# Patient Record
Sex: FEMALE | Race: WHITE | NOT HISPANIC OR LATINO | Employment: PART TIME | URBAN - METROPOLITAN AREA
[De-identification: names, ages, dates, MRNs, and addresses within clinical notes are randomized per-mention and may not be internally consistent; named-entity substitution may affect disease eponyms.]

---

## 2017-01-05 ENCOUNTER — TELEPHONE (OUTPATIENT)
Dept: INTERNAL MEDICINE | Facility: CLINIC | Age: 37
End: 2017-01-05

## 2017-01-05 PROBLEM — F19.10 SUBSTANCE ABUSE: Status: ACTIVE | Noted: 2017-01-05

## 2017-01-05 NOTE — TELEPHONE ENCOUNTER
----- Message from TORSTEN Lambert sent at 1/5/2017 11:55 AM EST -----  Please note history of substance abuse in chart. No narcotics.  ----- Message -----     From: Ayala Cruz     Sent: 1/5/2017  11:50 AM       To: TORSTEN Lambert        Added to keyur

## 2017-01-13 RX ORDER — BUPROPION HYDROCHLORIDE 300 MG/1
TABLET ORAL
Qty: 90 TABLET | Refills: 1 | Status: SHIPPED | OUTPATIENT
Start: 2017-01-13 | End: 2017-02-22 | Stop reason: SINTOL

## 2017-02-22 ENCOUNTER — OFFICE VISIT (OUTPATIENT)
Dept: RETAIL CLINIC | Facility: CLINIC | Age: 37
End: 2017-02-22

## 2017-02-22 VITALS
SYSTOLIC BLOOD PRESSURE: 136 MMHG | TEMPERATURE: 99.8 F | RESPIRATION RATE: 20 BRPM | OXYGEN SATURATION: 98 % | DIASTOLIC BLOOD PRESSURE: 88 MMHG | HEART RATE: 121 BPM

## 2017-02-22 DIAGNOSIS — Z76.0 REPEAT PRESCRIPTION ISSUE: ICD-10-CM

## 2017-02-22 DIAGNOSIS — H60.501 ACUTE OTITIS EXTERNA OF RIGHT EAR, UNSPECIFIED TYPE: Primary | ICD-10-CM

## 2017-02-22 PROCEDURE — 99213 OFFICE O/P EST LOW 20 MIN: CPT | Performed by: NURSE PRACTITIONER

## 2017-02-22 RX ORDER — CIPROFLOXACIN HYDROCHLORIDE 3.5 MG/ML
SOLUTION/ DROPS TOPICAL
Qty: 2.5 ML | Refills: 0 | Status: SHIPPED | OUTPATIENT
Start: 2017-02-22 | End: 2017-10-02

## 2017-02-22 RX ORDER — OMEPRAZOLE 20 MG/1
20 CAPSULE, DELAYED RELEASE ORAL 2 TIMES DAILY
Qty: 60 CAPSULE | Refills: 0 | Status: SHIPPED | OUTPATIENT
Start: 2017-02-22 | End: 2017-03-24

## 2017-02-22 NOTE — PATIENT INSTRUCTIONS
"  Otitis Externa  Otitis externa is a bacterial or fungal infection of the outer ear canal. This is the area from the eardrum to the outside of the ear. Otitis externa is sometimes called \"swimmer's ear.\"  CAUSES   Possible causes of infection include:  · Swimming in dirty water.  · Moisture remaining in the ear after swimming or bathing.  · Mild injury (trauma) to the ear.  · Objects stuck in the ear (foreign body).  · Cuts or scrapes (abrasions) on the outside of the ear.  SIGNS AND SYMPTOMS   The first symptom of infection is often itching in the ear canal. Later signs and symptoms may include swelling and redness of the ear canal, ear pain, and yellowish-white fluid (pus) coming from the ear. The ear pain may be worse when pulling on the earlobe.  DIAGNOSIS   Your health care provider will perform a physical exam. A sample of fluid may be taken from the ear and examined for bacteria or fungi.  TREATMENT   Antibiotic ear drops are often given for 10 to 14 days. Treatment may also include pain medicine or corticosteroids to reduce itching and swelling.  HOME CARE INSTRUCTIONS   · Apply antibiotic ear drops to the ear canal as prescribed by your health care provider.  · Take medicines only as directed by your health care provider.  · If you have diabetes, follow any additional treatment instructions from your health care provider.  · Keep all follow-up visits as directed by your health care provider.  PREVENTION   · Keep your ear dry. Use the corner of a towel to absorb water out of the ear canal after swimming or bathing.  · Avoid scratching or putting objects inside your ear. This can damage the ear canal or remove the protective wax that lines the canal. This makes it easier for bacteria and fungi to grow.  · Avoid swimming in lakes, polluted water, or poorly chlorinated pools.  · You may use ear drops made of rubbing alcohol and vinegar after swimming. Combine equal parts of white vinegar and alcohol in a " bottle. Put 3 or 4 drops into each ear after swimming.  SEEK MEDICAL CARE IF:   · You have a fever.  · Your ear is still red, swollen, painful, or draining pus after 3 days.  · Your redness, swelling, or pain gets worse.  · You have a severe headache.  · You have redness, swelling, pain, or tenderness in the area behind your ear.  MAKE SURE YOU:   · Understand these instructions.  · Will watch your condition.  · Will get help right away if you are not doing well or get worse.     This information is not intended to replace advice given to you by your health care provider. Make sure you discuss any questions you have with your health care provider.     Document Released: 12/18/2006 Document Revised: 01/08/2016 Document Reviewed: 09/26/2016  [employer] Interactive Patient Education ©2016 [employer]

## 2017-02-22 NOTE — PROGRESS NOTES
Subjective   Aide Henry is a 36 y.o. female.     HPI Comments: Is also requesting refill on prilosec. Adequate symptom improvement while using but has recently ran out.     Earache    There is pain in both ears. This is a new problem. Episode onset: x3 days. The problem occurs constantly. The problem has been unchanged. There has been no fever. Associated symptoms include coughing (baseline) and ear discharge (yellow, thick). Pertinent negatives include no abdominal pain, diarrhea, headaches, hearing loss, neck pain, rash, rhinorrhea, sore throat or vomiting. She has tried nothing for the symptoms. Her past medical history is significant for a chronic ear infection and a tympanostomy tube. There is no history of hearing loss.       The following portions of the patient's history were reviewed and updated as appropriate: allergies, current medications, past family history, past medical history, past social history, past surgical history and problem list.    Review of Systems   Constitutional: Negative for appetite change, chills, diaphoresis, fatigue and fever.   HENT: Positive for ear discharge (yellow, thick) and ear pain. Negative for congestion, dental problem, facial swelling, hearing loss, mouth sores, nosebleeds, postnasal drip, rhinorrhea, sinus pressure, sneezing, sore throat, tinnitus, trouble swallowing and voice change.    Eyes: Negative for pain, discharge, redness and itching.   Respiratory: Positive for cough (baseline). Negative for chest tightness, shortness of breath, wheezing and stridor.    Cardiovascular: Negative for chest pain and palpitations.   Gastrointestinal: Negative for abdominal pain, constipation, diarrhea, nausea and vomiting.   Genitourinary: Negative for decreased urine volume.   Musculoskeletal: Negative for myalgias, neck pain and neck stiffness.   Skin: Negative for rash.   Neurological: Negative for dizziness, syncope, weakness and headaches.       Objective   Physical Exam    Constitutional: She is oriented to person, place, and time. She appears well-developed and well-nourished. She is cooperative.  Non-toxic appearance. She does not appear ill. No distress.   HENT:   Right Ear: Hearing and external ear normal. There is tenderness. No drainage. No mastoid tenderness. Tympanic membrane is scarred. Tympanic membrane is not perforated, not erythematous, not retracted and not bulging.   Left Ear: Hearing and external ear normal. No drainage. No mastoid tenderness. Tympanic membrane is scarred. Tympanic membrane is not perforated, not erythematous, not retracted and not bulging.   Nose: Nose normal. Right sinus exhibits no maxillary sinus tenderness and no frontal sinus tenderness. Left sinus exhibits no maxillary sinus tenderness and no frontal sinus tenderness.   Mouth/Throat: Uvula is midline, oropharynx is clear and moist and mucous membranes are normal. Tonsils are 0 on the right. Tonsils are 0 on the left.   Tympanostomy tube present in left and right TMs, right canal moderately erythematous   Eyes: Conjunctivae and lids are normal.   Cardiovascular: Normal rate, regular rhythm, S1 normal and S2 normal.    Pulmonary/Chest: Effort normal and breath sounds normal.   Abdominal: Soft. Normal appearance and bowel sounds are normal. There is no tenderness.   Lymphadenopathy:     She has no cervical adenopathy.   Neurological: She is alert and oriented to person, place, and time.   Skin: Skin is warm and dry. She is not diaphoretic. No pallor.   Vitals reviewed.      Assessment/Plan   Aide was seen today for earache.    Diagnoses and all orders for this visit:    Acute otitis externa of right ear, unspecified type  -     ciprofloxacin (CILOXAN) 0.3 % ophthalmic solution; 1 drop in right EAR twice a day x 10 days    Repeat prescription issue  -     omeprazole (PRILOSEC) 20 MG capsule; Take 1 capsule by mouth 2 (Two) Times a Day for 30 days.          -     Follow up with ENT for persistent  symptoms        -     Follow up with urgent treatment for worsening symptoms

## 2017-05-10 ENCOUNTER — OFFICE VISIT (OUTPATIENT)
Dept: INTERNAL MEDICINE | Facility: CLINIC | Age: 37
End: 2017-05-10

## 2017-05-10 VITALS
DIASTOLIC BLOOD PRESSURE: 86 MMHG | WEIGHT: 142 LBS | HEART RATE: 101 BPM | SYSTOLIC BLOOD PRESSURE: 134 MMHG | HEIGHT: 64 IN | OXYGEN SATURATION: 98 % | BODY MASS INDEX: 24.24 KG/M2

## 2017-05-10 DIAGNOSIS — B96.89 BV (BACTERIAL VAGINOSIS): ICD-10-CM

## 2017-05-10 DIAGNOSIS — F19.20 DRUG ADDICTION (HCC): Primary | ICD-10-CM

## 2017-05-10 DIAGNOSIS — F10.10 ALCOHOL ABUSE: ICD-10-CM

## 2017-05-10 DIAGNOSIS — N76.0 BV (BACTERIAL VAGINOSIS): ICD-10-CM

## 2017-05-10 PROCEDURE — 99213 OFFICE O/P EST LOW 20 MIN: CPT | Performed by: NURSE PRACTITIONER

## 2017-05-10 RX ORDER — METRONIDAZOLE 500 MG/1
500 TABLET ORAL 3 TIMES DAILY
Qty: 21 TABLET | Refills: 0 | Status: SHIPPED | OUTPATIENT
Start: 2017-05-10 | End: 2017-10-02

## 2017-05-12 LAB
ALBUMIN SERPL-MCNC: 4.2 G/DL (ref 3.5–5.2)
ALBUMIN/GLOB SERPL: 1.4 G/DL
ALP SERPL-CCNC: 71 U/L (ref 40–129)
ALT SERPL-CCNC: 80 U/L (ref 5–33)
AST SERPL-CCNC: 137 U/L (ref 5–32)
BASOPHILS # BLD AUTO: 0.03 10*3/MM3 (ref 0–0.2)
BASOPHILS NFR BLD AUTO: 0.6 % (ref 0–2)
BILIRUB SERPL-MCNC: 0.2 MG/DL (ref 0.2–1.2)
BUN SERPL-MCNC: 8 MG/DL (ref 6–20)
BUN/CREAT SERPL: 12.1 (ref 7–25)
CALCIUM SERPL-MCNC: 8.9 MG/DL (ref 8.6–10.5)
CHLORIDE SERPL-SCNC: 100 MMOL/L (ref 98–107)
CO2 SERPL-SCNC: 25 MMOL/L (ref 22–29)
CREAT SERPL-MCNC: 0.66 MG/DL (ref 0.57–1)
EOSINOPHIL # BLD AUTO: 0.13 10*3/MM3 (ref 0.1–0.3)
EOSINOPHIL NFR BLD AUTO: 2.6 % (ref 0–4)
ERYTHROCYTE [DISTWIDTH] IN BLOOD BY AUTOMATED COUNT: 12.8 % (ref 11.5–14.5)
ETHANOL BLD GC-MCNC: 0.17 %
GLOBULIN SER CALC-MCNC: 3.1 GM/DL
GLUCOSE SERPL-MCNC: 90 MG/DL (ref 65–99)
HAV IGM SERPL QL IA: NEGATIVE
HBV CORE IGM SERPL QL IA: NEGATIVE
HBV SURFACE AG SERPL QL IA: NEGATIVE
HCT VFR BLD AUTO: 42.7 % (ref 37–47)
HCV AB S/CO SERPL IA: <0.1 S/CO RATIO (ref 0–0.9)
HGB BLD-MCNC: 14.3 G/DL (ref 12–16)
HIV 1+2 AB+HIV1 P24 AG SERPL QL IA: NON REACTIVE
IMM GRANULOCYTES # BLD: 0.04 10*3/MM3 (ref 0–0.03)
IMM GRANULOCYTES NFR BLD: 0.8 % (ref 0–0.5)
LYMPHOCYTES # BLD AUTO: 1.57 10*3/MM3 (ref 0.6–4.8)
LYMPHOCYTES NFR BLD AUTO: 31.7 % (ref 20–45)
MCH RBC QN AUTO: 31.8 PG (ref 27–31)
MCHC RBC AUTO-ENTMCNC: 33.5 G/DL (ref 31–37)
MCV RBC AUTO: 95.1 FL (ref 81–99)
MONOCYTES # BLD AUTO: 0.43 10*3/MM3 (ref 0–1)
MONOCYTES NFR BLD AUTO: 8.7 % (ref 3–8)
NEUTROPHILS # BLD AUTO: 2.76 10*3/MM3 (ref 1.5–8.3)
NEUTROPHILS NFR BLD AUTO: 55.6 % (ref 45–70)
NRBC BLD AUTO-RTO: 0 /100 WBC (ref 0–0)
PLATELET # BLD AUTO: 323 10*3/MM3 (ref 140–500)
POTASSIUM SERPL-SCNC: 4.1 MMOL/L (ref 3.5–5.2)
PROT SERPL-MCNC: 7.3 G/DL (ref 6–8.5)
RBC # BLD AUTO: 4.49 10*6/MM3 (ref 4.2–5.4)
SODIUM SERPL-SCNC: 141 MMOL/L (ref 136–145)
WBC # BLD AUTO: 4.96 10*3/MM3 (ref 4.8–10.8)

## 2017-05-15 ENCOUNTER — TELEPHONE (OUTPATIENT)
Dept: INTERNAL MEDICINE | Facility: CLINIC | Age: 37
End: 2017-05-15

## 2017-05-16 LAB
AMPHETAMINES UR QL SCN: NEGATIVE NG/ML
BARBITURATES UR QL SCN: NEGATIVE NG/ML
BENZODIAZ UR QL SCN: NEGATIVE NG/ML
BZE UR QL: POSITIVE
CANNABINOIDS UR QL CFM: POSITIVE
CREAT UR-MCNC: 52 MG/DL (ref 20–300)
FENTANYL+NORFENTANYL UR QL SCN: NEGATIVE PG/ML
Lab: ABNORMAL
MEPERIDINE UR QL: NEGATIVE NG/ML
METHADONE UR QL SCN: NEGATIVE NG/ML
OPIATES UR QL SCN: NEGATIVE NG/ML
OXYCODONE+OXYMORPHONE UR QL SCN: NEGATIVE NG/ML
PCP UR QL: NEGATIVE NG/ML
PH UR: 6.9 [PH] (ref 4.5–8.9)
PROPOXYPH UR QL SCN: NEGATIVE NG/ML
SP GR UR: 1.01
TRAMADOL UR QL SCN: NEGATIVE NG/ML

## 2017-09-13 ENCOUNTER — HOSPITAL ENCOUNTER (EMERGENCY)
Facility: HOSPITAL | Age: 37
Discharge: HOME | End: 2017-09-13
Attending: EMERGENCY MEDICINE | Admitting: EMERGENCY MEDICINE

## 2017-09-13 VITALS
DIASTOLIC BLOOD PRESSURE: 84 MMHG | RESPIRATION RATE: 14 BRPM | HEIGHT: 64 IN | WEIGHT: 158 LBS | OXYGEN SATURATION: 99 % | SYSTOLIC BLOOD PRESSURE: 124 MMHG | HEART RATE: 85 BPM | BODY MASS INDEX: 26.98 KG/M2 | TEMPERATURE: 98.5 F

## 2017-09-13 DIAGNOSIS — L02.31 LEFT BUTTOCK ABSCESS: Primary | ICD-10-CM

## 2017-09-13 PROCEDURE — 99283 EMERGENCY DEPT VISIT LOW MDM: CPT

## 2017-09-13 PROCEDURE — 10060 I&D ABSCESS SIMPLE/SINGLE: CPT | Performed by: EMERGENCY MEDICINE

## 2017-09-13 RX ORDER — CEPHALEXIN 500 MG/1
500 CAPSULE ORAL 4 TIMES DAILY
Qty: 28 CAPSULE | Refills: 0 | Status: SHIPPED | OUTPATIENT
Start: 2017-09-13 | End: 2017-09-20

## 2017-09-13 RX ORDER — SULFAMETHOXAZOLE AND TRIMETHOPRIM 800; 160 MG/1; MG/1
1 TABLET ORAL ONCE
Status: COMPLETED | OUTPATIENT
Start: 2017-09-13 | End: 2017-09-13

## 2017-09-13 RX ORDER — TRAMADOL HYDROCHLORIDE 50 MG/1
50 TABLET ORAL EVERY 8 HOURS PRN
Qty: 10 TABLET | Refills: 0 | Status: SHIPPED | OUTPATIENT
Start: 2017-09-13 | End: 2017-09-16

## 2017-09-13 RX ORDER — SULFAMETHOXAZOLE AND TRIMETHOPRIM 800; 160 MG/1; MG/1
1 TABLET ORAL 2 TIMES DAILY
Qty: 14 TABLET | Refills: 0 | Status: SHIPPED | OUTPATIENT
Start: 2017-09-13 | End: 2017-09-20

## 2017-09-13 RX ORDER — CEPHALEXIN 500 MG/1
500 CAPSULE ORAL ONCE
Status: COMPLETED | OUTPATIENT
Start: 2017-09-13 | End: 2017-09-13

## 2017-09-13 RX ORDER — HYDROCODONE BITARTRATE AND ACETAMINOPHEN 5; 325 MG/1; MG/1
1 TABLET ORAL ONCE
Status: COMPLETED | OUTPATIENT
Start: 2017-09-13 | End: 2017-09-13

## 2017-09-13 RX ADMIN — SULFAMETHOXAZOLE AND TRIMETHOPRIM 160 MG: 800; 160 TABLET ORAL at 11:09

## 2017-09-13 RX ADMIN — HYDROCODONE BITARTRATE AND ACETAMINOPHEN 1 TABLET: 5; 325 TABLET ORAL at 11:09

## 2017-09-13 RX ADMIN — CEPHALEXIN 500 MG: 500 CAPSULE ORAL at 11:09

## 2017-09-13 NOTE — ED PROVIDER NOTES
Subjective   History of Present Illness  History of Present Illness    Chief complaint: Abscess    Location: Left buttocks    Quality/Severity:  Moderate to severe pain    Timing/Duration: Worsening over the past one week    Modifying Factors: Worse with sitting or lying on the buttocks.    Narrative: This patient presents for evaluation of worsening pain to the left buttocks from an abscess.  She has been in nursing home for the past couple of months.  She says that she was diagnosed with the abscess about 1 week ago and they started her on Bactrim therapy which she has been taking for about 5 or 6 days.  She says that in spite of taking the Bactrim medication she continues having a lot of pain and swelling and redness in the buttocks and she is worried that the abscess is growing.  She denies any fevers.  She denies any drainage.  She denies any other areas of swelling or redness or concern.    Associated Symptoms: As above    Review of Systems   Constitutional: Negative for activity change and fever.   HENT: Negative.    Respiratory: Negative for shortness of breath.    Cardiovascular: Negative for chest pain.   Gastrointestinal: Negative for abdominal pain.   Musculoskeletal: Positive for back pain and myalgias.   Skin: Positive for rash. Negative for color change.   Neurological: Negative for syncope and headaches.   All other systems reviewed and are negative.      Past Medical History:   Diagnosis Date   • Anxiety    • Bacterial vaginosis    • Fibromyalgia    • Lateral epicondylitis 2013   • Lupus    • Sjogren's syndrome    • Urinary tract infection        Allergies   Allergen Reactions   • Fluconazole        Past Surgical History:   Procedure Laterality Date   • ADENOIDECTOMY     •  SECTION     • MOUTH SURGERY     • TONSILLECTOMY     • TUBAL ABDOMINAL LIGATION     • TYMPANOSTOMY TUBE PLACEMENT     • WISDOM TOOTH EXTRACTION Bilateral        Family History   Problem Relation Age of Onset   • Cancer Father  "   • Heart disease Paternal Grandmother        Social History     Social History   • Marital status:      Spouse name: N/A   • Number of children: N/A   • Years of education: N/A     Social History Main Topics   • Smoking status: Current Every Day Smoker     Packs/day: 0.50     Years: 20.00     Types: Cigarettes   • Smokeless tobacco: Never Used   • Alcohol use No   • Drug use: No      Comment: \"recovering addict\"   • Sexual activity: Yes     Other Topics Concern   • None     Social History Narrative   • None     ED Triage Vitals   Temp Heart Rate Resp BP SpO2   09/13/17 1025 09/13/17 1025 09/13/17 1025 09/13/17 1025 09/13/17 1025   98.5 °F (36.9 °C) 105 14 125/85 100 %      Temp src Heart Rate Source Patient Position BP Location FiO2 (%)   09/13/17 1025 09/13/17 1025 09/13/17 1025 09/13/17 1025 --   Oral Monitor Sitting Right arm            Objective   Physical Exam   Constitutional: She is oriented to person, place, and time. She appears well-developed and well-nourished.   HENT:   Head: Normocephalic and atraumatic.   Eyes: EOM are normal. Pupils are equal, round, and reactive to light. Right eye exhibits no discharge. Left eye exhibits no discharge.   Neck: Normal range of motion. Neck supple.   Cardiovascular: Normal rate and intact distal pulses.    Pulmonary/Chest: Effort normal. No respiratory distress.   Musculoskeletal: Normal range of motion. She exhibits tenderness. She exhibits no edema or deformity.   Neurological: She is alert and oriented to person, place, and time.   Skin: Skin is warm and dry. No rash noted. There is erythema. No pallor.   The left medial buttocks shows an area of moderate erythema and induration and some mild central fluctuance.  This abscess tissue is approximately 6 cm in diameter and is very tender to palpation.  There is no open lesion or drainage.   Psychiatric: She has a normal mood and affect. Her behavior is normal. Judgment and thought content normal.   Nursing " "note and vitals reviewed.      Incision & Drainage  Date/Time: 9/13/2017 12:15 PM  Performed by: TAVO ABREU  Authorized by: TAVO ABREU     Consent:     Consent obtained:  Verbal    Consent given by:  Patient    Risks discussed:  Bleeding, incomplete drainage, infection, pain and damage to other organs    Alternatives discussed:  No treatment and delayed treatment  Location:     Type:  Abscess    Location:  Lower extremity    Lower extremity location:  L buttock  Pre-procedure details:     Skin preparation:  Betadine  Anesthesia (see MAR for exact dosages):     Anesthesia method:  Local infiltration    Local anesthetic:  Lidocaine 2% WITH epi  Procedure details:     Complexity:  Simple    Needle aspiration: no      Incision types:  Single straight    Incision depth:  Subcutaneous    Scalpel blade:  11    Wound management:  Probed and deloculated    Drainage:  Purulent    Drainage amount:  Copious    Wound treatment:  Wound left open    Packing materials:  1/4 in iodoform gauze    Amount 1/4\" iodoform:  9\"  Post-procedure details:     Patient tolerance of procedure:  Tolerated well, no immediate complications             ED Course  ED Course   Comment By Time   Patient presented with a moderate sized abscess on the left buttocks.  It required incision and drainage which I performed at the bedside.  A moderate amount of purulent debris was evacuated and I packed the wound with iodoform gauze.  We gave her a dose of Keflex and Bactrim here.  I'll send her home with a continued prescription for both medications for one week.  Advised to remove the gauze tomorrow evening.  Gave her the basic abscess wound care instructions and also the usual \"return to ER\" instructions for any further concerns. Tavo Abreu MD 09/13 1710                  MDM  Number of Diagnoses or Management Options  Left buttock abscess:   Risk of Complications, Morbidity, and/or Mortality  Presenting problems: moderate  Diagnostic " procedures: moderate  Management options: moderate        Final diagnoses:   Left buttock abscess            Adalid Abreu MD  09/13/17 4580

## 2017-09-13 NOTE — DISCHARGE INSTRUCTIONS
Take both antibiotics as directed until they're completed.  Remove the gauze packing on Thursday evening as we discussed.  Please return to the emergency room for any worsening pain, redness, swelling, fevers or any other concerns.

## 2017-09-19 ENCOUNTER — OFFICE VISIT (OUTPATIENT)
Dept: INTERNAL MEDICINE | Facility: CLINIC | Age: 37
End: 2017-09-19

## 2017-09-19 VITALS
WEIGHT: 160 LBS | HEART RATE: 111 BPM | OXYGEN SATURATION: 96 % | HEIGHT: 64 IN | BODY MASS INDEX: 27.31 KG/M2 | DIASTOLIC BLOOD PRESSURE: 72 MMHG | SYSTOLIC BLOOD PRESSURE: 118 MMHG

## 2017-09-19 DIAGNOSIS — IMO0001 WOUND ABSCESS, INITIAL ENCOUNTER: Primary | ICD-10-CM

## 2017-09-19 DIAGNOSIS — F19.20: ICD-10-CM

## 2017-09-19 PROBLEM — IMO0002 WOUND ABSCESS: Status: ACTIVE | Noted: 2017-09-19

## 2017-09-19 PROCEDURE — 99213 OFFICE O/P EST LOW 20 MIN: CPT | Performed by: NURSE PRACTITIONER

## 2017-09-25 ENCOUNTER — TELEPHONE (OUTPATIENT)
Dept: INTERNAL MEDICINE | Facility: CLINIC | Age: 37
End: 2017-09-25

## 2017-09-25 LAB
BACTERIA SPEC AEROBE CULT: NORMAL
BACTERIA SPEC ANAEROBE CULT: NORMAL
BACTERIA SPEC CULT: NORMAL
BACTERIA SPEC CULT: NORMAL

## 2017-09-25 NOTE — TELEPHONE ENCOUNTER
Patient advised, abscess is still there. Ok with surgery consult.    ----- Message from TORSTEN Lambert sent at 9/25/2017  9:17 AM EDT -----  Culture was negative. If abscess still present will need surgery consult.  ----- Message -----     From: Troy Moreno Results In     Sent: 9/25/2017   8:08 AM       To: TORSTEN Lambert

## 2017-09-26 DIAGNOSIS — L02.91 ABSCESS: Primary | ICD-10-CM

## 2017-10-02 ENCOUNTER — OFFICE VISIT (OUTPATIENT)
Dept: SURGERY | Facility: CLINIC | Age: 37
End: 2017-10-02

## 2017-10-02 VITALS
SYSTOLIC BLOOD PRESSURE: 118 MMHG | DIASTOLIC BLOOD PRESSURE: 70 MMHG | HEIGHT: 64 IN | BODY MASS INDEX: 27.31 KG/M2 | WEIGHT: 160 LBS

## 2017-10-02 DIAGNOSIS — IMO0001 WOUND ABSCESS, SEQUELA: Primary | ICD-10-CM

## 2017-10-02 PROCEDURE — 99201 PR OFFICE OUTPATIENT NEW 10 MINUTES: CPT | Performed by: SURGERY

## 2017-10-02 NOTE — PROGRESS NOTES
PATIENT INFORMATION  Aide Henry  ABSCESS ON BUTTOCKS, PT HAS BEEN ON FLAGYL AND IT DID HELP THE AREA, PT STATES AREA HAS DRAINED AND IS NOW HEALED BUT AREA IS STILL HARD     - 1980    CHIEF COMPLAINT  Chief Complaint   Patient presents with   • Abscess       HISTORY OF PRESENT ILLNESS  HPI she complains of an abscess on her left buttock.  She says it initially drained and then she went to the emergency room in mid September and this was I&D.  She was placed on Bactrim and then subsequently placed on a second course of Bactrim by her primary care doctor.  Her cultures were no growth.  She denies any drainage from the area now in the area has sealed.  She denies any fevers or chills.  She denies a prior cyst or mass at this site.  She's not had a problem here prior.  She complains of some thickening at the site.        REVIEW OF SYSTEMS  Review of Systems   Constitutional: Negative.    HENT: Negative.    Eyes: Negative.    Respiratory: Negative.    Cardiovascular: Negative.    Gastrointestinal: Negative.    Endocrine: Negative.    Genitourinary: Negative.    Musculoskeletal: Negative.    Skin: Negative.    Allergic/Immunologic: Negative.    Neurological: Negative.    Hematological: Negative.    Psychiatric/Behavioral: Negative.          ACTIVE PROBLEMS  Patient Active Problem List    Diagnosis   • Wound abscess [T81.4XXA]   • Drug addiction syndrome [F19.20]   • Drug addiction [F19.20]   • Alcohol abuse [F10.10]   • Substance abuse [F19.10]   • Bilateral carpal tunnel syndrome [G56.03]   • Eczema [L30.9]   • Shoulder pain, left [M25.512]   • Anxiety [F41.9]   • Arthralgia of multiple joints [M25.50]   • Chronic back pain [M54.9, G89.29]   • Bacterial vaginosis [N76.0, B96.89]   • Chronic constipation [K59.09]   • Disorder of connective tissue [M35.9]   • Foreign body of cornea [T15.00XA]   • Mixed anxiety depressive disorder [F41.8]   • Fever [R50.9]   • Fibromyalgia [M79.7]   • Gastroesophageal reflux  "disease without esophagitis [K21.9]   • Irritable bowel syndrome [K58.9]   • Muscle pain [M79.1]   • Palpitations [R00.2]   • Seasonal allergic rhinitis [J30.2]   • Sjogren's syndrome [M35.00]   • 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           PAST MEDICAL HISTORY  Past Medical History:   Diagnosis Date   • Anxiety    • Bacterial vaginosis    • Fibromyalgia    • Lateral epicondylitis 2013   • Lupus    • Sjogren's syndrome    • Urinary tract infection          SURGICAL HISTORY  Past Surgical History:   Procedure Laterality Date   • ADENOIDECTOMY     •  SECTION     • MOUTH SURGERY     • TONSILLECTOMY     • TUBAL ABDOMINAL LIGATION     • TYMPANOSTOMY TUBE PLACEMENT     • WISDOM TOOTH EXTRACTION Bilateral          FAMILY HISTORY  Family History   Problem Relation Age of Onset   • Cancer Father    • Heart disease Paternal Grandmother          SOCIAL HISTORY  Social History     Occupational History   • Not on file.     Social History Main Topics   • Smoking status: Current Every Day Smoker     Packs/day: 0.50     Years: 20.00     Types: Cigarettes   • Smokeless tobacco: Never Used   • Alcohol use Yes      Comment: Last drink    • Drug use: Yes     Special: Cocaine      Comment: \"recovering addict\"   • Sexual activity: Yes         CURRENT MEDICATIONS  No current outpatient prescriptions on file.    ALLERGIES  Fluconazole    VITALS  Vitals:    10/02/17 1415   BP: 118/70   Weight: 160 lb (72.6 kg)   Height: 64\" (162.6 cm)       LAST RESULTS   Office Visit on 2017   Component Date Value Ref Range Status   • Aer Anaer Result 1 2017 Final report   Final   • Result 1 2017 Comment   Final    No aerobic or anaerobic growth in 72 hours.   • Culture 2017 Final report   Final   • Result 1 2017 Comment   Final    No growth in 36 - 48 hours.     No " results found.    PHYSICAL EXAM  Physical Exam  alert white female in no active distress.  There is a 1 mm scab over an I&D site.  There is no evidence of fluctuance induration cellulitis or recurrent abscess.  She does have a moderate amount of thickening in the soft tissue and this is consistent with a normal healing process.  Her cultures were no growth and I reviewed her primary care and her ER records.    ASSESSMENT  Healing abscess      PLAN  I do not feel that any additional medication or intervention needs to be undertaken.  The induration and thickening in the soft tissue we'll continue to resolve.  I will see her back when necessary.

## 2017-10-30 ENCOUNTER — OFFICE VISIT (OUTPATIENT)
Dept: INTERNAL MEDICINE | Facility: CLINIC | Age: 37
End: 2017-10-30

## 2017-10-30 VITALS
RESPIRATION RATE: 16 BRPM | BODY MASS INDEX: 27.66 KG/M2 | WEIGHT: 162 LBS | HEIGHT: 64 IN | OXYGEN SATURATION: 98 % | DIASTOLIC BLOOD PRESSURE: 80 MMHG | SYSTOLIC BLOOD PRESSURE: 120 MMHG | HEART RATE: 96 BPM

## 2017-10-30 DIAGNOSIS — K21.9 GASTROESOPHAGEAL REFLUX DISEASE WITHOUT ESOPHAGITIS: ICD-10-CM

## 2017-10-30 DIAGNOSIS — M35.01 SJOGREN'S SYNDROME WITH KERATOCONJUNCTIVITIS SICCA (HCC): ICD-10-CM

## 2017-10-30 DIAGNOSIS — N76.1 SUBACUTE VAGINITIS: Primary | ICD-10-CM

## 2017-10-30 DIAGNOSIS — Z96.22 HISTORY OF PLACEMENT OF EAR TUBES: ICD-10-CM

## 2017-10-30 LAB
BILIRUB BLD-MCNC: NEGATIVE MG/DL
CLARITY, POC: CLEAR
COLOR UR: YELLOW
GLUCOSE UR STRIP-MCNC: NEGATIVE MG/DL
KETONES UR QL: NEGATIVE
LEUKOCYTE EST, POC: ABNORMAL
NITRITE UR-MCNC: NEGATIVE MG/ML
PH UR: 8.5 [PH] (ref 5–8)
PROT UR STRIP-MCNC: NEGATIVE MG/DL
RBC # UR STRIP: NEGATIVE /UL
SP GR UR: 1.02 (ref 1–1.03)
UROBILINOGEN UR QL: NORMAL

## 2017-10-30 PROCEDURE — 99214 OFFICE O/P EST MOD 30 MIN: CPT | Performed by: NURSE PRACTITIONER

## 2017-10-30 PROCEDURE — 81003 URINALYSIS AUTO W/O SCOPE: CPT | Performed by: NURSE PRACTITIONER

## 2017-10-30 RX ORDER — OMEPRAZOLE 20 MG/1
20 CAPSULE, DELAYED RELEASE ORAL DAILY
Qty: 30 CAPSULE | Refills: 2 | Status: SHIPPED | OUTPATIENT
Start: 2017-10-30 | End: 2018-04-28 | Stop reason: SDUPTHER

## 2017-10-30 RX ORDER — AZITHROMYCIN 500 MG/1
1000 TABLET, FILM COATED ORAL ONCE
Qty: 2 TABLET | Refills: 0 | Status: SHIPPED | OUTPATIENT
Start: 2017-10-30 | End: 2017-10-30

## 2017-10-30 RX ORDER — CEFTRIAXONE SODIUM 250 MG/1
250 INJECTION, POWDER, FOR SOLUTION INTRAMUSCULAR; INTRAVENOUS ONCE
Status: COMPLETED | OUTPATIENT
Start: 2017-10-30 | End: 2017-12-12

## 2017-10-30 NOTE — PROGRESS NOTES
Chief Complaint   Patient presents with   • Vaginal Itching       Subjective     Aide Henry is a 37 y.o. female being seen for a follow up appointment today regarding vaginal itching that started 2 weeks ago.  She took OTC monistat without much help.  Yellow vaginal discharge and burning pain with urination. Denies abd pain. No new sexual partners or known exposures. Last pap was with Dr. Pillai about 1 year ago.    She has a history of ear tubes in place, and will need an ENT referral due to passport.    She also has Sjogrens syndrome, which causes owen dry eye. Last eye eval was > 1 year ago.       History of Present Illness     Allergies   Allergen Reactions   • Fluconazole        No current outpatient prescriptions on file.    The following portions of the patient's history were reviewed and updated as appropriate: allergies, current medications, past family history, past medical history, past social history, past surgical history and problem list.    Review of Systems   HENT: Negative for ear pain, nosebleeds, postnasal drip, sinus pressure, sneezing and sore throat.    Eyes: Positive for pain, discharge, redness and itching.   Respiratory: Negative.    Genitourinary: Positive for dysuria and vaginal discharge. Negative for decreased urine volume, frequency, menstrual problem, pelvic pain, urgency and vaginal pain.   Musculoskeletal: Negative.    Hematological: Negative.    Psychiatric/Behavioral: Negative.        Assessment     Physical Exam   Constitutional: She is oriented to person, place, and time. She appears well-developed and well-nourished.   HENT:   Head: Normocephalic.   Right Ear: External ear normal. A foreign body (Green TM tube in place) is present.   Left Ear: External ear normal. A foreign body (Green TM tube in place) is present.   Nose: Nose normal.   Mouth/Throat: Oropharynx is clear and moist. No oropharyngeal exudate.   Eyes: Right conjunctiva is injected. Right conjunctiva has no  hemorrhage. Left conjunctiva is not injected. Left conjunctiva has no hemorrhage.   Neck: Neck supple. No thyromegaly present.   Cardiovascular: Normal rate, regular rhythm and normal heart sounds.    No murmur heard.  Pulmonary/Chest: Effort normal and breath sounds normal. No respiratory distress. She has no wheezes.   Genitourinary: There is no rash, lesion or injury on the right labia. There is no rash, lesion or injury on the left labia. Cervix exhibits motion tenderness and discharge. Right adnexum displays no tenderness and no fullness. Left adnexum displays no mass and no fullness. There is tenderness in the vagina. No bleeding in the vagina. No foreign body in the vagina. Vaginal discharge (yellow purulent) found.   Musculoskeletal: She exhibits no edema.   Neurological: She is alert and oriented to person, place, and time.   Psychiatric: She has a normal mood and affect. Her behavior is normal.   Vitals reviewed.      Aston Noble was seen today for vaginal itching.    Diagnoses and all orders for this visit:    Subacute vaginitis  -     POC Urinalysis Dipstick, Automated  -     Urine Culture - Urine, Urine, Clean Catch  -     azithromycin (ZITHROMAX) 500 MG tablet; Take 2 tablets by mouth 1 (One) Time for 1 dose.    Sjogren's syndrome with keratoconjunctivitis sicca  -     Ambulatory Referral to Ophthalmology    Gastroesophageal reflux disease without esophagitis  -     omeprazole (PRILOSEC) 20 MG capsule; Take 1 capsule by mouth Daily.    History of placement of ear tubes  -     Ambulatory Referral to ENT (Otolaryngology)      Set up fasting labs with STD screen and a physical in 3 months.

## 2017-11-01 LAB
BACTERIA UR CULT: ABNORMAL
BACTERIA UR CULT: ABNORMAL

## 2017-11-02 LAB
A VAGINAE DNA VAG QL NAA+PROBE: ABNORMAL SCORE
BVAB2 DNA VAG QL NAA+PROBE: ABNORMAL SCORE
C ALBICANS DNA VAG QL NAA+PROBE: NEGATIVE
C GLABRATA DNA VAG QL NAA+PROBE: NEGATIVE
C TRACH RRNA CVX QL NAA+PROBE: NEGATIVE
C TRACH RRNA SPEC QL NAA+PROBE: NEGATIVE
CONV .: ABNORMAL
CYTOLOGIST CVX/VAG CYTO: ABNORMAL
CYTOLOGY CVX/VAG DOC THIN PREP: ABNORMAL
DX ICD CODE: ABNORMAL
DX ICD CODE: ABNORMAL
HIV 1 & 2 AB SER-IMP: ABNORMAL
MEGA1 DNA VAG QL NAA+PROBE: ABNORMAL SCORE
N GONORRHOEA RRNA CVX QL NAA+PROBE: NEGATIVE
N GONORRHOEA RRNA SPEC QL NAA+PROBE: NEGATIVE
OTHER STN SPEC: ABNORMAL
PATH REPORT.FINAL DX SPEC: ABNORMAL
STAT OF ADQ CVX/VAG CYTO-IMP: ABNORMAL
T VAGINALIS RRNA SPEC QL NAA+PROBE: POSITIVE
T VAGINALIS RRNA SPEC QL NAA+PROBE: POSITIVE

## 2017-11-03 ENCOUNTER — TELEPHONE (OUTPATIENT)
Dept: INTERNAL MEDICINE | Facility: CLINIC | Age: 37
End: 2017-11-03

## 2017-11-03 NOTE — TELEPHONE ENCOUNTER
Attempted to call patient but no answer, her voicemail is not set up.     ----- Message from TORSTEN Lambert sent at 11/2/2017  8:11 AM EDT -----  Positive trich as suspected. She has already been treated. Sexual partners need treatment as well.   ----- Message -----     From: Garland Carrillo MA     Sent: 10/30/2017  10:53 AM       To: TORSTEN Lambert

## 2017-11-29 ENCOUNTER — TELEPHONE (OUTPATIENT)
Dept: INTERNAL MEDICINE | Facility: CLINIC | Age: 37
End: 2017-11-29

## 2017-11-29 NOTE — TELEPHONE ENCOUNTER
Advised patient as per below.  Patient declined appt this week due to menses.  Offered appt next week, but she states she will call back.    ----- Message from TORSTEN Lambert sent at 11/29/2017  3:05 PM EST -----  Regarding: FW: REFILL MED FOR STD  Contact: [phone]  Not sure what STD she has currently. Will need a vaginal swab and STD panel for high risk.  ----- Message -----     From: Lulú Mata MA     Sent: 11/29/2017   2:29 PM       To: TORSTEN Lambert  Subject: FW: REFILL MED FOR STD                               ----- Message -----     From: Lori Ellis     Sent: 11/29/2017   1:56 PM       To: [facility]  Subject: REFILL MED FOR STD                               MARLENY CHOPRA    Patient is requesting a refill of the medicine that you gave her for her STD. She said she is having the same problem again.    kriss richard    Thanks!

## 2017-12-12 ENCOUNTER — OFFICE VISIT (OUTPATIENT)
Dept: INTERNAL MEDICINE | Facility: CLINIC | Age: 37
End: 2017-12-12

## 2017-12-12 VITALS
DIASTOLIC BLOOD PRESSURE: 80 MMHG | TEMPERATURE: 98.4 F | HEART RATE: 136 BPM | BODY MASS INDEX: 27.31 KG/M2 | HEIGHT: 64 IN | WEIGHT: 160 LBS | RESPIRATION RATE: 16 BRPM | OXYGEN SATURATION: 98 % | SYSTOLIC BLOOD PRESSURE: 120 MMHG

## 2017-12-12 DIAGNOSIS — A08.4 VIRAL GASTROENTERITIS: ICD-10-CM

## 2017-12-12 DIAGNOSIS — A59.01 TRICHOMONAL VAGINITIS: Primary | ICD-10-CM

## 2017-12-12 PROBLEM — N76.1 SUBACUTE VAGINITIS: Status: RESOLVED | Noted: 2017-10-30 | Resolved: 2017-12-12

## 2017-12-12 PROBLEM — IMO0002 WOUND ABSCESS: Status: RESOLVED | Noted: 2017-09-19 | Resolved: 2017-12-12

## 2017-12-12 PROBLEM — K52.9 GASTROENTERITIS: Status: ACTIVE | Noted: 2017-12-12

## 2017-12-12 PROCEDURE — 96372 THER/PROPH/DIAG INJ SC/IM: CPT | Performed by: NURSE PRACTITIONER

## 2017-12-12 PROCEDURE — 99213 OFFICE O/P EST LOW 20 MIN: CPT | Performed by: NURSE PRACTITIONER

## 2017-12-12 RX ORDER — ONDANSETRON 4 MG/1
4 TABLET, FILM COATED ORAL EVERY 8 HOURS PRN
Qty: 10 TABLET | Refills: 0 | Status: SHIPPED | OUTPATIENT
Start: 2017-12-12 | End: 2018-01-15

## 2017-12-12 RX ORDER — AZITHROMYCIN 500 MG/1
1000 TABLET, FILM COATED ORAL ONCE
Qty: 2 TABLET | Refills: 0 | Status: SHIPPED | OUTPATIENT
Start: 2017-12-12 | End: 2017-12-12

## 2017-12-12 RX ORDER — CEFTRIAXONE SODIUM 250 MG/1
250 INJECTION, POWDER, FOR SOLUTION INTRAMUSCULAR; INTRAVENOUS ONCE
Qty: 250 MG | Refills: 0 | Status: SHIPPED | OUTPATIENT
Start: 2017-12-12 | End: 2017-12-12

## 2017-12-12 RX ADMIN — CEFTRIAXONE SODIUM 250 MG: 250 INJECTION, POWDER, FOR SOLUTION INTRAMUSCULAR; INTRAVENOUS at 17:31

## 2017-12-12 NOTE — PROGRESS NOTES
Chief Complaint   Patient presents with   • Diarrhea   • Abdominal Pain   • Vaginitis       Subjective     Aide Henry is a 37 y.o. female being seen for a follow up appointment today regarding vaginitis. She was treated for trich in December. She believes she was re-infected when her  did not get treated for STD. Symptoms of yellow vaginal Discharge, itching started again 1 month ago.     She is having abd pain and nausea as well from viral gastroenteritis. .      History of Present Illness     Allergies   Allergen Reactions   • Fluconazole          Current Outpatient Prescriptions:   •  omeprazole (PRILOSEC) 20 MG capsule, Take 1 capsule by mouth Daily., Disp: 30 capsule, Rfl: 2    Current Facility-Administered Medications:   •  cefTRIAXone (ROCEPHIN) injection 250 mg, 250 mg, Intramuscular, Once, TORSTEN Lambert    The following portions of the patient's history were reviewed and updated as appropriate: allergies, current medications, past family history, past medical history, past social history, past surgical history and problem list.    Review of Systems   Constitutional: Positive for chills.   HENT: Negative.    Eyes: Negative.    Respiratory: Negative.    Cardiovascular: Negative.    Gastrointestinal: Positive for abdominal pain, diarrhea and nausea.   Endocrine: Negative.    Genitourinary: Positive for pelvic pain and vaginal discharge. Negative for menstrual problem.   Musculoskeletal: Negative.    Skin: Negative.    Allergic/Immunologic: Negative.    Neurological: Negative.    Hematological: Negative.    Psychiatric/Behavioral: Negative.        Assessment     Physical Exam   Constitutional: She appears well-developed and well-nourished.   Cardiovascular: Normal rate, regular rhythm and normal heart sounds.    No murmur heard.  Pulmonary/Chest: Effort normal and breath sounds normal. No respiratory distress. She has no wheezes.   Abdominal: There is tenderness (suprapubic).   Psychiatric: She  has a normal mood and affect. Her behavior is normal.   Vitals reviewed.      Aston Noble was seen today for diarrhea, abdominal pain and vaginitis.    Diagnoses and all orders for this visit:    Trichomonal vaginitis  -     azithromycin (ZITHROMAX) 500 MG tablet; Take 2 tablets by mouth 1 (One) Time for 1 dose.       Diagnosis Plan   1. Trichomonal vaginitis  azithromycin (ZITHROMAX) 500 MG tablet   2. Viral gastroenteritis  ondansetron (ZOFRAN) 4 MG tablet     Rocephin 250mg IM today    She refused PAP and HIV testing today due to illness. She will schedule Pap and get  treated at [facility].

## 2017-12-12 NOTE — PATIENT INSTRUCTIONS
Trichomoniasis  Trichomoniasis is an infection caused by an organism called Trichomonas. The infection can affect both women and men. In women, the outer female genitalia and the vagina are affected. In men, the penis is mainly affected, but the prostate and other reproductive organs can also be involved. Trichomoniasis is a sexually transmitted infection (STI) and is most often passed to another person through sexual contact.   RISK FACTORS  · Having unprotected sexual intercourse.  · Having sexual intercourse with an infected partner.  SIGNS AND SYMPTOMS   Symptoms of trichomoniasis in women include:  · Abnormal gray-green frothy vaginal discharge.  · Itching and irritation of the vagina.  · Itching and irritation of the area outside the vagina.  Symptoms of trichomoniasis in men include:   · Penile discharge with or without pain.  · Pain during urination. This results from inflammation of the urethra.  DIAGNOSIS   Trichomoniasis may be found during a Pap test or physical exam. Your health care provider may use one of the following methods to help diagnose this infection:  · Testing the pH of the vagina with a test tape.  · Using a vaginal swab test that checks for the Trichomonas organism. A test is available that provides results within a few minutes.  · Examining a urine sample.  · Testing vaginal secretions.  Your health care provider may test you for other STIs, including HIV.  TREATMENT   · You may be given medicine to fight the infection. Women should inform their health care provider if they could be or are pregnant. Some medicines used to treat the infection should not be taken during pregnancy.  · Your health care provider may recommend over-the-counter medicines or creams to decrease itching or irritation.  · Your sexual partner will need to be treated if infected.  · Your health care provider may test you for infection again 3 months after treatment.  HOME CARE INSTRUCTIONS   · Take medicines only as  directed by your health care provider.  · Take over-the-counter medicine for itching or irritation as directed by your health care provider.  · Do not have sexual intercourse while you have the infection.  · Women should not douche or wear tampons while they have the infection.  · Discuss your infection with your partner. Your partner may have gotten the infection from you, or you may have gotten it from your partner.  · Have your sex partner get examined and treated if necessary.  · Practice safe, informed, and protected sex.  · See your health care provider for other STI testing.  SEEK MEDICAL CARE IF:   · You still have symptoms after you finish your medicine.  · You develop abdominal pain.  · You have pain when you urinate.  · You have bleeding after sexual intercourse.  · You develop a rash.  · Your medicine makes you sick or makes you throw up (vomit).  MAKE SURE YOU:  · Understand these instructions.  · Will watch your condition.  · Will get help right away if you are not doing well or get worse.     This information is not intended to replace advice given to you by your health care provider. Make sure you discuss any questions you have with your health care provider.     Document Released: 06/13/2002 Document Revised: 01/08/2016 Document Reviewed: 09/29/2014  [employer] Interactive Patient Education ©2017 [employer]

## 2018-01-15 ENCOUNTER — OFFICE VISIT (OUTPATIENT)
Dept: OBSTETRICS AND GYNECOLOGY | Facility: CLINIC | Age: 38
End: 2018-01-15

## 2018-01-15 VITALS
WEIGHT: 165 LBS | BODY MASS INDEX: 28.17 KG/M2 | SYSTOLIC BLOOD PRESSURE: 118 MMHG | DIASTOLIC BLOOD PRESSURE: 78 MMHG | HEIGHT: 64 IN

## 2018-01-15 DIAGNOSIS — N89.8 VAGINAL DISCHARGE: Primary | ICD-10-CM

## 2018-01-15 DIAGNOSIS — Z11.3 SCREENING FOR STD (SEXUALLY TRANSMITTED DISEASE): ICD-10-CM

## 2018-01-15 DIAGNOSIS — A59.01 TRICHOMONAL VAGINITIS: ICD-10-CM

## 2018-01-15 LAB
BILIRUB BLD-MCNC: NEGATIVE MG/DL
CLARITY, POC: CLEAR
COLOR UR: YELLOW
GLUCOSE UR STRIP-MCNC: NEGATIVE MG/DL
KETONES UR QL: NEGATIVE
LEUKOCYTE EST, POC: ABNORMAL
NITRITE UR-MCNC: NEGATIVE MG/ML
PH UR: 6 [PH] (ref 5–8)
PROT UR STRIP-MCNC: NEGATIVE MG/DL
RBC # UR STRIP: NEGATIVE /UL
SP GR UR: 1.02 (ref 1–1.03)
UROBILINOGEN UR QL: NORMAL

## 2018-01-15 PROCEDURE — 99213 OFFICE O/P EST LOW 20 MIN: CPT | Performed by: OBSTETRICS & GYNECOLOGY

## 2018-01-15 RX ORDER — PREDNISOLONE ACETATE 10 MG/ML
SUSPENSION/ DROPS OPHTHALMIC
Refills: 6 | COMMUNITY
Start: 2017-12-11 | End: 2020-02-14 | Stop reason: ALTCHOICE

## 2018-01-15 NOTE — PROGRESS NOTES
"      Aide Henry is a 37 y.o. patient who presents for follow up of   Chief Complaint   Patient presents with   • Vaginal Discharge       36 yo est pt here for vaginal discharge. She had an affair and contracted trich. She was treated but then had sex with her  who was untreated.She now has discharge again. She declined full STD panel blood work.       The following portions of the patient's history were reviewed and updated as appropriate: allergies, current medications and problem list.    Review of Systems   Constitutional: Negative for chills and fever.   Genitourinary: Positive for vaginal discharge. Negative for dyspareunia, menstrual problem, pelvic pain, urgency and vaginal pain.   All other systems reviewed and are negative.      /78  Ht 162.6 cm (64\")  Wt 74.8 kg (165 lb)  LMP 01/03/2018  BMI 28.32 kg/m2    Physical Exam   Constitutional: She is oriented to person, place, and time. She appears well-developed and well-nourished.   HENT:   Head: Normocephalic and atraumatic.   Abdominal: Soft. Bowel sounds are normal. She exhibits no distension and no mass. There is no tenderness. There is no rebound and no guarding. No hernia.   Genitourinary: Uterus normal. Pelvic exam was performed with patient supine. There is no rash, tenderness, lesion or injury on the right labia. There is no rash, tenderness, lesion or injury on the left labia. Cervix exhibits no motion tenderness, no discharge and no friability. Right adnexum displays no mass, no tenderness and no fullness. Left adnexum displays no mass, no tenderness and no fullness. No erythema, tenderness or bleeding in the vagina. No foreign body in the vagina. No signs of injury around the vagina. Vaginal discharge found.   Neurological: She is alert and oriented to person, place, and time.   Skin: Skin is warm and dry.   Psychiatric: She has a normal mood and affect. Her behavior is normal. Judgment and thought content normal.   Nursing " note and vitals reviewed.    A/P :  1. Vaginal discharge- check NuSwab L/BV/M- will treat according to results. Enc condoms or abstinence until JERMAINE.  2. R[facility]- UTD pap 10/2017.     Assessment/Plan   Aide was seen today for vaginal discharge.    Diagnoses and all orders for this visit:    Vaginal discharge  -     POC Urinalysis Dipstick                   No Follow-up on file.      Snehal Carlson MD    1/15/2018  12:04 PM

## 2018-01-18 LAB
A VAGINAE DNA VAG QL NAA+PROBE: ABNORMAL SCORE
BVAB2 DNA VAG QL NAA+PROBE: ABNORMAL SCORE
C ALBICANS DNA VAG QL NAA+PROBE: NEGATIVE
C GLABRATA DNA VAG QL NAA+PROBE: NEGATIVE
C TRACH RRNA SPEC QL NAA+PROBE: NEGATIVE
CONV COMMENT: ABNORMAL
M GENITALIUM DNA SPEC QL NAA+PROBE: NEGATIVE
M HOMINIS DNA SPEC QL NAA+PROBE: POSITIVE
MEGA1 DNA VAG QL NAA+PROBE: ABNORMAL SCORE
N GONORRHOEA RRNA SPEC QL NAA+PROBE: NEGATIVE
T VAGINALIS RRNA SPEC QL NAA+PROBE: POSITIVE
UREAPLASMA DNA SPEC QL NAA+PROBE: POSITIVE

## 2018-01-18 RX ORDER — METRONIDAZOLE 500 MG/1
500 TABLET ORAL 2 TIMES DAILY
Qty: 14 TABLET | Refills: 1 | Status: SHIPPED | OUTPATIENT
Start: 2018-01-18 | End: 2018-01-25

## 2018-01-18 RX ORDER — LEVOFLOXACIN 500 MG/1
500 TABLET, FILM COATED ORAL DAILY
Qty: 7 TABLET | Refills: 0 | Status: SHIPPED | OUTPATIENT
Start: 2018-01-18 | End: 2018-01-25

## 2018-04-25 ENCOUNTER — OFFICE VISIT (OUTPATIENT)
Dept: INTERNAL MEDICINE | Facility: CLINIC | Age: 38
End: 2018-04-25

## 2018-04-25 VITALS
SYSTOLIC BLOOD PRESSURE: 138 MMHG | RESPIRATION RATE: 16 BRPM | HEIGHT: 64 IN | WEIGHT: 168 LBS | BODY MASS INDEX: 28.68 KG/M2 | TEMPERATURE: 98 F | DIASTOLIC BLOOD PRESSURE: 82 MMHG | HEART RATE: 92 BPM | OXYGEN SATURATION: 98 %

## 2018-04-25 DIAGNOSIS — F41.8 DEPRESSION WITH ANXIETY: ICD-10-CM

## 2018-04-25 DIAGNOSIS — Z96.22 HISTORY OF PLACEMENT OF EAR TUBES: ICD-10-CM

## 2018-04-25 DIAGNOSIS — F19.20: ICD-10-CM

## 2018-04-25 DIAGNOSIS — F10.21 ALCOHOL DEPENDENCE IN REMISSION (HCC): Primary | ICD-10-CM

## 2018-04-25 DIAGNOSIS — M35.01 SJOGREN'S SYNDROME WITH KERATOCONJUNCTIVITIS SICCA (HCC): ICD-10-CM

## 2018-04-25 LAB
ALBUMIN SERPL-MCNC: 4.5 G/DL (ref 3.5–5.2)
ALBUMIN/GLOB SERPL: 1.6 G/DL
ALP SERPL-CCNC: 58 U/L (ref 40–129)
ALT SERPL-CCNC: 30 U/L (ref 5–33)
AST SERPL-CCNC: 32 U/L (ref 5–32)
BASOPHILS # BLD AUTO: 0.02 10*3/MM3 (ref 0–0.2)
BASOPHILS NFR BLD AUTO: 0.6 % (ref 0–2)
BILIRUB SERPL-MCNC: 0.2 MG/DL (ref 0.2–1.2)
BUN SERPL-MCNC: 8 MG/DL (ref 6–20)
BUN/CREAT SERPL: 11.4 (ref 7–25)
CALCIUM SERPL-MCNC: 9.3 MG/DL (ref 8.6–10.5)
CHLORIDE SERPL-SCNC: 101 MMOL/L (ref 98–107)
CHOLEST SERPL-MCNC: 153 MG/DL (ref 0–200)
CHOLEST/HDLC SERPL: 2.73 {RATIO}
CO2 SERPL-SCNC: 28.6 MMOL/L (ref 22–29)
CREAT SERPL-MCNC: 0.7 MG/DL (ref 0.57–1)
EOSINOPHIL # BLD AUTO: 0.06 10*3/MM3 (ref 0.1–0.3)
EOSINOPHIL NFR BLD AUTO: 1.7 % (ref 0–4)
ERYTHROCYTE [DISTWIDTH] IN BLOOD BY AUTOMATED COUNT: 12.6 % (ref 11.5–14.5)
GFR SERPLBLD CREATININE-BSD FMLA CKD-EPI: 115 ML/MIN/1.73
GFR SERPLBLD CREATININE-BSD FMLA CKD-EPI: 95 ML/MIN/1.73
GLOBULIN SER CALC-MCNC: 2.9 GM/DL
GLUCOSE SERPL-MCNC: 92 MG/DL (ref 65–99)
HCT VFR BLD AUTO: 42.6 % (ref 37–47)
HDLC SERPL-MCNC: 56 MG/DL (ref 40–60)
HGB BLD-MCNC: 14.2 G/DL (ref 12–16)
IMM GRANULOCYTES # BLD: 0.05 10*3/MM3 (ref 0–0.03)
IMM GRANULOCYTES NFR BLD: 1.4 % (ref 0–0.5)
LDLC SERPL CALC-MCNC: 81 MG/DL (ref 0–100)
LYMPHOCYTES # BLD AUTO: 0.71 10*3/MM3 (ref 0.6–4.8)
LYMPHOCYTES NFR BLD AUTO: 19.8 % (ref 20–45)
MCH RBC QN AUTO: 32.3 PG (ref 27–31)
MCHC RBC AUTO-ENTMCNC: 33.3 G/DL (ref 31–37)
MCV RBC AUTO: 97 FL (ref 81–99)
MONOCYTES # BLD AUTO: 0.38 10*3/MM3 (ref 0–1)
MONOCYTES NFR BLD AUTO: 10.6 % (ref 3–8)
NEUTROPHILS # BLD AUTO: 2.36 10*3/MM3 (ref 1.5–8.3)
NEUTROPHILS NFR BLD AUTO: 65.9 % (ref 45–70)
NRBC BLD AUTO-RTO: 0 /100 WBC (ref 0–0)
PLATELET # BLD AUTO: 236 10*3/MM3 (ref 140–500)
POTASSIUM SERPL-SCNC: 4.4 MMOL/L (ref 3.5–5.2)
PROT SERPL-MCNC: 7.4 G/DL (ref 6–8.5)
RBC # BLD AUTO: 4.39 10*6/MM3 (ref 4.2–5.4)
SODIUM SERPL-SCNC: 139 MMOL/L (ref 136–145)
T4 SERPL-MCNC: 6.17 MCG/DL (ref 4.5–11.7)
TRIGL SERPL-MCNC: 82 MG/DL (ref 0–150)
TSH SERPL DL<=0.005 MIU/L-ACNC: 3.2 MIU/ML (ref 0.27–4.2)
VLDLC SERPL CALC-MCNC: 16.4 MG/DL (ref 7–27)
WBC # BLD AUTO: 3.58 10*3/MM3 (ref 4.8–10.8)

## 2018-04-25 PROCEDURE — 99213 OFFICE O/P EST LOW 20 MIN: CPT | Performed by: NURSE PRACTITIONER

## 2018-04-25 RX ORDER — ESCITALOPRAM OXALATE 10 MG/1
10 TABLET ORAL DAILY
Qty: 30 TABLET | Refills: 1 | Status: SHIPPED | OUTPATIENT
Start: 2018-04-25 | End: 2018-05-25 | Stop reason: SDUPTHER

## 2018-04-25 NOTE — PROGRESS NOTES
"Chief Complaint   Patient presents with   • Follow-up   • Hemorrhoids       Subjective     Aide Henry is a 37 y.o. female being seen for a follow up appointment today regarding \"some issues\". She has history of cocaine use and marijuana use (positive drug screen 5-). [facility] but reports that she used suppositories OTC and is doing well.    She is currently in AA meetings for drug addiciton and alcohol use. She report that her last drink was 2 months ago. She is very anxious. She is not having panic attacks. She is not working right now. She is getting evaluated at [facility] next week to start rehab IOP.     She is also requesting referral to ENT and rheumatology due to ear tubes and Sjogrens  syndrome history. She denies trouble with hearing, ear pain. She is complaining visual disturbance related to uveitis from Sjogrens and is followed by opth.       History of Present Illness     Allergies   Allergen Reactions   • Fluconazole          Current Outpatient Prescriptions:   •  omeprazole (PRILOSEC) 20 MG capsule, Take 1 capsule by mouth Daily., Disp: 30 capsule, Rfl: 2  •  prednisoLONE acetate (PRED FORTE) 1 % ophthalmic suspension, INSTILL 1 DROP INTO OD Q 2 H, Disp: , Rfl: 6    The following portions of the patient's history were reviewed and updated as appropriate: allergies, current medications, past family history, past medical history, past social history, past surgical history and problem list.    Review of Systems   Constitutional: Positive for fatigue.   HENT: Positive for congestion.    Eyes: Positive for visual disturbance.   Respiratory: Negative.  Negative for shortness of breath and stridor.    Cardiovascular: Negative for chest pain, palpitations and leg swelling.   Gastrointestinal: Positive for diarrhea and nausea. Negative for anal bleeding.   Endocrine: Negative.    Genitourinary: Negative.    Musculoskeletal: Positive for " arthralgias.   Skin: Negative.    Allergic/Immunologic: Positive for environmental allergies.   Neurological: Negative.    Hematological: Negative.    Psychiatric/Behavioral: Positive for agitation and sleep disturbance. Negative for suicidal ideas. The patient is nervous/anxious.        Assessment     Physical Exam   Constitutional: She is oriented to person, place, and time. She appears well-developed and well-nourished.   HENT:   Head: Normocephalic.   Right Ear: A foreign body (green TM tube) is present. Tympanic membrane is scarred.   Left Ear: A foreign body (green TM tube) is present. Tympanic membrane is scarred.   Cardiovascular: Normal rate, regular rhythm and normal heart sounds.    No murmur heard.  Pulmonary/Chest: Effort normal and breath sounds normal. No respiratory distress. She has no wheezes.   Abdominal: Soft. Bowel sounds are normal. She exhibits no distension. There is no tenderness.   Musculoskeletal: She exhibits no edema.   Neurological: She is alert and oriented to person, place, and time.   Skin: Skin is warm and dry.   Psychiatric: Her mood appears anxious. Her speech is rapid and/or pressured. She is agitated. Thought content is paranoid. She expresses no suicidal ideation.   Vitals reviewed.      Plan     Her fasting labs were drawn today.    Aide was seen today for follow-up and hemorrhoids.    Diagnoses and all orders for this visit:    Alcohol dependence in remission    Depression with anxiety  -     escitalopram (LEXAPRO) 10 MG tablet; Take 1 tablet by mouth Daily.  -     Comprehensive metabolic panel  -     Conv Lipid Panel w/ Chol/HDL Ratio  -     CBC & Differential  -     T4 & TSH ([facility])    Drug addiction syndrome    History of placement of ear tubes  -     Ambulatory Referral to ENT (Otolaryngology)    Sjogren's syndrome with keratoconjunctivitis sicca  -     Ambulatory Referral to Rheumatology       She is getting Evaluated at [facility] next week for IOP.     I have  placed referral for chronic issues os Sjogrens and TM tubes.     She will need to catch up on routine healthcare. Follow up in 4 weeks.

## 2018-04-26 ENCOUNTER — TELEPHONE (OUTPATIENT)
Dept: OBSTETRICS AND GYNECOLOGY | Facility: CLINIC | Age: 38
End: 2018-04-26

## 2018-04-26 RX ORDER — METRONIDAZOLE 500 MG/1
500 TABLET ORAL 2 TIMES DAILY
Qty: 14 TABLET | Refills: 1 | Status: SHIPPED | OUTPATIENT
Start: 2018-04-26 | End: 2018-05-03

## 2018-04-26 NOTE — TELEPHONE ENCOUNTER
Patient states she and her partner have trich again she would like refills on their meds if possible

## 2018-04-27 LAB
HAV IGM SERPL QL IA: NEGATIVE
HBV CORE IGM SERPL QL IA: NEGATIVE
HBV SURFACE AG SERPL QL IA: NEGATIVE
HCV AB S/CO SERPL IA: <0.1 S/CO RATIO (ref 0–0.9)
Lab: NORMAL
RPR SER-TITR: NON REACTIVE {TITER}
SPECIMEN STATUS: NORMAL
WRITTEN AUTHORIZATION: NORMAL

## 2018-04-28 DIAGNOSIS — K21.9 GASTROESOPHAGEAL REFLUX DISEASE WITHOUT ESOPHAGITIS: ICD-10-CM

## 2018-04-30 RX ORDER — OMEPRAZOLE 20 MG/1
20 CAPSULE, DELAYED RELEASE ORAL DAILY
Qty: 90 CAPSULE | Refills: 1 | Status: SHIPPED | OUTPATIENT
Start: 2018-04-30 | End: 2019-11-07 | Stop reason: SDUPTHER

## 2018-05-15 ENCOUNTER — OFFICE VISIT (OUTPATIENT)
Dept: RETAIL CLINIC | Facility: CLINIC | Age: 38
End: 2018-05-15

## 2018-05-15 VITALS
DIASTOLIC BLOOD PRESSURE: 90 MMHG | HEART RATE: 81 BPM | OXYGEN SATURATION: 99 % | SYSTOLIC BLOOD PRESSURE: 134 MMHG | TEMPERATURE: 98.3 F

## 2018-05-15 DIAGNOSIS — N39.0 URINARY TRACT INFECTION WITH HEMATURIA, SITE UNSPECIFIED: ICD-10-CM

## 2018-05-15 DIAGNOSIS — R30.0 DYSURIA: Primary | ICD-10-CM

## 2018-05-15 DIAGNOSIS — R31.9 URINARY TRACT INFECTION WITH HEMATURIA, SITE UNSPECIFIED: ICD-10-CM

## 2018-05-15 LAB
BILIRUB BLD-MCNC: NEGATIVE MG/DL
CLARITY, POC: ABNORMAL
COLOR UR: ABNORMAL
GLUCOSE UR STRIP-MCNC: NEGATIVE MG/DL
KETONES UR QL: NEGATIVE
LEUKOCYTE EST, POC: ABNORMAL
NITRITE UR-MCNC: POSITIVE MG/ML
PH UR: 6.5 [PH] (ref 5–8)
PROT UR STRIP-MCNC: ABNORMAL MG/DL
RBC # UR STRIP: ABNORMAL /UL
SP GR UR: 1.02 (ref 1–1.03)
UROBILINOGEN UR QL: NORMAL

## 2018-05-15 PROCEDURE — 81003 URINALYSIS AUTO W/O SCOPE: CPT | Performed by: NURSE PRACTITIONER

## 2018-05-15 PROCEDURE — 99213 OFFICE O/P EST LOW 20 MIN: CPT | Performed by: NURSE PRACTITIONER

## 2018-05-15 RX ORDER — PHENAZOPYRIDINE HYDROCHLORIDE 100 MG/1
100 TABLET, FILM COATED ORAL 3 TIMES DAILY PRN
Qty: 6 TABLET | Refills: 0 | Status: SHIPPED | OUTPATIENT
Start: 2018-05-15 | End: 2018-05-17

## 2018-05-15 RX ORDER — SULFAMETHOXAZOLE AND TRIMETHOPRIM 800; 160 MG/1; MG/1
1 TABLET ORAL 2 TIMES DAILY
Qty: 20 TABLET | Refills: 0 | Status: SHIPPED | OUTPATIENT
Start: 2018-05-15 | End: 2018-05-25

## 2018-05-15 NOTE — PROGRESS NOTES
Subjective     Aide Henry is a 37 y.o.. female.     Urinary Tract Infection    This is a new problem. Episode onset: 2 days ago. The problem has been unchanged. There has been no fever. Associated symptoms include frequency, hematuria (on menses at this time) and urgency. Pertinent negatives include no flank pain, nausea or vomiting. She has tried nothing for the symptoms. The treatment provided no relief. Her past medical history is significant for recurrent UTIs. There is no history of kidney stones.       The following portions of the patient's history were reviewed and updated as appropriate: allergies, current medications, past family history, past medical history, past social history, past surgical history and problem list.    Review of Systems   Constitutional: Negative for fever.   Gastrointestinal: Negative for nausea and vomiting.   Genitourinary: Positive for dysuria, frequency, hematuria (on menses at this time) and urgency. Negative for flank pain.       Objective     Vitals:    05/15/18 0857   BP: 134/90   Pulse: 81   Temp: 98.3 °F (36.8 °C)   TempSrc: Oral   SpO2: 99%       Physical Exam   Constitutional: She is oriented to person, place, and time. She appears well-developed and well-nourished.   HENT:   Head: Normocephalic and atraumatic.   Eyes: Pupils are equal, round, and reactive to light.   Cardiovascular: Normal rate and regular rhythm.    Pulmonary/Chest: Effort normal and breath sounds normal.   Musculoskeletal: Normal range of motion.   Neurological: She is alert and oriented to person, place, and time.       Lab Results (last 24 hours)     Procedure Component Value Units Date/Time    POCT urinalysis dipstick, automated [[id number]]  (Abnormal) Collected:  05/15/18 0917    Specimen:  Urine Updated:  05/15/18 0919     Color Victorina     Clarity, UA Cloudy (A)     Glucose, UA Negative mg/dL      Bilirubin Negative     Ketones, UA Negative     Specific Gravity  1.025     Blood, UA Large (A)      pH, Urine 6.5     Protein,  mg/dL (A) mg/dL      Urobilinogen, UA Normal     Leukocytes Large (3+) (A)     Nitrite, UA Positive (A)          Assessment/Plan   Aide was seen today for urinary tract infection.    Diagnoses and all orders for this visit:    Dysuria  -     POCT urinalysis dipstick, automated    Urinary tract infection with hematuria, site unspecified  -     Urine Culture - Urine, Urine, Clean Catch  -     sulfamethoxazole-trimethoprim (BACTRIM DS) 800-160 MG per tablet; Take 1 tablet by mouth 2 (Two) Times a Day for 10 days.  -     phenazopyridine (PYRIDIUM) 100 MG tablet; Take 1 tablet by mouth 3 (Three) Times a Day As Needed for bladder spasms for up to 2 days.        Patient Instructions   Urinary Tract Infection, Adult  A urinary tract infection (UTI) is an infection of any part of the urinary tract, which includes the kidneys, ureters, bladder, and urethra. These organs make, store, and get rid of urine in the body. UTI can be a bladder infection (cystitis) or kidney infection (pyelonephritis).  What are the causes?  This infection may be caused by fungi, viruses, or bacteria. Bacteria are the most common cause of UTIs. This condition can also be caused by repeated incomplete emptying of the bladder during urination.  What increases the risk?  This condition is more likely to develop if:  · You ignore your need to urinate or hold urine for long periods of time.  · You do not empty your bladder completely during urination.  · You wipe back to front after urinating or having a bowel movement, if you are female.  · You are uncircumcised, if you are male.  · You are constipated.  · You have a urinary catheter that stays in place (indwelling).  · You have a weak defense (immune) system.  · You have a medical condition that affects your bowels, kidneys, or bladder.  · You have diabetes.  · You take antibiotic medicines frequently or for long periods of time, and the antibiotics no longer work  well against certain types of infections (antibiotic resistance).  · You take medicines that irritate your urinary tract.  · You are exposed to chemicals that irritate your urinary tract.  · You are female.  What are the signs or symptoms?  Symptoms of this condition include:  · Fever.  · Frequent urination or passing small amounts of urine frequently.  · Needing to urinate urgently.  · Pain or burning with urination.  · Urine that smells bad or unusual.  · Cloudy urine.  · Pain in the lower abdomen or back.  · Trouble urinating.  · Blood in the urine.  · Vomiting or being less hungry than normal.  · Diarrhea or abdominal pain.  · Vaginal discharge, if you are female.  How is this diagnosed?  This condition is diagnosed with a medical history and physical exam. You will also need to provide a urine sample to test your urine. Other tests may be done, including:  · Blood tests.  · Sexually transmitted disease (STD) testing.  If you have had more than one UTI, a cystoscopy or imaging studies may be done to determine the cause of the infections.  How is this treated?  Treatment for this condition often includes a combination of two or more of the following:  · Antibiotic medicine.  · Other medicines to treat less common causes of UTI.  · Over-the-counter medicines to treat pain.  · Drinking enough water to stay hydrated.  Follow these instructions at home:  · Take over-the-counter and prescription medicines only as told by your health care provider.  · If you were prescribed an antibiotic, take it as told by your health care provider. Do not stop taking the antibiotic even if you start to feel better.  · Avoid alcohol, caffeine, tea, and carbonated beverages. They can irritate your bladder.  · Drink enough fluid to keep your urine clear or pale yellow.  · Keep all follow-up visits as told by your health care provider. This is important.  · Make sure to:  ¨ Empty your bladder often and completely. Do not hold urine for  long periods of time.  ¨ Empty your bladder before and after sex.  ¨ Wipe from front to back after a bowel movement if you are female. Use each tissue one time when you wipe.  Contact a health care provider if:  · You have back pain.  · You have a fever.  · You feel nauseous or vomit.  · Your symptoms do not get better after 3 days.  · Your symptoms go away and then return.  Get help right away if:  · You have severe back pain or lower abdominal pain.  · You are vomiting and cannot keep down any medicines or water.  This information is not intended to replace advice given to you by your health care provider. Make sure you discuss any questions you have with your health care provider.  Document Released: 09/27/2006 Document Revised: 05/31/2017 Document Reviewed: 11/07/2016  [employer] Interactive Patient Education © 2017 [employer]        Return if symptoms worsen or fail to improve with urgent care/ER.

## 2018-05-18 LAB
BACTERIA UR CULT: ABNORMAL
BACTERIA UR CULT: ABNORMAL
OTHER ANTIBIOTIC SUSC ISLT: ABNORMAL

## 2018-05-20 ENCOUNTER — TELEPHONE (OUTPATIENT)
Dept: RETAIL CLINIC | Facility: CLINIC | Age: 38
End: 2018-05-20

## 2018-05-20 NOTE — TELEPHONE ENCOUNTER
Attempted to call pt but pt's mailbox not set up to take voicemails. Pt's urine culture came back positive for e-coli and susceptibility results show bactrim covering infection. Pt was started on bactrim on day of visit.     Will attempt to call at later date to provide culture results.

## 2018-05-24 ENCOUNTER — TELEPHONE (OUTPATIENT)
Dept: RETAIL CLINIC | Facility: CLINIC | Age: 38
End: 2018-05-24

## 2018-05-24 NOTE — TELEPHONE ENCOUNTER
Spoke to Ms. Henry today. Pt was notified that we tried to reach her a few days ago regarding her abnormal lab results. Pt reports the she is still taking her antibiotic and  has a couple of days left. Pt says it still burns when she pees. Ms. Henry reports that she has an appt scheduled w/ her PCP for something else,  but plans on addressing her urinary symptoms with them as well.   Pt acknowledged her abnormal urine culture results for e- coli and had no further comments or concerns.

## 2018-05-25 ENCOUNTER — OFFICE VISIT (OUTPATIENT)
Dept: INTERNAL MEDICINE | Facility: CLINIC | Age: 38
End: 2018-05-25

## 2018-05-25 VITALS
HEIGHT: 64 IN | RESPIRATION RATE: 16 BRPM | OXYGEN SATURATION: 99 % | SYSTOLIC BLOOD PRESSURE: 120 MMHG | HEART RATE: 93 BPM | WEIGHT: 165 LBS | DIASTOLIC BLOOD PRESSURE: 82 MMHG | BODY MASS INDEX: 28.17 KG/M2 | TEMPERATURE: 98.5 F

## 2018-05-25 DIAGNOSIS — F41.8 DEPRESSION WITH ANXIETY: ICD-10-CM

## 2018-05-25 DIAGNOSIS — F19.20: Primary | ICD-10-CM

## 2018-05-25 DIAGNOSIS — N30.00 ACUTE CYSTITIS WITHOUT HEMATURIA: ICD-10-CM

## 2018-05-25 DIAGNOSIS — M25.512 CHRONIC LEFT SHOULDER PAIN: ICD-10-CM

## 2018-05-25 DIAGNOSIS — G89.29 CHRONIC LEFT SHOULDER PAIN: ICD-10-CM

## 2018-05-25 LAB
BILIRUB BLD-MCNC: NEGATIVE MG/DL
CLARITY, POC: CLEAR
COLOR UR: YELLOW
GLUCOSE UR STRIP-MCNC: NEGATIVE MG/DL
KETONES UR QL: NEGATIVE
LEUKOCYTE EST, POC: ABNORMAL
NITRITE UR-MCNC: NEGATIVE MG/ML
PH UR: 5.5 [PH] (ref 5–8)
PROT UR STRIP-MCNC: NEGATIVE MG/DL
RBC # UR STRIP: NEGATIVE /UL
SP GR UR: 1.02 (ref 1–1.03)
UROBILINOGEN UR QL: NORMAL

## 2018-05-25 PROCEDURE — 99214 OFFICE O/P EST MOD 30 MIN: CPT | Performed by: NURSE PRACTITIONER

## 2018-05-25 RX ORDER — ESCITALOPRAM OXALATE 10 MG/1
10 TABLET ORAL DAILY
Qty: 90 TABLET | Refills: 3 | Status: SHIPPED | OUTPATIENT
Start: 2018-05-25 | End: 2018-09-04 | Stop reason: SDUPTHER

## 2018-05-25 NOTE — PROGRESS NOTES
Chief Complaint   Patient presents with   • Follow-up   • Anxiety   • Urinary Tract Infection       Subjective     Aide Henry is a 37 y.o. female being seen for a follow up appointment today regarding Depression with anxiety and history of drug addiction . She was also seen an Urgent Care for a UTI and is currently on Bactrim therapy. She will need a full STI panel with GYN, and she will call to get appointment today.     She has been on Lexapro 10 mg 1 month ago. It blunted her emotions at first. She has been in IOP at [facility] for drug and alcohol addiciton, last drink April 2018. She is getting a sponsor for the holiday weekend. She denies panic attacks, insomnia.     She is complaining of left shoulder pain for several years. Pain has increased over the past 2 weeks. It is worse with driving.No neck pain or numbness in arm. She has history of connective tissue disease, awaiting Rheumatology eval.        History of Present Illness     Allergies   Allergen Reactions   • Fluconazole          Current Outpatient Prescriptions:   •  escitalopram (LEXAPRO) 10 MG tablet, Take 1 tablet by mouth Daily., Disp: 30 tablet, Rfl: 1  •  omeprazole (priLOSEC) 20 MG capsule, TAKE 1 CAPSULE BY MOUTH DAILY, Disp: 90 capsule, Rfl: 1  •  prednisoLONE acetate (PRED FORTE) 1 % ophthalmic suspension, INSTILL 1 DROP INTO OD Q 2 H, Disp: , Rfl: 6  •  sulfamethoxazole-trimethoprim (BACTRIM DS) 800-160 MG per tablet, Take 1 tablet by mouth 2 (Two) Times a Day for 10 days., Disp: 20 tablet, Rfl: 0    The following portions of the patient's history were reviewed and updated as appropriate: allergies, current medications, past family history, past medical history, past social history, past surgical history and problem list.    Review of Systems   Constitutional: Negative.    HENT: Negative.    Eyes: Negative.    Respiratory: Negative.  Negative for apnea and chest tightness.    Cardiovascular: Negative.  Negative for chest pain,  palpitations and leg swelling.   Gastrointestinal: Negative.  Negative for abdominal distention and abdominal pain.   Endocrine: Negative.    Genitourinary: Positive for dysuria.   Musculoskeletal: Positive for arthralgias. Negative for joint swelling, myalgias, neck pain and neck stiffness.   Skin: Negative.    Allergic/Immunologic: Negative.    Neurological: Negative.    Hematological: Negative.    Psychiatric/Behavioral: Negative for agitation, behavioral problems, confusion, sleep disturbance and suicidal ideas. The patient is not nervous/anxious.        Assessment     Physical Exam   Constitutional: She is oriented to person, place, and time. She appears well-developed and well-nourished.   HENT:   Head: Normocephalic.   Right Ear: External ear normal.   Left Ear: External ear normal.   Nose: Nose normal.   Mouth/Throat: Oropharynx is clear and moist.   Neck: Neck supple.   Cardiovascular: Normal rate, regular rhythm and normal heart sounds.    No murmur heard.  Pulmonary/Chest: Effort normal and breath sounds normal. No respiratory distress. She has no wheezes.   Musculoskeletal:        Left shoulder: She exhibits tenderness (anterior shoulder). She exhibits normal range of motion and no pain.   Neurological: She is alert and oriented to person, place, and time.   Psychiatric: She has a normal mood and affect. Her behavior is normal.   Vitals reviewed.      Aston Noble was seen today for follow-up, anxiety and urinary tract infection.    Diagnoses and all orders for this visit:    Drug addiction syndrome    Depression with anxiety  -     escitalopram (LEXAPRO) 10 MG tablet; Take 1 tablet by mouth Daily.    Acute cystitis without hematuria    Chronic left shoulder pain  -     XR Shoulder 2+ View Left; Future      [facility]    2. Urine clear today, will follow up with GYN for possible STI. Complete Bactrim DS.     3. XR of shoulder, lesly lcall with  results.    Follow up in 6 months.

## 2018-06-02 ENCOUNTER — HOSPITAL ENCOUNTER (OUTPATIENT)
Dept: GENERAL RADIOLOGY | Facility: HOSPITAL | Age: 38
Discharge: HOME | End: 2018-06-02
Admitting: NURSE PRACTITIONER

## 2018-06-02 DIAGNOSIS — M25.512 CHRONIC LEFT SHOULDER PAIN: ICD-10-CM

## 2018-06-02 DIAGNOSIS — G89.29 CHRONIC LEFT SHOULDER PAIN: ICD-10-CM

## 2018-06-02 PROCEDURE — 73030 X-RAY EXAM OF SHOULDER: CPT

## 2018-06-05 ENCOUNTER — LAB (OUTPATIENT)
Dept: OBSTETRICS AND GYNECOLOGY | Facility: CLINIC | Age: 38
End: 2018-06-05

## 2018-06-05 DIAGNOSIS — Z11.3 SCREEN FOR STD (SEXUALLY TRANSMITTED DISEASE): Primary | ICD-10-CM

## 2018-06-06 LAB
HBV SURFACE AG SERPL QL IA: NEGATIVE
HCV AB S/CO SERPL IA: <0.1 S/CO RATIO (ref 0–0.9)
HIV 1+2 AB+HIV1 P24 AG SERPL QL IA: NON REACTIVE
HSV1 IGG SER IA-ACNC: 8.84 INDEX (ref 0–0.9)
HSV2 IGG SER IA-ACNC: >23.6 INDEX (ref 0–0.9)
RPR SER QL: REACTIVE
RPR SER-TITR: ABNORMAL {TITER}
T PALLIDUM AB SER QL IA: NEGATIVE

## 2018-06-07 LAB
C TRACH RRNA SPEC QL NAA+PROBE: NEGATIVE
N GONORRHOEA RRNA SPEC QL NAA+PROBE: NEGATIVE
T VAGINALIS RRNA SPEC QL NAA+PROBE: NEGATIVE

## 2018-06-08 NOTE — PROGRESS NOTES
PIP- JERMAINE for trich is normal. Her STD panel shows previous exposure to the cold sore as well as the genital herpes virus. Her screening test for syphilis was positive but the final test was normal. She make an appt if she has any additional questions

## 2018-06-11 DIAGNOSIS — M25.512 LEFT SHOULDER PAIN, UNSPECIFIED CHRONICITY: Primary | ICD-10-CM

## 2018-06-20 DIAGNOSIS — M35.01 SJOGREN'S SYNDROME WITH KERATOCONJUNCTIVITIS SICCA (HCC): Primary | ICD-10-CM

## 2018-06-29 ENCOUNTER — OFFICE VISIT (OUTPATIENT)
Dept: ORTHOPEDIC SURGERY | Facility: CLINIC | Age: 38
End: 2018-06-29

## 2018-06-29 VITALS
BODY MASS INDEX: 27.31 KG/M2 | HEIGHT: 64 IN | SYSTOLIC BLOOD PRESSURE: 117 MMHG | DIASTOLIC BLOOD PRESSURE: 69 MMHG | WEIGHT: 160 LBS | HEART RATE: 92 BPM

## 2018-06-29 DIAGNOSIS — M35.00 SJOGREN'S SYNDROME, WITH UNSPECIFIED ORGAN INVOLVEMENT (HCC): ICD-10-CM

## 2018-06-29 DIAGNOSIS — M67.912 TENDINOPATHY OF LEFT ROTATOR CUFF: Primary | ICD-10-CM

## 2018-06-29 PROCEDURE — 99203 OFFICE O/P NEW LOW 30 MIN: CPT | Performed by: NURSE PRACTITIONER

## 2018-06-29 RX ORDER — NAPROXEN 250 MG/1
250 TABLET ORAL 2 TIMES DAILY PRN
COMMUNITY
End: 2019-03-28

## 2018-07-27 ENCOUNTER — HOSPITAL ENCOUNTER (OUTPATIENT)
Dept: MRI IMAGING | Facility: HOSPITAL | Age: 38
Discharge: HOME | End: 2018-07-27
Admitting: NURSE PRACTITIONER

## 2018-07-27 ENCOUNTER — OFFICE VISIT (OUTPATIENT)
Dept: OBSTETRICS AND GYNECOLOGY | Facility: CLINIC | Age: 38
End: 2018-07-27

## 2018-07-27 VITALS
WEIGHT: 167 LBS | HEIGHT: 64 IN | SYSTOLIC BLOOD PRESSURE: 130 MMHG | DIASTOLIC BLOOD PRESSURE: 90 MMHG | BODY MASS INDEX: 28.51 KG/M2

## 2018-07-27 DIAGNOSIS — N76.0 ACUTE VAGINITIS: Primary | ICD-10-CM

## 2018-07-27 DIAGNOSIS — R63.8 INCREASED BMI: ICD-10-CM

## 2018-07-27 DIAGNOSIS — M67.912 TENDINOPATHY OF LEFT ROTATOR CUFF: ICD-10-CM

## 2018-07-27 DIAGNOSIS — F17.200 SMOKER: ICD-10-CM

## 2018-07-27 DIAGNOSIS — R03.0 ELEVATED BP WITHOUT DIAGNOSIS OF HYPERTENSION: ICD-10-CM

## 2018-07-27 PROCEDURE — 99213 OFFICE O/P EST LOW 20 MIN: CPT | Performed by: NURSE PRACTITIONER

## 2018-07-27 PROCEDURE — 99406 BEHAV CHNG SMOKING 3-10 MIN: CPT | Performed by: NURSE PRACTITIONER

## 2018-07-27 PROCEDURE — 73221 MRI JOINT UPR EXTREM W/O DYE: CPT

## 2018-07-27 RX ORDER — METRONIDAZOLE 500 MG/1
500 TABLET ORAL 2 TIMES DAILY
Qty: 14 TABLET | Refills: 0 | Status: SHIPPED | OUTPATIENT
Start: 2018-07-27 | End: 2018-08-03

## 2018-07-27 NOTE — PROGRESS NOTES
Subjective     Chief Complaint   Patient presents with   • Vaginitis       Aide Henry is a 38 y.o. No obstetric history on file. whose LMP is Patient's last menstrual period was 07/13/2018 (exact date).. She presents with complaints of vaginal discharge and itching. Her symptoms started approx 4 days ago. She is on OTC monistat currently. She is sexually active. She has had more than one partner over the course of the year but only one partner most recently. She has been treated for Trich in the past. She is working on drug rehab and is clean currently.      HPI    Vaginal Discharge   The patient's primary symptoms include genital itching and vaginal discharge. The patient's pertinent negatives include no genital lesions, genital odor, genital rash, missed menses, pelvic pain or vaginal bleeding. This is a new problem. The current episode started in the past 7 days. The problem occurs intermittently. The problem has been waxing and waning. The patient is experiencing no pain. She is not pregnant. Pertinent negatives include no discolored urine, dysuria, fever, nausea, urgency or vomiting. The vaginal discharge was yellow, watery and thick. There has been no bleeding. She has not been passing clots. She has not been passing tissue. Nothing aggravates the symptoms. She has tried antifungals for the symptoms. The treatment provided mild relief. She is sexually active. No, her partner does not have an STD. She uses nothing for contraception. Her menstrual history has been regular.       The following portions of the patient's history were reviewed and updated as appropriate:vital signs, allergies, current medications, past medical history, past social history, past surgical history and problem list      Review of Systems     Review of Systems   Constitutional: Negative.  Negative for fever.   Respiratory: Negative.    Cardiovascular: Negative.    Gastrointestinal: Negative.  Negative for nausea and vomiting.  "  Endocrine: Negative.    Genitourinary: Positive for vaginal discharge. Negative for dysuria, missed menses, pelvic pain and urgency.   Musculoskeletal: Negative.    Skin: Negative.    Neurological: Negative.    Psychiatric/Behavioral: Negative.        Objective      /90   Ht 162.6 cm (64\")   Wt 75.8 kg (167 lb)   LMP 07/13/2018 (Exact Date)   Breastfeeding? No   BMI 28.67 kg/m²     Physical Exam    Physical Exam   Constitutional: She is oriented to person, place, and time. She appears well-developed and well-nourished.   Pulmonary/Chest: Effort normal. Right breast exhibits no inverted nipple, no mass, no nipple discharge, no skin change and no tenderness. Left breast exhibits no inverted nipple, no mass, no nipple discharge, no skin change and no tenderness.   Abdominal: Soft. Bowel sounds are normal. She exhibits no distension. There is no tenderness.   Genitourinary: Rectum normal. Pelvic exam was performed with patient supine. There is no rash, tenderness, lesion or injury on the right labia. There is no rash, tenderness, lesion or injury on the left labia. Uterus is not deviated, not enlarged, not fixed and not tender. Cervix exhibits no motion tenderness, no discharge and no friability. Right adnexum displays no mass, no tenderness and no fullness. Left adnexum displays no mass, no tenderness and no fullness. There is erythema and tenderness in the vagina. No bleeding in the vagina. No foreign body in the vagina. No signs of injury around the vagina. Vaginal discharge found.   Musculoskeletal: Normal range of motion.   Neurological: She is alert and oriented to person, place, and time.   Skin: Skin is warm and dry.   Psychiatric: She has a normal mood and affect. Her behavior is normal.   Vitals reviewed.      Lab Review   Labs: No data reviewed     Imaging   No data reviewed    Assessment  There are no diagnoses linked to this encounter.    Additional Assessment:   1) Vaginitis   2) Smoker   3) " Increased BMI   4) elevated BP    Plan     1. Vaginitis- Treat presumptively for Trich. ERX Flagyl. Check NuSwab. Disc with patient that swab maybe inconclusive d/t Monistat use.     2. Scheduled for: STD Labs - Gonorrhea, Chlamydia, Trichomonas and Nu Swab - myco, bv, yeast, leuk , Ultrasound of the -  N/A , Mammography - N/A , Bone Density Test - N/A , Additional Labs - N/A   3. Elevated BP- Pt aware. Plans to follow up with PCP.     4. Pap:  10/17    5. Contraception: None.     6. STD:  Enc condom use.     7.   Smoking status: Smoker. Is smoking 4 cigs/day down from 1 ppd.  I advised Aide of the risks of continuing to use tobacco, and I provided her with tobacco cessation educational materials in the After Visit Summary.  During this visit, I spent 3-5 minutes counseling the patient regarding tobacco cessation. She is trying to quit but denies medication, she reports she is weaning.     8.   Patient's Body mass index is 28.67 kg/m². BMI is above normal parameters. Recommendations include: educational material.    9.  Annual Exam scheduled for: 10/18      TORSTEN Ho  7/27/2018  1:09 PM

## 2018-07-31 ENCOUNTER — TELEPHONE (OUTPATIENT)
Dept: ORTHOPEDIC SURGERY | Facility: CLINIC | Age: 38
End: 2018-07-31

## 2018-07-31 NOTE — TELEPHONE ENCOUNTER
Called to discuss results with patient:    MRI LEFT SHOULDER WITHOUT CONTRAST     HISTORY:  Left shoulder pain for several years, worse over the last 3 months with decreased range of motion, history of lupus and Sjogren syndrome.       COMPARISON:  Left shoulder films 06/02/2018.     FINDINGS:  Multiplanar, multiecho imaging from the left shoulder utilizing a high field magnet dedicated protocol.  Hypertrophic change noted along the undersurface of the acromion with a thickened coracoacromial ligament contributing to narrowing of the subacromial outlet and may contribute to the patient's cuff pathology.  Marrow signal within the proximal humerus and glenoid is normal.  Glenohumeral joint fluid within normal limits.  Small amount of subacromial/subdeltoid bursal inflammation.       Moderate insertional and preinsertional supraspinatus tendinopathy with a small full-thickness tear insertional supraspinatus tendon measuring about 4 mm medial to lateral x about 7 mm AP dimension.  No retraction.  Mild infraspinatus tendinopathy.  Teres minor and subscapularis tendons appear intact.  No muscle atrophy or edema.     There is some periarticular inflammation about the AC joint compatible with active inflammatory component. There is edema along the muscle belly of the infraspinatus, could represent a component of muscle strain or the sequela of underlying tendinopathy.     Superior labrum, biceps anchor, and long tendon of the biceps appears intact.  Anterior and posterior labrum unremarkable.  Deltoid and extra-articular soft tissues appear normal.  Mild AC joint arthropathy with inferiorly directed capsular hypertrophy contributing to some mass effect on the rotator cuff.     IMPRESSION:  1.  A 4 x 7 mm full thickness tear central insertion of the supraspinatus tendon without retraction.  This is superimposed on moderate diffuse tendinopathy.  2.  Mild infraspinatus tendinopathy.   3.  Subacromial outlet narrowing due to a  combination of ligamentous hypertrophy and hypertrophic change undersurface of the acromion.  AC joint arthropathy also contributes to some narrowing.      Plan:  1. Patient wishes to discuss surgery. Will have her follow-up with Dr. Rivers. Patient verbalized understanding of all information and agrees with plan of care. Denies all other concerns present at this time.

## 2018-08-02 LAB
A VAGINAE DNA VAG QL NAA+PROBE: ABNORMAL SCORE
BVAB2 DNA VAG QL NAA+PROBE: ABNORMAL SCORE
C ALBICANS DNA VAG QL NAA+PROBE: NEGATIVE
C GLABRATA DNA VAG QL NAA+PROBE: NEGATIVE
C TRACH RRNA SPEC QL NAA+PROBE: NEGATIVE
LABORATORY COMMENT REPORT: ABNORMAL
M GENITALIUM DNA SPEC QL NAA+PROBE: NEGATIVE
M HOMINIS DNA SPEC QL NAA+PROBE: POSITIVE
MEGA1 DNA VAG QL NAA+PROBE: ABNORMAL SCORE
N GONORRHOEA RRNA SPEC QL NAA+PROBE: NEGATIVE
T VAGINALIS RRNA SPEC QL NAA+PROBE: POSITIVE
UREAPLASMA DNA SPEC QL NAA+PROBE: POSITIVE

## 2018-08-05 RX ORDER — DOXYCYCLINE HYCLATE 100 MG
100 TABLET ORAL 2 TIMES DAILY
Qty: 14 TABLET | Refills: 0 | Status: SHIPPED | OUTPATIENT
Start: 2018-08-05 | End: 2018-08-12

## 2018-08-06 ENCOUNTER — TELEPHONE (OUTPATIENT)
Dept: OBSTETRICS AND GYNECOLOGY | Facility: CLINIC | Age: 38
End: 2018-08-06

## 2018-08-08 ENCOUNTER — TELEPHONE (OUTPATIENT)
Dept: OBSTETRICS AND GYNECOLOGY | Facility: CLINIC | Age: 38
End: 2018-08-08

## 2018-08-08 NOTE — TELEPHONE ENCOUNTER
Pt states you told her you would treat her . I told her we were no longer doing this and she rudely said then why would she tell me that.

## 2018-08-08 NOTE — TELEPHONE ENCOUNTER
I don't recall telling this patient I would treat her . I probably told her that her partner needs to be treated.

## 2018-08-14 ENCOUNTER — OFFICE VISIT (OUTPATIENT)
Dept: ORTHOPEDIC SURGERY | Facility: CLINIC | Age: 38
End: 2018-08-14

## 2018-08-14 VITALS
HEIGHT: 64 IN | SYSTOLIC BLOOD PRESSURE: 119 MMHG | BODY MASS INDEX: 29.02 KG/M2 | WEIGHT: 170 LBS | DIASTOLIC BLOOD PRESSURE: 82 MMHG | HEART RATE: 108 BPM

## 2018-08-14 DIAGNOSIS — M75.122 COMPLETE TEAR OF LEFT ROTATOR CUFF: Primary | ICD-10-CM

## 2018-08-14 DIAGNOSIS — M75.22 BICEPS TENDINITIS OF LEFT UPPER EXTREMITY: ICD-10-CM

## 2018-08-14 PROCEDURE — 99204 OFFICE O/P NEW MOD 45 MIN: CPT | Performed by: ORTHOPAEDIC SURGERY

## 2018-08-14 RX ORDER — MELOXICAM 7.5 MG/1
15 TABLET ORAL ONCE
Status: CANCELLED | OUTPATIENT
Start: 2018-08-14 | End: 2018-08-14

## 2018-08-14 RX ORDER — PREGABALIN 75 MG/1
75 CAPSULE ORAL ONCE
Status: CANCELLED | OUTPATIENT
Start: 2018-08-14 | End: 2018-08-14

## 2018-08-14 RX ORDER — ACETAMINOPHEN 325 MG/1
1000 TABLET ORAL ONCE
Status: CANCELLED | OUTPATIENT
Start: 2018-08-14 | End: 2018-08-14

## 2018-08-14 RX ORDER — OXYCODONE HCL 10 MG/1
10 TABLET, FILM COATED, EXTENDED RELEASE ORAL ONCE
Status: CANCELLED | OUTPATIENT
Start: 2018-08-14 | End: 2018-08-14

## 2018-08-14 NOTE — PROGRESS NOTES
"Subjective:     Patient ID: Aide Henry is a 38 y.o. female.    Chief Complaint:  Left shoulder pain  History of Present Illness  Aide Henry presents to clinic today for evaluation of left shoulder pain, initially seen by Sirisha Rose, nurse practitioner, notes that pain has been present for approximately 5 years per her report to me today.  Denies any injury prior to the onset of her pain.  Localizes pain to the anterolateral aspect the left shoulder, rates now as a 6-8 out of 10 aching in nature, sharp pain noted with overhead lifting and try to move her arm overhead, she has noticed increasing weakness over the last 3-4 months.  Denies any history of instability.  Denies associated numbness or tingling left upper extremity.  Denies any significant radiation of her pain.  She has failed other treatment including anti-inflammatory medications, home exercise program, and activity modification.     Social History     Occupational History   • Not on file.     Social History Main Topics   • Smoking status: Current Every Day Smoker     Packs/day: 0.25     Years: 20.00     Types: Cigarettes   • Smokeless tobacco: Never Used      Comment: Declines medication today. Not interested in patches. Has tried gum previously. Desires to continue weaning.    • Alcohol use Yes      Comment: Alcoholic, Last drink 2018   • Drug use: Yes     Types: Cocaine      Comment: \"recovering addict\", last reports use 2017   • Sexual activity: Yes     Partners: Male     Birth control/ protection: Coitus interruptus      Past Medical History:   Diagnosis Date   • Anxiety    • Bacterial vaginosis    • Fibromyalgia    • Fracture, finger    • Lateral epicondylitis 2013   • Lupus    • Sjogren's syndrome (CMS/HCC)    • Urinary tract infection      Past Surgical History:   Procedure Laterality Date   • ADENOIDECTOMY     •  SECTION     • MOUTH SURGERY     • TONSILLECTOMY     • TUBAL ABDOMINAL LIGATION     • TYMPANOSTOMY TUBE " "PLACEMENT     • WISDOM TOOTH EXTRACTION Bilateral        Family History   Problem Relation Age of Onset   • Cancer Father    • Heart disease Paternal Grandmother    • Diabetes Maternal Aunt    • Diabetes Maternal Grandmother          Review of Systems   Constitutional: Negative for chills, diaphoresis, fever and unexpected weight change.   HENT: Negative for hearing loss, nosebleeds, sore throat and tinnitus.    Eyes: Negative for pain and visual disturbance.   Respiratory: Negative for cough, shortness of breath and wheezing.    Cardiovascular: Negative for chest pain and palpitations.   Gastrointestinal: Negative for abdominal pain, diarrhea, nausea and vomiting.   Endocrine: Negative for cold intolerance, heat intolerance and polydipsia.   Genitourinary: Negative for difficulty urinating, dysuria and hematuria.   Musculoskeletal: Positive for myalgias. Negative for arthralgias and joint swelling.   Skin: Negative for rash and wound.   Allergic/Immunologic: Negative for environmental allergies.   Neurological: Negative for dizziness, syncope and numbness.   Hematological: Does not bruise/bleed easily.   Psychiatric/Behavioral: Negative for dysphoric mood and sleep disturbance. The patient is not nervous/anxious.            Objective:  Vitals:    08/14/18 1311   BP: 119/82   BP Location: Right arm   Pulse: 108   Weight: 77.1 kg (170 lb)   Height: 162.6 cm (64\")     1    08/14/18  1311   Weight: 77.1 kg (170 lb)     Body mass index is 29.18 kg/m².  Physical Exam    Vital signs reviewed.   General: No acute distress.  Eyes: conjunctiva clear; pupils equally round and reactive  ENT: external ears and nose atraumatic; oropharynx clear  CV: no peripheral edema  Resp: normal respiratory effort  Skin: no rashes or wounds; normal turgor  Psych: mood and affect appropriate; recent and remote memory intact       Ortho Exam    Left shoulder-active forward flexion 160° compared to 180 on the right, 4 minus out of 5 strength, " active external rotation 45° with 4 out of 5 strength, active internal rotation T12, 4+ out of 5 strength on belly press test with negative bear hug sign.  Positive Yergason and speed's, positive empty can test, negative drop arm test, negative external rotation lag sign.  Minimal tenderness over acromioclavicular joint with a negative crossarm test.  Positive Mason and Neer's.  No midline or paraspinal cervical spine tenderness, negative Spurling exam.  Brisk cap refill all digits, 2+ radial pulse left wrist.  Positive sensation light touch all distibution left hand symmetric to the right.  Imaging:  Review of outside x-rays left shoulder as well as radiology report indicates no evidence of fracture, dislocation, or subluxation.    Review of outside MRI left shoulder as well as radiology report indicates small region of full-thickness rotator cuff tear primarily bursal sided involving the supraspinatus with minimal evidence of retraction, moderate biceps intra-articular tendinopathy on my review.  Assessment:       1. Complete tear of left rotator cuff    2. Biceps tendinitis of left upper extremity          Plan:          Discussed treatment options at length with patient at today's visit.  Reviewed treatment options at length, given prolonged nature of pain, full-thickness nature of the rotator cuff tear, failure to treatment, patient wished to proceed with surgery at this point in time.  Recommended that she stay off her methotrexate as she has been off of the first year as this will limits wound healing and rotator cuff healing potential with surgical intervention.    Plan will be for left shoulder arthroscopy, rotator cuff debridement versus repair, possible biceps tenodesis, subacromial decompression, and all associated procedures.  I reviewed risks benefits and alternatives the procedure with risks including not limited to neurovascular damage, bleeding, infection, chronic pain, re-tear rotator cuff,  failure of healing rotator cuff, loss of motion, weakness, stiffness, instability, biceps sag, DVT, pulmonary embolus, death, stroke, complex regional pain syndrome, myocardial infarction, need for additional procedures. She understood all these had all questions answered.  Patient verbally consented to proceed with surgery.  No guarantees were given regarding results of surgery.  We will have patient medically optimized by primary care physician and proceed with surgery at next available date.    Denies any significant cardiac history, denies history of DVT or pulmonary embolus.  She does have lupus so we will place on full dose aspirin postoperatively for DVT prophylaxis.    Aide Henry was in agreement with plan and had all questions answered.     Orders:  Orders Placed This Encounter   Procedures   • Basic metabolic panel   • Protime-INR   • APTT   • Follow anesthesia standing orders.   • Provide instructions to patient regarding NPO status   • Clorhexidine skin prep   • CBC and Differential       Medications:  No orders of the defined types were placed in this encounter.      Followup:  No Follow-up on file.    Aide was seen today for pain.    Diagnoses and all orders for this visit:    Complete tear of left rotator cuff  -     Case Request; Standing  -     oxyCODONE (oxyCONTIN) 12 hr tablet 10 mg; Take 1 tablet by mouth 1 (One) Time.  -     CBC and Differential; Future  -     Basic metabolic panel; Future  -     Protime-INR; Future  -     APTT; Future  -     ceFAZolin (ANCEF) 2 g in sodium chloride 0.9 % 100 mL IVPB; Infuse 2 g into a venous catheter 1 (One) Time.  -     acetaminophen (TYLENOL) tablet 975 mg; Take 3 tablets by mouth 1 (One) Time.  -     meloxicam (MOBIC) tablet 15 mg; Take 2 tablets by mouth 1 (One) Time.  -     pregabalin (LYRICA) capsule 75 mg; Take 1 capsule by mouth 1 (One) Time.  -     Case Request    Biceps tendinitis of left upper extremity  -     Case Request; Standing  -      oxyCODONE (oxyCONTIN) 12 hr tablet 10 mg; Take 1 tablet by mouth 1 (One) Time.  -     CBC and Differential; Future  -     Basic metabolic panel; Future  -     Protime-INR; Future  -     APTT; Future  -     ceFAZolin (ANCEF) 2 g in sodium chloride 0.9 % 100 mL IVPB; Infuse 2 g into a venous catheter 1 (One) Time.  -     acetaminophen (TYLENOL) tablet 975 mg; Take 3 tablets by mouth 1 (One) Time.  -     meloxicam (MOBIC) tablet 15 mg; Take 2 tablets by mouth 1 (One) Time.  -     pregabalin (LYRICA) capsule 75 mg; Take 1 capsule by mouth 1 (One) Time.  -     Case Request    Other orders  -     Follow anesthesia standing orders.  -     Provide instructions to patient regarding NPO status  -     Clorhexidine skin prep  -     Follow Anesthesia Guidelines / Standing Orders; Standing  -     Verify NPO Status; Standing  -     Clip operative site; Standing  -     Obtain informed consent (if not collected inpatient or PAT); Standing          Dictated utilizing Dragon dictation

## 2018-08-27 ENCOUNTER — PREP FOR SURGERY (OUTPATIENT)
Dept: OTHER | Facility: HOSPITAL | Age: 38
End: 2018-08-27

## 2018-08-29 ENCOUNTER — APPOINTMENT (OUTPATIENT)
Dept: PREADMISSION TESTING | Facility: HOSPITAL | Age: 38
End: 2018-08-29

## 2018-08-29 VITALS
RESPIRATION RATE: 16 BRPM | DIASTOLIC BLOOD PRESSURE: 82 MMHG | SYSTOLIC BLOOD PRESSURE: 119 MMHG | BODY MASS INDEX: 29.43 KG/M2 | OXYGEN SATURATION: 99 % | HEIGHT: 64 IN | WEIGHT: 172.4 LBS | HEART RATE: 80 BPM

## 2018-08-29 DIAGNOSIS — M75.122 COMPLETE TEAR OF LEFT ROTATOR CUFF: ICD-10-CM

## 2018-08-29 DIAGNOSIS — M75.22 BICEPS TENDINITIS OF LEFT UPPER EXTREMITY: ICD-10-CM

## 2018-08-29 LAB
ANION GAP SERPL CALCULATED.3IONS-SCNC: 14.4 MMOL/L
APTT PPP: 27.9 SECONDS (ref 24.3–38.1)
BASOPHILS # BLD AUTO: 0.03 10*3/MM3 (ref 0–0.2)
BASOPHILS NFR BLD AUTO: 0.9 % (ref 0–2)
BUN BLD-MCNC: 10 MG/DL (ref 6–20)
BUN/CREAT SERPL: 13.9 (ref 7–25)
CALCIUM SPEC-SCNC: 8.7 MG/DL (ref 8.6–10.5)
CHLORIDE SERPL-SCNC: 103 MMOL/L (ref 98–107)
CO2 SERPL-SCNC: 20.6 MMOL/L (ref 22–29)
CREAT BLD-MCNC: 0.72 MG/DL (ref 0.57–1)
DEPRECATED RDW RBC AUTO: 42.5 FL (ref 37–54)
EOSINOPHIL # BLD AUTO: 0.08 10*3/MM3 (ref 0.1–0.3)
EOSINOPHIL NFR BLD AUTO: 2.4 % (ref 0–4)
ERYTHROCYTE [DISTWIDTH] IN BLOOD BY AUTOMATED COUNT: 12.1 % (ref 11.5–14.5)
GFR SERPL CREATININE-BSD FRML MDRD: 92 ML/MIN/1.73
GLUCOSE BLD-MCNC: 114 MG/DL (ref 65–99)
HCT VFR BLD AUTO: 39.9 % (ref 37–47)
HGB BLD-MCNC: 13.2 G/DL (ref 12–16)
IMM GRANULOCYTES # BLD: 0.04 10*3/MM3 (ref 0–0.03)
IMM GRANULOCYTES NFR BLD: 1.2 % (ref 0–0.5)
INR PPP: 0.94 (ref 0.9–1.1)
LYMPHOCYTES # BLD AUTO: 0.79 10*3/MM3 (ref 0.6–4.8)
LYMPHOCYTES NFR BLD AUTO: 23.9 % (ref 20–45)
MCH RBC QN AUTO: 31.7 PG (ref 27–31)
MCHC RBC AUTO-ENTMCNC: 33.1 G/DL (ref 31–37)
MCV RBC AUTO: 95.7 FL (ref 81–99)
MONOCYTES # BLD AUTO: 0.36 10*3/MM3 (ref 0–1)
MONOCYTES NFR BLD AUTO: 10.9 % (ref 3–8)
NEUTROPHILS # BLD AUTO: 2 10*3/MM3 (ref 1.5–8.3)
NEUTROPHILS NFR BLD AUTO: 60.7 % (ref 45–70)
NRBC BLD MANUAL-RTO: 0 /100 WBC (ref 0–0)
PLATELET # BLD AUTO: 235 10*3/MM3 (ref 140–500)
PMV BLD AUTO: 9.7 FL (ref 7.4–10.4)
POTASSIUM BLD-SCNC: 4 MMOL/L (ref 3.5–5.2)
PROTHROMBIN TIME: 12.6 SECONDS (ref 12.1–15)
RBC # BLD AUTO: 4.17 10*6/MM3 (ref 4.2–5.4)
SODIUM BLD-SCNC: 138 MMOL/L (ref 136–145)
WBC NRBC COR # BLD: 3.3 10*3/MM3 (ref 4.8–10.8)

## 2018-08-29 PROCEDURE — 85025 COMPLETE CBC W/AUTO DIFF WBC: CPT | Performed by: ORTHOPAEDIC SURGERY

## 2018-08-29 PROCEDURE — 36415 COLL VENOUS BLD VENIPUNCTURE: CPT

## 2018-08-29 PROCEDURE — 80048 BASIC METABOLIC PNL TOTAL CA: CPT | Performed by: ORTHOPAEDIC SURGERY

## 2018-08-29 PROCEDURE — 85610 PROTHROMBIN TIME: CPT | Performed by: ORTHOPAEDIC SURGERY

## 2018-08-29 PROCEDURE — 85730 THROMBOPLASTIN TIME PARTIAL: CPT | Performed by: ORTHOPAEDIC SURGERY

## 2018-09-04 ENCOUNTER — OFFICE VISIT (OUTPATIENT)
Dept: INTERNAL MEDICINE | Facility: CLINIC | Age: 38
End: 2018-09-04

## 2018-09-04 VITALS
HEIGHT: 64 IN | BODY MASS INDEX: 29.37 KG/M2 | OXYGEN SATURATION: 99 % | SYSTOLIC BLOOD PRESSURE: 114 MMHG | TEMPERATURE: 98.3 F | WEIGHT: 172 LBS | DIASTOLIC BLOOD PRESSURE: 76 MMHG | HEART RATE: 77 BPM | RESPIRATION RATE: 16 BRPM

## 2018-09-04 DIAGNOSIS — Z01.818 PRE-OPERATIVE CLEARANCE: Primary | ICD-10-CM

## 2018-09-04 DIAGNOSIS — F10.21 ALCOHOL DEPENDENCE IN REMISSION (HCC): ICD-10-CM

## 2018-09-04 DIAGNOSIS — M75.122 COMPLETE TEAR OF LEFT ROTATOR CUFF: ICD-10-CM

## 2018-09-04 DIAGNOSIS — F41.8 DEPRESSION WITH ANXIETY: ICD-10-CM

## 2018-09-04 PROBLEM — A59.01 TRICHOMONAL VAGINITIS: Status: RESOLVED | Noted: 2017-12-12 | Resolved: 2018-09-04

## 2018-09-04 PROBLEM — F10.10 ALCOHOL ABUSE: Status: RESOLVED | Noted: 2017-05-10 | Resolved: 2018-09-04

## 2018-09-04 PROCEDURE — 93000 ELECTROCARDIOGRAM COMPLETE: CPT | Performed by: NURSE PRACTITIONER

## 2018-09-04 PROCEDURE — 99214 OFFICE O/P EST MOD 30 MIN: CPT | Performed by: NURSE PRACTITIONER

## 2018-09-04 RX ORDER — ESCITALOPRAM OXALATE 20 MG/1
20 TABLET ORAL DAILY
Qty: 90 TABLET | Refills: 3 | Status: SHIPPED | OUTPATIENT
Start: 2018-09-04 | End: 2019-03-28

## 2018-09-04 NOTE — PROGRESS NOTES
Procedure     ECG 12 Lead  Date/Time: 9/4/2018 10:33 AM  Performed by: MARLENY MAYS  Authorized by: MARLENY MAYS   Comparison: compared with previous ECG from 6/11/2015  Similar to previous ECG  Comparison to previous ECG: Compared to Holter lead III, no 12 lead available  Rhythm: sinus rhythm  Ectopy comments: none  Rate: normal  BPM: 75  Conduction: conduction normal  Conduction comments: VT 0.12 QRS 0.08  ST Segments: ST segments normal  T Waves: T waves normal  QRS axis: normal  Other: no other findings  Clinical impression: normal ECG

## 2018-09-04 NOTE — PROGRESS NOTES
Subjective      CC: Pre-Op consult for Left Shoulder scope      History of Present Illness     Aide Henry is a 38 y.o. female here today for a pre-operative consult for a left shoulder arthroscopy which is planned for 9/18/18.   She is being seen by Dr. Rivers and was found to have a complete tear of her left rotator cuff and bicep tendinitis of her left upper extremity. MRI left shoulder completed 7-.     Patient has a history of SICCA Syndrome and and is being followed by Dr. Bar at [facility].      Requesting increase in her Lexapro to help with increased anxiety symptoms she has been experiencing since quitting smoking and alcohol.    Past medical history includes depression with anxiety, fibromyaglia, and alcohol abuse currently in remission.  She also has a history of past opiod abuse.  She reports that she has a plan for managing her pain medication after surgery so she does not relapse.    Social history: She is a former smoker (7-2018) with 5 pack year history. She has not smoked since July.  She is in recovery from alcohol abuse and drug abuse and states she has not had any alcohol for at least 3 months.  Aide states she is currently without a car so she has not been able to get to meetings but has found new support from her [employer] family.      She is currently not taking any blood thinners or fish oil. She has been off of the previously prescribed Methotrexate for over 1 1/2 years and will not resume until cleared by Dr. Rivers after surgery recovery.      Current Outpatient Prescriptions on File Prior to Visit   Medication Sig Dispense Refill   • escitalopram (LEXAPRO) 10 MG tablet Take 1 tablet by mouth Daily. (Patient taking differently: Take 10 mg by mouth Every Evening.) 90 tablet 3   • omeprazole (priLOSEC) 20 MG capsule TAKE 1 CAPSULE BY MOUTH DAILY (Patient taking differently: Take 20 mg by mouth Every Night.) 90 capsule 1   • prednisoLONE acetate (PRED FORTE) 1 % ophthalmic  suspension INSTILL 1 DROP INTO OD Q 2 H  6   • naproxen (NAPROSYN) 250 MG tablet Take 250 mg by mouth 2 (Two) Times a Day As Needed.       No current facility-administered medications on file prior to visit.      The following portions of the patient's history were reviewed and updated as appropriate: allergies, current medications, past family history, past medical history, past social history, past surgical history and problem list.    Review of Systems   Constitutional: Negative for activity change, appetite change and fatigue.   HENT: Positive for congestion. Negative for rhinorrhea and sore throat.    Eyes: Positive for visual disturbance (due to SICCA/eye dryness). Negative for discharge and redness.   Respiratory: Negative for chest tightness, shortness of breath and wheezing.    Cardiovascular: Negative for leg swelling.   Gastrointestinal: Positive for abdominal distention. Negative for abdominal pain.   Endocrine: Negative for polyuria.   Genitourinary: Negative for dysuria.   Musculoskeletal: Positive for arthralgias. Negative for joint swelling.   Skin: Positive for dry skin (patches of itchy, dry skin). Negative for color change.   Allergic/Immunologic: Positive for environmental allergies.   Neurological: Negative for dizziness.   Hematological: Negative for adenopathy.   Psychiatric/Behavioral: Positive for stress. The patient is nervous/anxious.        Objective   Physical Exam   Constitutional: She is oriented to person, place, and time. Vital signs are normal. She appears well-developed and well-nourished.  Non-toxic appearance. She does not have a sickly appearance. She does not appear ill.   HENT:   Head: Normocephalic.   Right Ear: External ear normal.   Left Ear: External ear normal.   Nose: Nose normal. No rhinorrhea or congestion.   Mouth/Throat: Uvula is midline and mucous membranes are normal. Posterior oropharyngeal erythema (mild. patient denies symptoms and attributes to drainage from  allergies) present.   Tubes present in both ears.   Eyes: Pupils are equal, round, and reactive to light. Conjunctivae and lids are normal. Right eye exhibits no discharge. Left eye exhibits no discharge. Right conjunctiva is not injected. Left conjunctiva is not injected.   Neck: Normal range of motion. No JVD present.   Cardiovascular: Normal rate, regular rhythm, normal heart sounds and intact distal pulses.    No murmur heard.  Pulmonary/Chest: Effort normal and breath sounds normal. No respiratory distress. She has no wheezes.   Abdominal: Soft. Bowel sounds are normal. She exhibits distension. There is no tenderness. There is no guarding.   Musculoskeletal: Normal range of motion. She exhibits no edema.   Neurological: She is alert and oriented to person, place, and time.   Skin: Skin is warm and dry. She is not diaphoretic.   Psychiatric: She has a normal mood and affect. Her behavior is normal.   Nursing note and vitals reviewed.        Assessment/Plan      EKG in office today.  Normal Sinus Rhythm.    CBC and PTT from 8/29/19 reviewed and were found to be within normal limits.       Diagnosis Plan   1. Pre-operative clearance  ECG 12 Lead   2. Complete tear of left rotator cuff     3. Alcohol dependence in remission (CMS/HCC)     4. Depression with anxiety  escitalopram (LEXAPRO) 20 MG tablet       Discussed clearance for surgery today with need to hold her methotrexate and any blood thinners until advised by surgery team.  Low risk for surgery, cleared for left shoulder arthroscopy.     Discussed increase in Lexapro to 20 mg today to help with increased anxiety symptoms.    Follow up in clinic if symptoms not improved in a few weeks after medication increase.      Documentation, physical assessment, diagnosis, and medical plan reviewed and approved by SHAYLEE Lambert.

## 2018-09-07 ENCOUNTER — ANESTHESIA EVENT (OUTPATIENT)
Dept: PERIOP | Facility: HOSPITAL | Age: 38
End: 2018-09-07

## 2018-09-10 ENCOUNTER — HOSPITAL ENCOUNTER (OUTPATIENT)
Facility: HOSPITAL | Age: 38
Setting detail: HOSPITAL OUTPATIENT SURGERY
Discharge: HOME | End: 2018-09-10
Attending: ORTHOPAEDIC SURGERY | Admitting: NURSE ANESTHETIST, CERTIFIED REGISTERED

## 2018-09-10 ENCOUNTER — ANESTHESIA (OUTPATIENT)
Dept: PERIOP | Facility: HOSPITAL | Age: 38
End: 2018-09-10

## 2018-09-10 VITALS
SYSTOLIC BLOOD PRESSURE: 117 MMHG | BODY MASS INDEX: 30.45 KG/M2 | WEIGHT: 177.4 LBS | HEART RATE: 108 BPM | RESPIRATION RATE: 16 BRPM | TEMPERATURE: 97.8 F | DIASTOLIC BLOOD PRESSURE: 77 MMHG | OXYGEN SATURATION: 94 %

## 2018-09-10 DIAGNOSIS — M75.122 COMPLETE TEAR OF LEFT ROTATOR CUFF: ICD-10-CM

## 2018-09-10 DIAGNOSIS — M75.22 BICEPS TENDINITIS OF LEFT UPPER EXTREMITY: ICD-10-CM

## 2018-09-10 LAB — HCG SERPL QL: NEGATIVE

## 2018-09-10 PROCEDURE — 25010000002 MIDAZOLAM PER 1 MG: Performed by: NURSE ANESTHETIST, CERTIFIED REGISTERED

## 2018-09-10 PROCEDURE — C1713 ANCHOR/SCREW BN/BN,TIS/BN: HCPCS | Performed by: ORTHOPAEDIC SURGERY

## 2018-09-10 PROCEDURE — 29827 SHO ARTHRS SRG RT8TR CUF RPR: CPT | Performed by: ORTHOPAEDIC SURGERY

## 2018-09-10 PROCEDURE — 25010000003 CEFAZOLIN PER 500 MG: Performed by: ORTHOPAEDIC SURGERY

## 2018-09-10 PROCEDURE — 23430 REPAIR BICEPS TENDON: CPT | Performed by: ORTHOPAEDIC SURGERY

## 2018-09-10 PROCEDURE — 84703 CHORIONIC GONADOTROPIN ASSAY: CPT | Performed by: ORTHOPAEDIC SURGERY

## 2018-09-10 PROCEDURE — 25010000002 KETOROLAC TROMETHAMINE PER 15 MG: Performed by: NURSE ANESTHETIST, CERTIFIED REGISTERED

## 2018-09-10 PROCEDURE — 25010000002 PROPOFOL 10 MG/ML EMULSION: Performed by: NURSE ANESTHETIST, CERTIFIED REGISTERED

## 2018-09-10 PROCEDURE — 25010000002 SUCCINYLCHOLINE PER 20 MG: Performed by: NURSE ANESTHETIST, CERTIFIED REGISTERED

## 2018-09-10 PROCEDURE — 25010000002 DEXAMETHASONE PER 1 MG: Performed by: NURSE ANESTHETIST, CERTIFIED REGISTERED

## 2018-09-10 PROCEDURE — 29823 SHO ARTHRS SRG XTNSV DBRDMT: CPT | Performed by: ORTHOPAEDIC SURGERY

## 2018-09-10 PROCEDURE — 25010000002 ROPIVACAINE PER 1 MG: Performed by: NURSE ANESTHETIST, CERTIFIED REGISTERED

## 2018-09-10 PROCEDURE — 25010000002 ONDANSETRON PER 1 MG: Performed by: NURSE ANESTHETIST, CERTIFIED REGISTERED

## 2018-09-10 PROCEDURE — 25010000002 EPINEPHRINE PER 0.1 MG: Performed by: ORTHOPAEDIC SURGERY

## 2018-09-10 DEVICE — 2.8MM Q-FIX ALL SUTURE ANCHOR
Type: IMPLANTABLE DEVICE | Site: SHOULDER | Status: FUNCTIONAL
Brand: Q-FIX

## 2018-09-10 DEVICE — HEALICOIL PK 4.5 MM SUTURE ANCHOR                                    WITH TWO ULTRABRAID NO.2 SUTURES                                    BLUE, BLUE-COBRAID STERILE
Type: IMPLANTABLE DEVICE | Site: SHOULDER | Status: FUNCTIONAL
Brand: HEALICOIL

## 2018-09-10 RX ORDER — KETAMINE HYDROCHLORIDE 100 MG/ML
INJECTION INTRAMUSCULAR; INTRAVENOUS AS NEEDED
Status: DISCONTINUED | OUTPATIENT
Start: 2018-09-10 | End: 2018-09-10 | Stop reason: SURG

## 2018-09-10 RX ORDER — SODIUM CHLORIDE, SODIUM LACTATE, POTASSIUM CHLORIDE, CALCIUM CHLORIDE 600; 310; 30; 20 MG/100ML; MG/100ML; MG/100ML; MG/100ML
9 INJECTION, SOLUTION INTRAVENOUS CONTINUOUS
Status: DISCONTINUED | OUTPATIENT
Start: 2018-09-10 | End: 2018-09-10 | Stop reason: HOSPADM

## 2018-09-10 RX ORDER — OXYCODONE HCL 10 MG/1
10 TABLET, FILM COATED, EXTENDED RELEASE ORAL ONCE
Status: COMPLETED | OUTPATIENT
Start: 2018-09-10 | End: 2018-09-10

## 2018-09-10 RX ORDER — DOCUSATE SODIUM 100 MG/1
100 CAPSULE, LIQUID FILLED ORAL 2 TIMES DAILY PRN
Qty: 30 CAPSULE | Refills: 0 | Status: SHIPPED | OUTPATIENT
Start: 2018-09-10 | End: 2018-10-02 | Stop reason: SDUPTHER

## 2018-09-10 RX ORDER — DEXAMETHASONE SODIUM PHOSPHATE 4 MG/ML
8 INJECTION, SOLUTION INTRA-ARTICULAR; INTRALESIONAL; INTRAMUSCULAR; INTRAVENOUS; SOFT TISSUE ONCE AS NEEDED
Status: COMPLETED | OUTPATIENT
Start: 2018-09-10 | End: 2018-09-10

## 2018-09-10 RX ORDER — MIDAZOLAM HYDROCHLORIDE 1 MG/ML
1 INJECTION INTRAMUSCULAR; INTRAVENOUS
Status: DISCONTINUED | OUTPATIENT
Start: 2018-09-10 | End: 2018-09-10 | Stop reason: HOSPADM

## 2018-09-10 RX ORDER — KETOROLAC TROMETHAMINE 30 MG/ML
INJECTION, SOLUTION INTRAMUSCULAR; INTRAVENOUS AS NEEDED
Status: DISCONTINUED | OUTPATIENT
Start: 2018-09-10 | End: 2018-09-10 | Stop reason: SURG

## 2018-09-10 RX ORDER — MIDAZOLAM HYDROCHLORIDE 1 MG/ML
2 INJECTION INTRAMUSCULAR; INTRAVENOUS
Status: DISCONTINUED | OUTPATIENT
Start: 2018-09-10 | End: 2018-09-10 | Stop reason: HOSPADM

## 2018-09-10 RX ORDER — OXYCODONE HYDROCHLORIDE AND ACETAMINOPHEN 5; 325 MG/1; MG/1
1 TABLET ORAL ONCE AS NEEDED
Status: DISCONTINUED | OUTPATIENT
Start: 2018-09-10 | End: 2018-09-10 | Stop reason: HOSPADM

## 2018-09-10 RX ORDER — LIDOCAINE HYDROCHLORIDE AND EPINEPHRINE 10; 10 MG/ML; UG/ML
INJECTION, SOLUTION INFILTRATION; PERINEURAL AS NEEDED
Status: DISCONTINUED | OUTPATIENT
Start: 2018-09-10 | End: 2018-09-10 | Stop reason: HOSPADM

## 2018-09-10 RX ORDER — GLYCOPYRROLATE 0.2 MG/ML
0.2 INJECTION INTRAMUSCULAR; INTRAVENOUS
Status: DISCONTINUED | OUTPATIENT
Start: 2018-09-10 | End: 2018-09-10 | Stop reason: HOSPADM

## 2018-09-10 RX ORDER — SODIUM CHLORIDE, SODIUM LACTATE, POTASSIUM CHLORIDE, CALCIUM CHLORIDE 600; 310; 30; 20 MG/100ML; MG/100ML; MG/100ML; MG/100ML
100 INJECTION, SOLUTION INTRAVENOUS CONTINUOUS
Status: DISCONTINUED | OUTPATIENT
Start: 2018-09-10 | End: 2018-09-10 | Stop reason: HOSPADM

## 2018-09-10 RX ORDER — ONDANSETRON 4 MG/1
4 TABLET, FILM COATED ORAL EVERY 8 HOURS PRN
Qty: 30 TABLET | Refills: 0 | Status: SHIPPED | OUTPATIENT
Start: 2018-09-10 | End: 2018-09-18

## 2018-09-10 RX ORDER — ROCURONIUM BROMIDE 10 MG/ML
INJECTION, SOLUTION INTRAVENOUS AS NEEDED
Status: DISCONTINUED | OUTPATIENT
Start: 2018-09-10 | End: 2018-09-10 | Stop reason: SURG

## 2018-09-10 RX ORDER — PREGABALIN 75 MG/1
75 CAPSULE ORAL ONCE
Status: COMPLETED | OUTPATIENT
Start: 2018-09-10 | End: 2018-09-10

## 2018-09-10 RX ORDER — HYDROMORPHONE HYDROCHLORIDE 1 MG/ML
0.5 INJECTION, SOLUTION INTRAMUSCULAR; INTRAVENOUS; SUBCUTANEOUS
Status: DISCONTINUED | OUTPATIENT
Start: 2018-09-10 | End: 2018-09-10 | Stop reason: HOSPADM

## 2018-09-10 RX ORDER — ROPIVACAINE HYDROCHLORIDE 5 MG/ML
INJECTION, SOLUTION EPIDURAL; INFILTRATION; PERINEURAL AS NEEDED
Status: DISCONTINUED | OUTPATIENT
Start: 2018-09-10 | End: 2018-09-10 | Stop reason: SURG

## 2018-09-10 RX ORDER — SODIUM CHLORIDE 9 MG/ML
40 INJECTION, SOLUTION INTRAVENOUS AS NEEDED
Status: DISCONTINUED | OUTPATIENT
Start: 2018-09-10 | End: 2018-09-10 | Stop reason: HOSPADM

## 2018-09-10 RX ORDER — FAMOTIDINE 20 MG/1
20 TABLET, FILM COATED ORAL EVERY 12 HOURS SCHEDULED
Status: DISCONTINUED | OUTPATIENT
Start: 2018-09-10 | End: 2018-09-10 | Stop reason: HOSPADM

## 2018-09-10 RX ORDER — SODIUM CHLORIDE 0.9 % (FLUSH) 0.9 %
1-10 SYRINGE (ML) INJECTION AS NEEDED
Status: DISCONTINUED | OUTPATIENT
Start: 2018-09-10 | End: 2018-09-10 | Stop reason: HOSPADM

## 2018-09-10 RX ORDER — ONDANSETRON 2 MG/ML
4 INJECTION INTRAMUSCULAR; INTRAVENOUS ONCE AS NEEDED
Status: DISCONTINUED | OUTPATIENT
Start: 2018-09-10 | End: 2018-09-10 | Stop reason: HOSPADM

## 2018-09-10 RX ORDER — EPHEDRINE SULFATE 50 MG/ML
INJECTION, SOLUTION INTRAVENOUS AS NEEDED
Status: DISCONTINUED | OUTPATIENT
Start: 2018-09-10 | End: 2018-09-10 | Stop reason: SURG

## 2018-09-10 RX ORDER — SUCCINYLCHOLINE CHLORIDE 20 MG/ML
INJECTION INTRAMUSCULAR; INTRAVENOUS AS NEEDED
Status: DISCONTINUED | OUTPATIENT
Start: 2018-09-10 | End: 2018-09-10 | Stop reason: SURG

## 2018-09-10 RX ORDER — LIDOCAINE HYDROCHLORIDE 10 MG/ML
0.5 INJECTION, SOLUTION EPIDURAL; INFILTRATION; INTRACAUDAL; PERINEURAL ONCE AS NEEDED
Status: COMPLETED | OUTPATIENT
Start: 2018-09-10 | End: 2018-09-10

## 2018-09-10 RX ORDER — MEPERIDINE HYDROCHLORIDE 25 MG/ML
12.5 INJECTION INTRAMUSCULAR; INTRAVENOUS; SUBCUTANEOUS
Status: DISCONTINUED | OUTPATIENT
Start: 2018-09-10 | End: 2018-09-10 | Stop reason: HOSPADM

## 2018-09-10 RX ORDER — MELOXICAM 7.5 MG/1
15 TABLET ORAL ONCE
Status: COMPLETED | OUTPATIENT
Start: 2018-09-10 | End: 2018-09-10

## 2018-09-10 RX ORDER — OXYCODONE HYDROCHLORIDE AND ACETAMINOPHEN 5; 325 MG/1; MG/1
1 TABLET ORAL EVERY 4 HOURS PRN
Qty: 42 TABLET | Refills: 0 | Status: SHIPPED | OUTPATIENT
Start: 2018-09-10 | End: 2018-09-18 | Stop reason: SDUPTHER

## 2018-09-10 RX ORDER — PROPOFOL 10 MG/ML
VIAL (ML) INTRAVENOUS AS NEEDED
Status: DISCONTINUED | OUTPATIENT
Start: 2018-09-10 | End: 2018-09-10 | Stop reason: SURG

## 2018-09-10 RX ORDER — ONDANSETRON 2 MG/ML
4 INJECTION INTRAMUSCULAR; INTRAVENOUS ONCE AS NEEDED
Status: COMPLETED | OUTPATIENT
Start: 2018-09-10 | End: 2018-09-10

## 2018-09-10 RX ORDER — ACETAMINOPHEN 500 MG
1000 TABLET ORAL ONCE
Status: COMPLETED | OUTPATIENT
Start: 2018-09-10 | End: 2018-09-10

## 2018-09-10 RX ADMIN — ROPIVACAINE HYDROCHLORIDE 25 ML: 5 INJECTION, SOLUTION EPIDURAL; INFILTRATION; PERINEURAL at 08:21

## 2018-09-10 RX ADMIN — PREGABALIN 75 MG: 75 CAPSULE ORAL at 07:15

## 2018-09-10 RX ADMIN — EPHEDRINE SULFATE 5 MG: 50 INJECTION, SOLUTION INTRAVENOUS at 09:53

## 2018-09-10 RX ADMIN — SUCCINYLCHOLINE CHLORIDE 120 MG: 20 INJECTION, SOLUTION INTRAMUSCULAR; INTRAVENOUS at 08:42

## 2018-09-10 RX ADMIN — OXYCODONE HYDROCHLORIDE 10 MG: 10 TABLET, FILM COATED, EXTENDED RELEASE ORAL at 07:15

## 2018-09-10 RX ADMIN — KETAMINE HYDROCHLORIDE 80 MG: 100 INJECTION INTRAMUSCULAR; INTRAVENOUS at 08:56

## 2018-09-10 RX ADMIN — CEFAZOLIN SODIUM 2 G: 2 SOLUTION INTRAVENOUS at 08:35

## 2018-09-10 RX ADMIN — SODIUM CHLORIDE, POTASSIUM CHLORIDE, SODIUM LACTATE AND CALCIUM CHLORIDE 9 ML/HR: 600; 310; 30; 20 INJECTION, SOLUTION INTRAVENOUS at 07:20

## 2018-09-10 RX ADMIN — PROPOFOL 200 MG: 10 INJECTION, EMULSION INTRAVENOUS at 08:39

## 2018-09-10 RX ADMIN — EPHEDRINE SULFATE 5 MG: 50 INJECTION, SOLUTION INTRAVENOUS at 09:27

## 2018-09-10 RX ADMIN — MIDAZOLAM HYDROCHLORIDE 1 MG: 1 INJECTION, SOLUTION INTRAMUSCULAR; INTRAVENOUS at 08:14

## 2018-09-10 RX ADMIN — MIDAZOLAM HYDROCHLORIDE: 1 INJECTION, SOLUTION INTRAMUSCULAR; INTRAVENOUS at 08:20

## 2018-09-10 RX ADMIN — EPHEDRINE SULFATE 5 MG: 50 INJECTION, SOLUTION INTRAVENOUS at 09:03

## 2018-09-10 RX ADMIN — ONDANSETRON 4 MG: 2 INJECTION INTRAMUSCULAR; INTRAVENOUS at 08:12

## 2018-09-10 RX ADMIN — GLYCOPYRROLATE 0.2 MG: 0.2 INJECTION INTRAMUSCULAR; INTRAVENOUS at 08:12

## 2018-09-10 RX ADMIN — SODIUM CHLORIDE, POTASSIUM CHLORIDE, SODIUM LACTATE AND CALCIUM CHLORIDE: 600; 310; 30; 20 INJECTION, SOLUTION INTRAVENOUS at 10:26

## 2018-09-10 RX ADMIN — MELOXICAM 15 MG: 7.5 TABLET ORAL at 07:15

## 2018-09-10 RX ADMIN — DEXAMETHASONE SODIUM PHOSPHATE 8 MG: 4 INJECTION, SOLUTION INTRAMUSCULAR; INTRAVENOUS at 08:12

## 2018-09-10 RX ADMIN — FAMOTIDINE 20 MG: 10 INJECTION, SOLUTION INTRAVENOUS at 08:12

## 2018-09-10 RX ADMIN — ACETAMINOPHEN 1000 MG: 500 TABLET, FILM COATED ORAL at 07:15

## 2018-09-10 RX ADMIN — ROCURONIUM BROMIDE 5 MG: 10 INJECTION INTRAVENOUS at 08:38

## 2018-09-10 RX ADMIN — LIDOCAINE HYDROCHLORIDE 0.1 ML: 10 INJECTION, SOLUTION EPIDURAL; INFILTRATION; INTRACAUDAL; PERINEURAL at 07:20

## 2018-09-10 RX ADMIN — KETOROLAC TROMETHAMINE 30 MG: 30 INJECTION INTRAMUSCULAR; INTRAVENOUS at 10:22

## 2018-09-10 NOTE — ANESTHESIA POSTPROCEDURE EVALUATION
Patient: Aide Henry    Procedure Summary     Date:  09/10/18 Room / Location:   [facility] /  [facility]    Anesthesia Start:  0834 Anesthesia Stop:  1041    Procedure:  SHOULDER ARTHROSCOPY, rotator cuff repair; extensive debridement, open biceps tenodesis (Left Shoulder) Diagnosis:       Complete tear of left rotator cuff      Biceps tendinitis of left upper extremity      (Complete tear of left rotator cuff [M75.122])      (Biceps tendinitis of left upper extremity [M75.22])    Surgeon:  Joo Rivers MD Provider:  Douglas Doe CRNA    Anesthesia Type:  general, regional ASA Status:  2          Anesthesia Type: general, regional  Last vitals  BP   122/89 (09/10/18 1107)   Temp   97.8 °F (36.6 °C) (09/10/18 1109)   Pulse   120 (09/10/18 1107)   Resp   12 (09/10/18 1038)     SpO2   94 % (09/10/18 1107)     Post Anesthesia Care and Evaluation    Patient location during evaluation: bedside  Patient participation: complete - patient participated  Level of consciousness: awake  Pain score: 1  Pain management: adequate  Airway patency: patent  Anesthetic complications: No anesthetic complications  PONV Status: none  Cardiovascular status: acceptable  Respiratory status: acceptable  Hydration status: acceptable

## 2018-09-10 NOTE — ANESTHESIA PROCEDURE NOTES
Airway  Date/Time: 9/10/2018 8:41 AM  Airway not difficult    General Information and Staff    Patient location during procedure: OR    Indications and Patient Condition  Indications for airway management: airway protection    Preoxygenated: yes  MILS maintained throughout  Mask difficulty assessment: 1 - vent by mask    Final Airway Details  Final airway type: endotracheal airway      Successful airway: ETT  Cuffed: yes   Successful intubation technique: direct laryngoscopy  Facilitating devices/methods: intubating stylet  Endotracheal tube insertion site: oral  Blade: Garcia  Blade size: 3  ETT size: 7.5 mm  Cormack-Lehane Classification: grade I - full view of glottis  Placement verified by: chest auscultation   Measured from: lips  ETT to lips (cm): 21  Number of attempts at approach: 1

## 2018-09-10 NOTE — ANESTHESIA PROCEDURE NOTES
Peripheral Block    Start time: 9/10/2018 8:17 AM  Stop time: 9/10/2018 8:23 AM  Reason for block: post-op pain management  Preanesthetic Checklist  Completed: patient identified, site marked, surgical consent, pre-op evaluation, timeout performed, IV checked, risks and benefits discussed and monitors and equipment checked  Prep:  Pt Position: sitting  Sterile barriers:cap, gloves, mask and sterile barriers  Prep: ChloraPrep  Patient monitoring: blood pressure monitoring, continuous pulse oximetry and EKG  Procedure  Sedation:yes  Performed under: PNB  Guidance:ultrasound guided and nerve stimulator  Images:still images obtained    Laterality:left  Block Type:interscalene  Injection Technique:single-shot  Needle Type:echogenic and short-bevel  Needle Gauge:22 G    Medications  Local Injected:ropivacaine 0.5% Local Amount Injected:25mL

## 2018-09-10 NOTE — ANESTHESIA PREPROCEDURE EVALUATION
Anesthesia Evaluation     Patient summary reviewed and Nursing notes reviewed   no history of anesthetic complications:  NPO Solid Status: > 8 hours  NPO Liquid Status: > 8 hours           Airway   Mallampati: II  TM distance: >3 FB  No difficulty expected  Dental - normal exam     Pulmonary - normal exam    breath sounds clear to auscultation  (+) a smoker (quit 1 month ago, 20 pk yr hx) Former,   Cardiovascular - normal exam  Exercise tolerance: good (4-7 METS)    ECG reviewed  Rhythm: regular  Rate: normal        Neuro/Psych  (+) psychiatric history Anxiety and Depression,     (-) numbness  GI/Hepatic/Renal/Endo    (+) obesity,  GERD well controlled,      Musculoskeletal     (+) back pain, myalgias,       ROS comment: lupus  Abdominal  - normal exam   Substance History   (+) alcohol use, drug use      Comment: Abstained x 2 yrs   OB/GYN          Other   (+) arthritis                     Anesthesia Plan    ASA 2     general and regional   (Isb)  intravenous induction   Anesthetic plan, all risks, benefits, and alternatives have been provided, discussed and informed consent has been obtained with: patient.  Use of blood products discussed with patient  Consented to blood products.

## 2018-09-13 ENCOUNTER — TELEPHONE (OUTPATIENT)
Dept: ORTHOPEDIC SURGERY | Facility: CLINIC | Age: 38
End: 2018-09-13

## 2018-09-13 NOTE — TELEPHONE ENCOUNTER
Needs to be supplementing with ibuprofen, aleve, or advil. If these are not working I can send in a prescription NSAID.     Pain should improve over next few days, if needs refill of pain pills call back

## 2018-09-13 NOTE — TELEPHONE ENCOUNTER
Pt states she only 25 pills for pain left and she doesn't see you until Tuesday and she is in a lot of pain.  Wants to know if you have any suggestions for her or can give her an extra days worth of pain pills.

## 2018-09-18 ENCOUNTER — OFFICE VISIT (OUTPATIENT)
Dept: ORTHOPEDIC SURGERY | Facility: CLINIC | Age: 38
End: 2018-09-18

## 2018-09-18 DIAGNOSIS — M75.122 COMPLETE TEAR OF LEFT ROTATOR CUFF: Primary | ICD-10-CM

## 2018-09-18 DIAGNOSIS — M75.22 BICEPS TENDINITIS OF LEFT UPPER EXTREMITY: ICD-10-CM

## 2018-09-18 DIAGNOSIS — Z98.890 STATUS POST ARTHROSCOPY OF SHOULDER: ICD-10-CM

## 2018-09-18 PROCEDURE — 99024 POSTOP FOLLOW-UP VISIT: CPT | Performed by: NURSE PRACTITIONER

## 2018-09-18 RX ORDER — OXYCODONE HYDROCHLORIDE AND ACETAMINOPHEN 5; 325 MG/1; MG/1
1 TABLET ORAL EVERY 4 HOURS PRN
Qty: 42 TABLET | Refills: 0 | Status: SHIPPED | OUTPATIENT
Start: 2018-09-18 | End: 2018-09-24 | Stop reason: SDUPTHER

## 2018-09-19 NOTE — PROGRESS NOTES
CC: follow-up status post right shoulder arthroscopy with extensive debridement, including debridement of rotator cuff tear, labrum, and subacromial bursitis, open biceps tenodesis, arthroscopic rotator cuff repair, DOS 09/10/2018    Interval History: Patient returns to clinic stating pain is doing fairly well, has been using sling as instructed, denies any numbness or tingling over right arm. No fevers, chills, or sweats, and no drainage from incisions noted.    Exam:   Right shoulder- incisions clean, dry, sutures in place   Positive sensation all distributions right hand and proximal lateral aspect arm, positive deltoid firing   Cap refill < 3 seconds, radial pulse 2+   Positive deltoid firing   Flex/extend fingers/thumb/wrist with 4+/5 strength, positive thumbs up, okay sign, cross finger adduction and abduction against resistance     Impression: s/p right shoulder  arthroscopy with extensive debridement, including debridement of rotator cuff tear, labrum, and subacromial bursitis, open biceps tenodesis, arthroscopic rotator cuff repair     Plan:  1. D/c sutures today and replace with steri-strips- may shower, no submerging wounds x 4 weeks  2. F/u in 3 wks with Dr Rivers  3. We will keep patient in sling for 3 weeks total, start physical therapy at week 2 was standard rotator cuff protocol, no resisted elbow flexion ×8 weeks status post biceps tenodesis.

## 2018-09-24 ENCOUNTER — HOSPITAL ENCOUNTER (OUTPATIENT)
Dept: PHYSICAL THERAPY | Facility: HOSPITAL | Age: 38
Setting detail: THERAPIES SERIES
Discharge: HOME | End: 2018-09-24

## 2018-09-24 DIAGNOSIS — Z98.890 STATUS POST ARTHROSCOPY OF SHOULDER: Primary | ICD-10-CM

## 2018-09-24 PROCEDURE — 97161 PT EVAL LOW COMPLEX 20 MIN: CPT

## 2018-09-24 RX ORDER — OXYCODONE HYDROCHLORIDE AND ACETAMINOPHEN 5; 325 MG/1; MG/1
1 TABLET ORAL EVERY 4 HOURS PRN
Qty: 42 TABLET | Refills: 0 | Status: SHIPPED | OUTPATIENT
Start: 2018-09-24 | End: 2018-10-02 | Stop reason: SDUPTHER

## 2018-09-24 NOTE — THERAPY EVALUATION
Outpatient Physical Therapy Ortho Initial Evaluation  TENISHA Wing     Patient Name: Aide Henry  : 1980  MRN: [id number]  Today's Date: 2018      Visit Date: 2018    Patient Active Problem List   Diagnosis   • Arthralgia of multiple joints   • Chronic back pain   • Chronic constipation   • Disorder of connective tissue (CMS/HCC)   • Depression with anxiety   • Fibromyalgia   • Gastroesophageal reflux disease without esophagitis   • Irritable bowel syndrome   • Muscle pain   • Palpitations   • Seasonal allergic rhinitis   • Sjogren's syndrome (CMS/HCC)   • Sympathetic uveitis   • Tenderness of temporomandibular joint   • Bilateral carpal tunnel syndrome   • Eczema   • Shoulder pain, left   • Substance abuse   • Lateral epicondylitis   • Drug addiction syndrome (CMS/HCC)   • Gastroenteritis   • Alcohol dependence in remission (CMS/HCC)   • History of placement of ear tubes   • Tendinopathy of left rotator cuff   • Biceps tendinitis of left upper extremity   • Complete tear of left rotator cuff   • Pre-operative clearance   • Status post arthroscopy of shoulder        Past Medical History:   Diagnosis Date   • Anxiety    • Bacterial vaginosis    • Fibromyalgia    • Fracture, finger    • GERD (gastroesophageal reflux disease)    • Lateral epicondylitis 2013    RHUEMATOLOGIST   • Lupus    • MRSA (methicillin resistant staph aureus) culture positive  ESTIMATE    GROIN AREA    • Sjogren's syndrome (CMS/HCC)    • Urinary tract infection         Past Surgical History:   Procedure Laterality Date   • ADENOIDECTOMY     •  SECTION     • COLONOSCOPY     • MOUTH SURGERY     • RHINOPLASTY     • SHOULDER ARTHROSCOPY Left 9/10/2018    Procedure: SHOULDER ARTHROSCOPY, rotator cuff repair; extensive debridement, open biceps tenodesis;  Surgeon: Joo Rivers MD;  Location: [facility];  Service: Orthopedics   • TONSILLECTOMY     • TUBAL ABDOMINAL LIGATION     • TYMPANOSTOMY TUBE  PLACEMENT     • WISDOM TOOTH EXTRACTION Bilateral        Visit Dx:     ICD-10-CM ICD-9-CM   1. Status post arthroscopy of shoulder Z98.890 V45.89             Patient History     Row Name 09/24/18 0900             History    Chief Complaint Difficulty with daily activities;Joint stiffness;Joint swelling;Muscle tenderness;Muscle weakness;Pain;Tightness  -AS      Type of Pain Shoulder pain   Left  -AS      Date Current Problem(s) Began 09/10/18  -AS      Brief Description of Current Complaint Patient is s/p left rotator cuff repair with biceps tenodesis performed 9-10-18. She is wearing abduction pillow sling at time of initial evaluation. Patient states she has been compliant with her post-op precautions and limitations. She denies numbness or tingling into LUE.  -AS      Patient/Caregiver Goals Relieve pain;Return to prior level of function;Improve mobility;Improve strength  -AS      Patient's Rating of General Health Good  -AS      Hand Dominance right-handed  -AS      Occupation/sports/leisure activities Homemaker  -AS      How has patient tried to help current problem? rest, ice, sling  -AS      Surgery/Hospitalization 9-10-18  -AS         Pain     Pain Location Shoulder  -AS      Pain at Present 2  -AS      Pain at Best 1  -AS      Pain at Worst 7  -AS      Pain Frequency Constant/continuous  -AS      Pain Description Aching  -AS      What Performance Factors Make the Current Problem(s) WORSE? movement  -AS      What Performance Factors Make the Current Problem(s) BETTER? rest  -AS         Daily Activities    Primary Language English  -AS      Are you able to read Yes  -AS      Are you able to write Yes  -AS      How does patient learn best? Listening;Reading  -AS      Teaching needs identified Home Exercise Program;Management of Condition  -AS      Patient is concerned about/has problems with Difficulty with self care (i.e. bathing, dressing, toileting:;Bed Mobility;Performing home management (household chores,  shopping, care of dependents);Performing job responsibilities/community activities (work, school,;Performing sports, recreation, and play activities;Reaching over head;Repetitive movements of the hand, arm, shoulder  -AS      Does patient have problems with the following? Anxiety  -AS      Barriers to learning None  -AS      Pt Participated in POC and Goals Yes  -AS         Safety    Are you being hurt, hit, or frightened by anyone at home or in your life? No  -AS      Are you being neglected by a caregiver No  -AS        User Key  (r) = Recorded By, (t) = Taken By, (c) = Cosigned By    Initials Name Provider Type    AS Mo Alford, PT Physical Therapist                PT Ortho     Row Name 09/24/18 0900       Precautions and Contraindications    Precautions/Limitations shoulder precautions   see post-op protocol  -AS       Posture/Observations    Posture- WNL Posture is WNL  -AS    Observations Incision healing;Edema  -AS       Shoulder Girdle Palpation    Supraspinatus Insertion Left:;Tender  -AS    Long Head of Biceps Left:;Tender  -AS    Greater Tubercule Left:;Tender  -AS    Pect Minor Left:;Bilateral:  -AS       Left Upper Ext    Lt Shoulder Abduction PROM 105  -AS    Lt Shoulder Flexion PROM 110  -AS    Lt Shoulder External Rotation PROM 23  -AS    Lt Shoulder Internal Rotation PROM 56  -AS       MMT Left Upper Ext    Lt Shoulder Flexion MMT, Gross Movement --   Not performed at IE due to recent surgery  -AS    Lt Shoulder ABduction MMT, Gross Movement --   Not performed at IE due to recent surgery  -AS    Lt Shoulder Internal Rotation MMT, Gross Movement --   Not performed at IE due to recent surgery  -AS    Lt Shoulder External Rotation MMT, Gross Movement --   Not performed at IE due to recent surgery  -AS       Sensation    Sensation WNL? WNL  -AS    Light Touch No apparent deficits  -AS      User Key  (r) = Recorded By, (t) = Taken By, (c) = Cosigned By    Initials Name Provider Type    AS  Mo Alford, PT Physical Therapist                      Therapy Education  Given: HEP, Symptoms/condition management, Pain management  Program: New  How Provided: Verbal, Demonstration, Written  Provided to: Patient  Level of Understanding: Teach back education performed, Verbalized, Demonstrated           PT OP Goals     Row Name 09/24/18 0900          PT Short Term Goals    STG Date to Achieve 10/22/18  -AS     STG 1 Pt to report c/o resting pain decreased to <2/10, with PROM <4/10  -AS     STG 2 Pt to demonstrate HEP for self-stretching and scapula stabilization strengthening I. 3/3 trials  -AS     STG 3 Pt to demonstrate °, PER at 20°abd 45-60°, PER at 90° abd 45°  -AS     STG 4 Pt to demonstrate use of UE for light waist level activities as comfortable, with 0 increased c/o pain  -AS        Long Term Goals    LTG Date to Achieve 11/19/18  -AS     LTG 1 Patient to demonstrate compliance with her HEP for ROM and strengthening.  -AS     LTG 2 Patient to demonstrate AROM flexion to 160, ABD to 160, ER to 75, and IR to 60.  -AS     LTG 3 Patient to report left shoulder pain on VAS of 0-1/10 with AROM and light ADL activities.  -AS     LTG 4 Patient to demonstrate improved left shoulder strength to 4/5 in all planes of motion.  -AS     LTG 5 Patient to report improved function and decreased pain on Quick DASH by >20-25 points.  -AS        Time Calculation    PT Goal Re-Cert Due Date 10/22/18  -AS       User Key  (r) = Recorded By, (t) = Taken By, (c) = Cosigned By    Initials Name Provider Type    AS Mo Alford, PT Physical Therapist                PT Assessment/Plan     Row Name 09/24/18 0900          PT Assessment    Functional Limitations Limitation in home management;Limitations in community activities;Performance in leisure activities;Performance in self-care ADL;Performance in sport activities;Performance in work activities  -AS     Impairments Joint mobility;Muscle  strength;Pain;Range of motion  -AS     Assessment Comments Patient presents to outpatient PT s/p left RCR and biceps tenodesis performed on 9-10-18. Patient is wearing abduction pillow sling. She has limited left shoulder ROM, limited left shoulder strength, and increased left shoulder pain. Patient has limited function at this time due to the above.  -AS     Please refer to paper survey for additional self-reported information Yes  -AS     Rehab Potential Good  -AS     Patient/caregiver participated in establishment of treatment plan and goals Yes  -AS     Patient would benefit from skilled therapy intervention Yes  -AS        PT Plan    PT Frequency 2x/week  -AS     Predicted Duration of Therapy Intervention (Therapy Eval) 8-10 weeks  -AS     Planned CPT's? PT RE-EVAL: 50844;PT MANUAL THERAPY EA 15 MIN: 74298;PT THER PROC EA 15 MIN: 74563;PT THER ACT EA 15 MIN: 11948;PT NEUROMUSC RE-EDUCATION EA 15 MIN: 50590;PT ELECTRICAL STIM UNATTEND: ;PT ULTRASOUND EA 15 MIN: 18558;PT HOT/COLD PACK WC NONMCARE: 88522  -AS       User Key  (r) = Recorded By, (t) = Taken By, (c) = Cosigned By    Initials Name Provider Type    AS Mo Alford, PT Physical Therapist                Modalities     Row Name 09/24/18 0900             Moist Heat    MH Applied Yes  -AS      Location Left Shoulder  -AS      Rx Minutes 10 mins  -AS      MH Prior to Rx Yes  -AS         Ice    Ice Applied Yes  -AS      Location Left Shoulder  -AS      Rx Minutes 10 mins  -AS      Ice S/P Rx Yes  -AS        User Key  (r) = Recorded By, (t) = Taken By, (c) = Cosigned By    Initials Name Provider Type    AS Mo Alford, PT Physical Therapist              Exercises     Row Name 09/24/18 0900             Subjective Pain    Able to rate subjective pain? yes  -AS      Pre-Treatment Pain Level 2  -AS      Post-Treatment Pain Level 2  -AS        User Key  (r) = Recorded By, (t) = Taken By, (c) = Cosigned By    Initials Name Provider Type    AS  Mo Alford, PT Physical Therapist           Manual Rx (last 36 hours)      Manual Treatments     Row Name 09/24/18 0900             Manual Rx 1    Manual Rx 1 Location Left Shoulder  -AS      Manual Rx 1 Type PROM - Flex, ABD, IR, ER  -AS      Manual Rx 1 Grade I  -AS      Manual Rx 1 Duration 15 min  -AS        User Key  (r) = Recorded By, (t) = Taken By, (c) = Cosigned By    Initials Name Provider Type    AS Mo Alford, PT Physical Therapist                      Outcome Measure Options: Quick DASH  Quick DASH  Open a tight or new jar.: Unable  Do heavy household chores (e.g., wash walls, wash floors): Unable  Carry a shopping bag or briefcase: Unable  Wash your back: Unable  Use a knife to cut food: Unable  Recreational activities in which you take some force or impact through your arm, should or hand (e.g. golf, hammering, tennis, etc.): Unable  During the past week, to what extent has your arm, shoulder, or hand problem interfered with your normal social activites with family, friends, neighbors or groups?: Extremely  During the past week, were you limited in your work or other regular daily activities as a result of your arm, shoulder or hand problem?: Unable  Arm, Shoulder, or hand pain: Extreme  Tingling (pins and needles) in your arm, shoulder, or hand: None  During the past week, how much difficulty have you had sleeping because of the pain in your arm, shoulder or hand?: So much Difficulty that I can't sleep  Number of Questions Answered: 11  Quick DASH Score: 90.91  Work Module (Optional)  Using your usual technique for your work?: Unable  Doing your usual work because of arm, shoulder or hand pain?: Unable  Doing your work as well as you would like?: Unable  Spending your usual amount of time doing your work?: Unable  Work Module Score: 100         Time Calculation:     Therapy Suggested Charges     Code   Minutes Charges    None             Start Time: 0906  Stop Time: 0955  Time  Calculation (min): 49 min     Therapy Charges for Today     Code Description Service Date Service Provider Modifiers Qty    [id number] HC PT EVAL LOW COMPLEXITY 3 9/24/2018 Mo Alford, PT GP 1          PT G-Codes  Outcome Measure Options: Quick DASH  Quick DASH Score: 90.91         Mo Alford, PT  9/24/2018

## 2018-09-28 ENCOUNTER — HOSPITAL ENCOUNTER (OUTPATIENT)
Dept: PHYSICAL THERAPY | Facility: HOSPITAL | Age: 38
Setting detail: THERAPIES SERIES
Discharge: HOME | End: 2018-09-28

## 2018-09-28 DIAGNOSIS — Z98.890 STATUS POST ARTHROSCOPY OF SHOULDER: Primary | ICD-10-CM

## 2018-09-28 PROCEDURE — 97140 MANUAL THERAPY 1/> REGIONS: CPT

## 2018-09-28 NOTE — THERAPY TREATMENT NOTE
Outpatient Physical Therapy Ortho Treatment Note  TENISHA Wing     Patient Name: Aide Henry  : 1980  MRN: [id number]  Today's Date: 2018      Visit Date: 2018    Visit Dx:    ICD-10-CM ICD-9-CM   1. Status post arthroscopy of shoulder Z98.890 V45.89       Patient Active Problem List   Diagnosis   • Arthralgia of multiple joints   • Chronic back pain   • Chronic constipation   • Disorder of connective tissue (CMS/HCC)   • Depression with anxiety   • Fibromyalgia   • Gastroesophageal reflux disease without esophagitis   • Irritable bowel syndrome   • Muscle pain   • Palpitations   • Seasonal allergic rhinitis   • Sjogren's syndrome (CMS/HCC)   • Sympathetic uveitis   • Tenderness of temporomandibular joint   • Bilateral carpal tunnel syndrome   • Eczema   • Shoulder pain, left   • Substance abuse   • Lateral epicondylitis   • Drug addiction syndrome (CMS/HCC)   • Gastroenteritis   • Alcohol dependence in remission (CMS/HCC)   • History of placement of ear tubes   • Tendinopathy of left rotator cuff   • Biceps tendinitis of left upper extremity   • Complete tear of left rotator cuff   • Pre-operative clearance   • Status post arthroscopy of shoulder        Past Medical History:   Diagnosis Date   • Anxiety    • Bacterial vaginosis    • Fibromyalgia    • Fracture, finger    • GERD (gastroesophageal reflux disease)    • Lateral epicondylitis 2013    RHUEMATOLOGIST   • Lupus    • MRSA (methicillin resistant staph aureus) culture positive 2011 ESTIMATE    GROIN AREA    • Sjogren's syndrome (CMS/HCC)    • Urinary tract infection         Past Surgical History:   Procedure Laterality Date   • ADENOIDECTOMY     •  SECTION     • COLONOSCOPY     • MOUTH SURGERY     • RHINOPLASTY     • SHOULDER ARTHROSCOPY Left 9/10/2018    Procedure: SHOULDER ARTHROSCOPY, rotator cuff repair; extensive debridement, open biceps tenodesis;  Surgeon: Joo Rivers MD;  Location: [facility];  Service:  Orthopedics   • TONSILLECTOMY     • TUBAL ABDOMINAL LIGATION     • TYMPANOSTOMY TUBE PLACEMENT     • WISDOM TOOTH EXTRACTION Bilateral                              PT Assessment/Plan     Row Name 09/28/18 0800          PT Assessment    Assessment Comments Patient's left shoulder PROM improved today with manual therapy techniques and stretching.  -AS        PT Plan    PT Plan Comments Continue with current treatment plan.  -AS       User Key  (r) = Recorded By, (t) = Taken By, (c) = Cosigned By    Initials Name Provider Type    AS Mo Alford, PT Physical Therapist                Modalities     Row Name 09/28/18 0800             Moist Heat    MH Applied Yes  -AS      Location Left Shoulder  -AS      Rx Minutes 10 mins  -AS      MH Prior to Rx Yes  -AS         Ice    Ice Applied Yes  -AS      Location Left Shoulder  -AS      Rx Minutes 10 mins  -AS      Ice S/P Rx Yes  -AS        User Key  (r) = Recorded By, (t) = Taken By, (c) = Cosigned By    Initials Name Provider Type    AS Mo Alford, PT Physical Therapist                Exercises     Row Name 09/28/18 0800             Subjective Comments    Subjective Comments Patient states that her shoulder has been throbbing a little more than usual but went away. She states she was sore a little after her initial treatment session.  -AS        User Key  (r) = Recorded By, (t) = Taken By, (c) = Cosigned By    Initials Name Provider Type    AS Mo Alford, PT Physical Therapist                        Manual Rx (last 36 hours)      Manual Treatments     Row Name 09/28/18 0800             Manual Rx 1    Manual Rx 1 Location Left Shoulder  -AS      Manual Rx 1 Type PROM - Flex, ABD, IR, ER  -AS      Manual Rx 1 Grade I  -AS      Manual Rx 1 Duration 15 min  -AS        User Key  (r) = Recorded By, (t) = Taken By, (c) = Cosigned By    Initials Name Provider Type    AS Mo Alford, PT Physical Therapist                             Time  Calculation:   Start Time: 0802  Stop Time: 0837  Time Calculation (min): 35 min  Therapy Suggested Charges     Code   Minutes Charges    None           Therapy Charges for Today     Code Description Service Date Service Provider Modifiers Qty    16020854145  PT MANUAL THERAPY EA 15 MIN 9/28/2018 Mo Alford, PT GP 1                    Mo Alford, PT  9/28/2018

## 2018-10-01 ENCOUNTER — TELEPHONE (OUTPATIENT)
Dept: ORTHOPEDIC SURGERY | Facility: CLINIC | Age: 38
End: 2018-10-01

## 2018-10-02 ENCOUNTER — APPOINTMENT (OUTPATIENT)
Dept: PHYSICAL THERAPY | Facility: HOSPITAL | Age: 38
End: 2018-10-02

## 2018-10-02 RX ORDER — OXYCODONE HYDROCHLORIDE AND ACETAMINOPHEN 5; 325 MG/1; MG/1
1 TABLET ORAL EVERY 4 HOURS PRN
Qty: 42 TABLET | Refills: 0 | Status: SHIPPED | OUTPATIENT
Start: 2018-10-02 | End: 2018-10-08 | Stop reason: SDUPTHER

## 2018-10-02 RX ORDER — DOCUSATE SODIUM 100 MG/1
CAPSULE, LIQUID FILLED ORAL
Qty: 30 CAPSULE | Refills: 0 | Status: SHIPPED | OUTPATIENT
Start: 2018-10-02 | End: 2019-03-28

## 2018-10-05 ENCOUNTER — HOSPITAL ENCOUNTER (OUTPATIENT)
Dept: PHYSICAL THERAPY | Facility: HOSPITAL | Age: 38
Setting detail: THERAPIES SERIES
Discharge: HOME | End: 2018-10-05

## 2018-10-05 DIAGNOSIS — Z98.890 STATUS POST ARTHROSCOPY OF SHOULDER: Primary | ICD-10-CM

## 2018-10-05 PROCEDURE — 97140 MANUAL THERAPY 1/> REGIONS: CPT

## 2018-10-05 NOTE — THERAPY TREATMENT NOTE
Outpatient Physical Therapy Ortho Treatment Note  TENISHA Wing     Patient Name: Aide Henry  : 1980  MRN: [id number]  Today's Date: 10/5/2018      Visit Date: 10/05/2018    Visit Dx:    ICD-10-CM ICD-9-CM   1. Status post arthroscopy of shoulder Z98.890 V45.89       Patient Active Problem List   Diagnosis   • Arthralgia of multiple joints   • Chronic back pain   • Chronic constipation   • Disorder of connective tissue (CMS/[facility])   • Depression with anxiety   • Fibromyalgia   • Gastroesophageal reflux disease without esophagitis   • Irritable bowel syndrome   • Muscle pain   • Palpitations   • Seasonal allergic rhinitis   • Sjogren's syndrome (CMS/HCC)   • Sympathetic uveitis   • Tenderness of temporomandibular joint   • Bilateral carpal tunnel syndrome   • Eczema   • Shoulder pain, left   • Substance abuse (CMS/HCC)   • Lateral epicondylitis   • Drug addiction syndrome (CMS/HCC)   • Gastroenteritis   • Alcohol dependence in remission (CMS/[facility])   • History of placement of ear tubes   • Tendinopathy of left rotator cuff   • Biceps tendinitis of left upper extremity   • Complete tear of left rotator cuff   • Pre-operative clearance   • Status post arthroscopy of shoulder        Past Medical History:   Diagnosis Date   • Anxiety    • Bacterial vaginosis    • Fibromyalgia    • Fracture, finger    • GERD (gastroesophageal reflux disease)    • Lateral epicondylitis 2013    RHUEMATOLOGIST   • Lupus    • MRSA (methicillin resistant staph aureus) culture positive 2011 ESTIMATE    GROIN AREA    • Sjogren's syndrome (CMS/HCC)    • Urinary tract infection         Past Surgical History:   Procedure Laterality Date   • ADENOIDECTOMY     •  SECTION     • COLONOSCOPY     • MOUTH SURGERY     • RHINOPLASTY     • SHOULDER ARTHROSCOPY Left 9/10/2018    Procedure: SHOULDER ARTHROSCOPY, rotator cuff repair; extensive debridement, open biceps tenodesis;  Surgeon: Joo Rivers MD;  Location: [facility] OR;   Service: Orthopedics   • TONSILLECTOMY     • TUBAL ABDOMINAL LIGATION     • TYMPANOSTOMY TUBE PLACEMENT     • WISDOM TOOTH EXTRACTION Bilateral                              PT Assessment/Plan     Row Name 10/05/18 0800          PT Assessment    Assessment Comments Patient continues to have improved tolerance with PROM at this time.  -AS        PT Plan    PT Plan Comments Continue with current treatment plan.  -AS       User Key  (r) = Recorded By, (t) = Taken By, (c) = Cosigned By    Initials Name Provider Type    AS Mo Alford, PT Physical Therapist                Modalities     Row Name 10/05/18 0800             Moist Heat    MH Applied Yes  -AS      Location Left Shoulder  -AS      Rx Minutes 10 mins  -AS      MH Prior to Rx Yes  -AS         Ice    Ice Applied Yes  -AS      Location Left Shoulder  -AS      Rx Minutes 10 mins  -AS      Ice S/P Rx Yes  -AS        User Key  (r) = Recorded By, (t) = Taken By, (c) = Cosigned By    Initials Name Provider Type    AS Mo Alford, PT Physical Therapist                Exercises     Row Name 10/05/18 0800             Subjective Comments    Subjective Comments Patient reports that her left shoulder continues to be painful but is improving slowly. She states she returns to her MD next week.  -AS        User Key  (r) = Recorded By, (t) = Taken By, (c) = Cosigned By    Initials Name Provider Type    AS Mo Alford, PT Physical Therapist                        Manual Rx (last 36 hours)      Manual Treatments     Row Name 10/05/18 0800             Manual Rx 1    Manual Rx 1 Location Left Shoulder  -AS      Manual Rx 1 Type PROM - Flex, ABD, IR, ER  -AS      Manual Rx 1 Duration 15 min  -AS        User Key  (r) = Recorded By, (t) = Taken By, (c) = Cosigned By    Initials Name Provider Type    AS Mo Alford, PT Physical Therapist                             Time Calculation:   Start Time: 0757  Stop Time: 0835  Time Calculation (min): 38  min  Therapy Suggested Charges     Code   Minutes Charges    None           Therapy Charges for Today     Code Description Service Date Service Provider Modifiers Qty    24930235207 HC PT MANUAL THERAPY EA 15 MIN 10/5/2018 Mo Alford, PT GP 1                    Mo Alford, PT  10/5/2018

## 2018-10-08 RX ORDER — OXYCODONE HYDROCHLORIDE AND ACETAMINOPHEN 5; 325 MG/1; MG/1
1 TABLET ORAL EVERY 4 HOURS PRN
Qty: 42 TABLET | Refills: 0 | Status: SHIPPED | OUTPATIENT
Start: 2018-10-08 | End: 2018-10-17 | Stop reason: SDUPTHER

## 2018-10-09 ENCOUNTER — HOSPITAL ENCOUNTER (OUTPATIENT)
Dept: PHYSICAL THERAPY | Facility: HOSPITAL | Age: 38
Setting detail: THERAPIES SERIES
Discharge: HOME | End: 2018-10-09

## 2018-10-09 ENCOUNTER — OFFICE VISIT (OUTPATIENT)
Dept: ORTHOPEDIC SURGERY | Facility: CLINIC | Age: 38
End: 2018-10-09

## 2018-10-09 VITALS — HEIGHT: 64 IN | BODY MASS INDEX: 29.02 KG/M2 | WEIGHT: 170 LBS

## 2018-10-09 DIAGNOSIS — Z98.890 STATUS POST ARTHROSCOPY OF SHOULDER: Primary | ICD-10-CM

## 2018-10-09 PROBLEM — M75.22 BICEPS TENDINITIS OF LEFT UPPER EXTREMITY: Status: RESOLVED | Noted: 2018-08-14 | Resolved: 2018-10-09

## 2018-10-09 PROBLEM — M75.122 COMPLETE TEAR OF LEFT ROTATOR CUFF: Status: RESOLVED | Noted: 2018-08-14 | Resolved: 2018-10-09

## 2018-10-09 PROBLEM — M67.912 TENDINOPATHY OF LEFT ROTATOR CUFF: Status: RESOLVED | Noted: 2018-06-29 | Resolved: 2018-10-09

## 2018-10-09 PROCEDURE — 97140 MANUAL THERAPY 1/> REGIONS: CPT

## 2018-10-09 PROCEDURE — 99024 POSTOP FOLLOW-UP VISIT: CPT | Performed by: ORTHOPAEDIC SURGERY

## 2018-10-09 NOTE — PROGRESS NOTES
CC: F/u s/p left shoulder rotator cuff repair and biceps tenodesis  DOS 9/10/2018    Interval History: Patient returns to clinic stating pain is doing fairly well, has been using sling as instructed, denies any numbness or tingling over left arm. No fevers, chills, or sweats, and no drainage from incisions noted. She has started into physical therapy.    Exam:   Left shoulder- incisions healing well   Tolerates FF- 120,   ER- 40   Positive sensation all distributions left hand and proximal lateral aspect arm, positive deltoid firing   Cap refill < 3 seconds, radial pulse 2+   Positive deltoid firing   Flex/extend fingers/thumb/wrist with 4+/5 strength, positive thumbs up, okay  sign, cross finger adduction and abduction against resistance     Impression: s/p left shoulder rotator cuff repair and biceps tenodesis     Plan:  1. Continue PT for work on ROM and progressing into strengthening per protocol  2. Discontinue sling  3. Instructed on passive ROM exercises to be done multiple times daily at home  4. F/u in 4 weeks to evaluate motion and progress with PT  5. All questions answered      No orders of the defined types were placed in this encounter.      No orders of the defined types were placed in this encounter.

## 2018-10-09 NOTE — THERAPY TREATMENT NOTE
Outpatient Physical Therapy Ortho Treatment Note  TENISHA Wing     Patient Name: Aide Henry  : 1980  MRN: [id number]  Today's Date: 10/9/2018      Visit Date: 10/09/2018    Visit Dx:    ICD-10-CM ICD-9-CM   1. Status post arthroscopy of shoulder Z98.890 V45.89       Patient Active Problem List   Diagnosis   • Arthralgia of multiple joints   • Chronic back pain   • Chronic constipation   • Disorder of connective tissue (CMS/[facility])   • Depression with anxiety   • Fibromyalgia   • Gastroesophageal reflux disease without esophagitis   • Irritable bowel syndrome   • Muscle pain   • Palpitations   • Seasonal allergic rhinitis   • Sjogren's syndrome (CMS/HCC)   • Sympathetic uveitis   • Tenderness of temporomandibular joint   • Bilateral carpal tunnel syndrome   • Eczema   • Shoulder pain, left   • Substance abuse (CMS/HCC)   • Lateral epicondylitis   • Drug addiction syndrome (CMS/HCC)   • Gastroenteritis   • Alcohol dependence in remission (CMS/[facility])   • History of placement of ear tubes   • Tendinopathy of left rotator cuff   • Biceps tendinitis of left upper extremity   • Complete tear of left rotator cuff   • Pre-operative clearance   • Status post arthroscopy of shoulder        Past Medical History:   Diagnosis Date   • Anxiety    • Bacterial vaginosis    • Fibromyalgia    • Fracture, finger    • GERD (gastroesophageal reflux disease)    • Lateral epicondylitis 2013    RHUEMATOLOGIST   • Lupus    • MRSA (methicillin resistant staph aureus) culture positive 2011 ESTIMATE    GROIN AREA    • Sjogren's syndrome (CMS/HCC)    • Urinary tract infection         Past Surgical History:   Procedure Laterality Date   • ADENOIDECTOMY     •  SECTION     • COLONOSCOPY     • MOUTH SURGERY     • RHINOPLASTY     • SHOULDER ARTHROSCOPY Left 9/10/2018    Procedure: SHOULDER ARTHROSCOPY, rotator cuff repair; extensive debridement, open biceps tenodesis;  Surgeon: Joo Rivers MD;  Location: [facility] OR;   Service: Orthopedics   • TONSILLECTOMY     • TUBAL ABDOMINAL LIGATION     • TYMPANOSTOMY TUBE PLACEMENT     • WISDOM TOOTH EXTRACTION Bilateral              PT Ortho     Row Name 10/09/18 0700       Subjective Comments    Subjective Comments Pt states for 2 nights in a row, she has woke with her arm out of the sling - shoulder has been more sore since  -       Subjective Pain    Able to rate subjective pain? yes  -    Pre-Treatment Pain Level 7  -MH       Left Upper Ext    Lt Shoulder Abduction PROM 149  -MH    Lt Shoulder Flexion PROM 133  -MH    Lt Shoulder External Rotation PROM 66  -MH    Lt Shoulder Internal Rotation PROM 90  -MH      User Key  (r) = Recorded By, (t) = Taken By, (c) = Cosigned By    Initials Name Provider Type     Aung Burger PTA Physical Therapy Assistant                            PT Assessment/Plan     Row Name 10/09/18 0834          PT Assessment    Assessment Comments Pt with complaints of increased soreness today - pt feels due to waking 2 nights in a row with her arm out of her sling; pt showing increased PROM all planes; pain limiting factor with PROM  -        PT Plan    PT Plan Comments Pt to MD following today's treatment - will continue per MD and POC  -       User Key  (r) = Recorded By, (t) = Taken By, (c) = Cosigned By    Initials Name Provider Type     Aung Burger PTA Physical Therapy Assistant                Modalities     Row Name 10/09/18 0700             Moist Heat    Location Left Shoulder - pt seated, pillow for support  -MH      Rx Minutes 10 mins  -      MH Prior to Rx Yes  -         Ice    Location Left Shoulder - pt seated, pillow for support  -MH      Rx Minutes 10 mins  -      Ice S/P Rx Yes  -        User Key  (r) = Recorded By, (t) = Taken By, (c) = Cosigned By    Initials Name Provider Type     Aung Burger PTA Physical Therapy Assistant                Exercises     Row Name 10/09/18 0700             Subjective Comments     Subjective Comments Pt states for 2 nights in a row, she has woke with her arm out of the sling - shoulder has been more sore since  -         Subjective Pain    Able to rate subjective pain? yes  -      Pre-Treatment Pain Level 7  -        User Key  (r) = Recorded By, (t) = Taken By, (c) = Cosigned By    Initials Name Provider Type    Aung Steel PTA Physical Therapy Assistant                        Manual Rx (last 36 hours)      Manual Treatments     Row Name 10/09/18 0700             Manual Rx 1    Manual Rx 1 Location Left Shoulder  -      Manual Rx 1 Type PROM - Flex, ABD, IR, ER  -      Manual Rx 1 Duration 15 min  -        User Key  (r) = Recorded By, (t) = Taken By, (c) = Cosigned By    Initials Name Provider Type    Aung Steel PTA Physical Therapy Assistant              Therapy Education  Given: Symptoms/condition management  Program: Reinforced  How Provided: Verbal  Level of Understanding: Teach back education performed, Verbalized              Time Calculation:   Start Time: 0755  Stop Time: 0844  Time Calculation (min): 49 min  Therapy Suggested Charges     Code   Minutes Charges    None           Therapy Charges for Today     Code Description Service Date Service Provider Modifiers Qty    11226462593 HC PT HOT OR COLD PACK TREAT MCARE 10/9/2018 Aung Burger PTA GP 2    98252689925 HC PT MANUAL THERAPY EA 15 MIN 10/9/2018 Aung Burger PTA GP 1                    Aung Burger PTA  10/9/2018

## 2018-10-11 ENCOUNTER — HOSPITAL ENCOUNTER (OUTPATIENT)
Dept: PHYSICAL THERAPY | Facility: HOSPITAL | Age: 38
Setting detail: THERAPIES SERIES
Discharge: HOME | End: 2018-10-11

## 2018-10-11 PROCEDURE — 97140 MANUAL THERAPY 1/> REGIONS: CPT

## 2018-10-11 NOTE — THERAPY TREATMENT NOTE
Outpatient Physical Therapy Ortho Treatment Note  TENISHA Wing     Patient Name: Aide Henry  : 1980  MRN: [id number]  Today's Date: 10/11/2018      Visit Date: 10/11/2018    Visit Dx:  No diagnosis found.    Patient Active Problem List   Diagnosis   • Arthralgia of multiple joints   • Chronic back pain   • Chronic constipation   • Disorder of connective tissue (CMS/HCC)   • Depression with anxiety   • Fibromyalgia   • Gastroesophageal reflux disease without esophagitis   • Irritable bowel syndrome   • Muscle pain   • Palpitations   • Seasonal allergic rhinitis   • Sjogren's syndrome (CMS/HCC)   • Sympathetic uveitis   • Tenderness of temporomandibular joint   • Bilateral carpal tunnel syndrome   • Eczema   • Shoulder pain, left   • Substance abuse (CMS/HCC)   • Lateral epicondylitis   • Drug addiction syndrome (CMS/HCC)   • Gastroenteritis   • Alcohol dependence in remission (CMS/HCC)   • History of placement of ear tubes   • Pre-operative clearance   • Status post arthroscopy of shoulder        Past Medical History:   Diagnosis Date   • Anxiety    • Bacterial vaginosis    • Fibromyalgia    • Fracture, finger    • GERD (gastroesophageal reflux disease)    • Lateral epicondylitis 2013    RHUEMATOLOGIST   • Lupus    • MRSA (methicillin resistant staph aureus) culture positive  ESTIMATE    GROIN AREA    • Sjogren's syndrome (CMS/HCC)    • Urinary tract infection         Past Surgical History:   Procedure Laterality Date   • ADENOIDECTOMY     •  SECTION     • COLONOSCOPY     • MOUTH SURGERY     • RHINOPLASTY     • SHOULDER ARTHROSCOPY Left 9/10/2018    Procedure: SHOULDER ARTHROSCOPY, rotator cuff repair; extensive debridement, open biceps tenodesis;  Surgeon: Joo Rivers MD;  Location: [facility];  Service: Orthopedics   • TONSILLECTOMY     • TUBAL ABDOMINAL LIGATION     • TYMPANOSTOMY TUBE PLACEMENT     • WISDOM TOOTH EXTRACTION Bilateral              PT Ortho     Row Name  10/11/18 0800       Subjective Comments    Subjective Comments Patient states she has been experiencing increased pain since being out of the sling.   -KM    Row Name 10/09/18 0700       Subjective Comments    Subjective Comments Pt states for 2 nights in a row, she has woke with her arm out of the sling - shoulder has been more sore since  -       Subjective Pain    Able to rate subjective pain? yes  -MH    Pre-Treatment Pain Level 7  -MH       Left Upper Ext    Lt Shoulder Abduction PROM 149  -MH    Lt Shoulder Flexion PROM 133  -MH    Lt Shoulder External Rotation PROM 66  -MH    Lt Shoulder Internal Rotation PROM 90  -MH      User Key  (r) = Recorded By, (t) = Taken By, (c) = Cosigned By    Initials Name Provider Type     Aung Burger PTA Physical Therapy Assistant    Sanna Jama PTA Physical Therapy Assistant                            PT Assessment/Plan     Row Name 10/11/18 0900          PT Assessment    Assessment Comments Pt demonstrates increased PROM throughout treatment session.   -KM        PT Plan    PT Plan Comments Continue per POC  -KM       User Key  (r) = Recorded By, (t) = Taken By, (c) = Cosigned By    Initials Name Provider Type    Sanna Jama PTA Physical Therapy Assistant                Modalities     Row Name 10/11/18 0800             Moist Heat    MH Applied Yes  -KM      Location Left Shoulder - pt seated, pillow for support  -KM      Rx Minutes 10 mins  -KM      MH Prior to Rx Yes  -KM         Ice    Ice Applied Yes  -KM      Location Left Shoulder - pt seated, pillow for support  -KM      Rx Minutes 10 mins  -KM      Ice S/P Rx Yes  -KM        User Key  (r) = Recorded By, (t) = Taken By, (c) = Cosigned By    Initials Name Provider Type    Sanna Jama PTA Physical Therapy Assistant                Exercises     Row Name 10/11/18 0800             Subjective Comments    Subjective Comments Patient states she has been experiencing increased pain since being out of  the sling.   -KM        User Key  (r) = Recorded By, (t) = Taken By, (c) = Cosigned By    Initials Name Provider Type    Sanna Jama PTA Physical Therapy Assistant                        Manual Rx (last 36 hours)      Manual Treatments     Row Name 10/11/18 0800             Manual Rx 1    Manual Rx 1 Location Left Shoulder  -KM      Manual Rx 1 Type PROM - Flex, ABD, IR, ER  -KM      Manual Rx 1 Duration 15 min  -KM        User Key  (r) = Recorded By, (t) = Taken By, (c) = Cosigned By    Initials Name Provider Type    Sanna Jama PTA Physical Therapy Assistant                             Time Calculation:   Start Time: 0800  Stop Time: 0850  Time Calculation (min): 50 min  Therapy Suggested Charges     Code   Minutes Charges    None           Therapy Charges for Today     Code Description Service Date Service Provider Modifiers Qty    34062759621 HC PT HOT OR COLD PACK TREAT [facility] 10/11/2018 Sanna Chaney PTA GP 1    58987115370 HC PT MANUAL THERAPY EA 15 MIN 10/11/2018 Sanna Chaney PTA GP 1                    Sanna Chaney PTA  10/11/2018

## 2018-10-17 ENCOUNTER — APPOINTMENT (OUTPATIENT)
Dept: PHYSICAL THERAPY | Facility: HOSPITAL | Age: 38
End: 2018-10-17

## 2018-10-17 RX ORDER — OXYCODONE HYDROCHLORIDE AND ACETAMINOPHEN 5; 325 MG/1; MG/1
1 TABLET ORAL EVERY 4 HOURS PRN
Qty: 42 TABLET | Refills: 0 | Status: SHIPPED | OUTPATIENT
Start: 2018-10-17 | End: 2018-10-23 | Stop reason: SDUPTHER

## 2018-10-19 ENCOUNTER — HOSPITAL ENCOUNTER (OUTPATIENT)
Dept: PHYSICAL THERAPY | Facility: HOSPITAL | Age: 38
Setting detail: THERAPIES SERIES
Discharge: HOME | End: 2018-10-19

## 2018-10-19 DIAGNOSIS — Z98.890 STATUS POST ARTHROSCOPY OF SHOULDER: Primary | ICD-10-CM

## 2018-10-19 PROCEDURE — 97140 MANUAL THERAPY 1/> REGIONS: CPT

## 2018-10-19 NOTE — THERAPY TREATMENT NOTE
Outpatient Physical Therapy Ortho Treatment Note   Cecilia Jarquin     Patient Name: Aide Henry  : 1980  MRN: [id number]  Today's Date: 10/19/2018      Visit Date: 10/19/2018    Visit Dx:    ICD-10-CM ICD-9-CM   1. Status post arthroscopy of shoulder Z98.890 V45.89       Patient Active Problem List   Diagnosis   • Arthralgia of multiple joints   • Chronic back pain   • Chronic constipation   • Disorder of connective tissue (CMS/HCC)   • Depression with anxiety   • Fibromyalgia   • Gastroesophageal reflux disease without esophagitis   • Irritable bowel syndrome   • Muscle pain   • Palpitations   • Seasonal allergic rhinitis   • Sjogren's syndrome (CMS/HCC)   • Sympathetic uveitis   • Tenderness of temporomandibular joint   • Bilateral carpal tunnel syndrome   • Eczema   • Shoulder pain, left   • Substance abuse (CMS/HCC)   • Lateral epicondylitis   • Drug addiction syndrome (CMS/HCC)   • Gastroenteritis   • Alcohol dependence in remission (CMS/HCC)   • History of placement of ear tubes   • Pre-operative clearance   • Status post arthroscopy of shoulder        Past Medical History:   Diagnosis Date   • Anxiety    • Bacterial vaginosis    • Fibromyalgia    • Fracture, finger    • GERD (gastroesophageal reflux disease)    • Lateral epicondylitis 2013    RHUEMATOLOGIST   • Lupus    • MRSA (methicillin resistant staph aureus) culture positive 2011 ESTIMATE    GROIN AREA    • Sjogren's syndrome (CMS/HCC)    • Urinary tract infection         Past Surgical History:   Procedure Laterality Date   • ADENOIDECTOMY     •  SECTION     • COLONOSCOPY     • MOUTH SURGERY     • RHINOPLASTY     • SHOULDER ARTHROSCOPY Left 9/10/2018    Procedure: SHOULDER ARTHROSCOPY, rotator cuff repair; extensive debridement, open biceps tenodesis;  Surgeon: Joo Rivers MD;  Location: [facility];  Service: Orthopedics   • TONSILLECTOMY     • TUBAL ABDOMINAL LIGATION     • TYMPANOSTOMY TUBE PLACEMENT     • WISDOM TOOTH  EXTRACTION Bilateral                              PT Assessment/Plan     Row Name 10/19/18 0800          PT Assessment    Assessment Comments Patient's Left shoulder PROM continues to improve in all planes of motion. Plan to progress patient per post op protocol.  -AS        PT Plan    PT Plan Comments Continue per POC  -AS       User Key  (r) = Recorded By, (t) = Taken By, (c) = Cosigned By    Initials Name Provider Type    AS Mo Alford, PT Physical Therapist                Modalities     Row Name 10/19/18 0800             Moist Heat    MH Applied Yes  -AS      Location Left Shoulder - pt seated, pillow for support  -AS      Rx Minutes 10 mins  -AS      MH Prior to Rx Yes  -AS         Ice    Ice Applied Yes  -AS      Location Left Shoulder - pt seated, pillow for support  -AS      Rx Minutes 10 mins  -AS      Ice S/P Rx Yes  -AS        User Key  (r) = Recorded By, (t) = Taken By, (c) = Cosigned By    Initials Name Provider Type    AS Mo Alford, PT Physical Therapist                Exercises     Row Name 10/19/18 0800             Subjective Comments    Subjective Comments Patient states that her shoulder continues to be painful but is improving. She states that her motion is also improving at this time.  -AS        User Key  (r) = Recorded By, (t) = Taken By, (c) = Cosigned By    Initials Name Provider Type    AS Mo Alford, PT Physical Therapist                        Manual Rx (last 36 hours)      Manual Treatments     Row Name 10/19/18 0800             Manual Rx 1    Manual Rx 1 Location Left Shoulder  -AS      Manual Rx 1 Type PROM - Flex, ABD, IR, ER  -AS      Manual Rx 1 Duration 15 min  -AS        User Key  (r) = Recorded By, (t) = Taken By, (c) = Cosigned By    Initials Name Provider Type    AS Mo Alford, PT Physical Therapist                             Time Calculation:   Start Time: 0751  Stop Time: 0826  Time Calculation (min): 35 min  Therapy Suggested  Charges     Code   Minutes Charges    None           Therapy Charges for Today     Code Description Service Date Service Provider Modifiers Qty    46347471174 HC PT MANUAL THERAPY EA 15 MIN 10/19/2018 Mo Alford, PT GP 1                    Mo Alford, PT  10/19/2018

## 2018-10-22 ENCOUNTER — APPOINTMENT (OUTPATIENT)
Dept: PHYSICAL THERAPY | Facility: HOSPITAL | Age: 38
End: 2018-10-22

## 2018-10-23 RX ORDER — OXYCODONE HYDROCHLORIDE AND ACETAMINOPHEN 5; 325 MG/1; MG/1
1 TABLET ORAL EVERY 4 HOURS PRN
Qty: 42 TABLET | Refills: 0 | Status: SHIPPED | OUTPATIENT
Start: 2018-10-23 | End: 2018-10-29 | Stop reason: SDUPTHER

## 2018-10-24 ENCOUNTER — HOSPITAL ENCOUNTER (OUTPATIENT)
Dept: PHYSICAL THERAPY | Facility: HOSPITAL | Age: 38
Setting detail: THERAPIES SERIES
Discharge: HOME | End: 2018-10-24

## 2018-10-24 DIAGNOSIS — Z98.890 STATUS POST ARTHROSCOPY OF SHOULDER: Primary | ICD-10-CM

## 2018-10-24 PROCEDURE — 97110 THERAPEUTIC EXERCISES: CPT

## 2018-10-24 PROCEDURE — 97140 MANUAL THERAPY 1/> REGIONS: CPT

## 2018-10-24 NOTE — THERAPY TREATMENT NOTE
Outpatient Physical Therapy Ortho Treatment Note   Cecilia Jarquin     Patient Name: Aide Henry  : 1980  MRN: [id number]  Today's Date: 10/24/2018      Visit Date: 10/24/2018    Visit Dx:    ICD-10-CM ICD-9-CM   1. Status post arthroscopy of shoulder Z98.890 V45.89       Patient Active Problem List   Diagnosis   • Arthralgia of multiple joints   • Chronic back pain   • Chronic constipation   • Disorder of connective tissue (CMS/HCC)   • Depression with anxiety   • Fibromyalgia   • Gastroesophageal reflux disease without esophagitis   • Irritable bowel syndrome   • Muscle pain   • Palpitations   • Seasonal allergic rhinitis   • Sjogren's syndrome (CMS/HCC)   • Sympathetic uveitis   • Tenderness of temporomandibular joint   • Bilateral carpal tunnel syndrome   • Eczema   • Shoulder pain, left   • Substance abuse (CMS/HCC)   • Lateral epicondylitis   • Drug addiction syndrome (CMS/HCC)   • Gastroenteritis   • Alcohol dependence in remission (CMS/HCC)   • History of placement of ear tubes   • Pre-operative clearance   • Status post arthroscopy of shoulder        Past Medical History:   Diagnosis Date   • Anxiety    • Bacterial vaginosis    • Fibromyalgia    • Fracture, finger    • GERD (gastroesophageal reflux disease)    • Lateral epicondylitis 2013    RHUEMATOLOGIST   • Lupus    • MRSA (methicillin resistant staph aureus) culture positive 2011 ESTIMATE    GROIN AREA    • Sjogren's syndrome (CMS/HCC)    • Urinary tract infection         Past Surgical History:   Procedure Laterality Date   • ADENOIDECTOMY     •  SECTION     • COLONOSCOPY     • MOUTH SURGERY     • RHINOPLASTY     • SHOULDER ARTHROSCOPY Left 9/10/2018    Procedure: SHOULDER ARTHROSCOPY, rotator cuff repair; extensive debridement, open biceps tenodesis;  Surgeon: Joo Rivers MD;  Location: [facility];  Service: Orthopedics   • TONSILLECTOMY     • TUBAL ABDOMINAL LIGATION     • TYMPANOSTOMY TUBE PLACEMENT     • WISDOM TOOTH  EXTRACTION Bilateral                              PT Assessment/Plan     Row Name 10/24/18 0800          PT Assessment    Assessment Comments Initiated AAROM today with 3-way wand exercises as well a pulleys in flexion and abduction. Patient tolerated treatment today with minimal complaints of left shoulder discomfort. Progressed patient with a HEP today as well.  -AS        PT Plan    PT Plan Comments Continue with current treatment plan.  -AS       User Key  (r) = Recorded By, (t) = Taken By, (c) = Cosigned By    Initials Name Provider Type    AS Mo Alford, PT Physical Therapist                Modalities     Row Name 10/24/18 0800             Moist Heat    MH Applied Yes  -AS      Location Left Shoulder - pt seated, pillow for support  -AS      Rx Minutes 10 mins  -AS      MH Prior to Rx Yes  -AS         Ice    Ice Applied Yes  -AS      Location Left Shoulder - pt seated, pillow for support  -AS      Rx Minutes 10 mins  -AS      Ice S/P Rx Yes  -AS        User Key  (r) = Recorded By, (t) = Taken By, (c) = Cosigned By    Initials Name Provider Type    AS Mo Alford, PT Physical Therapist                Exercises     Row Name 10/24/18 0800             Subjective Comments    Subjective Comments Patient states that her shoulder has been hurting her a little more the past few days. She states she has aching in her left shoulder when just sitting around, pain is less when she is up moving around.  -AS         Exercise 1    Exercise Name 1 3-Way Cane  -AS      Reps 1 20 each  -AS         Exercise 2    Exercise Name 2 Pulleys - Flex and ABD  -AS      Time 2 3 min each  -AS        User Key  (r) = Recorded By, (t) = Taken By, (c) = Cosigned By    Initials Name Provider Type    AS Mo Alford, PT Physical Therapist                        Manual Rx (last 36 hours)      Manual Treatments     Row Name 10/24/18 0800             Manual Rx 1    Manual Rx 1 Location Left Shoulder  -AS      Manual Rx  1 Type PROM - Flex, ABD, IR, ER  -AS      Manual Rx 1 Duration 15 min  -AS        User Key  (r) = Recorded By, (t) = Taken By, (c) = Cosigned By    Initials Name Provider Type    AS Mo Alford, PT Physical Therapist                             Time Calculation:   Start Time: 0805  Stop Time: 0858  Time Calculation (min): 53 min  Therapy Suggested Charges     Code   Minutes Charges    None           Therapy Charges for Today     Code Description Service Date Service Provider Modifiers Qty    14191070273  PT THER PROC EA 15 MIN 10/24/2018 Mo Alford, PT GP 1    [id number]  PT MANUAL THERAPY EA 15 MIN 10/24/2018 Mo Alford, PT GP 1                    Mo Alford, PT  10/24/2018

## 2018-10-26 ENCOUNTER — HOSPITAL ENCOUNTER (OUTPATIENT)
Dept: PHYSICAL THERAPY | Facility: HOSPITAL | Age: 38
Setting detail: THERAPIES SERIES
Discharge: HOME | End: 2018-10-26

## 2018-10-26 DIAGNOSIS — Z98.890 STATUS POST ARTHROSCOPY OF SHOULDER: Primary | ICD-10-CM

## 2018-10-26 PROCEDURE — 97110 THERAPEUTIC EXERCISES: CPT

## 2018-10-26 PROCEDURE — 97140 MANUAL THERAPY 1/> REGIONS: CPT

## 2018-10-26 NOTE — THERAPY RE-EVALUATION
Outpatient Physical Therapy Ortho Re-Evaluation  TENISHA Wing     Patient Name: Aide Henry  : 1980  MRN: [id number]  Today's Date: 10/26/2018      Visit Date: 10/26/2018    Patient Active Problem List   Diagnosis   • Arthralgia of multiple joints   • Chronic back pain   • Chronic constipation   • Disorder of connective tissue (CMS/HCC)   • Depression with anxiety   • Fibromyalgia   • Gastroesophageal reflux disease without esophagitis   • Irritable bowel syndrome   • Muscle pain   • Palpitations   • Seasonal allergic rhinitis   • Sjogren's syndrome (CMS/HCC)   • Sympathetic uveitis   • Tenderness of temporomandibular joint   • Bilateral carpal tunnel syndrome   • Eczema   • Shoulder pain, left   • Substance abuse (CMS/HCC)   • Lateral epicondylitis   • Drug addiction syndrome (CMS/HCC)   • Gastroenteritis   • Alcohol dependence in remission (CMS/HCC)   • History of placement of ear tubes   • Pre-operative clearance   • Status post arthroscopy of shoulder        Past Medical History:   Diagnosis Date   • Anxiety    • Bacterial vaginosis    • Fibromyalgia    • Fracture, finger    • GERD (gastroesophageal reflux disease)    • Lateral epicondylitis 2013    RHUEMATOLOGIST   • Lupus    • MRSA (methicillin resistant staph aureus) culture positive 2011 ESTIMATE    GROIN AREA    • Sjogren's syndrome (CMS/HCC)    • Urinary tract infection         Past Surgical History:   Procedure Laterality Date   • ADENOIDECTOMY     •  SECTION     • COLONOSCOPY     • MOUTH SURGERY     • RHINOPLASTY     • SHOULDER ARTHROSCOPY Left 9/10/2018    Procedure: SHOULDER ARTHROSCOPY, rotator cuff repair; extensive debridement, open biceps tenodesis;  Surgeon: Joo Rivers MD;  Location: [facility];  Service: Orthopedics   • TONSILLECTOMY     • TUBAL ABDOMINAL LIGATION     • TYMPANOSTOMY TUBE PLACEMENT     • WISDOM TOOTH EXTRACTION Bilateral        Visit Dx:     ICD-10-CM ICD-9-CM   1. Status post arthroscopy of  shoulder Z98.890 V45.89                 PT Ortho     Row Name 10/26/18 0700       Subjective Comments    Subjective Comments Patient states that her shoulder is feeling about the same this morning.  -AS       Left Upper Ext    Lt Shoulder Abduction AROM 94  -AS    Lt Shoulder Flexion AROM 145  -AS    Lt Shoulder External Rotation AROM 70  -AS    Lt Shoulder Internal Rotation AROM 79  -AS       MMT Left Upper Ext    Lt Shoulder Flexion MMT, Gross Movement (3+/5) fair plus  -AS    Lt Shoulder ABduction MMT, Gross Movement (3+/5) fair plus  -AS    Lt Shoulder Internal Rotation MMT, Gross Movement (3+/5) fair plus  -AS    Lt Shoulder External Rotation MMT, Gross Movement (3+/5) fair plus  -AS      User Key  (r) = Recorded By, (t) = Taken By, (c) = Cosigned By    Initials Name Provider Type    AS Mo Alford, PT Physical Therapist                                  PT OP Goals     Row Name 10/26/18 0700          PT Short Term Goals    STG Date to Achieve 11/16/18  -AS     STG 1 Pt to report c/o resting pain decreased to <2/10, with PROM <4/10  -AS     STG 1 Progress Ongoing;Progressing  -AS     STG 2 Pt to demonstrate HEP for self-stretching and scapula stabilization strengthening I. 3/3 trials  -AS     STG 2 Progress Met  -AS     STG 3 Pt to demonstrate °, PER at 20°abd 70-80°, PER at 90° abd 65°  -AS     STG 3 Progress Partially Met;Ongoing;Progressing  -AS     STG 4 Pt to demonstrate use of UE for light waist level activities as comfortable, with 0 increased c/o pain  -AS     STG 4 Progress Met  -AS        Long Term Goals    LTG Date to Achieve 12/07/18  -AS     LTG 1 Patient to demonstrate compliance with her HEP for ROM and strengthening.  -AS     LTG 1 Progress Ongoing;Progressing  -AS     LTG 2 Patient to demonstrate AROM flexion to 160, ABD to 160, ER to 75, and IR to 90.  -AS     LTG 2 Progress Ongoing;Progressing  -AS     LTG 3 Patient to report left shoulder pain on VAS of 0-1/10 with AROM and  light ADL activities.  -AS     LTG 3 Progress Ongoing;Progressing  -AS     LTG 4 Patient to demonstrate improved left shoulder strength to 4/5 in all planes of motion.  -AS     LTG 4 Progress Ongoing;Progressing  -AS     LTG 5 Patient to report improved function and decreased pain on Quick DASH by >20-25 points.  -AS     LTG 5 Progress Ongoing;Progressing  -AS        Time Calculation    PT Goal Re-Cert Due Date 11/23/18  -AS       User Key  (r) = Recorded By, (t) = Taken By, (c) = Cosigned By    Initials Name Provider Type    AS Mo Alford, PT Physical Therapist                PT Assessment/Plan     Row Name 10/26/18 0700          PT Assessment    Functional Limitations Limitation in home management;Limitations in community activities;Performance in leisure activities;Performance in self-care ADL;Performance in sport activities;Performance in work activities  -AS     Impairments Muscle strength;Pain;Range of motion  -AS     Assessment Comments Initiated left GH and scapular strengthening today with only minimal complaints of left shoulder discomfort. Patient continues to improve with PT at this time.  -AS     Please refer to paper survey for additional self-reported information Yes  -AS     Rehab Potential Good  -AS     Patient/caregiver participated in establishment of treatment plan and goals Yes  -AS     Patient would benefit from skilled therapy intervention Yes  -AS        PT Plan    PT Frequency 2x/week  -AS     Predicted Duration of Therapy Intervention (Therapy Eval) 6-8 weeks  -AS     Planned CPT's? PT RE-EVAL: 47223;PT THER PROC EA 15 MIN: 43447;PT THER ACT EA 15 MIN: 77686;PT MANUAL THERAPY EA 15 MIN: 56613;PT NEUROMUSC RE-EDUCATION EA 15 MIN: 12964;PT ELECTRICAL STIM UNATTEND: ;PT ULTRASOUND EA 15 MIN: 25083;PT HOT/COLD PACK WC NONMCARE: 57414  -AS     PT Plan Comments Continue with current treatment plan.  -AS       User Key  (r) = Recorded By, (t) = Taken By, (c) = Cosigned By     Initials Name Provider Type    AS Mo Alford, PT Physical Therapist                Modalities     Row Name 10/26/18 0700             Moist Heat    MH Applied Yes  -AS      Location Left Shoulder - pt seated, pillow for support  -AS      Rx Minutes 10 mins  -AS      MH Prior to Rx Yes  -AS         Ice    Ice Applied Yes  -AS      Location Left Shoulder - pt seated, pillow for support  -AS      Rx Minutes 10 mins  -AS      Ice S/P Rx Yes  -AS        User Key  (r) = Recorded By, (t) = Taken By, (c) = Cosigned By    Initials Name Provider Type    AS Mo Alford, PT Physical Therapist              Exercises     Row Name 10/26/18 0700             Subjective Comments    Subjective Comments Patient states that her shoulder is feeling about the same this morning.  -AS         Exercise 1    Exercise Name 1 3-Way Cane  -AS      Reps 1 25 each  -AS         Exercise 2    Exercise Name 2 Pulleys - Flex and ABD  -AS      Time 2 4 min each  -AS         Exercise 3    Exercise Name 3 Serratus Punches  -AS      Reps 3 25  -AS         Exercise 4    Exercise Name 4 Sidelying ER  -AS      Reps 4 25  -AS         Exercise 5    Exercise Name 5 Prone Y & T  -AS         Exercise 6    Exercise Name 6 Empty/Full Can  -AS      Reps 6 25 each  -AS         Exercise 7    Exercise Name 7 Rows  -AS         Exercise 8    Exercise Name 8 Extensions  -AS         Exercise 9    Exercise Name 9 IR  -AS         Exercise 10    Exercise Name 10 ER  -AS        User Key  (r) = Recorded By, (t) = Taken By, (c) = Cosigned By    Initials Name Provider Type    AS Mo Alford, PT Physical Therapist           Manual Rx (last 36 hours)      Manual Treatments     Row Name 10/26/18 0800             Manual Rx 1    Manual Rx 1 Location Left Shoulder  -AS      Manual Rx 1 Type PROM - Flex, ABD, IR, ER  -AS      Manual Rx 1 Duration 15 min  -AS        User Key  (r) = Recorded By, (t) = Taken By, (c) = Cosigned By    Initials Name Provider Type     AS Mo Alford, PT Physical Therapist                                Time Calculation:     Therapy Suggested Charges     Code   Minutes Charges    None             Start Time: 0754  Stop Time: 0901  Time Calculation (min): 67 min     Therapy Charges for Today     Code Description Service Date Service Provider Modifiers Qty    11062270085  PT THER PROC EA 15 MIN 10/26/2018 Mo Alford, PT GP 2    [id number]  PT MANUAL THERAPY EA 15 MIN 10/26/2018 Mo Alford, PT GP 1                    Mo Alford, PT  10/26/2018

## 2018-10-29 ENCOUNTER — HOSPITAL ENCOUNTER (OUTPATIENT)
Dept: PHYSICAL THERAPY | Facility: HOSPITAL | Age: 38
Setting detail: THERAPIES SERIES
Discharge: HOME | End: 2018-10-29

## 2018-10-29 DIAGNOSIS — Z98.890 STATUS POST ARTHROSCOPY OF SHOULDER: Primary | ICD-10-CM

## 2018-10-29 PROCEDURE — 97140 MANUAL THERAPY 1/> REGIONS: CPT | Performed by: PHYSICAL THERAPIST

## 2018-10-29 PROCEDURE — 97110 THERAPEUTIC EXERCISES: CPT | Performed by: PHYSICAL THERAPIST

## 2018-10-29 NOTE — THERAPY TREATMENT NOTE
Outpatient Physical Therapy Ortho Treatment Note   Cecilia Jarquin     Patient Name: Aide Henry  : 1980  MRN: [id number]  Today's Date: 10/29/2018      Visit Date: 10/29/2018    Visit Dx:    ICD-10-CM ICD-9-CM   1. Status post arthroscopy of shoulder Z98.890 V45.89       Patient Active Problem List   Diagnosis   • Arthralgia of multiple joints   • Chronic back pain   • Chronic constipation   • Disorder of connective tissue (CMS/HCC)   • Depression with anxiety   • Fibromyalgia   • Gastroesophageal reflux disease without esophagitis   • Irritable bowel syndrome   • Muscle pain   • Palpitations   • Seasonal allergic rhinitis   • Sjogren's syndrome (CMS/HCC)   • Sympathetic uveitis   • Tenderness of temporomandibular joint   • Bilateral carpal tunnel syndrome   • Eczema   • Shoulder pain, left   • Substance abuse (CMS/HCC)   • Lateral epicondylitis   • Drug addiction syndrome (CMS/HCC)   • Gastroenteritis   • Alcohol dependence in remission (CMS/HCC)   • History of placement of ear tubes   • Pre-operative clearance   • Status post arthroscopy of shoulder        Past Medical History:   Diagnosis Date   • Anxiety    • Bacterial vaginosis    • Fibromyalgia    • Fracture, finger    • GERD (gastroesophageal reflux disease)    • Lateral epicondylitis 2013    RHUEMATOLOGIST   • Lupus    • MRSA (methicillin resistant staph aureus) culture positive 2011 ESTIMATE    GROIN AREA    • Sjogren's syndrome (CMS/HCC)    • Urinary tract infection         Past Surgical History:   Procedure Laterality Date   • ADENOIDECTOMY     •  SECTION     • COLONOSCOPY     • MOUTH SURGERY     • RHINOPLASTY     • SHOULDER ARTHROSCOPY Left 9/10/2018    Procedure: SHOULDER ARTHROSCOPY, rotator cuff repair; extensive debridement, open biceps tenodesis;  Surgeon: Joo Rivers MD;  Location: [facility];  Service: Orthopedics   • TONSILLECTOMY     • TUBAL ABDOMINAL LIGATION     • TYMPANOSTOMY TUBE PLACEMENT     • WISDOM TOOTH  EXTRACTION Bilateral                              PT Assessment/Plan     Row Name 10/29/18 2502          PT Assessment    Assessment Comments Pt tolerated stretches and exercises fair today with complaints of pain wiht all.  -GC        PT Plan    PT Plan Comments Pt will continue her HEP daily.  -GC       User Key  (r) = Recorded By, (t) = Taken By, (c) = Cosigned By    Initials Name Provider Type    GC Ruperto Kimball, PT Physical Therapist                Modalities     Row Name 10/29/18 8949             Moist Heat    MH Applied Yes  -GC      Location Left Shoulder - pt seated, pillow for support  -GC      Rx Minutes 10 mins  -GC      MH Prior to Rx Yes  -GC         Ice    Ice Applied Yes  -GC      Location Left Shoulder - pt seated, pillow for support  -GC      Rx Minutes 10 mins  -GC      Ice S/P Rx Yes  -GC        User Key  (r) = Recorded By, (t) = Taken By, (c) = Cosigned By    Initials Name Provider Type    GC Ruperto Kimball, PT Physical Therapist                Exercises     Row Name 10/29/18 1164             Subjective Comments    Subjective Comments Pt states her shoulder is a little sore and stiff this morning.  -GC         Exercise 1    Exercise Name 1 3-Way Cane  -GC      Reps 1 25 each  -GC         Exercise 2    Exercise Name 2 Pulleys - Flex and ABD  -GC      Time 2 4 min each  -GC         Exercise 3    Exercise Name 3 Serratus Punches  -GC      Reps 3 25  -GC         Exercise 4    Exercise Name 4 Sidelying ER  -GC      Reps 4 25  -GC         Exercise 5    Exercise Name 5 Prone Y & T  -GC         Exercise 6    Exercise Name 6 Empty/Full Can  -GC      Reps 6 25 each  -GC         Exercise 7    Exercise Name 7 Rows  -GC         Exercise 8    Exercise Name 8 Extensions  -GC         Exercise 9    Exercise Name 9 IR  -GC         Exercise 10    Exercise Name 10 ER  -GC        User Key  (r) = Recorded By, (t) = Taken By, (c) = Cosigned By    Initials Name Provider Type    GC Ruperto Kimball, PT Physical  Therapist                        Manual Rx (last 36 hours)      Manual Treatments     Row Name 10/29/18 0755             Manual Rx 1    Manual Rx 1 Location Left Shoulder  -GC      Manual Rx 1 Type PROM - Flex, ABD, IR, ER  -GC      Manual Rx 1 Duration 15 min  -GC        User Key  (r) = Recorded By, (t) = Taken By, (c) = Cosigned By    Initials Name Provider Type    GC Ruperto Kimball, PT Physical Therapist                             Time Calculation:   Start Time: 0755  Stop Time: 0847  Time Calculation (min): 52 min  Therapy Suggested Charges     Code   Minutes Charges    None           Therapy Charges for Today     Code Description Service Date Service Provider Modifiers Qty    [id number]  PT MANUAL THERAPY EA 15 MIN 10/29/2018 Ruperto Kimball, PT GP 1    [id number] HC PT THER PROC EA 15 MIN 10/29/2018 Ruperto Kimball, PT GP 1                    Ruperto Kimball, PT  10/29/2018

## 2018-10-30 RX ORDER — OXYCODONE HYDROCHLORIDE AND ACETAMINOPHEN 5; 325 MG/1; MG/1
1 TABLET ORAL EVERY 4 HOURS PRN
Qty: 42 TABLET | Refills: 0 | Status: SHIPPED | OUTPATIENT
Start: 2018-10-30 | End: 2018-11-06 | Stop reason: SDUPTHER

## 2018-10-31 ENCOUNTER — HOSPITAL ENCOUNTER (OUTPATIENT)
Dept: PHYSICAL THERAPY | Facility: HOSPITAL | Age: 38
Setting detail: THERAPIES SERIES
Discharge: HOME | End: 2018-10-31

## 2018-10-31 DIAGNOSIS — Z98.890 STATUS POST ARTHROSCOPY OF SHOULDER: Primary | ICD-10-CM

## 2018-10-31 PROCEDURE — 97140 MANUAL THERAPY 1/> REGIONS: CPT

## 2018-10-31 PROCEDURE — 97110 THERAPEUTIC EXERCISES: CPT

## 2018-11-01 ENCOUNTER — OFFICE VISIT (OUTPATIENT)
Dept: INTERNAL MEDICINE | Facility: CLINIC | Age: 38
End: 2018-11-01

## 2018-11-01 VITALS
BODY MASS INDEX: 30.22 KG/M2 | RESPIRATION RATE: 16 BRPM | TEMPERATURE: 98.3 F | WEIGHT: 177 LBS | DIASTOLIC BLOOD PRESSURE: 80 MMHG | HEART RATE: 92 BPM | SYSTOLIC BLOOD PRESSURE: 110 MMHG | HEIGHT: 64 IN | OXYGEN SATURATION: 100 %

## 2018-11-01 DIAGNOSIS — M35.00 SJOGREN'S SYNDROME, WITH UNSPECIFIED ORGAN INVOLVEMENT (HCC): Primary | ICD-10-CM

## 2018-11-01 DIAGNOSIS — F19.20: ICD-10-CM

## 2018-11-01 DIAGNOSIS — M79.7 FIBROMYALGIA: ICD-10-CM

## 2018-11-01 PROBLEM — Z01.818 PRE-OPERATIVE CLEARANCE: Status: RESOLVED | Noted: 2018-09-04 | Resolved: 2018-11-01

## 2018-11-01 PROCEDURE — 99213 OFFICE O/P EST LOW 20 MIN: CPT | Performed by: NURSE PRACTITIONER

## 2018-11-01 NOTE — PROGRESS NOTES
"Chief Complaint   Patient presents with   • Follow-up       Subjective     Aide Henry is a 38 y.o. female being seen for a follow up appointment today regarding disability paperwork. She reports that she has filed for disability for Fibromyalgia, Lupus, and Sjögren's. She is followed by Rheumatolgy, and last appointment was 8- for SICC .     She is reporting that daily pain is ranging from 5 of 10. Pain is exacerbated by any movement at all. She states' \" I cannot even chew my food.\"  She aslo reports \"i can't do anything, I can't climb steps.\" She reports that the pain is everything from her \"joints to muscles to nerves.\" She reports that she has spoken with her  and needs her paperwork done today!    She is currently on Percocet for recent arthroscopic shoulder surgery. She has history of opiod abuse.       History of Present Illness     Allergies   Allergen Reactions   • Fluconazole Hives         Current Outpatient Prescriptions:   •   MG capsule, TAKE ONE CAPSULE BY MOUTH TWICE DAILY AS NEEDED FOR CONSTIPATION, Disp: 30 capsule, Rfl: 0  •  escitalopram (LEXAPRO) 20 MG tablet, Take 1 tablet by mouth Daily., Disp: 90 tablet, Rfl: 3  •  naproxen (NAPROSYN) 250 MG tablet, Take 250 mg by mouth 2 (Two) Times a Day As Needed., Disp: , Rfl:   •  omeprazole (priLOSEC) 20 MG capsule, TAKE 1 CAPSULE BY MOUTH DAILY (Patient taking differently: Take 20 mg by mouth Every Night.), Disp: 90 capsule, Rfl: 1  •  oxyCODONE-acetaminophen (PERCOCET) 5-325 MG per tablet, Take 1 tablet by mouth Every 4 (Four) Hours As Needed (Pain)., Disp: 42 tablet, Rfl: 0  •  prednisoLONE acetate (PRED FORTE) 1 % ophthalmic suspension, INSTILL 1 DROP INTO OD Q 2 H, Disp: , Rfl: 6    The following portions of the patient's history were reviewed and updated as appropriate: allergies, current medications, past family history, past medical history, past social history, past surgical history and problem list.    Review of Systems "   Constitutional: Negative.    HENT: Negative.    Eyes: Positive for visual disturbance.   Respiratory: Negative for shortness of breath, wheezing and stridor.    Cardiovascular: Negative for chest pain, palpitations and leg swelling.   Gastrointestinal: Negative.    Endocrine: Negative.    Genitourinary: Negative.    Musculoskeletal: Positive for arthralgias, back pain, myalgias, neck pain and neck stiffness.   Allergic/Immunologic: Positive for environmental allergies.   Neurological: Negative.    Hematological: Negative.    Psychiatric/Behavioral: Positive for agitation. The patient is nervous/anxious.        Assessment     Physical Exam   Constitutional: She appears well-developed and well-nourished.   Neck: Neck supple.   Cardiovascular: Normal rate and regular rhythm.    Musculoskeletal:        Right shoulder: She exhibits tenderness.        Left shoulder: She exhibits tenderness.        Cervical back: She exhibits tenderness.        Lumbar back: She exhibits tenderness.   Neurological: She is alert.   Psychiatric: Her mood appears anxious. Her speech is rapid and/or pressured. She is agitated and aggressive. She expresses impulsivity and inappropriate judgment. She expresses no suicidal plans.   Speech is rapid and pressured, she is speaking in a loud decibel. She appears very anxious. She is inattentive.   Vitals reviewed.      Aston Noble was seen today for follow-up.    Diagnoses and all orders for this visit:    Sjogren's syndrome, with unspecified organ involvement (CMS/HCC)    Fibromyalgia    Drug addiction syndrome (CMS/HCC)    I have asked that she taper off the Percocet due to opiod abuse in the past. She is unable to have any pain assessment complete while taking prescriptions pain medications.    She is aggressive and hostile today in office. I told her that she would have to see a disability approved physician to get an assessment complete. I also sent in out , Chantel Sandra, to  speak with her.     After review of rheumatology note, she does not appear to qualify for disability due to Rheumatoid disease.    She will need to see [facility] to get Addiction therapy help.

## 2018-11-06 ENCOUNTER — OFFICE VISIT (OUTPATIENT)
Dept: ORTHOPEDIC SURGERY | Facility: CLINIC | Age: 38
End: 2018-11-06

## 2018-11-06 ENCOUNTER — HOSPITAL ENCOUNTER (OUTPATIENT)
Dept: PHYSICAL THERAPY | Facility: HOSPITAL | Age: 38
Setting detail: THERAPIES SERIES
Discharge: HOME | End: 2018-11-06

## 2018-11-06 VITALS — BODY MASS INDEX: 30.73 KG/M2 | WEIGHT: 180 LBS | HEIGHT: 64 IN

## 2018-11-06 DIAGNOSIS — Z98.890 STATUS POST ARTHROSCOPY OF SHOULDER: Primary | ICD-10-CM

## 2018-11-06 PROCEDURE — 99024 POSTOP FOLLOW-UP VISIT: CPT | Performed by: ORTHOPAEDIC SURGERY

## 2018-11-06 PROCEDURE — 97140 MANUAL THERAPY 1/> REGIONS: CPT

## 2018-11-06 PROCEDURE — 97110 THERAPEUTIC EXERCISES: CPT

## 2018-11-06 RX ORDER — MELOXICAM 7.5 MG/1
7.5 TABLET ORAL DAILY
Qty: 30 TABLET | Refills: 0 | Status: SHIPPED | OUTPATIENT
Start: 2018-11-06 | End: 2018-12-09 | Stop reason: SDUPTHER

## 2018-11-06 RX ORDER — OXYCODONE HYDROCHLORIDE AND ACETAMINOPHEN 5; 325 MG/1; MG/1
1 TABLET ORAL EVERY 4 HOURS PRN
Qty: 42 TABLET | Refills: 0 | Status: SHIPPED | OUTPATIENT
Start: 2018-11-06 | End: 2019-03-28

## 2018-11-06 NOTE — THERAPY TREATMENT NOTE
Outpatient Physical Therapy Ortho Treatment Note   Cecilia Jarquin     Patient Name: Aide Henry  : 1980  MRN: [id number]  Today's Date: 2018      Visit Date: 2018    Visit Dx:    ICD-10-CM ICD-9-CM   1. Status post arthroscopy of shoulder Z98.890 V45.89       Patient Active Problem List   Diagnosis   • Arthralgia of multiple joints   • Chronic back pain   • Chronic constipation   • Disorder of connective tissue (CMS/HCC)   • Depression with anxiety   • Fibromyalgia   • Gastroesophageal reflux disease without esophagitis   • Irritable bowel syndrome   • Muscle pain   • Palpitations   • Seasonal allergic rhinitis   • Sjogren's syndrome (CMS/HCC)   • Sympathetic uveitis   • Tenderness of temporomandibular joint   • Bilateral carpal tunnel syndrome   • Eczema   • Shoulder pain, left   • Substance abuse (CMS/HCC)   • Lateral epicondylitis   • Drug addiction syndrome (CMS/HCC)   • Gastroenteritis   • Alcohol dependence in remission (CMS/[facility])   • History of placement of ear tubes   • Status post arthroscopy of shoulder        Past Medical History:   Diagnosis Date   • Anxiety    • Bacterial vaginosis    • Fibromyalgia    • Fracture, finger    • GERD (gastroesophageal reflux disease)    • Lateral epicondylitis 2013    RHUEMATOLOGIST   • Lupus    • MRSA (methicillin resistant staph aureus) culture positive 2011 ESTIMATE    GROIN AREA    • Sjogren's syndrome (CMS/HCC)    • Urinary tract infection         Past Surgical History:   Procedure Laterality Date   • ADENOIDECTOMY     •  SECTION     • COLONOSCOPY     • MOUTH SURGERY     • RHINOPLASTY     • SHOULDER ARTHROSCOPY Left 9/10/2018    Procedure: SHOULDER ARTHROSCOPY, rotator cuff repair; extensive debridement, open biceps tenodesis;  Surgeon: Joo Rivers MD;  Location: [facility];  Service: Orthopedics   • TONSILLECTOMY     • TUBAL ABDOMINAL LIGATION     • TYMPANOSTOMY TUBE PLACEMENT     • WISDOM TOOTH EXTRACTION Bilateral          "     PT Ortho     Row Name 11/06/18 0800       Left Upper Ext    Lt Shoulder Abduction AROM 122  -AS    Lt Shoulder Flexion AROM 154  -AS      User Key  (r) = Recorded By, (t) = Taken By, (c) = Cosigned By    Initials Name Provider Type    AS Mo Alford, PT Physical Therapist                            PT Assessment/Plan     Row Name 11/06/18 0800          PT Assessment    Assessment Comments Patient continues to make good progress with her left shoulder ROM and strengthening. She has improved tolerance to exercises today.  -AS        PT Plan    PT Plan Comments Patient to continue her HEP daily.  -AS       User Key  (r) = Recorded By, (t) = Taken By, (c) = Cosigned By    Initials Name Provider Type    AS Mo Alford, PT Physical Therapist                Modalities     Row Name 11/06/18 0800             Moist Heat    MH Applied Yes  -AS      Location Left Shoulder - pt seated, pillow for support  -AS      Rx Minutes 10 mins  -AS      MH Prior to Rx Yes  -AS         Ice    Ice Applied Yes  -AS      Location Left Shoulder - pt seated, pillow for support  -AS      Rx Minutes 10 mins  -AS      Ice S/P Rx Yes  -AS        User Key  (r) = Recorded By, (t) = Taken By, (c) = Cosigned By    Initials Name Provider Type    AS Mo Alford, PT Physical Therapist                Exercises     Row Name 11/06/18 0800             Subjective Comments    Subjective Comments Patient states that her shoulder is \"getting there\" as she states she is starting to feel better.  -AS         Exercise 1    Exercise Name 1 3-Way Cane  -AS      Reps 1 25 each  -AS         Exercise 2    Exercise Name 2 --  -AS      Time 2 --  -AS         Exercise 3    Exercise Name 3 Serratus Punches  -AS      Reps 3 40  -AS         Exercise 4    Exercise Name 4 Sidelying ER  -AS      Reps 4 40  -AS         Exercise 5    Exercise Name 5 Prone Y & T  -AS      Cueing 5 Verbal;Tactile  -AS      Reps 5 25 each  -AS         Exercise 6    " Exercise Name 6 Empty/Full Can  -AS      Reps 6 30 each  -AS         Exercise 7    Exercise Name 7 Rows  -AS      Reps 7 25  -AS      Additional Comments Red  -AS         Exercise 8    Exercise Name 8 Extensions  -AS      Reps 8 25  -AS      Additional Comments Red  -AS         Exercise 9    Exercise Name 9 IR  -AS      Cueing 9 Verbal;Tactile;Demo  -AS      Reps 9 25  -AS      Additional Comments Red  -AS         Exercise 10    Exercise Name 10 ER  -AS      Cueing 10 Verbal;Tactile;Demo  -AS      Reps 10 25  -AS      Additional Comments Red  -AS        User Key  (r) = Recorded By, (t) = Taken By, (c) = Cosigned By    Initials Name Provider Type    AS Mo Alford, PT Physical Therapist                        Manual Rx (last 36 hours)      Manual Treatments     Row Name 11/06/18 0800             Manual Rx 1    Manual Rx 1 Location Left Shoulder  -AS      Manual Rx 1 Type PROM - Flex, ABD, IR, ER  -AS      Manual Rx 1 Duration 15 min  -AS        User Key  (r) = Recorded By, (t) = Taken By, (c) = Cosigned By    Initials Name Provider Type    AS Mo Alford, PT Physical Therapist                             Time Calculation:   Start Time: 0802  Stop Time: 0849  Time Calculation (min): 47 min  Therapy Suggested Charges     Code   Minutes Charges    None           Therapy Charges for Today     Code Description Service Date Service Provider Modifiers Qty    [id number]  PT THER PROC EA 15 MIN 11/6/2018 Mo Alford, PT GP 1    [id number]  PT MANUAL THERAPY EA 15 MIN 11/6/2018 Mo Alford, PT GP 1                    Mo Alford, PT  11/6/2018

## 2018-11-06 NOTE — PROGRESS NOTES
CC: F/u s/p left shoulder rotator cuff repair and biceps tenodesis  DOS 9/10/2018    Interval History: Patient returns to clinic stating pain is doing fairly well but still notes some aching pain, has been working with PT, denies any numbness or tingling over left arm. No fevers, chills, or sweats, and no drainage from incisions noted.    Exam:   Left shoulder- incisions healed   Active , 4/5 strength   Passive    Active Er  50, 4/5 strength   Passive ER 55   Positive sensation all distributions left hand and proximal lateral aspect arm, positive deltoid firing   Cap refill < 3 seconds, radial pulse 2+   Positive deltoid firing   Flex/extend fingers/thumb/wrist with 4+/5 strength, positive thumbs up, okay  sign, cross finger adduction and abduction against resistance     Impression: s/p left shoulder rotator cuff repair and biceps tenodesis     Plan:  1. Continue PT for work on ROM and progressing into strengthening per protocol  2. Continue home exercises  3. F/u in 6 weeks to evaluate motion and progress with PT  4. All questions answered  5. Refill on Percocet today, no additional refills to be given    New Medications Ordered This Visit   Medications   • oxyCODONE-acetaminophen (PERCOCET) 5-325 MG per tablet     Sig: Take 1 tablet by mouth Every 4 (Four) Hours As Needed (Pain).     Dispense:  42 tablet     Refill:  0   • meloxicam (MOBIC) 7.5 MG tablet     Sig: Take 1 tablet by mouth Daily.     Dispense:  30 tablet     Refill:  0       No orders of the defined types were placed in this encounter.

## 2018-11-20 ENCOUNTER — HOSPITAL ENCOUNTER (OUTPATIENT)
Dept: PHYSICAL THERAPY | Facility: HOSPITAL | Age: 38
Setting detail: THERAPIES SERIES
Discharge: HOME | End: 2018-11-20

## 2018-11-20 DIAGNOSIS — Z98.890 STATUS POST ARTHROSCOPY OF SHOULDER: Primary | ICD-10-CM

## 2018-11-20 PROCEDURE — 97140 MANUAL THERAPY 1/> REGIONS: CPT

## 2018-11-20 PROCEDURE — 97110 THERAPEUTIC EXERCISES: CPT

## 2018-11-20 NOTE — THERAPY TREATMENT NOTE
Outpatient Physical Therapy Ortho Treatment Note  TENISHA Wing     Patient Name: Aide Henry  : 1980  MRN: [id number]  Today's Date: 2018      Visit Date: 2018    Visit Dx:    ICD-10-CM ICD-9-CM   1. Status post arthroscopy of shoulder Z98.890 V45.89       Patient Active Problem List   Diagnosis   • Arthralgia of multiple joints   • Chronic back pain   • Chronic constipation   • Disorder of connective tissue (CMS/HCC)   • Depression with anxiety   • Fibromyalgia   • Gastroesophageal reflux disease without esophagitis   • Irritable bowel syndrome   • Muscle pain   • Palpitations   • Seasonal allergic rhinitis   • Sjogren's syndrome (CMS/HCC)   • Sympathetic uveitis   • Tenderness of temporomandibular joint   • Bilateral carpal tunnel syndrome   • Eczema   • Shoulder pain, left   • Substance abuse (CMS/HCC)   • Lateral epicondylitis   • Drug addiction syndrome (CMS/HCC)   • Gastroenteritis   • Alcohol dependence in remission (CMS/HCC)   • History of placement of ear tubes   • Status post arthroscopy of shoulder        Past Medical History:   Diagnosis Date   • Anxiety    • Bacterial vaginosis    • Fibromyalgia    • Fracture, finger    • GERD (gastroesophageal reflux disease)    • Lateral epicondylitis 2013    RHUEMATOLOGIST   • Lupus    • MRSA (methicillin resistant staph aureus) culture positive 2011 ESTIMATE    GROIN AREA    • Sjogren's syndrome (CMS/HCC)    • Urinary tract infection         Past Surgical History:   Procedure Laterality Date   • ADENOIDECTOMY     •  SECTION     • COLONOSCOPY     • MOUTH SURGERY     • RHINOPLASTY     • TONSILLECTOMY     • TUBAL ABDOMINAL LIGATION     • TYMPANOSTOMY TUBE PLACEMENT     • WISDOM TOOTH EXTRACTION Bilateral                        PT Assessment/Plan     Row Name 18 0800          PT Assessment    Assessment Comments  Patient's left shoulder ROM continues to improve as well as her strength. Patient has good tolerance with her  exercises.  -AS        PT Plan    PT Plan Comments  Continue with current treatment plan.  -AS       User Key  (r) = Recorded By, (t) = Taken By, (c) = Cosigned By    Initials Name Provider Type    AS Mo Alford, PT Physical Therapist          Modalities     Row Name 11/20/18 0700             Moist Heat    MH Applied  Yes  -AS      Location  Left Shoulder - pt seated, pillow for support  -AS      Rx Minutes  10 mins  -AS      MH Prior to Rx  Yes  -AS         Ice    Ice Applied  Yes  -AS      Location  Left Shoulder - pt seated, pillow for support  -AS      Rx Minutes  10 mins  -AS      Ice S/P Rx  Yes  -AS        User Key  (r) = Recorded By, (t) = Taken By, (c) = Cosigned By    Initials Name Provider Type    AS Mo Alford, PT Physical Therapist          Exercises     Row Name 11/20/18 0800             Subjective Comments    Subjective Comments  Patient states her shoulder still hurts but her ROM and function have improved.  -AS         Exercise 1    Exercise Name 1  3-Way Cane  -AS      Reps 1  25 each  -AS         Exercise 3    Exercise Name 3  Serratus Punches  -AS      Reps 3  25  -AS      Additional Comments  1#  -AS         Exercise 4    Exercise Name 4  Sidelying ER  -AS      Reps 4  25  -AS      Additional Comments  1#  -AS         Exercise 5    Exercise Name 5  Prone Y & T  -AS      Cueing 5  Verbal;Tactile  -AS      Reps 5  25 each  -AS         Exercise 6    Exercise Name 6  Empty/Full Can  -AS      Reps 6  25 each  -AS         Exercise 7    Exercise Name 7  Rows  -AS      Reps 7  30  -AS      Additional Comments  Red  -AS         Exercise 8    Exercise Name 8  Extensions  -AS      Reps 8  30  -AS      Additional Comments  Red  -AS         Exercise 9    Exercise Name 9  IR  -AS      Cueing 9  Verbal;Tactile;Demo  -AS      Reps 9  30  -AS      Additional Comments  Red  -AS         Exercise 10    Exercise Name 10  ER  -AS      Cueing 10  Verbal;Tactile;Demo  -AS      Reps 10  30  -AS       Additional Comments  Red  -AS        User Key  (r) = Recorded By, (t) = Taken By, (c) = Cosigned By    Initials Name Provider Type    AS Mo Alford, PT Physical Therapist                        Manual Rx (last 36 hours)      Manual Treatments     Row Name 11/20/18 0900             Manual Rx 1    Manual Rx 1 Location  Left Shoulder  -AS      Manual Rx 1 Type  PROM - Flex, ABD, IR, ER  -AS      Manual Rx 1 Duration  15 min  -AS        User Key  (r) = Recorded By, (t) = Taken By, (c) = Cosigned By    Initials Name Provider Type    AS Mo Alford, PT Physical Therapist                             Time Calculation:   Start Time: 0756  Stop Time: 0902  Time Calculation (min): 66 min  Therapy Suggested Charges     Code   Minutes Charges    None           Therapy Charges for Today     Code Description Service Date Service Provider Modifiers Qty    [id number]  PT THER PROC EA 15 MIN 11/20/2018 Mo Alford, PT GP 1    [id number]  PT MANUAL THERAPY EA 15 MIN 11/20/2018 Mo Alford, PT GP 1                    Mo Alford, PT  11/20/2018

## 2018-11-29 ENCOUNTER — HOSPITAL ENCOUNTER (OUTPATIENT)
Dept: PHYSICAL THERAPY | Facility: HOSPITAL | Age: 38
Setting detail: THERAPIES SERIES
Discharge: HOME | End: 2018-11-29

## 2018-11-29 DIAGNOSIS — Z98.890 STATUS POST ARTHROSCOPY OF SHOULDER: Primary | ICD-10-CM

## 2018-11-29 PROCEDURE — 97110 THERAPEUTIC EXERCISES: CPT

## 2018-11-29 NOTE — THERAPY EVALUATION
Outpatient Physical Therapy Ortho Initial Evaluation  TENISHA Rowlett     Patient Name: Aide Henry  : 1980  MRN: [id number]  Today's Date: 2018      Visit Date: 2018    Patient Active Problem List   Diagnosis   • Arthralgia of multiple joints   • Chronic back pain   • Chronic constipation   • Disorder of connective tissue (CMS/HCC)   • Depression with anxiety   • Fibromyalgia   • Gastroesophageal reflux disease without esophagitis   • Irritable bowel syndrome   • Muscle pain   • Palpitations   • Seasonal allergic rhinitis   • Sjogren's syndrome (CMS/HCC)   • Sympathetic uveitis   • Tenderness of temporomandibular joint   • Bilateral carpal tunnel syndrome   • Eczema   • Shoulder pain, left   • Substance abuse (CMS/HCC)   • Lateral epicondylitis   • Drug addiction syndrome (CMS/HCC)   • Gastroenteritis   • Alcohol dependence in remission (CMS/HCC)   • History of placement of ear tubes   • Status post arthroscopy of shoulder        Past Medical History:   Diagnosis Date   • Anxiety    • Bacterial vaginosis    • Fibromyalgia    • Fracture, finger    • GERD (gastroesophageal reflux disease)    • Lateral epicondylitis 2013    RHUEMATOLOGIST   • Lupus    • MRSA (methicillin resistant staph aureus) culture positive 2011 ESTIMATE    GROIN AREA    • Sjogren's syndrome (CMS/HCC)    • Urinary tract infection         Past Surgical History:   Procedure Laterality Date   • ADENOIDECTOMY     •  SECTION     • COLONOSCOPY     • MOUTH SURGERY     • RHINOPLASTY     • TONSILLECTOMY     • TUBAL ABDOMINAL LIGATION     • TYMPANOSTOMY TUBE PLACEMENT     • WISDOM TOOTH EXTRACTION Bilateral        Visit Dx:     ICD-10-CM ICD-9-CM   1. Status post arthroscopy of shoulder Z98.890 V45.89           PT Ortho     Row Name 18 0800       Left Upper Ext    Lt Shoulder Abduction AROM  163  -AS    Lt Shoulder Flexion AROM  160  -AS       MMT Left Upper Ext    Lt Shoulder Flexion MMT, Gross Movement  (4/5)  good  -AS    Lt Shoulder ABduction MMT, Gross Movement  (4/5) good  -AS    Lt Shoulder Internal Rotation MMT, Gross Movement  (4/5) good  -AS    Lt Shoulder External Rotation MMT, Gross Movement  (4-/5) good minus  -AS      User Key  (r) = Recorded By, (t) = Taken By, (c) = Cosigned By    Initials Name Provider Type    AS Mo Alford, PT Physical Therapist                            PT OP Goals     Row Name 11/29/18 0900          PT Short Term Goals    STG Date to Achieve  12/20/18  -AS     STG 1  Pt to report c/o resting pain decreased to <2/10, with PROM <4/10  -AS     STG 1 Progress  Ongoing;Progressing  -AS     STG 2  Pt to demonstrate HEP for self-stretching and scapula stabilization strengthening I. 3/3 trials  -AS     STG 2 Progress  Met  -AS     STG 3  Pt to demonstrate °, PER at 20°abd 70-80°, PER at 90° abd 65°  -AS     STG 3 Progress  Met  -AS     STG 4  Pt to demonstrate use of UE for light waist level activities as comfortable, with 0 increased c/o pain  -AS     STG 4 Progress  Met  -AS        Long Term Goals    LTG Date to Achieve  01/10/19  -AS     LTG 1  Patient to demonstrate compliance with her HEP for ROM and strengthening.  -AS     LTG 1 Progress  Met  -AS     LTG 2  Patient to demonstrate AROM flexion to 170, ABD to 170, ER to 75, and IR to 90.  -AS     LTG 2 Progress  New  -AS     LTG 3  Patient to report left shoulder pain on VAS of 0-1/10 with AROM and light ADL activities.  -AS     LTG 3 Progress  Ongoing;Progressing  -AS     LTG 4  Patient to demonstrate improved left shoulder strength to 4/5 in all planes of motion.  -AS     LTG 4 Progress  Partially Met;Ongoing;Progressing  -AS     LTG 5  Patient to report improved function and decreased pain on Quick DASH by >20-25 points.  -AS     LTG 5 Progress  Ongoing;Progressing  -AS        Time Calculation    PT Goal Re-Cert Due Date  12/27/18  -AS       User Key  (r) = Recorded By, (t) = Taken By, (c) = Cosigned By    Initials  Name Provider Type    AS Mo Alford, PT Physical Therapist          PT Assessment/Plan     Row Name 11/29/18 0800          PT Assessment    Functional Limitations  Performance in sport activities;Performance in work activities;Limitations in community activities;Limitation in home management  -AS     Impairments  Muscle strength;Pain;Range of motion  -AS     Assessment Comments  Patient continues to demonstrate improved left shoulder ROM, strength, and function. However, she continues to report pain and states she has been diagnosed with fibromyalgia and she feels this contributes to her shoulder pain.  -AS     Please refer to paper survey for additional self-reported information  Yes  -AS     Rehab Potential  Good  -AS     Patient/caregiver participated in establishment of treatment plan and goals  Yes  -AS     Patient would benefit from skilled therapy intervention  Yes  -AS        PT Plan    PT Frequency  2x/week  -AS     Predicted Duration of Therapy Intervention (Therapy Eval)  4-6 weeks  -AS     Planned CPT's?  PT THER ACT EA 15 MIN: 42997;PT RE-EVAL: 27842;PT THER PROC EA 15 MIN: 44254;PT MANUAL THERAPY EA 15 MIN: 92760;PT NEUROMUSC RE-EDUCATION EA 15 MIN: 10485;PT ELECTRICAL STIM UNATTEND: ;PT ULTRASOUND EA 15 MIN: 95268;PT HOT/COLD PACK WC NONMCARE: 04646  -AS     PT Plan Comments  Continue with current treatment plan.  -AS       User Key  (r) = Recorded By, (t) = Taken By, (c) = Cosigned By    Initials Name Provider Type    AS Mo Alford, PT Physical Therapist          Modalities     Row Name 11/29/18 0800             Moist Heat    MH Applied  Yes  -AS      Location  Left Shoulder - pt seated, pillow for support  -AS      Rx Minutes  10 mins  -AS      MH Prior to Rx  Yes  -AS         Ice    Ice Applied  Yes  -AS      Location  Left Shoulder - pt seated, pillow for support  -AS      Rx Minutes  10 mins  -AS      Ice S/P Rx  Yes  -AS        User Key  (r) = Recorded By, (t) = Taken  By, (c) = Cosigned By    Initials Name Provider Type    AS Mo Alford, PT Physical Therapist        Exercises     Row Name 11/29/18 0800             Subjective Comments    Subjective Comments  Patient states that she continues to have pain but her ROM and function continue to improve.  -AS         Exercise 1    Exercise Name 1  3-Way Cane  -AS      Reps 1  25 each  -AS         Exercise 3    Exercise Name 3  Serratus Punches  -AS      Reps 3  25  -AS      Additional Comments  2#  -AS         Exercise 4    Exercise Name 4  Sidelying ER  -AS      Reps 4  25  -AS      Additional Comments  2#  -AS         Exercise 5    Exercise Name 5  Prone Y & T  -AS      Cueing 5  Verbal;Tactile  -AS      Reps 5  25 each  -AS         Exercise 6    Exercise Name 6  Empty/Full Can  -AS      Reps 6  25 each  -AS      Additional Comments  1#  -AS         Exercise 7    Exercise Name 7  Rows  -AS      Reps 7  25  -AS      Additional Comments  Green  -AS         Exercise 8    Exercise Name 8  Extensions  -AS      Reps 8  25  -AS      Additional Comments  Green  -AS         Exercise 9    Exercise Name 9  IR  -AS      Cueing 9  Verbal;Tactile;Demo  -AS      Reps 9  25  -AS      Additional Comments  Green  -AS         Exercise 10    Exercise Name 10  ER  -AS      Cueing 10  Verbal;Tactile;Demo  -AS      Reps 10  25  -AS      Additional Comments  Green  -AS        User Key  (r) = Recorded By, (t) = Taken By, (c) = Cosigned By    Initials Name Provider Type    AS Mo Alford, PT Physical Therapist           Manual Rx (last 36 hours)      Manual Treatments     Row Name 11/29/18 0800             Manual Rx 1    Manual Rx 1 Location  Left Shoulder  -AS      Manual Rx 1 Type  PROM - Flex, ABD, IR, ER  -AS      Manual Rx 1 Duration  15 min  -AS        User Key  (r) = Recorded By, (t) = Taken By, (c) = Cosigned By    Initials Name Provider Type    AS oM Alford, PT Physical Therapist                                Time  Calculation:     Therapy Suggested Charges     Code   Minutes Charges    None             Start Time: 0800  Stop Time: 0906  Time Calculation (min): 66 min     Therapy Charges for Today     Code Description Service Date Service Provider Modifiers Qty    [id number]  PT THER PROC EA 15 MIN 11/29/2018 Mo Alford, PT GP 2                    Mo Alford, PT  11/29/2018

## 2018-12-10 RX ORDER — MELOXICAM 7.5 MG/1
7.5 TABLET ORAL DAILY
Qty: 30 TABLET | Refills: 0 | Status: SHIPPED | OUTPATIENT
Start: 2018-12-10 | End: 2019-03-28

## 2018-12-13 ENCOUNTER — HOSPITAL ENCOUNTER (OUTPATIENT)
Dept: PHYSICAL THERAPY | Facility: HOSPITAL | Age: 38
Setting detail: THERAPIES SERIES
Discharge: HOME | End: 2018-12-13

## 2018-12-13 DIAGNOSIS — Z98.890 STATUS POST ARTHROSCOPY OF SHOULDER: Primary | ICD-10-CM

## 2018-12-13 PROCEDURE — 97140 MANUAL THERAPY 1/> REGIONS: CPT

## 2018-12-13 PROCEDURE — 97110 THERAPEUTIC EXERCISES: CPT

## 2018-12-13 NOTE — THERAPY TREATMENT NOTE
Outpatient Physical Therapy Ortho Treatment Note  TENISHA Wing     Patient Name: Aide Henry  : 1980  MRN: [id number]  Today's Date: 2018      Visit Date: 2018    Visit Dx:    ICD-10-CM ICD-9-CM   1. Status post arthroscopy of shoulder Z98.890 V45.89       Patient Active Problem List   Diagnosis   • Arthralgia of multiple joints   • Chronic back pain   • Chronic constipation   • Disorder of connective tissue (CMS/HCC)   • Depression with anxiety   • Fibromyalgia   • Gastroesophageal reflux disease without esophagitis   • Irritable bowel syndrome   • Muscle pain   • Palpitations   • Seasonal allergic rhinitis   • Sjogren's syndrome (CMS/HCC)   • Sympathetic uveitis   • Tenderness of temporomandibular joint   • Bilateral carpal tunnel syndrome   • Eczema   • Shoulder pain, left   • Substance abuse (CMS/HCC)   • Lateral epicondylitis   • Drug addiction syndrome (CMS/HCC)   • Gastroenteritis   • Alcohol dependence in remission (CMS/HCC)   • History of placement of ear tubes   • Status post arthroscopy of shoulder        Past Medical History:   Diagnosis Date   • Anxiety    • Bacterial vaginosis    • Fibromyalgia    • Fracture, finger    • GERD (gastroesophageal reflux disease)    • Lateral epicondylitis 2013    RHUEMATOLOGIST   • Lupus    • MRSA (methicillin resistant staph aureus) culture positive 2011 ESTIMATE    GROIN AREA    • Sjogren's syndrome (CMS/HCC)    • Urinary tract infection         Past Surgical History:   Procedure Laterality Date   • ADENOIDECTOMY     •  SECTION     • COLONOSCOPY     • MOUTH SURGERY     • RHINOPLASTY     • TONSILLECTOMY     • TUBAL ABDOMINAL LIGATION     • TYMPANOSTOMY TUBE PLACEMENT     • WISDOM TOOTH EXTRACTION Bilateral                        PT Assessment/Plan     Row Name 18 0700          PT Assessment    Assessment Comments  Pain continues to be her most limiting factor at this time.  -AS        PT Plan    PT Plan Comments  Continue  with current treatment plan.  -AS       User Key  (r) = Recorded By, (t) = Taken By, (c) = Cosigned By    Initials Name Provider Type    AS Mo Alford, PT Physical Therapist          Modalities     Row Name 12/13/18 0700             Moist Heat    MH Applied  Yes  -AS      Location  Left Shoulder - pt seated, pillow for support  -AS      Rx Minutes  10 mins  -AS      MH Prior to Rx  Yes  -AS         Ice    Ice Applied  Yes  -AS      Location  Left Shoulder - pt seated, pillow for support  -AS      Rx Minutes  10 mins  -AS      Ice S/P Rx  Yes  -AS        User Key  (r) = Recorded By, (t) = Taken By, (c) = Cosigned By    Initials Name Provider Type    AS Mo Alford, PT Physical Therapist          Exercises     Row Name 12/13/18 0700             Subjective Comments    Subjective Comments  Patient reports that her shoulder is doing well this morning.  -AS         Exercise 1    Exercise Name 1  3-Way Cane  -AS      Reps 1  25 each  -AS         Exercise 3    Exercise Name 3  Serratus Punches  -AS      Reps 3  30  -AS      Additional Comments  2#  -AS         Exercise 4    Exercise Name 4  Sidelying ER  -AS      Reps 4  30  -AS      Additional Comments  2#  -AS         Exercise 5    Exercise Name 5  Prone Y & T  -AS      Cueing 5  Verbal;Tactile  -AS      Reps 5  25 each  -AS      Additional Comments  1#  -AS         Exercise 6    Exercise Name 6  Empty/Full Can  -AS      Reps 6  25 each  -AS      Additional Comments  1#  -AS         Exercise 7    Exercise Name 7  Rows  -AS      Reps 7  30  -AS      Additional Comments  Green  -AS         Exercise 8    Exercise Name 8  Extensions  -AS      Reps 8  30  -AS      Additional Comments  Green  -AS         Exercise 9    Exercise Name 9  IR  -AS      Cueing 9  Verbal;Tactile;Demo  -AS      Reps 9  30  -AS      Additional Comments  Green  -AS         Exercise 10    Exercise Name 10  ER  -AS      Cueing 10  Verbal;Tactile;Demo  -AS      Reps 10  30  -AS       Additional Comments  Green  -AS        User Key  (r) = Recorded By, (t) = Taken By, (c) = Cosigned By    Initials Name Provider Type    AS Mo Alford, PT Physical Therapist                        Manual Rx (last 36 hours)      Manual Treatments     Row Name 12/13/18 0700             Manual Rx 1    Manual Rx 1 Location  Left Shoulder  -AS      Manual Rx 1 Type  PROM - Flex, ABD, IR, ER  -AS      Manual Rx 1 Duration  15 min  -AS        User Key  (r) = Recorded By, (t) = Taken By, (c) = Cosigned By    Initials Name Provider Type    AS Mo Alford, PT Physical Therapist                             Time Calculation:   Start Time: 0728  Stop Time: 0830  Time Calculation (min): 62 min  Therapy Suggested Charges     Code   Minutes Charges    None           Therapy Charges for Today     Code Description Service Date Service Provider Modifiers Qty    [id number]  PT THER PROC EA 15 MIN 12/13/2018 Mo Alford, PT GP 1    [id number]  PT MANUAL THERAPY EA 15 MIN 12/13/2018 Mo Alford, PT GP 1                    Mo Alford, PT  12/13/2018

## 2018-12-18 ENCOUNTER — OFFICE VISIT (OUTPATIENT)
Dept: ORTHOPEDIC SURGERY | Facility: CLINIC | Age: 38
End: 2018-12-18

## 2018-12-18 ENCOUNTER — HOSPITAL ENCOUNTER (OUTPATIENT)
Dept: PHYSICAL THERAPY | Facility: HOSPITAL | Age: 38
Setting detail: THERAPIES SERIES
Discharge: HOME | End: 2018-12-18

## 2018-12-18 VITALS — WEIGHT: 180 LBS | BODY MASS INDEX: 30.73 KG/M2 | HEIGHT: 64 IN

## 2018-12-18 DIAGNOSIS — R52 PAIN: ICD-10-CM

## 2018-12-18 DIAGNOSIS — Z98.890 STATUS POST ARTHROSCOPY OF SHOULDER: Primary | ICD-10-CM

## 2018-12-18 DIAGNOSIS — M75.42 SUBACROMIAL IMPINGEMENT OF LEFT SHOULDER: ICD-10-CM

## 2018-12-18 PROCEDURE — 20610 DRAIN/INJ JOINT/BURSA W/O US: CPT | Performed by: ORTHOPAEDIC SURGERY

## 2018-12-18 PROCEDURE — 97140 MANUAL THERAPY 1/> REGIONS: CPT

## 2018-12-18 PROCEDURE — 99213 OFFICE O/P EST LOW 20 MIN: CPT | Performed by: ORTHOPAEDIC SURGERY

## 2018-12-18 PROCEDURE — 97110 THERAPEUTIC EXERCISES: CPT

## 2018-12-18 RX ORDER — LIDOCAINE HYDROCHLORIDE 10 MG/ML
4 INJECTION, SOLUTION EPIDURAL; INFILTRATION; INTRACAUDAL; PERINEURAL
Status: COMPLETED | OUTPATIENT
Start: 2018-12-18 | End: 2018-12-18

## 2018-12-18 RX ORDER — TRIAMCINOLONE ACETONIDE 40 MG/ML
80 INJECTION, SUSPENSION INTRA-ARTICULAR; INTRAMUSCULAR
Status: COMPLETED | OUTPATIENT
Start: 2018-12-18 | End: 2018-12-18

## 2018-12-18 RX ADMIN — TRIAMCINOLONE ACETONIDE 80 MG: 40 INJECTION, SUSPENSION INTRA-ARTICULAR; INTRAMUSCULAR at 09:09

## 2018-12-18 RX ADMIN — LIDOCAINE HYDROCHLORIDE 4 ML: 10 INJECTION, SOLUTION EPIDURAL; INFILTRATION; INTRACAUDAL; PERINEURAL at 09:09

## 2018-12-18 NOTE — PROGRESS NOTES
"Subjective:     Patient ID: Aide Henry is a 38 y.o. female.    Chief Complaint:  Follow-up status post left shoulder rotator cuff repair, biceps tenodesis, 9/10/2018  History of Present Illness  Aide Henry presents to clinic today for evaluation of left shoulder, states she is making good progress with therapy but has noted some persistent residual pain over the lateral aspect of left shoulder particularly with overhead and crossarm activities, rates as a 4-6 out of 10 in aching in nature with occasional sharp pain noted, she has noted continued improvement in regards to motion and strength.  Denies associated numbness or tingling, denies radiation of pain.     Social History     Occupational History   • Not on file   Tobacco Use   • Smoking status: Former Smoker     Packs/day: 0.25     Years: 20.00     Pack years: 5.00     Types: Cigarettes     Last attempt to quit: 2018     Years since quittin.4   • Smokeless tobacco: Never Used   • Tobacco comment: Declines medication today. Not interested in patches. Has tried gum previously. Desires to continue weaning.    Substance and Sexual Activity   • Alcohol use: Yes     Comment: Alcoholic, Last drink 2018   • Drug use: Yes     Types: Cocaine, Hydrocodone     Comment: \"recovering addict\", last reports use 2017   • Sexual activity: Defer      Past Medical History:   Diagnosis Date   • Anxiety    • Bacterial vaginosis    • Fibromyalgia    • Fracture, finger    • GERD (gastroesophageal reflux disease)    • Lateral epicondylitis 2013    RHUEMATOLOGIST   • Lupus    • MRSA (methicillin resistant staph aureus) culture positive  ESTIMATE    GROIN AREA    • Sjogren's syndrome (CMS/[facility])    • Urinary tract infection      Past Surgical History:   Procedure Laterality Date   • ADENOIDECTOMY     •  SECTION     • COLONOSCOPY     • MOUTH SURGERY     • RHINOPLASTY     • TONSILLECTOMY     • TUBAL ABDOMINAL LIGATION     • TYMPANOSTOMY TUBE " "PLACEMENT     • WISDOM TOOTH EXTRACTION Bilateral        Family History   Problem Relation Age of Onset   • Cancer Father    • Heart disease Paternal Grandmother    • Diabetes Maternal Aunt    • Diabetes Maternal Grandmother          Review of Systems   Constitutional: Negative for chills, diaphoresis, fever and unexpected weight change.   HENT: Negative for hearing loss, nosebleeds, sore throat and tinnitus.    Eyes: Negative for pain and visual disturbance.   Respiratory: Negative for cough, shortness of breath and wheezing.    Cardiovascular: Negative for chest pain and palpitations.   Gastrointestinal: Negative for abdominal pain, diarrhea, nausea and vomiting.   Endocrine: Negative for cold intolerance, heat intolerance and polydipsia.   Genitourinary: Negative for difficulty urinating, dysuria and hematuria.   Musculoskeletal: Positive for arthralgias and myalgias. Negative for joint swelling.   Skin: Negative for rash and wound.   Allergic/Immunologic: Negative for environmental allergies.   Neurological: Negative for dizziness, syncope and numbness.   Hematological: Does not bruise/bleed easily.   Psychiatric/Behavioral: Negative for dysphoric mood and sleep disturbance. The patient is not nervous/anxious.            Objective:  Vitals:    12/18/18 0849   Weight: 81.6 kg (180 lb)   Height: 162.6 cm (64\")         12/18/18  0849   Weight: 81.6 kg (180 lb)     Body mass index is 30.9 kg/m².  General: No acute distress.  Resp: normal respiratory effort  Skin: no rashes or wounds; normal turgor  Psych: mood and affect appropriate; recent and remote memory intact          Ortho Exam     Left shoulder-active forward flexion 170°, external rotation 55°, external rotation L1, 4 out of 5 strength on resisted forward flexion, 4+ out of 5 strength on resisted external rotation and belly press test.  Positive Mason and Neer's.  Maximal tenderness over the lateral subacromial space.  Incisions well-healed.  Positive " sensation light touch all distibution's left hand symmetric to the right.  Imaging:    Assessment:        1. Status post arthroscopy of shoulder    2. Subacromial impingement of left shoulder    3. Pain           Plan:Large Joint Arthrocentesis: L subacromial bursa  Date/Time: 12/18/2018 9:09 AM  Consent given by: patient  Site marked: site marked  Timeout: Immediately prior to procedure a time out was called to verify the correct patient, procedure, equipment, support staff and site/side marked as required   Supporting Documentation  Indications: pain   Procedure Details  Location: shoulder - L subacromial bursa  Preparation: Patient was prepped and draped in the usual sterile fashion  Needle size: 22 G  Approach: lateral  Medications administered: 4 mL lidocaine PF 1% 1 %; 80 mg triamcinolone acetonide 40 MG/ML  Patient tolerance: patient tolerated the procedure well with no immediate complications                  Discussed treatment options at length with patient at today's visit.  Given persistent pain the subacromial space was limiting her further progress we discussed options and patient wished to proceed with subacromial injection today.  Follow-up in 3 months with x-rays left shoulder.  Continue work with physical therapy on strengthening program.  Continue home exercises.    Aide Henry was in agreement with plan and had all questions answered.     Orders:  Orders Placed This Encounter   Procedures   • Large Joint Arthrocentesis: R subacromial bursa       Medications:  No orders of the defined types were placed in this encounter.      Followup:  Return in about 3 months (around 3/18/2019).    Aide was seen today for follow-up.    Diagnoses and all orders for this visit:    Status post arthroscopy of shoulder    Subacromial impingement of left shoulder    Pain  -     Large Joint Arthrocentesis: R subacromial bursa          Dictated utilizing Dragon dictation

## 2018-12-18 NOTE — THERAPY TREATMENT NOTE
Outpatient Physical Therapy Ortho Treatment Note  TENISHA Wing     Patient Name: Aide Henry  : 1980  MRN: [id number]  Today's Date: 2018      Visit Date: 2018    Visit Dx:    ICD-10-CM ICD-9-CM   1. Status post arthroscopy of shoulder Z98.890 V45.89       Patient Active Problem List   Diagnosis   • Arthralgia of multiple joints   • Chronic back pain   • Chronic constipation   • Disorder of connective tissue (CMS/HCC)   • Depression with anxiety   • Fibromyalgia   • Gastroesophageal reflux disease without esophagitis   • Irritable bowel syndrome   • Muscle pain   • Palpitations   • Seasonal allergic rhinitis   • Sjogren's syndrome (CMS/HCC)   • Sympathetic uveitis   • Tenderness of temporomandibular joint   • Bilateral carpal tunnel syndrome   • Eczema   • Shoulder pain, left   • Substance abuse (CMS/HCC)   • Lateral epicondylitis   • Drug addiction syndrome (CMS/HCC)   • Gastroenteritis   • Alcohol dependence in remission (CMS/[facility])   • History of placement of ear tubes   • Status post arthroscopy of shoulder   • Subacromial impingement of left shoulder        Past Medical History:   Diagnosis Date   • Anxiety    • Bacterial vaginosis    • Fibromyalgia    • Fracture, finger    • GERD (gastroesophageal reflux disease)    • Lateral epicondylitis 2013    RHUEMATOLOGIST   • Lupus    • MRSA (methicillin resistant staph aureus) culture positive  ESTIMATE    GROIN AREA    • Sjogren's syndrome (CMS/HCC)    • Urinary tract infection         Past Surgical History:   Procedure Laterality Date   • ADENOIDECTOMY     •  SECTION     • COLONOSCOPY     • MOUTH SURGERY     • RHINOPLASTY     • TONSILLECTOMY     • TUBAL ABDOMINAL LIGATION     • TYMPANOSTOMY TUBE PLACEMENT     • WISDOM TOOTH EXTRACTION Bilateral        PT Ortho     Row Name 18 0700       Left Upper Ext    Lt Shoulder Abduction AROM  170  -AS    Lt Shoulder Flexion AROM  160  -AS      User Key  (r) = Recorded By, (t) =  Taken By, (c) = Cosigned By    Initials Name Provider Type    AS Mo Alford, PT Physical Therapist                      PT Assessment/Plan     Row Name 12/18/18 0700          PT Assessment    Assessment Comments  Patient's left shoulder ROM continues to improve as well as her strength. Patient has good tolerance with her exercises.  -AS        PT Plan    PT Plan Comments  Continue with current treatment plan.  -AS       User Key  (r) = Recorded By, (t) = Taken By, (c) = Cosigned By    Initials Name Provider Type    AS Mo Alford, PT Physical Therapist          Modalities     Row Name 12/18/18 0700             Moist Heat    MH Applied  Yes  -AS      Location  Left Shoulder - pt seated, pillow for support  -AS      Rx Minutes  10 mins  -AS      MH Prior to Rx  Yes  -AS         Ice    Ice Applied  Yes  -AS      Location  Left Shoulder - pt seated, pillow for support  -AS      Rx Minutes  10 mins  -AS      Ice S/P Rx  Yes  -AS        User Key  (r) = Recorded By, (t) = Taken By, (c) = Cosigned By    Initials Name Provider Type    AS Mo Alford, PT Physical Therapist          Exercises     Row Name 12/18/18 0700             Subjective Comments    Subjective Comments  Patient states her shoulde ris doing ok today.  -AS         Exercise 1    Exercise Name 1  3-Way Cane  -AS      Reps 1  25 each  -AS         Exercise 2    Exercise Name 2  Pulleys - flex and ABD  -AS      Time 2  4 min each  -AS         Exercise 3    Exercise Name 3  Serratus Punches  -AS      Reps 3  40  -AS      Additional Comments  2#  -AS         Exercise 4    Exercise Name 4  Sidelying ER  -AS      Reps 4  40  -AS      Additional Comments  2#  -AS         Exercise 5    Exercise Name 5  Prone Y & T  -AS      Cueing 5  Verbal;Tactile  -AS      Reps 5  30 each  -AS      Additional Comments  1#  -AS         Exercise 6    Exercise Name 6  Empty/Full Can  -AS      Reps 6  30 each  -AS      Additional Comments  1#  -AS          Exercise 7    Exercise Name 7  Rows  -AS      Reps 7  40  -AS      Additional Comments  Green  -AS         Exercise 8    Exercise Name 8  Extensions  -AS      Reps 8  40  -AS      Additional Comments  Green  -AS         Exercise 9    Exercise Name 9  IR  -AS      Cueing 9  Verbal;Tactile;Demo  -AS      Reps 9  40  -AS      Additional Comments  Green  -AS         Exercise 10    Exercise Name 10  ER  -AS      Cueing 10  Verbal;Tactile;Demo  -AS      Reps 10  40  -AS      Additional Comments  Green  -AS        User Key  (r) = Recorded By, (t) = Taken By, (c) = Cosigned By    Initials Name Provider Type    AS Mo Alford, PT Physical Therapist                        Manual Rx (last 36 hours)      Manual Treatments     Row Name 12/18/18 0900             Manual Rx 1    Manual Rx 1 Location  Left Shoulder  -AS      Manual Rx 1 Type  PROM - Flex, ABD, IR, ER  -AS      Manual Rx 1 Duration  15 min  -AS        User Key  (r) = Recorded By, (t) = Taken By, (c) = Cosigned By    Initials Name Provider Type    AS Mo Alford, PT Physical Therapist                             Time Calculation:   Start Time: 0729  Stop Time: 0840  Time Calculation (min): 71 min  Therapy Suggested Charges     Code   Minutes Charges    None           Therapy Charges for Today     Code Description Service Date Service Provider Modifiers Qty    39631015805 HC PT THER PROC EA 15 MIN 12/18/2018 Mo Alford, PT GP 1    [id number]  PT MANUAL THERAPY EA 15 MIN 12/18/2018 Mo Alford, PT GP 1                    Mo Alford, PT  12/18/2018

## 2018-12-21 ENCOUNTER — HOSPITAL ENCOUNTER (OUTPATIENT)
Dept: PHYSICAL THERAPY | Facility: HOSPITAL | Age: 38
Setting detail: THERAPIES SERIES
Discharge: HOME | End: 2018-12-21

## 2018-12-21 DIAGNOSIS — Z98.890 STATUS POST ARTHROSCOPY OF SHOULDER: Primary | ICD-10-CM

## 2018-12-21 PROCEDURE — 97110 THERAPEUTIC EXERCISES: CPT

## 2018-12-21 PROCEDURE — 97140 MANUAL THERAPY 1/> REGIONS: CPT

## 2018-12-21 NOTE — THERAPY TREATMENT NOTE
Outpatient Physical Therapy Ortho Treatment Note   Cecilia Jarquin     Patient Name: Aide Henry  : 1980  MRN: [id number]  Today's Date: 2018      Visit Date: 2018    Visit Dx:    ICD-10-CM ICD-9-CM   1. Status post arthroscopy of shoulder Z98.890 V45.89       Patient Active Problem List   Diagnosis   • Arthralgia of multiple joints   • Chronic back pain   • Chronic constipation   • Disorder of connective tissue (CMS/HCC)   • Depression with anxiety   • Fibromyalgia   • Gastroesophageal reflux disease without esophagitis   • Irritable bowel syndrome   • Muscle pain   • Palpitations   • Seasonal allergic rhinitis   • Sjogren's syndrome (CMS/HCC)   • Sympathetic uveitis   • Tenderness of temporomandibular joint   • Bilateral carpal tunnel syndrome   • Eczema   • Shoulder pain, left   • Substance abuse (CMS/HCC)   • Lateral epicondylitis   • Drug addiction syndrome (CMS/HCC)   • Gastroenteritis   • Alcohol dependence in remission (CMS/[facility])   • History of placement of ear tubes   • Status post arthroscopy of shoulder   • Subacromial impingement of left shoulder        Past Medical History:   Diagnosis Date   • Anxiety    • Bacterial vaginosis    • Fibromyalgia    • Fracture, finger    • GERD (gastroesophageal reflux disease)    • Lateral epicondylitis 2013    RHUEMATOLOGIST   • Lupus    • MRSA (methicillin resistant staph aureus) culture positive 2011 ESTIMATE    GROIN AREA    • Sjogren's syndrome (CMS/HCC)    • Urinary tract infection         Past Surgical History:   Procedure Laterality Date   • ADENOIDECTOMY     •  SECTION     • COLONOSCOPY     • MOUTH SURGERY     • RHINOPLASTY     • SHOULDER ARTHROSCOPY Left 9/10/2018    Procedure: SHOULDER ARTHROSCOPY, rotator cuff repair; extensive debridement, open biceps tenodesis;  Surgeon: Joo Rivers MD;  Location: [facility];  Service: Orthopedics   • TONSILLECTOMY     • TUBAL ABDOMINAL LIGATION     • TYMPANOSTOMY TUBE PLACEMENT      • WISDOM TOOTH EXTRACTION Bilateral                        PT Assessment/Plan     Row Name 12/21/18 0800          PT Assessment    Assessment Comments  Patient continues to make good improvemenst and progressing well with PT at this time.   -AS        PT Plan    PT Plan Comments  Continue with current treatment plan.  -AS       User Key  (r) = Recorded By, (t) = Taken By, (c) = Cosigned By    Initials Name Provider Type    AS Mo Alford, PT Physical Therapist          Modalities     Row Name 12/21/18 0800             Moist Heat    MH Applied  Yes  -AS      Location  Left Shoulder - pt seated, pillow for support  -AS      Rx Minutes  10 mins  -AS      MH Prior to Rx  Yes  -AS         Ice    Ice Applied  Yes  -AS      Location  Left Shoulder - pt seated, pillow for support  -AS      Rx Minutes  10 mins  -AS      Ice S/P Rx  Yes  -AS        User Key  (r) = Recorded By, (t) = Taken By, (c) = Cosigned By    Initials Name Provider Type    AS Mo Alford, PT Physical Therapist          Exercises     Row Name 12/21/18 0800             Subjective Comments    Subjective Comments  Patient reports she got a shot in her shoulder earlier this week and her shoulder is feeling better and she feels she is getting improvements in her ROM and pain.  -AS         Exercise 1    Exercise Name 1  3-Way Cane  -AS      Reps 1  25 each  -AS         Exercise 2    Exercise Name 2  Pulleys - flex and ABD  -AS      Time 2  3 min each  -AS         Exercise 3    Exercise Name 3  Serratus Punches  -AS      Reps 3  25  -AS      Additional Comments  3#  -AS         Exercise 4    Exercise Name 4  Sidelying ER  -AS      Reps 4  25  -AS      Additional Comments  3#  -AS         Exercise 5    Exercise Name 5  Prone Y & T  -AS      Cueing 5  Verbal;Tactile  -AS      Reps 5  40 each  -AS      Additional Comments  1#  -AS         Exercise 6    Exercise Name 6  Empty/Full Can  -AS      Reps 6  40 each  -AS      Additional Comments  1#   -AS         Exercise 7    Exercise Name 7  Rows  -AS      Reps 7  25  -AS      Additional Comments  Blue  -AS         Exercise 8    Exercise Name 8  Extensions  -AS      Reps 8  25  -AS      Additional Comments  Blue  -AS         Exercise 9    Exercise Name 9  IR  -AS      Cueing 9  Verbal;Tactile;Demo  -AS      Reps 9  25  -AS      Additional Comments  Blue  -AS         Exercise 10    Exercise Name 10  ER  -AS      Cueing 10  Verbal;Tactile;Demo  -AS      Reps 10  25  -AS      Additional Comments  Blue  -AS        User Key  (r) = Recorded By, (t) = Taken By, (c) = Cosigned By    Initials Name Provider Type    AS Mo Alford, PT Physical Therapist                        Manual Rx (last 36 hours)      Manual Treatments     Row Name 12/21/18 0900             Manual Rx 1    Manual Rx 1 Location  Left Shoulder  -AS      Manual Rx 1 Type  PROM - Flex, ABD, IR, ER  -AS      Manual Rx 1 Duration  15 min  -AS        User Key  (r) = Recorded By, (t) = Taken By, (c) = Cosigned By    Initials Name Provider Type    AS Mo Alford, PT Physical Therapist                             Time Calculation:   Start Time: 0834  Stop Time: 0940  Time Calculation (min): 66 min  Therapy Suggested Charges     Code   Minutes Charges    None           Therapy Charges for Today     Code Description Service Date Service Provider Modifiers Qty    68523943572 HC PT MANUAL THERAPY EA 15 MIN 12/21/2018 Mo Alford, PT GP 1    42915882893 HC PT THER PROC EA 15 MIN 12/21/2018 Mo Alford, PT GP 2                    Mo Alford, PT  12/21/2018

## 2019-03-28 ENCOUNTER — OFFICE VISIT (OUTPATIENT)
Dept: ORTHOPEDIC SURGERY | Facility: CLINIC | Age: 39
End: 2019-03-28

## 2019-03-28 DIAGNOSIS — Z98.890 STATUS POST ARTHROSCOPY OF SHOULDER: ICD-10-CM

## 2019-03-28 DIAGNOSIS — M75.22 BICEPS TENDINITIS OF LEFT UPPER EXTREMITY: ICD-10-CM

## 2019-03-28 DIAGNOSIS — R52 PAIN: Primary | ICD-10-CM

## 2019-03-28 DIAGNOSIS — M75.122 COMPLETE TEAR OF LEFT ROTATOR CUFF: ICD-10-CM

## 2019-03-28 PROCEDURE — 99212 OFFICE O/P EST SF 10 MIN: CPT | Performed by: ORTHOPAEDIC SURGERY

## 2019-03-28 PROCEDURE — 73030 X-RAY EXAM OF SHOULDER: CPT | Performed by: ORTHOPAEDIC SURGERY

## 2019-03-28 RX ORDER — CHLORHEXIDINE GLUCONATE 4 G/100ML
SOLUTION TOPICAL
COMMUNITY
Start: 2019-03-21 | End: 2020-02-14 | Stop reason: ALTCHOICE

## 2019-03-28 RX ORDER — AZATHIOPRINE 50 MG/1
100 TABLET ORAL DAILY
COMMUNITY
Start: 2019-03-08 | End: 2022-04-01

## 2019-03-28 RX ORDER — SULFAMETHOXAZOLE AND TRIMETHOPRIM 800; 160 MG/1; MG/1
1 TABLET ORAL
COMMUNITY
Start: 2019-03-08 | End: 2019-03-31

## 2019-03-28 RX ORDER — PILOCARPINE HYDROCHLORIDE 5 MG/1
5 TABLET, FILM COATED ORAL 3 TIMES DAILY PRN
Refills: 3 | COMMUNITY
Start: 2019-03-08 | End: 2022-04-01

## 2019-03-28 NOTE — PROGRESS NOTES
"Subjective:     Patient ID: Aide Henry is a 38 y.o. female.    Chief Complaint:  Follow-up status post left shoulder rotator cuff repair, biceps tenodesis, 9/10/2018  History of Present Illness  Aide Henry returns to clinic today for evaluation of left shoulder, she is doing very well at this point time, minimal irritation rates as 1-2 out of 10 on resisted external rotation and forward flexion as well as lifting activities.  She admits that she has not been doing her therapy exercises and strengthening program at home.  She denies any numbness or tingling, denies radiation of pain, denies any issues with her incisions.  Improvement with soft tissue massage, rest, anti-inflammatory medications intermittently over-the-counter.     Social History     Occupational History   • Not on file   Tobacco Use   • Smoking status: Former Smoker     Packs/day: 0.25     Years: 20.00     Pack years: 5.00     Types: Cigarettes     Last attempt to quit: 2018     Years since quittin.7   • Smokeless tobacco: Never Used   • Tobacco comment: Declines medication today. Not interested in patches. Has tried gum previously. Desires to continue weaning.    Substance and Sexual Activity   • Alcohol use: Yes     Comment: Alcoholic, Last drink 2018   • Drug use: Yes     Types: Cocaine, Hydrocodone     Comment: \"recovering addict\", last reports use 2017   • Sexual activity: Defer      Past Medical History:   Diagnosis Date   • Anxiety    • Bacterial vaginosis    • Fibromyalgia    • Fracture, finger    • GERD (gastroesophageal reflux disease)    • Lateral epicondylitis 2013    RHUEMATOLOGIST   • Lupus    • MRSA (methicillin resistant staph aureus) culture positive  ESTIMATE    GROIN AREA    • Sjogren's syndrome (CMS/[facility])    • Urinary tract infection      Past Surgical History:   Procedure Laterality Date   • ADENOIDECTOMY     •  SECTION     • COLONOSCOPY     • MOUTH SURGERY     • RHINOPLASTY     • " SHOULDER ARTHROSCOPY Left 9/10/2018    Procedure: SHOULDER ARTHROSCOPY, rotator cuff repair; extensive debridement, open biceps tenodesis;  Surgeon: Joo Rivers MD;  Location: [facility];  Service: Orthopedics   • TONSILLECTOMY     • TUBAL ABDOMINAL LIGATION     • TYMPANOSTOMY TUBE PLACEMENT     • WISDOM TOOTH EXTRACTION Bilateral        Family History   Problem Relation Age of Onset   • Cancer Father    • Heart disease Paternal Grandmother    • Diabetes Maternal Aunt    • Diabetes Maternal Grandmother          Review of Systems   Constitutional: Negative for chills, diaphoresis, fever and unexpected weight change.   HENT: Negative for hearing loss, nosebleeds, sore throat and tinnitus.    Eyes: Negative for pain and visual disturbance.   Respiratory: Negative for cough, shortness of breath and wheezing.    Cardiovascular: Negative for chest pain and palpitations.   Gastrointestinal: Negative for abdominal pain, diarrhea, nausea and vomiting.   Endocrine: Negative for cold intolerance, heat intolerance and polydipsia.   Genitourinary: Negative for difficulty urinating, dysuria and hematuria.   Musculoskeletal: Positive for arthralgias. Negative for joint swelling and myalgias.   Skin: Negative for rash and wound.   Allergic/Immunologic: Negative for environmental allergies.   Neurological: Negative for dizziness, syncope and numbness.   Hematological: Does not bruise/bleed easily.   Psychiatric/Behavioral: Negative for dysphoric mood and sleep disturbance. The patient is not nervous/anxious.    All other systems reviewed and are negative.          Objective:  There were no vitals filed for this visit.  There were no vitals filed for this visit.  There is no height or weight on file to calculate BMI.  General: No acute distress.  Resp: normal respiratory effort  Skin: no rashes or wounds; normal turgor  Psych: mood and affect appropriate; recent and remote memory intact          Ortho Exam     Left  shoulder-active forward flexion 175 degrees, external rotation 60 degrees, internal rotation T8, 4+ out of 5 strength in all planes of motion against resistance, symmetric to the right, minimal irritation over subacromial space with minimally positive empty can test, negative Mason and Neer's, negative external rotation lag sign, negative drop arm test.  Incisions well-healed.  Brisk cap refill all digits, 2+ radial pulse left wrist.  Imaging:  3 view x-rays left shoulder from today's visit, ordered and reviewed by me, indication status post rotator cuff repair, compared to prior preoperative x-rays from office.  Glenohumeral joint well aligned, no evidence of reactive bone formation around rotator cuff insertion site, mild blunting of greater tuberosity from debridement in preparation for rotator cuff repair appreciated.    Assessment:        1. Pain    2. Status post arthroscopy of shoulder    3. Complete tear of left rotator cuff    4. Biceps tendinitis of left upper extremity           Plan:          Discussed treatment options at length with patient at today's visit.  Patient has made great progress at this point time, doing much better following subacromial injection last visit.  X-rays appear to be acceptable at this time.  Continue work with home strengthening program, she has any further issues return to office for follow-up.    Aide Henry was in agreement with plan and had all questions answered.     Orders:  Orders Placed This Encounter   Procedures   • XR Shoulder 2+ View Left       Medications:  No orders of the defined types were placed in this encounter.      Followup:  Return if symptoms worsen or fail to improve.    Aide was seen today for follow-up and pain.    Diagnoses and all orders for this visit:    Pain  -     XR Shoulder 2+ View Left    Status post arthroscopy of shoulder    Complete tear of left rotator cuff    Biceps tendinitis of left upper extremity          Dictated utilizing  Grisel dictation

## 2019-04-22 ENCOUNTER — OFFICE VISIT (OUTPATIENT)
Dept: SURGERY | Facility: CLINIC | Age: 39
End: 2019-04-22

## 2019-04-22 VITALS
BODY MASS INDEX: 31.58 KG/M2 | HEIGHT: 64 IN | WEIGHT: 185 LBS | DIASTOLIC BLOOD PRESSURE: 66 MMHG | SYSTOLIC BLOOD PRESSURE: 122 MMHG | RESPIRATION RATE: 18 BRPM

## 2019-04-22 DIAGNOSIS — M79.89 SOFT TISSUE MASS: Primary | ICD-10-CM

## 2019-04-22 PROCEDURE — 99212 OFFICE O/P EST SF 10 MIN: CPT | Performed by: SURGERY

## 2019-04-22 NOTE — PROGRESS NOTES
PATIENT INFORMATION  Aide Henry       - 1980    CHIEF COMPLAINT  Chief Complaint   Patient presents with   • Cyst     right arm   cyst on right arm x 1 month    HISTORY OF PRESENT ILLNESS  HPI she complains of several month history of a mass on her forearm in the antecubital fossa.  She says it started as a pimple and then she was placed on 2 courses of Bactrim and she says the redness and the size has improved but it has not resolved.  She denies any drainage from the site.        REVIEW OF SYSTEMS  Review of Systems she is immunocompromised  He has a history of MRSA    ACTIVE PROBLEMS  Patient Active Problem List    Diagnosis   • Biceps tendinitis of left upper extremity [M75.22]   • Complete tear of left rotator cuff [M75.122]   • Subacromial impingement of left shoulder [M75.42]   • Status post arthroscopy of shoulder [Z98.890]   • Alcohol dependence in remission (CMS/[facility]) [F10.21]   • History of placement of ear tubes [Z96.22]   • Gastroenteritis [K52.9]   • Drug addiction syndrome (CMS/[facility]) [F19.20]   • Substance abuse (CMS/[facility]) [F19.10]   • Bilateral carpal tunnel syndrome [G56.03]   • Eczema [L30.9]   • Shoulder pain, left [M25.512]   • Arthralgia of multiple joints [M25.50]   • Chronic back pain [M54.9, G89.29]   • Chronic constipation [K59.09]   • Disorder of connective tissue (CMS/[facility]) [M35.9]   • Depression with anxiety [F41.8]   • Fibromyalgia [M79.7]   • Gastroesophageal reflux disease without esophagitis [K21.9]   • Irritable bowel syndrome [K58.9]   • Muscle pain [M79.10]   • Palpitations [R00.2]   • Seasonal allergic rhinitis [J30.2]   • Sjogren's syndrome (CMS/[facility]) [M35.00]   • 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]   • Tenderness of temporomandibular joint [M26.629]   • Lateral epicondylitis [M77.10]         PAST MEDICAL HISTORY  Past Medical History:   Diagnosis Date   • Anxiety    • Bacterial vaginosis    • Fibromyalgia    • Fracture, finger    • GERD (gastroesophageal reflux disease)   "  • Lateral epicondylitis 2013    RHUEMATOLOGIST   • Lupus    • MRSA (methicillin resistant staph aureus) culture positive  ESTIMATE    GROIN AREA    • Sjogren's syndrome (CMS/HCC)    • Urinary tract infection          SURGICAL HISTORY  Past Surgical History:   Procedure Laterality Date   • ADENOIDECTOMY     •  SECTION     • COLONOSCOPY     • MOUTH SURGERY     • RHINOPLASTY     • SHOULDER ARTHROSCOPY Left 9/10/2018    Procedure: SHOULDER ARTHROSCOPY, rotator cuff repair; extensive debridement, open biceps tenodesis;  Surgeon: Joo Rivers MD;  Location: [facility];  Service: Orthopedics   • TONSILLECTOMY     • TUBAL ABDOMINAL LIGATION     • TYMPANOSTOMY TUBE PLACEMENT     • WISDOM TOOTH EXTRACTION Bilateral          FAMILY HISTORY  Family History   Problem Relation Age of Onset   • Cancer Father    • Heart disease Paternal Grandmother    • Diabetes Maternal Aunt    • Diabetes Maternal Grandmother          SOCIAL HISTORY  Social History     Occupational History   • Not on file   Tobacco Use   • Smoking status: Former Smoker     Packs/day: 0.25     Years: 20.00     Pack years: 5.00     Types: Cigarettes     Last attempt to quit: 2018     Years since quittin.8   • Smokeless tobacco: Never Used   • Tobacco comment: Declines medication today. Not interested in patches. Has tried gum previously. Desires to continue weaning.    Substance and Sexual Activity   • Alcohol use: Yes     Comment: Alcoholic, Last drink 2018   • Drug use: Yes     Types: Cocaine, Hydrocodone     Comment: \"recovering addict\", last reports use 2017   • Sexual activity: Defer       Debilities/Disabilities Identified: None    Emotional Behavior: Appropriate    CURRENT MEDICATIONS    Current Outpatient Medications:   •  azaTHIOprine (IMURAN) 50 MG tablet, TK 2 TS PO D, Disp: , Rfl:   •  chlorhexidine (HIBICLENS) 4 % external liquid, by Intratympanic route., Disp: , Rfl:   •  omeprazole (priLOSEC) 20 MG capsule, " "TAKE 1 CAPSULE BY MOUTH DAILY (Patient taking differently: Take 20 mg by mouth Every Night.), Disp: 90 capsule, Rfl: 1  •  pilocarpine (SALAGEN) 5 MG tablet, TK 1 T PO TID, Disp: , Rfl: 3  •  prednisoLONE acetate (PRED FORTE) 1 % ophthalmic suspension, INSTILL 1 DROP INTO OD Q 2 H, Disp: , Rfl: 6    ALLERGIES  Fluconazole    VITALS  Vitals:    04/22/19 1455   BP: 122/66   Resp: 18   Weight: 83.9 kg (185 lb)   Height: 162.6 cm (64\")       LAST RESULTS   Admission on 09/10/2018, Discharged on 09/10/2018   Component Date Value Ref Range Status   • HCG Qualitative 09/10/2018 Negative  Negative Final     Xr Shoulder 2+ View Left    Result Date: 3/28/2019  Impression: Ordering physician's impression is located in the Encounter Note dated 03/28/19.       PHYSICAL EXAM  Physical Exam alert white female in no active distress she has a 1.1 cm hard mass in the subcutaneous tissue with some induration.  I was unable to appreciate any fluctuance at the site.    ASSESSMENT    Soft tissue mass    PLAN  The risk benefits and options were discussed with her in detail.  I would like to get an ultrasound of the site and we will call her with those results and further recommendations after I have had an opportunity to review her films                            "

## 2019-04-26 ENCOUNTER — HOSPITAL ENCOUNTER (OUTPATIENT)
Dept: ULTRASOUND IMAGING | Facility: HOSPITAL | Age: 39
Discharge: HOME | End: 2019-04-26
Admitting: SURGERY

## 2019-04-26 DIAGNOSIS — M79.89 SOFT TISSUE MASS: ICD-10-CM

## 2019-04-26 PROCEDURE — 76999 ECHO EXAMINATION PROCEDURE: CPT

## 2019-04-30 ENCOUNTER — OFFICE VISIT (OUTPATIENT)
Dept: GASTROENTEROLOGY | Facility: CLINIC | Age: 39
End: 2019-04-30

## 2019-04-30 VITALS
SYSTOLIC BLOOD PRESSURE: 124 MMHG | BODY MASS INDEX: 32.3 KG/M2 | WEIGHT: 189.2 LBS | HEIGHT: 64 IN | DIASTOLIC BLOOD PRESSURE: 70 MMHG

## 2019-04-30 DIAGNOSIS — K58.2 IRRITABLE BOWEL SYNDROME WITH BOTH CONSTIPATION AND DIARRHEA: Primary | ICD-10-CM

## 2019-04-30 DIAGNOSIS — K21.9 GASTROESOPHAGEAL REFLUX DISEASE WITHOUT ESOPHAGITIS: ICD-10-CM

## 2019-04-30 PROCEDURE — 99203 OFFICE O/P NEW LOW 30 MIN: CPT | Performed by: INTERNAL MEDICINE

## 2019-04-30 RX ORDER — DULOXETIN HYDROCHLORIDE 20 MG/1
20 CAPSULE, DELAYED RELEASE ORAL DAILY
COMMUNITY
End: 2020-02-14 | Stop reason: ALTCHOICE

## 2019-04-30 NOTE — PROGRESS NOTES
PATIENT INFORMATION  Aide Henry       - 1980    CHIEF COMPLAINT  Chief Complaint   Patient presents with   • Abdominal Pain   • Diarrhea   • Constipation   • Heartburn       HISTORY OF PRESENT ILLNESS  HPI    39 yo with gerd for over 5-6 years with increasing reflux over the past several months. Symptoms worsening in regards to regurgitation. Heartburn is better.  No known aggravating or alleviating factors, worse with bending over. She has been on ppi for 10 years and takes in am with breakfast.  She was an alcoholic and has been sober for 2 years.  She has lupus and Sojorgens and is on imuran.  No dysphagia occasionl odynophagia  bm alternate from diarrhea and constipation occasional blood on tp. She has hemorrhoids since pregnancy.  No egd  cls done 5-7 years ago. No polyps.    REVIEW OF SYSTEMS  Review of Systems   Gastrointestinal: Positive for abdominal pain, anal bleeding, constipation, diarrhea, nausea, rectal pain and vomiting.        Reflux   All other systems reviewed and are negative.        ACTIVE PROBLEMS  Patient Active Problem List    Diagnosis   • Biceps tendinitis of left upper extremity [M75.22]   • Complete tear of left rotator cuff [M75.122]   • Subacromial impingement of left shoulder [M75.42]   • Status post arthroscopy of shoulder [Z98.890]   • Alcohol dependence in remission (CMS/[facility]) [F10.21]   • History of placement of ear tubes [Z96.22]   • Gastroenteritis [K52.9]   • Drug addiction syndrome (CMS/[facility]) [F19.20]   • Substance abuse (CMS/[facility]) [F19.10]   • Bilateral carpal tunnel syndrome [G56.03]   • Eczema [L30.9]   • Shoulder pain, left [M25.512]   • Arthralgia of multiple joints [M25.50]   • Chronic back pain [M54.9, G89.29]   • Chronic constipation [K59.09]   • Disorder of connective tissue (CMS/[facility]) [M35.9]   • Depression with anxiety [F41.8]   • Fibromyalgia [M79.7]   • Gastroesophageal reflux disease without esophagitis [K21.9]   • Irritable bowel syndrome [K58.9]   •  Muscle pain [M79.10]   • Palpitations [R00.2]   • Seasonal allergic rhinitis [J30.2]   • Sjogren's syndrome (CMS/HCC) [M35.00]   • 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]   • Tenderness of temporomandibular joint [M26.629]   • Lateral epicondylitis [M77.10]         PAST MEDICAL HISTORY  Past Medical History:   Diagnosis Date   • Anxiety    • Bacterial vaginosis    • Fibromyalgia    • Fracture, finger    • GERD (gastroesophageal reflux disease)    • Lateral epicondylitis 2013    RHUEMATOLOGIST   • Lupus    • MRSA (methicillin resistant staph aureus) culture positive  ESTIMATE    GROIN AREA    • Sjogren's syndrome (CMS/HCC)    • Urinary tract infection          SURGICAL HISTORY  Past Surgical History:   Procedure Laterality Date   • ADENOIDECTOMY     •  SECTION     • COLONOSCOPY     • MOUTH SURGERY     • RHINOPLASTY     • SHOULDER ARTHROSCOPY Left 9/10/2018    Procedure: SHOULDER ARTHROSCOPY, rotator cuff repair; extensive debridement, open biceps tenodesis;  Surgeon: Joo Rivers MD;  Location: [facility];  Service: Orthopedics   • TONSILLECTOMY     • TUBAL ABDOMINAL LIGATION     • TYMPANOSTOMY TUBE PLACEMENT     • WISDOM TOOTH EXTRACTION Bilateral          FAMILY HISTORY  Family History   Problem Relation Age of Onset   • Cancer Father    • Heart disease Paternal Grandmother    • Diabetes Maternal Aunt    • Diabetes Maternal Grandmother    • Colon cancer Neg Hx    • Colon polyps Neg Hx          SOCIAL HISTORY  Social History     Occupational History   • Not on file   Tobacco Use   • Smoking status: Former Smoker     Packs/day: 0.25     Years: 20.00     Pack years: 5.00     Types: Cigarettes   • Smokeless tobacco: Never Used   • Tobacco comment: Declines medication today. Not interested in patches. Has tried gum previously. Desires to continue weaning.    Substance and Sexual Activity   • Alcohol use: Yes     Comment: Alcoholic, Last drink 2018   • Drug use: Yes     Types: Cocaine, Hydrocodone      "Comment: \"recovering addict\", last reports use June 2017   • Sexual activity: Defer       Debilities/Disabilities Identified: None    Emotional Behavior: Appropriate    CURRENT MEDICATIONS    Current Outpatient Medications:   •  azaTHIOprine (IMURAN) 50 MG tablet, TK 2 TS PO D, Disp: , Rfl:   •  chlorhexidine (HIBICLENS) 4 % external liquid, by Intratympanic route., Disp: , Rfl:   •  DULoxetine (CYMBALTA) 20 MG capsule, Take 20 mg by mouth Daily., Disp: , Rfl:   •  omeprazole (priLOSEC) 20 MG capsule, TAKE 1 CAPSULE BY MOUTH DAILY (Patient taking differently: Take 20 mg by mouth Every Night.), Disp: 90 capsule, Rfl: 1  •  pilocarpine (SALAGEN) 5 MG tablet, TK 1 T PO TID, Disp: , Rfl: 3  •  prednisoLONE acetate (PRED FORTE) 1 % ophthalmic suspension, INSTILL 1 DROP INTO OD Q 2 H, Disp: , Rfl: 6    ALLERGIES  Fluconazole    VITALS  Vitals:    04/30/19 1528   BP: 124/70   Weight: 85.8 kg (189 lb 3.2 oz)   Height: 162.6 cm (64.02\")       LAST RESULTS   Admission on 09/10/2018, Discharged on 09/10/2018   Component Date Value Ref Range Status   • HCG Qualitative 09/10/2018 Negative  Negative Final     Us Soft Tissue    Result Date: 4/26/2019  Narrative: ULTRASOUND SOFT TISSUE, RIGHT ARM, 04/26/2019  HISTORY: 38-year-old female complaining of several month history of a mass on her forearm in the antecubital fossa. ?She says it started as a pimple and then she was placed on 2 courses of Bactrim and she says the redness and the size has improved but it has not resolved. ?She denies any drainage from the site. This is not fluctuant on physical exam.  TECHNIQUE: High-resolution grayscale ultrasound imaging of the medial right arm and elbow at the site of clinical complaint.  FINDINGS: The examination shows a rounded focus of heterogeneously increased echotexture at the skin surface measuring about 1.7 cm in diameter and 0.6 cm in thickness. No fluid component is visible. There is no hyperemia on color flow Doppler imaging. " Malignancy is unlikely.  The findings are nonspecific. Resolving focus of subcutaneous inflammation without residual abscess is likely. Subcutaneous lipoma or similar lesion is not excluded.      Impression: Focal subcutaneous soft tissue swelling in the right arm as described. No abscess or other fluid collection is demonstrated.  This report was finalized on 4/26/2019 3:45 PM by Dr. Siddharth Connors MD.        PHYSICAL EXAM  Physical Exam   Constitutional: She is oriented to person, place, and time. She appears well-developed and well-nourished. No distress.   HENT:   Head: Normocephalic and atraumatic.   Mouth/Throat: Oropharynx is clear and moist.   Eyes: EOM are normal. Pupils are equal, round, and reactive to light.   Neck: Normal range of motion. No tracheal deviation present.   Cardiovascular: Normal rate, regular rhythm, normal heart sounds and intact distal pulses. Exam reveals no gallop and no friction rub.   No murmur heard.  Pulmonary/Chest: Effort normal and breath sounds normal. No stridor. No respiratory distress. She has no wheezes. She has no rales. She exhibits no tenderness.   Abdominal: Soft. Bowel sounds are normal. She exhibits no distension. There is no tenderness. There is no rebound and no guarding.   Musculoskeletal: She exhibits no edema.   Lymphadenopathy:     She has no cervical adenopathy.   Neurological: She is alert and oriented to person, place, and time.   Skin: Skin is warm. She is not diaphoretic.   Psychiatric: She has a normal mood and affect. Her behavior is normal. Judgment and thought content normal.   Nursing note and vitals reviewed.      ASSESSMENT  Diagnoses and all orders for this visit:    Irritable bowel syndrome with both constipation and diarrhea    Gastroesophageal reflux disease without esophagitis  -     Case Request; Standing  -     Case Request    Other orders  -     DULoxetine (CYMBALTA) 20 MG capsule; Take 20 mg by mouth Daily.  -     Follow Anesthesia  Guidelines / Standing Orders; Future  -     Implement Anesthesia orders day of procedure.; Standing  -     Obtain informed consent; Standing          PLAN  No Follow-up on file.        Antireflux measures and dietary modifications reviewed. Low acid diet reviewed. Keep head of bed elevated. Stop eating/drinking at least 3 hours prior to bedtime. Eliminate caffeine and carbonated beverages.  Weight loss encouraged if BMI over 25.      Indications, risks and procedure were discussed with the patient, including but not limited to, bleeding, infection, possibility of perforation and possible polypectomy. All of the patient's questions were answered, and signed informed consent was obtained and placed on the chart.

## 2019-05-08 ENCOUNTER — TRANSCRIBE ORDERS (OUTPATIENT)
Dept: ADMINISTRATIVE | Facility: HOSPITAL | Age: 39
End: 2019-05-08

## 2019-05-08 ENCOUNTER — LAB (OUTPATIENT)
Dept: LAB | Facility: HOSPITAL | Age: 39
End: 2019-05-08

## 2019-05-08 ENCOUNTER — PROCEDURE VISIT (OUTPATIENT)
Dept: SURGERY | Facility: CLINIC | Age: 39
End: 2019-05-08

## 2019-05-08 VITALS
HEIGHT: 64 IN | BODY MASS INDEX: 32.61 KG/M2 | DIASTOLIC BLOOD PRESSURE: 68 MMHG | WEIGHT: 191 LBS | SYSTOLIC BLOOD PRESSURE: 123 MMHG | RESPIRATION RATE: 18 BRPM

## 2019-05-08 DIAGNOSIS — T14.8XXA HEMATOMA: Primary | ICD-10-CM

## 2019-05-08 DIAGNOSIS — Z51.81 ENCOUNTER FOR THERAPEUTIC DRUG MONITORING: Primary | ICD-10-CM

## 2019-05-08 DIAGNOSIS — Z51.81 ENCOUNTER FOR THERAPEUTIC DRUG MONITORING: ICD-10-CM

## 2019-05-08 LAB
ALBUMIN SERPL-MCNC: 4.5 G/DL (ref 3.5–5.2)
ALBUMIN/GLOB SERPL: 1.5 G/DL
ALP SERPL-CCNC: 51 U/L (ref 39–117)
ALT SERPL W P-5'-P-CCNC: 24 U/L (ref 1–33)
ANION GAP SERPL CALCULATED.3IONS-SCNC: 11.8 MMOL/L
AST SERPL-CCNC: 28 U/L (ref 1–32)
BILIRUB SERPL-MCNC: <0.2 MG/DL (ref 0.2–1.2)
BUN BLD-MCNC: 7 MG/DL (ref 6–20)
BUN/CREAT SERPL: 10.4 (ref 7–25)
CALCIUM SPEC-SCNC: 9 MG/DL (ref 8.6–10.5)
CHLORIDE SERPL-SCNC: 102 MMOL/L (ref 98–107)
CO2 SERPL-SCNC: 23.2 MMOL/L (ref 22–29)
CREAT BLD-MCNC: 0.67 MG/DL (ref 0.57–1)
DEPRECATED RDW RBC AUTO: 47.5 FL (ref 37–54)
ERYTHROCYTE [DISTWIDTH] IN BLOOD BY AUTOMATED COUNT: 13.2 % (ref 12.3–15.4)
GFR SERPL CREATININE-BSD FRML MDRD: 100 ML/MIN/1.73
GLOBULIN UR ELPH-MCNC: 3.1 GM/DL
GLUCOSE BLD-MCNC: 96 MG/DL (ref 65–99)
HCT VFR BLD AUTO: 37.8 % (ref 34–46.6)
HGB BLD-MCNC: 12.5 G/DL (ref 12–15.9)
MCH RBC QN AUTO: 32.1 PG (ref 26.6–33)
MCHC RBC AUTO-ENTMCNC: 33.1 G/DL (ref 31.5–35.7)
MCV RBC AUTO: 97.2 FL (ref 79–97)
PLATELET # BLD AUTO: 287 10*3/MM3 (ref 140–450)
PMV BLD AUTO: 9.8 FL (ref 6–12)
POTASSIUM BLD-SCNC: 3.7 MMOL/L (ref 3.5–5.2)
PROT SERPL-MCNC: 7.6 G/DL (ref 6–8.5)
RBC # BLD AUTO: 3.89 10*6/MM3 (ref 3.77–5.28)
SODIUM BLD-SCNC: 137 MMOL/L (ref 136–145)
WBC NRBC COR # BLD: 2.63 10*3/MM3 (ref 3.4–10.8)

## 2019-05-08 PROCEDURE — 85027 COMPLETE CBC AUTOMATED: CPT

## 2019-05-08 PROCEDURE — 99212 OFFICE O/P EST SF 10 MIN: CPT | Performed by: SURGERY

## 2019-05-08 PROCEDURE — 36415 COLL VENOUS BLD VENIPUNCTURE: CPT

## 2019-05-08 PROCEDURE — 80053 COMPREHEN METABOLIC PANEL: CPT

## 2019-05-08 NOTE — PROGRESS NOTES
Right arm cyst -   Her right arm mass is markedly improved.  Concern about a third of the size that it was.  I had reviewed the ultrasound on a prior occasion with the radiologist.  It was nonspecific.  There is no cellulitis no induration no tenderness I feel this is probably a resolving hematoma.  I have recommended observation only and I will see her back in 6 weeks

## 2019-05-09 ENCOUNTER — ANESTHESIA EVENT (OUTPATIENT)
Dept: PERIOP | Facility: HOSPITAL | Age: 39
End: 2019-05-09

## 2019-05-10 ENCOUNTER — HOSPITAL ENCOUNTER (OUTPATIENT)
Facility: HOSPITAL | Age: 39
Setting detail: HOSPITAL OUTPATIENT SURGERY
Discharge: HOME | End: 2019-05-10
Attending: INTERNAL MEDICINE | Admitting: INTERNAL MEDICINE

## 2019-05-10 ENCOUNTER — ANESTHESIA (OUTPATIENT)
Dept: PERIOP | Facility: HOSPITAL | Age: 39
End: 2019-05-10

## 2019-05-10 VITALS
OXYGEN SATURATION: 95 % | HEART RATE: 87 BPM | TEMPERATURE: 97.9 F | DIASTOLIC BLOOD PRESSURE: 85 MMHG | SYSTOLIC BLOOD PRESSURE: 117 MMHG | RESPIRATION RATE: 16 BRPM

## 2019-05-10 DIAGNOSIS — K21.9 GASTROESOPHAGEAL REFLUX DISEASE WITHOUT ESOPHAGITIS: ICD-10-CM

## 2019-05-10 PROCEDURE — 43239 EGD BIOPSY SINGLE/MULTIPLE: CPT | Performed by: INTERNAL MEDICINE

## 2019-05-10 PROCEDURE — 88305 TISSUE EXAM BY PATHOLOGIST: CPT | Performed by: INTERNAL MEDICINE

## 2019-05-10 PROCEDURE — 25010000002 PROPOFOL 10 MG/ML EMULSION: Performed by: NURSE ANESTHETIST, CERTIFIED REGISTERED

## 2019-05-10 RX ORDER — SODIUM CHLORIDE, SODIUM LACTATE, POTASSIUM CHLORIDE, CALCIUM CHLORIDE 600; 310; 30; 20 MG/100ML; MG/100ML; MG/100ML; MG/100ML
9 INJECTION, SOLUTION INTRAVENOUS CONTINUOUS
Status: DISCONTINUED | OUTPATIENT
Start: 2019-05-10 | End: 2019-05-10 | Stop reason: HOSPADM

## 2019-05-10 RX ORDER — MEPERIDINE HYDROCHLORIDE 25 MG/ML
12.5 INJECTION INTRAMUSCULAR; INTRAVENOUS; SUBCUTANEOUS
Status: CANCELLED | OUTPATIENT
Start: 2019-05-10 | End: 2019-05-11

## 2019-05-10 RX ORDER — SODIUM CHLORIDE, SODIUM LACTATE, POTASSIUM CHLORIDE, CALCIUM CHLORIDE 600; 310; 30; 20 MG/100ML; MG/100ML; MG/100ML; MG/100ML
100 INJECTION, SOLUTION INTRAVENOUS CONTINUOUS
Status: CANCELLED | OUTPATIENT
Start: 2019-05-10

## 2019-05-10 RX ORDER — LIDOCAINE HYDROCHLORIDE 20 MG/ML
INJECTION, SOLUTION INFILTRATION; PERINEURAL AS NEEDED
Status: DISCONTINUED | OUTPATIENT
Start: 2019-05-10 | End: 2019-05-10 | Stop reason: SURG

## 2019-05-10 RX ORDER — PROPOFOL 10 MG/ML
VIAL (ML) INTRAVENOUS AS NEEDED
Status: DISCONTINUED | OUTPATIENT
Start: 2019-05-10 | End: 2019-05-10 | Stop reason: SURG

## 2019-05-10 RX ORDER — LIDOCAINE HYDROCHLORIDE 10 MG/ML
0.5 INJECTION, SOLUTION EPIDURAL; INFILTRATION; INTRACAUDAL; PERINEURAL ONCE AS NEEDED
Status: COMPLETED | OUTPATIENT
Start: 2019-05-10 | End: 2019-05-10

## 2019-05-10 RX ORDER — SODIUM CHLORIDE 9 MG/ML
40 INJECTION, SOLUTION INTRAVENOUS AS NEEDED
Status: DISCONTINUED | OUTPATIENT
Start: 2019-05-10 | End: 2019-05-10 | Stop reason: HOSPADM

## 2019-05-10 RX ORDER — SODIUM CHLORIDE 0.9 % (FLUSH) 0.9 %
1-10 SYRINGE (ML) INJECTION AS NEEDED
Status: DISCONTINUED | OUTPATIENT
Start: 2019-05-10 | End: 2019-05-10 | Stop reason: HOSPADM

## 2019-05-10 RX ORDER — DOXEPIN HYDROCHLORIDE 10 MG/1
10 CAPSULE ORAL NIGHTLY
COMMUNITY
End: 2020-02-14 | Stop reason: ALTCHOICE

## 2019-05-10 RX ORDER — ONDANSETRON 2 MG/ML
4 INJECTION INTRAMUSCULAR; INTRAVENOUS ONCE AS NEEDED
Status: CANCELLED | OUTPATIENT
Start: 2019-05-10

## 2019-05-10 RX ADMIN — PROPOFOL 30 MG: 10 INJECTION, EMULSION INTRAVENOUS at 13:03

## 2019-05-10 RX ADMIN — PROPOFOL 50 MG: 10 INJECTION, EMULSION INTRAVENOUS at 12:57

## 2019-05-10 RX ADMIN — LIDOCAINE HYDROCHLORIDE 100 MG: 20 INJECTION, SOLUTION INFILTRATION; PERINEURAL at 12:54

## 2019-05-10 RX ADMIN — LIDOCAINE HYDROCHLORIDE 0.5 ML: 10 INJECTION, SOLUTION EPIDURAL; INFILTRATION; INTRACAUDAL; PERINEURAL at 12:30

## 2019-05-10 RX ADMIN — SODIUM CHLORIDE, POTASSIUM CHLORIDE, SODIUM LACTATE AND CALCIUM CHLORIDE 9 ML/HR: 600; 310; 30; 20 INJECTION, SOLUTION INTRAVENOUS at 12:24

## 2019-05-10 RX ADMIN — PROPOFOL 50 MG: 10 INJECTION, EMULSION INTRAVENOUS at 12:59

## 2019-05-10 RX ADMIN — PROPOFOL 100 MG: 10 INJECTION, EMULSION INTRAVENOUS at 12:54

## 2019-05-10 NOTE — ANESTHESIA PREPROCEDURE EVALUATION
Anesthesia Evaluation     Patient summary reviewed and Nursing notes reviewed   no history of anesthetic complications:  NPO Solid Status: > 8 hours  NPO Liquid Status: > 8 hours           Airway   Mallampati: II  TM distance: >3 FB  No difficulty expected  Dental - normal exam     Pulmonary - normal exam    breath sounds clear to auscultation  (+) a smoker (  smokes 2-3 cig/day) Current Smoked day of surgery,   Cardiovascular - normal exam  Exercise tolerance: good (4-7 METS)    ECG reviewed  Rhythm: regular  Rate: normal        Neuro/Psych  (+) psychiatric history Anxiety and Depression,     (-) numbness  GI/Hepatic/Renal/Endo    (+) obesity,  GERD well controlled,      Musculoskeletal     (+) back pain, myalgias,       ROS comment: lupus  Abdominal  - normal exam   Substance History   (+) alcohol use, drug use      Comment: Abstained x 2 yrs   OB/GYN          Other   (+) arthritis     ROS/Med Hx Other: Lupus  sjogrens                  Anesthesia Plan    ASA 2     regional and MAC   (Isb)  intravenous induction   Anesthetic plan, all risks, benefits, and alternatives have been provided, discussed and informed consent has been obtained with: patient.  Use of blood products discussed with patient  Consented to blood products.

## 2019-05-10 NOTE — H&P
PATIENT INFORMATION  Aide Henry         - 1980     CHIEF COMPLAINT      Chief Complaint   Patient presents with   • Abdominal Pain   • Diarrhea   • Constipation   • Heartburn         HISTORY OF PRESENT ILLNESS  HPI     39 yo with gerd for over 5-6 years with increasing reflux over the past several months. Symptoms worsening in regards to regurgitation. Heartburn is better.  No known aggravating or alleviating factors, worse with bending over. She has been on ppi for 10 years and takes in am with breakfast.  She was an alcoholic and has been sober for 2 years.  She has lupus and Sojorgens and is on imuran.  No dysphagia occasionl odynophagia  bm alternate from diarrhea and constipation occasional blood on tp. She has hemorrhoids since pregnancy.  No egd  cls done 5-7 years ago. No polyps.     REVIEW OF SYSTEMS  Review of Systems   Gastrointestinal: Positive for abdominal pain, anal bleeding, constipation, diarrhea, nausea, rectal pain and vomiting.        Reflux   All other systems reviewed and are negative.           ACTIVE PROBLEMS      Patient Active Problem List     Diagnosis   • Biceps tendinitis of left upper extremity [M75.22]   • Complete tear of left rotator cuff [M75.122]   • Subacromial impingement of left shoulder [M75.42]   • Status post arthroscopy of shoulder [Z98.890]   • Alcohol dependence in remission (CMS/[facility]) [F10.21]   • History of placement of ear tubes [Z96.22]   • Gastroenteritis [K52.9]   • Drug addiction syndrome (CMS/[facility]) [F19.20]   • Substance abuse (CMS/[facility]) [F19.10]   • Bilateral carpal tunnel syndrome [G56.03]   • Eczema [L30.9]   • Shoulder pain, left [M25.512]   • Arthralgia of multiple joints [M25.50]   • Chronic back pain [M54.9, G89.29]   • Chronic constipation [K59.09]   • Disorder of connective tissue (CMS/[facility]) [M35.9]   • Depression with anxiety [F41.8]   • Fibromyalgia [M79.7]   • Gastroesophageal reflux disease without esophagitis [K21.9]   • Irritable bowel  syndrome [K58.9]   • Muscle pain [M79.10]   • Palpitations [R00.2]   • Seasonal allergic rhinitis [J30.2]   • Sjogren's syndrome (CMS/HCC) [M35.00]   • 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]   • Tenderness of temporomandibular joint [M26.629]   • Lateral epicondylitis [M77.10]            PAST MEDICAL HISTORY  Medical History        Past Medical History:   Diagnosis Date   • Anxiety     • Bacterial vaginosis     • Fibromyalgia     • Fracture, finger     • GERD (gastroesophageal reflux disease)     • Lateral epicondylitis 2013     RHUEMATOLOGIST   • Lupus     • MRSA (methicillin resistant staph aureus) culture positive  ESTIMATE     GROIN AREA    • Sjogren's syndrome (CMS/HCC)     • Urinary tract infection                 SURGICAL HISTORY  Surgical History         Past Surgical History:   Procedure Laterality Date   • ADENOIDECTOMY       •  SECTION       • COLONOSCOPY       • MOUTH SURGERY       • RHINOPLASTY       • SHOULDER ARTHROSCOPY Left 9/10/2018     Procedure: SHOULDER ARTHROSCOPY, rotator cuff repair; extensive debridement, open biceps tenodesis;  Surgeon: Joo Rivers MD;  Location: [facility];  Service: Orthopedics   • TONSILLECTOMY       • TUBAL ABDOMINAL LIGATION       • TYMPANOSTOMY TUBE PLACEMENT       • WISDOM TOOTH EXTRACTION Bilateral                 FAMILY HISTORY        Family History   Problem Relation Age of Onset   • Cancer Father     • Heart disease Paternal Grandmother     • Diabetes Maternal Aunt     • Diabetes Maternal Grandmother     • Colon cancer Neg Hx     • Colon polyps Neg Hx              SOCIAL HISTORY  Social History            Occupational History   • Not on file   Tobacco Use   • Smoking status: Former Smoker       Packs/day: 0.25       Years: 20.00       Pack years: 5.00       Types: Cigarettes   • Smokeless tobacco: Never Used   • Tobacco comment: Declines medication today. Not interested in patches. Has tried gum previously. Desires to continue weaning.    Substance  "and Sexual Activity   • Alcohol use: Yes       Comment: Alcoholic, Last drink 4-2-2018   • Drug use: Yes       Types: Cocaine, Hydrocodone       Comment: \"recovering addict\", last reports use June 2017   • Sexual activity: Defer         Debilities/Disabilities Identified: None     Emotional Behavior: Appropriate     CURRENT MEDICATIONS     Current Outpatient Medications:   •  azaTHIOprine (IMURAN) 50 MG tablet, TK 2 TS PO D, Disp: , Rfl:   •  chlorhexidine (HIBICLENS) 4 % external liquid, by Intratympanic route., Disp: , Rfl:   •  DULoxetine (CYMBALTA) 20 MG capsule, Take 20 mg by mouth Daily., Disp: , Rfl:   •  omeprazole (priLOSEC) 20 MG capsule, TAKE 1 CAPSULE BY MOUTH DAILY (Patient taking differently: Take 20 mg by mouth Every Night.), Disp: 90 capsule, Rfl: 1  •  pilocarpine (SALAGEN) 5 MG tablet, TK 1 T PO TID, Disp: , Rfl: 3  •  prednisoLONE acetate (PRED FORTE) 1 % ophthalmic suspension, INSTILL 1 DROP INTO OD Q 2 H, Disp: , Rfl: 6     ALLERGIES  Fluconazole     VITALS  /96 (BP Location: Left arm, Patient Position: Lying)   Pulse 86   Temp 98.4 °F (36.9 °C) (Oral)   Resp 17   LMP 05/10/2019   SpO2 95%   Breastfeeding? No               LAST RESULTS                   Admission on 09/10/2018, Discharged on 09/10/2018   Component Date Value Ref Range Status   • HCG Qualitative 09/10/2018 Negative  Negative Final      Us Soft Tissue     Result Date: 4/26/2019  Narrative: ULTRASOUND SOFT TISSUE, RIGHT ARM, 04/26/2019  HISTORY: 38-year-old female complaining of several month history of a mass on her forearm in the antecubital fossa. ?She says it started as a pimple and then she was placed on 2 courses of Bactrim and she says the redness and the size has improved but it has not resolved. ?She denies any drainage from the site. This is not fluctuant on physical exam.  TECHNIQUE: High-resolution grayscale ultrasound imaging of the medial right arm and elbow at the site of clinical complaint.  FINDINGS: " The examination shows a rounded focus of heterogeneously increased echotexture at the skin surface measuring about 1.7 cm in diameter and 0.6 cm in thickness. No fluid component is visible. There is no hyperemia on color flow Doppler imaging. Malignancy is unlikely.  The findings are nonspecific. Resolving focus of subcutaneous inflammation without residual abscess is likely. Subcutaneous lipoma or similar lesion is not excluded.       Impression: Focal subcutaneous soft tissue swelling in the right arm as described. No abscess or other fluid collection is demonstrated.  This report was finalized on 4/26/2019 3:45 PM by Dr. Siddharth Connors MD.          PHYSICAL EXAM  Physical Exam   Constitutional: She is oriented to person, place, and time. She appears well-developed and well-nourished. No distress.   HENT:   Head: Normocephalic and atraumatic.   Mouth/Throat: Oropharynx is clear and moist.   Eyes: EOM are normal. Pupils are equal, round, and reactive to light.   Neck: Normal range of motion. No tracheal deviation present.   Cardiovascular: Normal rate, regular rhythm, normal heart sounds and intact distal pulses. Exam reveals no gallop and no friction rub.   No murmur heard.  Pulmonary/Chest: Effort normal and breath sounds normal. No stridor. No respiratory distress. She has no wheezes. She has no rales. She exhibits no tenderness.   Abdominal: Soft. Bowel sounds are normal. She exhibits no distension. There is no tenderness. There is no rebound and no guarding.   Musculoskeletal: She exhibits no edema.   Lymphadenopathy:     She has no cervical adenopathy.   Neurological: She is alert and oriented to person, place, and time.   Skin: Skin is warm. She is not diaphoretic.   Psychiatric: She has a normal mood and affect. Her behavior is normal. Judgment and thought content normal.   Nursing note and vitals reviewed.        ASSESSMENT  Diagnoses and all orders for this visit:     Irritable bowel syndrome with both  constipation and diarrhea     Gastroesophageal reflux disease without esophagitis  -     Case Request; Standing  -     Case Request     Other orders  -     DULoxetine (CYMBALTA) 20 MG capsule; Take 20 mg by mouth Daily.  -     Follow Anesthesia Guidelines / Standing Orders; Future  -     Implement Anesthesia orders day of procedure.; Standing  -     Obtain informed consent; Standing              PLAN  No Follow-up on file.           Antireflux measures and dietary modifications reviewed. Low acid diet reviewed. Keep head of bed elevated. Stop eating/drinking at least 3 hours prior to bedtime. Eliminate caffeine and carbonated beverages.  Weight loss encouraged if BMI over 25.        Indications, risks and procedure were discussed with the patient, including but not limited to, bleeding, infection, possibility of perforation and possible polypectomy. All of the patient's questions were answered, and signed informed consent was obtained and placed on the chart.

## 2019-05-10 NOTE — OP NOTE
Patient Name:  Aide Henry  YOB: 1980    Date of Procedure:  5/10/2019    Procedure Performed: EGD     Indications: GERD    Pre-op Diagnosis:   Gastroesophageal reflux disease without esophagitis [K21.9]    Post-Op Diagnosis Codes:     * Gastroesophageal reflux disease without esophagitis [K21.9]         Staff:  Surgeon(s):  Shanon Vazquez MD         Consent: Procedure EGD indications, risks and procedure were discussed with the patient, including but not limited to, bleeding, infection, possibility of perforation and possible polypectomy. All of the patient's questions were answered, and signed informed consent was obtained and placed on the chart.      Sedation: Sedation was provided by anesthesia.      Estimated Blood Loss: minimal    Specimens:   ID Type Source Tests Collected by Time   A : Gastric Biopsy Tissue Gastric, Body TISSUE PATHOLOGY EXAM Shanon Vazquez MD 5/10/2019 1300   B : distal Esophagus Biopsy Tissue Esophagus, Distal TISSUE PATHOLOGY EXAM Shanon Vazquez MD 5/10/2019 1301         Description of Procedure:   After excellent sedation a flexible endoscope was passed into the oropharynx into the distal esophagus.  Z line is irregular biopsies are obtained using forceps.  Scope was easily traversed into the stomach following to the antrum.  Mild to moderate gastritis is noted here.  Biopsies are obtained with forceps.  The scope was retroflexed here straightened and passed into the duodenal bulb all the way into the second portion with ease.  The entire examined small bowel mucosa overall appeared normal.  Scope was then slowly withdrawn out the patient with no immediate complications and she tolerated procedure well.         Findings: Gastritis  Irregular Z line  We will await biopsy results.  She will continue on her PPI therapy.  To see him back in the office in follow-up in 3 to 4 weeks.    Complications: None        Shanon Vazquez MD     Date: 5/10/2019  Time: 1:06 PM

## 2019-05-10 NOTE — ANESTHESIA POSTPROCEDURE EVALUATION
Patient: Aide Henry    Procedure Summary     Date:  05/10/19 Room / Location:  [facility] ENDOSCOPY 2 /  [facility]    Anesthesia Start:  1249 Anesthesia Stop:  1305    Procedure:  ESOPHAGOGASTRODUODENOSCOPY with biopsies (N/A Esophagus) Diagnosis:       Gastroesophageal reflux disease without esophagitis      (Gastroesophageal reflux disease without esophagitis [K21.9])    Surgeon:  Shanon Vazquez MD Provider:  Paolo Whipple CRNA    Anesthesia Type:  regional, MAC ASA Status:  2          Anesthesia Type: regional, MAC  Last vitals  BP   139/96 (05/10/19 1210)   Temp   98.4 °F (36.9 °C) (05/10/19 1210)   Pulse   86 (05/10/19 1210)   Resp   17 (05/10/19 1210)     SpO2   95 % (05/10/19 1210)     Post Anesthesia Care and Evaluation    Patient location during evaluation: bedside  Patient participation: complete - patient participated  Level of consciousness: awake  Pain score: 0  Pain management: adequate  Airway patency: patent  Anesthetic complications: No anesthetic complications  PONV Status: none  Cardiovascular status: acceptable  Respiratory status: acceptable  Hydration status: acceptable

## 2019-05-13 LAB
CYTO UR: NORMAL
LAB AP CASE REPORT: NORMAL
PATH REPORT.FINAL DX SPEC: NORMAL
PATH REPORT.GROSS SPEC: NORMAL

## 2019-05-20 NOTE — PROGRESS NOTES
Gastric biopsies with mild inflammation no H. pylori.  Distal esophageal biopsies with inflammation consistent with reflux esophagitis.  Ashton's esophagus is noted.  No dysplasia.  Letter on for a repeat EGD in 2 years

## 2019-06-12 ENCOUNTER — OFFICE VISIT (OUTPATIENT)
Dept: OBSTETRICS AND GYNECOLOGY | Facility: CLINIC | Age: 39
End: 2019-06-12

## 2019-06-12 VITALS
HEIGHT: 64 IN | WEIGHT: 193 LBS | BODY MASS INDEX: 32.95 KG/M2 | SYSTOLIC BLOOD PRESSURE: 136 MMHG | DIASTOLIC BLOOD PRESSURE: 78 MMHG

## 2019-06-12 DIAGNOSIS — M35.01 SJOGREN'S SYNDROME WITH KERATOCONJUNCTIVITIS SICCA (HCC): ICD-10-CM

## 2019-06-12 DIAGNOSIS — F17.200 SMOKER: ICD-10-CM

## 2019-06-12 DIAGNOSIS — Z01.419 WELL FEMALE EXAM WITH ROUTINE GYNECOLOGICAL EXAM: Primary | ICD-10-CM

## 2019-06-12 DIAGNOSIS — M79.7 FIBROMYALGIA: ICD-10-CM

## 2019-06-12 DIAGNOSIS — Z11.51 SPECIAL SCREENING EXAMINATION FOR HUMAN PAPILLOMAVIRUS (HPV): ICD-10-CM

## 2019-06-12 DIAGNOSIS — F19.10 SUBSTANCE ABUSE (HCC): ICD-10-CM

## 2019-06-12 LAB
B-HCG UR QL: NEGATIVE
BILIRUB BLD-MCNC: NEGATIVE MG/DL
CLARITY, POC: CLEAR
COLOR UR: YELLOW
GLUCOSE UR STRIP-MCNC: NEGATIVE MG/DL
INTERNAL NEGATIVE CONTROL: NEGATIVE
INTERNAL POSITIVE CONTROL: POSITIVE
KETONES UR QL: NEGATIVE
LEUKOCYTE EST, POC: NEGATIVE
Lab: NORMAL
NITRITE UR-MCNC: NEGATIVE MG/ML
PH UR: 6 [PH] (ref 5–8)
PROT UR STRIP-MCNC: NEGATIVE MG/DL
RBC # UR STRIP: NEGATIVE /UL
SP GR UR: 1.02 (ref 1–1.03)
UROBILINOGEN UR QL: NORMAL

## 2019-06-12 PROCEDURE — 81025 URINE PREGNANCY TEST: CPT | Performed by: OBSTETRICS & GYNECOLOGY

## 2019-06-12 PROCEDURE — 81002 URINALYSIS NONAUTO W/O SCOPE: CPT | Performed by: OBSTETRICS & GYNECOLOGY

## 2019-06-12 PROCEDURE — 99395 PREV VISIT EST AGE 18-39: CPT | Performed by: OBSTETRICS & GYNECOLOGY

## 2019-06-12 PROCEDURE — 99406 BEHAV CHNG SMOKING 3-10 MIN: CPT | Performed by: OBSTETRICS & GYNECOLOGY

## 2019-06-12 RX ORDER — BUSPIRONE HYDROCHLORIDE 10 MG/1
10 TABLET ORAL 3 TIMES DAILY
COMMUNITY
End: 2020-02-14

## 2019-06-12 NOTE — PROGRESS NOTES
GYN Annual Exam     CC- Here for annual exam.     Pt new to practice? no  Pt new to me? no     HPI: History of Present Illness      Aide Henry is a 38 y.o. female who presents for annual well woman exam. Patient's last menstrual period was 06/02/2019.  Pt states she is having pelvic pain like she did when she had her ovarian cysts.    MAMMOGRAM UP TO DATE IF AGE APPROPRIATE?  na    COLONOSCOPY UP TO DATE IF AGE APPROPRIATE? na    Fhx breast cancer? no    Fhx ovarian cancer? no    Fhx colon cancer? no    Invitae testing offered? no      PMHX:  Patient Active Problem List   Diagnosis   • Arthralgia of multiple joints   • Chronic back pain   • Chronic constipation   • Disorder of connective tissue (CMS/HCC)   • Depression with anxiety   • Fibromyalgia   • Gastroesophageal reflux disease without esophagitis   • Irritable bowel syndrome   • Muscle pain   • Palpitations   • Seasonal allergic rhinitis   • Sympathetic uveitis   • Tenderness of temporomandibular joint   • Bilateral carpal tunnel syndrome   • Eczema   • Shoulder pain, left   • Substance abuse (CMS/HCC)   • Lateral epicondylitis   • Drug addiction syndrome (CMS/HCC)   • Gastroenteritis   • Alcohol dependence in remission (CMS/HCC)   • History of placement of ear tubes   • Status post arthroscopy of shoulder   • Subacromial impingement of left shoulder   • Biceps tendinitis of left upper extremity   • Complete tear of left rotator cuff   • Smoker   • Sjogren's syndrome with keratoconjunctivitis sicca (CMS/HCC)   ; otherwise none    OB History     No data available            Past Medical History:   Diagnosis Date   • Anxiety    • Bacterial vaginosis    • [place]hton esophagus    • Fibromyalgia    • Fracture, finger    • GERD (gastroesophageal reflux disease)    • Lateral epicondylitis 9/16/2013    RHUEMATOLOGIST   • Lupus    • MRSA (methicillin resistant staph aureus) culture positive 2011 ESTIMATE    GROIN AREA    • Sjogren's syndrome (CMS/HCC)    • Urinary  tract infection        Past Surgical History:   Procedure Laterality Date   • ADENOIDECTOMY     •  SECTION     • COLONOSCOPY     • ENDOSCOPY N/A 5/10/2019    Procedure: ESOPHAGOGASTRODUODENOSCOPY with biopsies;  Surgeon: Shanon Vazquez MD;  Location: [facility] OR;  Service: Gastroenterology   • MOUTH SURGERY     • RHINOPLASTY     • SHOULDER ARTHROSCOPY Left 9/10/2018    Procedure: SHOULDER ARTHROSCOPY, rotator cuff repair; extensive debridement, open biceps tenodesis;  Surgeon: Joo Rivers MD;  Location: [facility] OR;  Service: Orthopedics   • TONSILLECTOMY     • TUBAL ABDOMINAL LIGATION     • TYMPANOSTOMY TUBE PLACEMENT     • WISDOM TOOTH EXTRACTION Bilateral          Current Outpatient Medications:   •  busPIRone (BUSPAR) 10 MG tablet, Take 10 mg by mouth 3 (Three) Times a Day., Disp: , Rfl:   •  azaTHIOprine (IMURAN) 50 MG tablet, TK 2 TS PO D, Disp: , Rfl:   •  chlorhexidine (HIBICLENS) 4 % external liquid, by Intratympanic route., Disp: , Rfl:   •  doxepin (SINEquan) 10 MG capsule, Take 10 mg by mouth Every Night., Disp: , Rfl:   •  DULoxetine (CYMBALTA) 20 MG capsule, Take 20 mg by mouth Daily., Disp: , Rfl:   •  omeprazole (priLOSEC) 20 MG capsule, TAKE 1 CAPSULE BY MOUTH DAILY (Patient taking differently: Take 20 mg by mouth Every Night.), Disp: 90 capsule, Rfl: 1  •  pilocarpine (SALAGEN) 5 MG tablet, TK 1 T PO TID, Disp: , Rfl: 3  •  prednisoLONE acetate (PRED FORTE) 1 % ophthalmic suspension, INSTILL 1 DROP INTO OD Q 2 H, Disp: , Rfl: 6    Allergies   Allergen Reactions   • Fluconazole Hives       Social History     Tobacco Use   • Smoking status: Former Smoker     Packs/day: 0.25     Years: 20.00     Pack years: 5.00     Types: Cigarettes   • Smokeless tobacco: Never Used   • Tobacco comment: Declines medication today. Not interested in patches. Has tried gum previously. Desires to continue weaning.    Substance Use Topics   • Alcohol use: Yes     Comment: Alcoholic, Last drink 2018   •  "Drug use: Yes     Types: Cocaine, Hydrocodone     Comment: \"recovering addict\", last reports use June 2017       I advised Aide of the risks of continuing to use tobacco, and I provided her with tobacco cessation educational materials in the After Visit Summary.     During this visit, I spent 5 minutes counseling the patient regarding tobacco cessation.      Family History   Problem Relation Age of Onset   • Cancer Father    • Heart disease Paternal Grandmother    • Diabetes Maternal Aunt    • Diabetes Maternal Grandmother    • Colon cancer Neg Hx    • Colon polyps Neg Hx        Review of Systems        EXAM:  /78   Ht 162.6 cm (64.02\")   Wt 87.5 kg (193 lb)   LMP 06/02/2019   BMI 33.11 kg/m²     Physical Exam   Constitutional: She is oriented to person, place, and time. She appears well-developed and well-nourished.   HENT:   Head: Normocephalic and atraumatic.   Neck: Normal range of motion. Neck supple. No thyromegaly present.   Cardiovascular: Normal rate and regular rhythm.   Pulmonary/Chest: Effort normal and breath sounds normal. Right breast exhibits no mass and no nipple discharge. Left breast exhibits no mass and no nipple discharge. Breasts are symmetrical. There is no breast swelling.   Abdominal: Soft. Bowel sounds are normal. She exhibits no distension and no mass. There is no tenderness. There is no rebound and no guarding.   Genitourinary: Vagina normal and uterus normal. No breast tenderness, discharge or bleeding. Pelvic exam was performed with patient prone. There is no lesion on the right labia. There is no lesion on the left labia. Cervix exhibits no motion tenderness and no discharge. Right adnexum displays no mass. Left adnexum displays no mass.   Genitourinary Comments: cx wnl, pap done   Musculoskeletal: Normal range of motion. She exhibits no edema.   Neurological: She is alert and oriented to person, place, and time.   Skin: Skin is warm and dry.   Breasts wnl bilaterally "   Psychiatric: She has a normal mood and affect. Her behavior is normal. Judgment and thought content normal.   Nursing note and vitals reviewed.         As part of wellness and prevention, the following topics were discussed with the patient:  []  Nutrition  []  Physical activity/regular exercise   [x]  Healthy weight  []  Injury prevention  [x]  Substance misuse/abuse  []  Sexual behavior  []  STD prevention  []  Contaception  []  Dental health  [x]  Mental health  []  Immunization  [x]  Encouraged SBE     Counseling and guidance done:  Nutrition, physical activity, healthy weight, injury prevention, misuse of tobacco, alcohol and drugs, sexual behavior and STDs, contraception, dental health, mental health, immunizations breast cancer screening and exams.    Assessment     1) GYN annual well woman exam.   2) PAP done today? yes  3) problems addressed: pelvic pain       Plan       Follow up prn and one year.    Aide was seen today for gynecologic exam and abdominal pain.    Diagnoses and all orders for this visit:    Well female exam with routine gynecological exam  -     POC Urinalysis Dipstick  -     POC Pregnancy, Urine  -     PapIG, CtNgTv, HPV, Rfx 16 / 18    Special screening examination for human papillomavirus (HPV)  -     PapIG, CtNgTv, HPV, Rfx 16 / 18    Fibromyalgia    Substance abuse (CMS/HCC)    Smoker    Sjogren's syndrome with keratoconjunctivitis sicca (CMS/HCC)        RTO Return in about 1 year (around 6/12/2020) for Annual physical.          Rancho Mayberry MD  [unfilled]  11:50 AM

## 2019-06-14 LAB
C TRACH RRNA CVX QL NAA+PROBE: NEGATIVE
CYTOLOGIST CVX/VAG CYTO: NORMAL
CYTOLOGY CVX/VAG DOC CYTO: NORMAL
CYTOLOGY CVX/VAG DOC THIN PREP: NORMAL
DX ICD CODE: NORMAL
HIV 1 & 2 AB SER-IMP: NORMAL
HPV I/H RISK 1 DNA CVX QL PROBE+SIG AMP: NORMAL
N GONORRHOEA RRNA CVX QL NAA+PROBE: NEGATIVE
OTHER STN SPEC: NORMAL
REQUEST PROBLEM: NORMAL
STAT OF ADQ CVX/VAG CYTO-IMP: NORMAL
T VAGINALIS RRNA SPEC QL NAA+PROBE: NEGATIVE

## 2019-06-25 ENCOUNTER — LAB (OUTPATIENT)
Dept: LAB | Facility: HOSPITAL | Age: 39
End: 2019-06-25

## 2019-06-25 ENCOUNTER — OFFICE VISIT (OUTPATIENT)
Dept: OBSTETRICS AND GYNECOLOGY | Facility: CLINIC | Age: 39
End: 2019-06-25

## 2019-06-25 ENCOUNTER — TRANSCRIBE ORDERS (OUTPATIENT)
Dept: ADMINISTRATIVE | Facility: HOSPITAL | Age: 39
End: 2019-06-25

## 2019-06-25 ENCOUNTER — PROCEDURE VISIT (OUTPATIENT)
Dept: OBSTETRICS AND GYNECOLOGY | Facility: CLINIC | Age: 39
End: 2019-06-25

## 2019-06-25 VITALS
WEIGHT: 194.7 LBS | BODY MASS INDEX: 33.24 KG/M2 | DIASTOLIC BLOOD PRESSURE: 94 MMHG | SYSTOLIC BLOOD PRESSURE: 126 MMHG | HEIGHT: 64 IN

## 2019-06-25 DIAGNOSIS — N83.202 LEFT OVARIAN CYST: Primary | ICD-10-CM

## 2019-06-25 DIAGNOSIS — Z51.81 ENCOUNTER FOR THERAPEUTIC DRUG MONITORING: Primary | ICD-10-CM

## 2019-06-25 DIAGNOSIS — F17.200 SMOKER: ICD-10-CM

## 2019-06-25 DIAGNOSIS — R10.2 PELVIC PAIN: ICD-10-CM

## 2019-06-25 DIAGNOSIS — R10.2 PELVIC PAIN: Primary | ICD-10-CM

## 2019-06-25 DIAGNOSIS — Z51.81 ENCOUNTER FOR THERAPEUTIC DRUG MONITORING: ICD-10-CM

## 2019-06-25 LAB
ALBUMIN SERPL-MCNC: 4.2 G/DL (ref 3.5–5.2)
ALBUMIN/GLOB SERPL: 1.1 G/DL
ALP SERPL-CCNC: 49 U/L (ref 39–117)
ALT SERPL W P-5'-P-CCNC: 32 U/L (ref 1–33)
ANION GAP SERPL CALCULATED.3IONS-SCNC: 11.5 MMOL/L
AST SERPL-CCNC: 64 U/L (ref 1–32)
BILIRUB SERPL-MCNC: 0.3 MG/DL (ref 0.2–1.2)
BUN BLD-MCNC: 11 MG/DL (ref 6–20)
BUN/CREAT SERPL: 14.9 (ref 7–25)
CALCIUM SPEC-SCNC: 9.2 MG/DL (ref 8.6–10.5)
CHLORIDE SERPL-SCNC: 105 MMOL/L (ref 98–107)
CO2 SERPL-SCNC: 21.5 MMOL/L (ref 22–29)
CREAT BLD-MCNC: 0.74 MG/DL (ref 0.57–1)
DEPRECATED RDW RBC AUTO: 49 FL (ref 37–54)
ERYTHROCYTE [DISTWIDTH] IN BLOOD BY AUTOMATED COUNT: 13.8 % (ref 12.3–15.4)
GFR SERPL CREATININE-BSD FRML MDRD: 89 ML/MIN/1.73
GLOBULIN UR ELPH-MCNC: 3.7 GM/DL
GLUCOSE BLD-MCNC: 116 MG/DL (ref 65–99)
HCT VFR BLD AUTO: 39.2 % (ref 34–46.6)
HGB BLD-MCNC: 13.2 G/DL (ref 12–15.9)
MCH RBC QN AUTO: 32.8 PG (ref 26.6–33)
MCHC RBC AUTO-ENTMCNC: 33.7 G/DL (ref 31.5–35.7)
MCV RBC AUTO: 97.3 FL (ref 79–97)
PLATELET # BLD AUTO: 360 10*3/MM3 (ref 140–450)
PMV BLD AUTO: 9.4 FL (ref 6–12)
POTASSIUM BLD-SCNC: 3.8 MMOL/L (ref 3.5–5.2)
PROT SERPL-MCNC: 7.9 G/DL (ref 6–8.5)
RBC # BLD AUTO: 4.03 10*6/MM3 (ref 3.77–5.28)
SODIUM BLD-SCNC: 138 MMOL/L (ref 136–145)
WBC NRBC COR # BLD: 4.28 10*3/MM3 (ref 3.4–10.8)

## 2019-06-25 PROCEDURE — 99213 OFFICE O/P EST LOW 20 MIN: CPT | Performed by: OBSTETRICS & GYNECOLOGY

## 2019-06-25 PROCEDURE — 80053 COMPREHEN METABOLIC PANEL: CPT

## 2019-06-25 PROCEDURE — 76830 TRANSVAGINAL US NON-OB: CPT | Performed by: OBSTETRICS & GYNECOLOGY

## 2019-06-25 PROCEDURE — 36415 COLL VENOUS BLD VENIPUNCTURE: CPT

## 2019-06-25 PROCEDURE — 85027 COMPLETE CBC AUTOMATED: CPT

## 2019-06-25 NOTE — PROGRESS NOTES
Patient Care Team:  Elsie Louie APRN as PCP - General (Family Medicine)    Patient new to practice? no Patient new to examiner? no     -----------------------------------------------------HISTORY---------------------------------------------------    Chief Complaint:   Chief Complaint   Patient presents with   • Pelvic Pain     New problem to examiner? no    Patient's last menstrual period was 06/02/2019.    HPI: Pt here with left pelvic pain.  Has had cysts on her ovaries before.  Pt is considering pregnancy but is on a lot of meds for eye issues and autoimmune disease      Pelvic Pain   The patient's primary symptoms include pelvic pain. This is a recurrent problem. The current episode started more than 1 month ago. The problem occurs intermittently. The problem has been waxing and waning. The pain is moderate. The problem affects the left side. She is not pregnant. Pertinent negatives include no fever. The symptoms are aggravated by activity. She has tried nothing for the symptoms. The treatment provided mild relief.             ROS:  Review of Systems   Constitutional: Negative.  Negative for fever.   HENT: Negative.    Eyes: Negative.    Respiratory: Negative.    Cardiovascular: Negative.    Gastrointestinal: Negative.    Endocrine: Negative.    Genitourinary: Positive for pelvic pain.   Musculoskeletal: Negative.    Skin: Negative.    Allergic/Immunologic: Negative.    Neurological: Negative.    Hematological: Negative.    Psychiatric/Behavioral: Negative.    :      PFSH: PAST HISTORY REVIEWED     1.    Past Medical History:   Diagnosis Date   • Anxiety    • Bacterial vaginosis    • [place] esophagus    • Fibromyalgia    • Fracture, finger    • GERD (gastroesophageal reflux disease)    • Lateral epicondylitis 9/16/2013    RHUEMATOLOGIST   • Lupus    • MRSA (methicillin resistant staph aureus) culture positive 2011 ESTIMATE    GROIN AREA    • Sjogren's syndrome (CMS/HCC)    • Urinary tract infection             Status of: reviewed.      2.   Family History   Problem Relation Age of Onset   • Cancer Father    • Heart disease Paternal Grandmother    • Diabetes Maternal Aunt    • Diabetes Maternal Grandmother    • Colon cancer Neg Hx    • Colon polyps Neg Hx        3. Social History: :    Employment/occupation:  no  Smoker: yes  Alcohol: no Recreational drugs: no     4.   Past Surgical History:   Procedure Laterality Date   • ADENOIDECTOMY     •  SECTION     • COLONOSCOPY     • ENDOSCOPY N/A 5/10/2019    Procedure: ESOPHAGOGASTRODUODENOSCOPY with biopsies;  Surgeon: Shanon Vazquez MD;  Location: [facility] OR;  Service: Gastroenterology   • MOUTH SURGERY     • RHINOPLASTY     • SHOULDER ARTHROSCOPY Left 9/10/2018    Procedure: SHOULDER ARTHROSCOPY, rotator cuff repair; extensive debridement, open biceps tenodesis;  Surgeon: Joo Rivers MD;  Location: [facility] OR;  Service: Orthopedics   • TONSILLECTOMY     • TUBAL ABDOMINAL LIGATION     • TYMPANOSTOMY TUBE PLACEMENT     • WISDOM TOOTH EXTRACTION Bilateral         History of classical Csection?  no    5.   Current Outpatient Medications:   •  azaTHIOprine (IMURAN) 50 MG tablet, TK 2 TS PO D, Disp: , Rfl:   •  busPIRone (BUSPAR) 10 MG tablet, Take 10 mg by mouth 3 (Three) Times a Day., Disp: , Rfl:   •  chlorhexidine (HIBICLENS) 4 % external liquid, by Intratympanic route., Disp: , Rfl:   •  doxepin (SINEquan) 10 MG capsule, Take 10 mg by mouth Every Night., Disp: , Rfl:   •  DULoxetine (CYMBALTA) 20 MG capsule, Take 20 mg by mouth Daily., Disp: , Rfl:   •  omeprazole (priLOSEC) 20 MG capsule, TAKE 1 CAPSULE BY MOUTH DAILY (Patient taking differently: Take 20 mg by mouth Every Night.), Disp: 90 capsule, Rfl: 1  •  pilocarpine (SALAGEN) 5 MG tablet, TK 1 T PO TID, Disp: , Rfl: 3  •  prednisoLONE acetate (PRED FORTE) 1 % ophthalmic suspension, INSTILL 1 DROP INTO OD Q 2 H, Disp: , Rfl: 6    6.   Allergies   Allergen Reactions   • Fluconazole Hives  "                    -----------------------------------------------PHYSICAL EXAM----------------------------------------------    Vital Signs: /94   Ht 162.6 cm (64.02\")   Wt 88.3 kg (194 lb 11.2 oz)   LMP 06/02/2019   Breastfeeding? No   BMI 33.40 kg/m²    Flowsheet Rows      First Filed Value   Admission Height  162.6 cm (64.02\") Documented at 06/25/2019 1459   Admission Weight  88.3 kg (194 lb 11.2 oz) Documented at 06/25/2019 1459          Physical Exam   Constitutional: She is oriented to person, place, and time. She appears well-developed and well-nourished.   HENT:   Head: Normocephalic and atraumatic.   Eyes: EOM are normal.   Neck: Normal range of motion.   Pulmonary/Chest: Effort normal.   Abdominal: Soft. She exhibits no distension and no mass. There is no tenderness. There is no rebound and no guarding.   Musculoskeletal: Normal range of motion.   Neurological: She is alert and oriented to person, place, and time.   Skin: Skin is warm and dry.   Psychiatric: She has a normal mood and affect. Her behavior is normal. Judgment and thought content normal.   Nursing note and vitals reviewed.                          -----------------------------------------------MEDICAL DECISION MAKING-----------------------------  Risk counseling done:  yes    Results Reviewed:     1.   Lab Results (last 24 hours)     ** No results found for the last 24 hours. **        2.   Imaging Results (last 24 hours)     ** No results found for the last 24 hours. **        3.   ECG/EMG Results (most recent)     None          Old records reviewed?  no    Diagnoses and/or chronic conditions reviewed with pt:  Aide was seen today for pelvic pain.    Diagnoses and all orders for this visit:    Left ovarian cyst    Smoker    Pelvic pain        IMP:  LLQ pain, L ovarian cyst.    PLAN: expectant management.  Pt considering conception.     Labs/imaging ordered: u/s: abnormal.  Reviewed with pt: ANTEVERTED UTERUS WITH AN " ENDOMETRIAL MEASUREMENT OF 0.68cm.  RIGHT OVARY APPEARS NORMAL.  LEFT CYST WITH INTERNAL ECHOES SEEN MEASURING 3.5 x 3.4 x 3.4cm.    RTO Return if symptoms worsen or fail to improve.    TIME: More than 50% of time spent in counseling and/or coordination of care.Time spent in counseling 20 min.  Counseling included the following topics pelvic apin. with prognosis, differential diagnosis, risks, benefits of treatment, instructions, compliance and/or risk reduction and alternatives.         Rancho Mayberry MD  4:36 PM  06/25/19

## 2019-10-04 ENCOUNTER — LAB (OUTPATIENT)
Dept: LAB | Facility: HOSPITAL | Age: 39
End: 2019-10-04

## 2019-10-04 ENCOUNTER — TRANSCRIBE ORDERS (OUTPATIENT)
Dept: ADMINISTRATIVE | Facility: HOSPITAL | Age: 39
End: 2019-10-04

## 2019-10-04 DIAGNOSIS — Z51.81 ENCOUNTER FOR THERAPEUTIC DRUG MONITORING: Primary | ICD-10-CM

## 2019-10-04 DIAGNOSIS — Z51.81 ENCOUNTER FOR THERAPEUTIC DRUG MONITORING: ICD-10-CM

## 2019-10-04 LAB
ALBUMIN SERPL-MCNC: 4.6 G/DL (ref 3.5–5.2)
ALBUMIN/GLOB SERPL: 1.6 G/DL
ALP SERPL-CCNC: 52 U/L (ref 39–117)
ALT SERPL W P-5'-P-CCNC: 28 U/L (ref 1–33)
ANION GAP SERPL CALCULATED.3IONS-SCNC: 11.3 MMOL/L (ref 5–15)
AST SERPL-CCNC: 41 U/L (ref 1–32)
BASOPHILS # BLD AUTO: 0.02 10*3/MM3 (ref 0–0.2)
BASOPHILS NFR BLD AUTO: 0.6 % (ref 0–1.5)
BILIRUB SERPL-MCNC: 0.4 MG/DL (ref 0.2–1.2)
BUN BLD-MCNC: 9 MG/DL (ref 6–20)
BUN/CREAT SERPL: 12.7 (ref 7–25)
CALCIUM SPEC-SCNC: 9 MG/DL (ref 8.6–10.5)
CHLORIDE SERPL-SCNC: 103 MMOL/L (ref 98–107)
CO2 SERPL-SCNC: 22.7 MMOL/L (ref 22–29)
CREAT BLD-MCNC: 0.71 MG/DL (ref 0.57–1)
DEPRECATED RDW RBC AUTO: 41.7 FL (ref 37–54)
EOSINOPHIL # BLD AUTO: 0.02 10*3/MM3 (ref 0–0.4)
EOSINOPHIL NFR BLD AUTO: 0.6 % (ref 0.3–6.2)
ERYTHROCYTE [DISTWIDTH] IN BLOOD BY AUTOMATED COUNT: 12 % (ref 12.3–15.4)
GFR SERPL CREATININE-BSD FRML MDRD: 93 ML/MIN/1.73
GLOBULIN UR ELPH-MCNC: 2.8 GM/DL
GLUCOSE BLD-MCNC: 88 MG/DL (ref 65–99)
HCT VFR BLD AUTO: 38.1 % (ref 34–46.6)
HGB BLD-MCNC: 13.2 G/DL (ref 12–15.9)
IMM GRANULOCYTES # BLD AUTO: 0.05 10*3/MM3 (ref 0–0.05)
IMM GRANULOCYTES NFR BLD AUTO: 1.6 % (ref 0–0.5)
LYMPHOCYTES # BLD AUTO: 0.66 10*3/MM3 (ref 0.7–3.1)
LYMPHOCYTES NFR BLD AUTO: 20.5 % (ref 19.6–45.3)
MCH RBC QN AUTO: 33 PG (ref 26.6–33)
MCHC RBC AUTO-ENTMCNC: 34.6 G/DL (ref 31.5–35.7)
MCV RBC AUTO: 95.3 FL (ref 79–97)
MONOCYTES # BLD AUTO: 0.41 10*3/MM3 (ref 0.1–0.9)
MONOCYTES NFR BLD AUTO: 12.7 % (ref 5–12)
NEUTROPHILS # BLD AUTO: 2.06 10*3/MM3 (ref 1.7–7)
NEUTROPHILS NFR BLD AUTO: 64 % (ref 42.7–76)
NRBC BLD AUTO-RTO: 0 /100 WBC (ref 0–0.2)
PLATELET # BLD AUTO: 264 10*3/MM3 (ref 140–450)
PMV BLD AUTO: 9.8 FL (ref 6–12)
POTASSIUM BLD-SCNC: 4.2 MMOL/L (ref 3.5–5.2)
PROT SERPL-MCNC: 7.4 G/DL (ref 6–8.5)
RBC # BLD AUTO: 4 10*6/MM3 (ref 3.77–5.28)
SODIUM BLD-SCNC: 137 MMOL/L (ref 136–145)
WBC NRBC COR # BLD: 3.22 10*3/MM3 (ref 3.4–10.8)

## 2019-10-04 PROCEDURE — 80053 COMPREHEN METABOLIC PANEL: CPT

## 2019-10-04 PROCEDURE — 36415 COLL VENOUS BLD VENIPUNCTURE: CPT

## 2019-10-04 PROCEDURE — 85025 COMPLETE CBC W/AUTO DIFF WBC: CPT

## 2019-11-07 ENCOUNTER — OFFICE VISIT (OUTPATIENT)
Dept: GASTROENTEROLOGY | Facility: CLINIC | Age: 39
End: 2019-11-07

## 2019-11-07 VITALS
BODY MASS INDEX: 34.93 KG/M2 | HEIGHT: 64 IN | DIASTOLIC BLOOD PRESSURE: 88 MMHG | WEIGHT: 204.6 LBS | SYSTOLIC BLOOD PRESSURE: 132 MMHG

## 2019-11-07 DIAGNOSIS — K22.70 BARRETT'S ESOPHAGUS WITHOUT DYSPLASIA: ICD-10-CM

## 2019-11-07 DIAGNOSIS — K21.9 GASTROESOPHAGEAL REFLUX DISEASE WITHOUT ESOPHAGITIS: ICD-10-CM

## 2019-11-07 DIAGNOSIS — K58.9 IRRITABLE BOWEL SYNDROME, UNSPECIFIED TYPE: Primary | ICD-10-CM

## 2019-11-07 PROCEDURE — 99213 OFFICE O/P EST LOW 20 MIN: CPT | Performed by: INTERNAL MEDICINE

## 2019-11-07 RX ORDER — OMEPRAZOLE 20 MG/1
20 CAPSULE, DELAYED RELEASE ORAL DAILY
Qty: 90 CAPSULE | Refills: 1 | Status: SHIPPED | OUTPATIENT
Start: 2019-11-07 | End: 2021-01-05

## 2019-11-07 NOTE — PROGRESS NOTES
PATIENT INFORMATION  Aide Henry       - 1980    CHIEF COMPLAINT  Chief Complaint   Patient presents with   • Abdominal Pain   • Diarrhea   • Heartburn       HISTORY OF PRESENT ILLNESS  HPI    40 yo with 2 months of crampy lower abdominal pain and diarrhea. Hemorrhoids have flared with this. Diarrhea  Is better but still occurs twice weekly, no known aggravating   egd done in May with barretts esophagus. Insurance stopped paying for ppi and only on zantac. GERD not controlled on zantac. She has ongoing increasing reflux with this.  She states she cannot afford otc medications.  She smokes 1/4 ppi    REVIEW OF SYSTEMS  Review of Systems   Constitutional: Positive for diaphoresis and fatigue.   HENT: Positive for postnasal drip, rhinorrhea, sinus pressure, sneezing, sore throat and voice change.    Respiratory: Positive for cough and shortness of breath.    Gastrointestinal: Positive for abdominal distention, abdominal pain, diarrhea and rectal pain.        Reflux   Endocrine: Positive for heat intolerance, polydipsia and polyphagia.   Genitourinary: Positive for frequency and urgency.   Musculoskeletal: Positive for arthralgias, back pain, gait problem and myalgias.   Allergic/Immunologic: Positive for environmental allergies, food allergies and immunocompromised state.   Neurological: Positive for weakness and numbness.   Psychiatric/Behavioral: Positive for sleep disturbance. The patient is nervous/anxious.    All other systems reviewed and are negative.        ACTIVE PROBLEMS  Patient Active Problem List    Diagnosis   • Left ovarian cyst [N83.202]   • Pelvic pain [R10.2]   • Smoker [F17.200]   • Sjogren's syndrome with keratoconjunctivitis sicca (CMS/[facility]) [M35.01]   • Biceps tendinitis of left upper extremity [M75.22]   • Complete tear of left rotator cuff [M75.122]   • Subacromial impingement of left shoulder [M75.42]   • Status post arthroscopy of shoulder [Z98.890]   • Alcohol dependence in  remission (CMS/[facility]) [F10.21]   • History of placement of ear tubes [Z96.22]   • Gastroenteritis [K52.9]   • Drug addiction syndrome (CMS/[facility]) [F19.20]   • Substance abuse (CMS/[facility]) [F19.10]   • Bilateral carpal tunnel syndrome [G56.03]   • Eczema [L30.9]   • Shoulder pain, left [M25.512]   • Arthralgia of multiple joints [M25.50]   • Chronic back pain [M54.9, G89.29]   • Chronic constipation [K59.09]   • Disorder of connective tissue (CMS/[facility]) [M35.9]   • Depression with anxiety [F41.8]   • Fibromyalgia [M79.7]   • Gastroesophageal reflux disease without esophagitis [K21.9]   • Irritable bowel syndrome [K58.9]   • Muscle pain [M79.10]   • Palpitations [R00.2]   • Seasonal allergic rhinitis [J30.2]   • 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]   • Tenderness of temporomandibular joint [M26.629]   • Lateral epicondylitis [M77.10]         PAST MEDICAL HISTORY  Past Medical History:   Diagnosis Date   • Anxiety    • Bacterial vaginosis    • Ashton esophagus    • Depression with anxiety    • Fibromyalgia    • Fracture, finger    • GERD (gastroesophageal reflux disease)    • Lateral epicondylitis 2013    RHUEMATOLOGIST   • Lupus (CMS/[facility])    • MRSA (methicillin resistant staph aureus) culture positive 2011 ESTIMATE    GROIN AREA    • Sjogren's syndrome (CMS/[facility])    • Urinary tract infection          SURGICAL HISTORY  Past Surgical History:   Procedure Laterality Date   • ADENOIDECTOMY     •  SECTION     • COLONOSCOPY     • ENDOSCOPY N/A 5/10/2019    Procedure: ESOPHAGOGASTRODUODENOSCOPY with biopsies;  Surgeon: Shanon Vazquez MD;  Location: [facility] OR;  Service: Gastroenterology   • MOUTH SURGERY     • RHINOPLASTY     • SHOULDER ARTHROSCOPY Left 9/10/2018    Procedure: SHOULDER ARTHROSCOPY, rotator cuff repair; extensive debridement, open biceps tenodesis;  Surgeon: Joo Rivers MD;  Location: [facility] OR;  Service: Orthopedics   • TONSILLECTOMY     • TUBAL ABDOMINAL LIGATION     • TYMPANOSTOMY TUBE PLACEMENT     •  "WISDOM TOOTH EXTRACTION Bilateral          FAMILY HISTORY  Family History   Problem Relation Age of Onset   • Cancer Father    • Heart disease Paternal Grandmother    • Diabetes Maternal Aunt    • Diabetes Maternal Grandmother    • Colon cancer Neg Hx    • Colon polyps Neg Hx          SOCIAL HISTORY  Social History     Occupational History   • Not on file   Tobacco Use   • Smoking status: Former Smoker     Packs/day: 0.25     Years: 20.00     Pack years: 5.00     Types: Cigarettes   • Smokeless tobacco: Never Used   • Tobacco comment: Declines medication today. Not interested in patches. Has tried gum previously. Desires to continue weaning.    Substance and Sexual Activity   • Alcohol use: Yes     Comment: Alcoholic, Last drink 4-2-2018   • Drug use: Yes     Types: Cocaine, Hydrocodone     Comment: \"recovering addict\", last reports use June 2017   • Sexual activity: Defer       Debilities/Disabilities Identified: None    Emotional Behavior: Appropriate    CURRENT MEDICATIONS    Current Outpatient Medications:   •  azaTHIOprine (IMURAN) 50 MG tablet, TK 2 TS PO D, Disp: , Rfl:   •  busPIRone (BUSPAR) 10 MG tablet, Take 10 mg by mouth 3 (Three) Times a Day., Disp: , Rfl:   •  chlorhexidine (HIBICLENS) 4 % external liquid, by Intratympanic route., Disp: , Rfl:   •  doxepin (SINEquan) 10 MG capsule, Take 10 mg by mouth Every Night., Disp: , Rfl:   •  DULoxetine (CYMBALTA) 20 MG capsule, Take 20 mg by mouth Daily., Disp: , Rfl:   •  hydrocortisone (ANUSOL-HC) 2.5 % rectal cream, Insert  into the rectum 4 (Four) Times a Day As Needed for Hemorrhoids (rectal discomfort)., Disp: 30 g, Rfl: 5  •  omeprazole (priLOSEC) 20 MG capsule, Take 1 capsule by mouth Daily., Disp: 90 capsule, Rfl: 1  •  pilocarpine (SALAGEN) 5 MG tablet, TK 1 T PO TID, Disp: , Rfl: 3  •  prednisoLONE acetate (PRED FORTE) 1 % ophthalmic suspension, INSTILL 1 DROP INTO OD Q 2 H, Disp: , Rfl: 6    ALLERGIES  Fluconazole    VITALS  Vitals:    11/07/19 " "1234   BP: 132/88   Weight: 92.8 kg (204 lb 9.6 oz)   Height: 162.6 cm (64.02\")       LAST RESULTS   Lab on 10/04/2019   Component Date Value Ref Range Status   • Glucose 10/04/2019 88  65 - 99 mg/dL Final   • BUN 10/04/2019 9  6 - 20 mg/dL Final   • Creatinine 10/04/2019 0.71  0.57 - 1.00 mg/dL Final   • Sodium 10/04/2019 137  136 - 145 mmol/L Final   • Potassium 10/04/2019 4.2  3.5 - 5.2 mmol/L Final   • Chloride 10/04/2019 103  98 - 107 mmol/L Final   • CO2 10/04/2019 22.7  22.0 - 29.0 mmol/L Final   • Calcium 10/04/2019 9.0  8.6 - 10.5 mg/dL Final   • Total Protein 10/04/2019 7.4  6.0 - 8.5 g/dL Final   • Albumin 10/04/2019 4.60  3.50 - 5.20 g/dL Final   • ALT (SGPT) 10/04/2019 28  1 - 33 U/L Final   • AST (SGOT) 10/04/2019 41* 1 - 32 U/L Final   • Alkaline Phosphatase 10/04/2019 52  39 - 117 U/L Final   • Total Bilirubin 10/04/2019 0.4  0.2 - 1.2 mg/dL Final   • eGFR Non African Amer 10/04/2019 93  >60 mL/min/1.73 Final   • Globulin 10/04/2019 2.8  gm/dL Final   • A/G Ratio 10/04/2019 1.6  g/dL Final   • BUN/Creatinine Ratio 10/04/2019 12.7  7.0 - 25.0 Final   • Anion Gap 10/04/2019 11.3  5.0 - 15.0 mmol/L Final   • WBC 10/04/2019 3.22* 3.40 - 10.80 10*3/mm3 Final   • RBC 10/04/2019 4.00  3.77 - 5.28 10*6/mm3 Final   • Hemoglobin 10/04/2019 13.2  12.0 - 15.9 g/dL Final   • Hematocrit 10/04/2019 38.1  34.0 - 46.6 % Final   • MCV 10/04/2019 95.3  79.0 - 97.0 fL Final   • MCH 10/04/2019 33.0  26.6 - 33.0 pg Final   • MCHC 10/04/2019 34.6  31.5 - 35.7 g/dL Final   • RDW 10/04/2019 12.0* 12.3 - 15.4 % Final   • RDW-SD 10/04/2019 41.7  37.0 - 54.0 fl Final   • MPV 10/04/2019 9.8  6.0 - 12.0 fL Final   • Platelets 10/04/2019 264  140 - 450 10*3/mm3 Final   • Neutrophil % 10/04/2019 64.0  42.7 - 76.0 % Final   • Lymphocyte % 10/04/2019 20.5  19.6 - 45.3 % Final   • Monocyte % 10/04/2019 12.7* 5.0 - 12.0 % Final   • Eosinophil % 10/04/2019 0.6  0.3 - 6.2 % Final   • Basophil % 10/04/2019 0.6  0.0 - 1.5 % Final   • " Immature Grans % 10/04/2019 1.6* 0.0 - 0.5 % Final   • Neutrophils, Absolute 10/04/2019 2.06  1.70 - 7.00 10*3/mm3 Final   • Lymphocytes, Absolute 10/04/2019 0.66* 0.70 - 3.10 10*3/mm3 Final   • Monocytes, Absolute 10/04/2019 0.41  0.10 - 0.90 10*3/mm3 Final   • Eosinophils, Absolute 10/04/2019 0.02  0.00 - 0.40 10*3/mm3 Final   • Basophils, Absolute 10/04/2019 0.02  0.00 - 0.20 10*3/mm3 Final   • Immature Grans, Absolute 10/04/2019 0.05  0.00 - 0.05 10*3/mm3 Final   • nRBC 10/04/2019 0.0  0.0 - 0.2 /100 WBC Final     No results found.    PHYSICAL EXAM  Physical Exam   Constitutional: She is oriented to person, place, and time. She appears well-developed and well-nourished. No distress.   HENT:   Head: Normocephalic and atraumatic.   Mouth/Throat: Oropharynx is clear and moist.   Eyes: EOM are normal. Pupils are equal, round, and reactive to light.   Neck: Normal range of motion. No tracheal deviation present.   Cardiovascular: Normal rate, regular rhythm, normal heart sounds and intact distal pulses. Exam reveals no gallop and no friction rub.   No murmur heard.  Pulmonary/Chest: Effort normal and breath sounds normal. No stridor. No respiratory distress. She has no wheezes. She has no rales. She exhibits no tenderness.   Abdominal: Soft. Bowel sounds are normal. She exhibits no distension. There is no tenderness. There is no rebound and no guarding.   Musculoskeletal: She exhibits no edema.   Lymphadenopathy:     She has no cervical adenopathy.   Neurological: She is alert and oriented to person, place, and time.   Skin: Skin is warm. She is not diaphoretic.   Psychiatric: She has a normal mood and affect. Her behavior is normal. Judgment and thought content normal.   Nursing note and vitals reviewed.      ASSESSMENT  Diagnoses and all orders for this visit:    Irritable bowel syndrome, unspecified type    Gastroesophageal reflux disease without esophagitis  -     omeprazole (priLOSEC) 20 MG capsule; Take 1  capsule by mouth Daily.    Ashton's esophagus without dysplasia    Other orders  -     hydrocortisone (ANUSOL-HC) 2.5 % rectal cream; Insert  into the rectum 4 (Four) Times a Day As Needed for Hemorrhoids (rectal discomfort).          PLAN  No Follow-up on file.    Will see if sending ppi again with barretts esophagus diagnosis will make a difference  Otherwise omeprazole otc  anusol cream

## 2019-12-05 ENCOUNTER — OFFICE VISIT (OUTPATIENT)
Dept: GASTROENTEROLOGY | Facility: CLINIC | Age: 39
End: 2019-12-05

## 2019-12-05 ENCOUNTER — LAB (OUTPATIENT)
Dept: LAB | Facility: HOSPITAL | Age: 39
End: 2019-12-05

## 2019-12-05 ENCOUNTER — TRANSCRIBE ORDERS (OUTPATIENT)
Dept: ADMINISTRATIVE | Facility: HOSPITAL | Age: 39
End: 2019-12-05

## 2019-12-05 VITALS
WEIGHT: 205.8 LBS | BODY MASS INDEX: 35.13 KG/M2 | DIASTOLIC BLOOD PRESSURE: 80 MMHG | SYSTOLIC BLOOD PRESSURE: 128 MMHG | HEIGHT: 64 IN

## 2019-12-05 DIAGNOSIS — Z51.81 ENCOUNTER FOR THERAPEUTIC DRUG MONITORING: Primary | ICD-10-CM

## 2019-12-05 DIAGNOSIS — K22.70 BARRETT'S ESOPHAGUS WITHOUT DYSPLASIA: ICD-10-CM

## 2019-12-05 DIAGNOSIS — K21.9 GASTROESOPHAGEAL REFLUX DISEASE WITHOUT ESOPHAGITIS: Primary | ICD-10-CM

## 2019-12-05 DIAGNOSIS — Z51.81 ENCOUNTER FOR THERAPEUTIC DRUG MONITORING: ICD-10-CM

## 2019-12-05 LAB
ALBUMIN SERPL-MCNC: 4.6 G/DL (ref 3.5–5.2)
ALBUMIN/GLOB SERPL: 1.3 G/DL
ALP SERPL-CCNC: 51 U/L (ref 39–117)
ALT SERPL W P-5'-P-CCNC: 36 U/L (ref 1–33)
ANION GAP SERPL CALCULATED.3IONS-SCNC: 14.9 MMOL/L (ref 5–15)
AST SERPL-CCNC: 48 U/L (ref 1–32)
BASOPHILS # BLD AUTO: 0.02 10*3/MM3 (ref 0–0.2)
BASOPHILS NFR BLD AUTO: 0.4 % (ref 0–1.5)
BILIRUB SERPL-MCNC: 0.3 MG/DL (ref 0.2–1.2)
BUN BLD-MCNC: 8 MG/DL (ref 6–20)
BUN/CREAT SERPL: 9.8 (ref 7–25)
CALCIUM SPEC-SCNC: 9.2 MG/DL (ref 8.6–10.5)
CHLORIDE SERPL-SCNC: 103 MMOL/L (ref 98–107)
CO2 SERPL-SCNC: 21.1 MMOL/L (ref 22–29)
CREAT BLD-MCNC: 0.82 MG/DL (ref 0.57–1)
DEPRECATED RDW RBC AUTO: 45.1 FL (ref 37–54)
EOSINOPHIL # BLD AUTO: 0.03 10*3/MM3 (ref 0–0.4)
EOSINOPHIL NFR BLD AUTO: 0.7 % (ref 0.3–6.2)
ERYTHROCYTE [DISTWIDTH] IN BLOOD BY AUTOMATED COUNT: 12.7 % (ref 12.3–15.4)
GFR SERPL CREATININE-BSD FRML MDRD: 78 ML/MIN/1.73
GLOBULIN UR ELPH-MCNC: 3.5 GM/DL
GLUCOSE BLD-MCNC: 89 MG/DL (ref 65–99)
HCT VFR BLD AUTO: 40.3 % (ref 34–46.6)
HGB BLD-MCNC: 13.5 G/DL (ref 12–15.9)
IMM GRANULOCYTES # BLD AUTO: 0.06 10*3/MM3 (ref 0–0.05)
IMM GRANULOCYTES NFR BLD AUTO: 1.3 % (ref 0–0.5)
LYMPHOCYTES # BLD AUTO: 0.65 10*3/MM3 (ref 0.7–3.1)
LYMPHOCYTES NFR BLD AUTO: 14.6 % (ref 19.6–45.3)
MCH RBC QN AUTO: 32.6 PG (ref 26.6–33)
MCHC RBC AUTO-ENTMCNC: 33.5 G/DL (ref 31.5–35.7)
MCV RBC AUTO: 97.3 FL (ref 79–97)
MONOCYTES # BLD AUTO: 0.4 10*3/MM3 (ref 0.1–0.9)
MONOCYTES NFR BLD AUTO: 9 % (ref 5–12)
NEUTROPHILS # BLD AUTO: 3.29 10*3/MM3 (ref 1.7–7)
NEUTROPHILS NFR BLD AUTO: 74 % (ref 42.7–76)
NRBC BLD AUTO-RTO: 0 /100 WBC (ref 0–0.2)
PLATELET # BLD AUTO: 280 10*3/MM3 (ref 140–450)
PMV BLD AUTO: 9.9 FL (ref 6–12)
POTASSIUM BLD-SCNC: 3.8 MMOL/L (ref 3.5–5.2)
PROT SERPL-MCNC: 8.1 G/DL (ref 6–8.5)
RBC # BLD AUTO: 4.14 10*6/MM3 (ref 3.77–5.28)
SODIUM BLD-SCNC: 139 MMOL/L (ref 136–145)
WBC NRBC COR # BLD: 4.45 10*3/MM3 (ref 3.4–10.8)

## 2019-12-05 PROCEDURE — 85025 COMPLETE CBC W/AUTO DIFF WBC: CPT

## 2019-12-05 PROCEDURE — 99213 OFFICE O/P EST LOW 20 MIN: CPT | Performed by: INTERNAL MEDICINE

## 2019-12-05 PROCEDURE — 80053 COMPREHEN METABOLIC PANEL: CPT

## 2019-12-05 PROCEDURE — 36415 COLL VENOUS BLD VENIPUNCTURE: CPT

## 2019-12-05 NOTE — PROGRESS NOTES
PATIENT INFORMATION  Aide Henry       - 1980    CHIEF COMPLAINT  Chief Complaint   Patient presents with   • Follow-up     4 week follow up on Gerd       HISTORY OF PRESENT ILLNESS  HPI    She is here in follow up for GERD and hemorrhoids. She did the suppositories and this has resolved. gerd still not controlled as she is not able to get ppi due to insurance.  She denies any dysphagia she states she cannot afford over-the-counter PPI therapy.  REVIEW OF SYSTEMS  Review of Systems   HENT: Positive for hearing loss and sneezing.    Respiratory: Positive for shortness of breath.    Gastrointestinal:        Reflux   Endocrine: Positive for heat intolerance.   Musculoskeletal: Positive for arthralgias, back pain and myalgias.   Allergic/Immunologic: Positive for environmental allergies, food allergies and immunocompromised state.   Psychiatric/Behavioral: Positive for sleep disturbance. The patient is nervous/anxious.    All other systems reviewed and are negative.        ACTIVE PROBLEMS  Patient Active Problem List    Diagnosis   • Left ovarian cyst [N83.202]   • Pelvic pain [R10.2]   • Smoker [F17.200]   • Sjogren's syndrome with keratoconjunctivitis sicca (CMS/[facility]) [M35.01]   • Biceps tendinitis of left upper extremity [M75.22]   • Complete tear of left rotator cuff [M75.122]   • Subacromial impingement of left shoulder [M75.42]   • Status post arthroscopy of shoulder [Z98.890]   • Alcohol dependence in remission (CMS/[facility]) [F10.21]   • History of placement of ear tubes [Z96.22]   • Gastroenteritis [K52.9]   • Drug addiction syndrome (CMS/[facility]) [F19.20]   • Substance abuse (CMS/[facility]) [F19.10]   • Bilateral carpal tunnel syndrome [G56.03]   • Eczema [L30.9]   • Shoulder pain, left [M25.512]   • Arthralgia of multiple joints [M25.50]   • Chronic back pain [M54.9, G89.29]   • Chronic constipation [K59.09]   • Disorder of connective tissue (CMS/[facility]) [M35.9]   • Depression with anxiety [F41.8]   •  Fibromyalgia [M79.7]   • Gastroesophageal reflux disease without esophagitis [K21.9]   • Irritable bowel syndrome [K58.9]   • Muscle pain [M79.10]   • Palpitations [R00.2]   • Seasonal allergic rhinitis [J30.2]   • 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]   • Tenderness of temporomandibular joint [M26.629]   • Lateral epicondylitis [M77.10]         PAST MEDICAL HISTORY  Past Medical History:   Diagnosis Date   • Anxiety    • Bacterial vaginosis    • Ashton esophagus    • Depression with anxiety    • Fibromyalgia    • Fracture, finger    • GERD (gastroesophageal reflux disease)    • Lateral epicondylitis 2013    RHUEMATOLOGIST   • Lupus (CMS/[facility])    • MRSA (methicillin resistant staph aureus) culture positive  ESTIMATE    GROIN AREA    • Sjogren's syndrome (CMS/[facility])    • Urinary tract infection          SURGICAL HISTORY  Past Surgical History:   Procedure Laterality Date   • ADENOIDECTOMY     •  SECTION     • COLONOSCOPY     • ENDOSCOPY N/A 5/10/2019    Procedure: ESOPHAGOGASTRODUODENOSCOPY with biopsies;  Surgeon: Shanon Vazquez MD;  Location: [facility];  Service: Gastroenterology   • MOUTH SURGERY     • RHINOPLASTY     • SHOULDER ARTHROSCOPY Left 9/10/2018    Procedure: SHOULDER ARTHROSCOPY, rotator cuff repair; extensive debridement, open biceps tenodesis;  Surgeon: Joo Rivers MD;  Location: [facility];  Service: Orthopedics   • TONSILLECTOMY     • TUBAL ABDOMINAL LIGATION     • TYMPANOSTOMY TUBE PLACEMENT     • WISDOM TOOTH EXTRACTION Bilateral          FAMILY HISTORY  Family History   Problem Relation Age of Onset   • Cancer Father    • Heart disease Paternal Grandmother    • Diabetes Maternal Aunt    • Diabetes Maternal Grandmother    • Colon cancer Neg Hx    • Colon polyps Neg Hx          SOCIAL HISTORY  Social History     Occupational History   • Not on file   Tobacco Use   • Smoking status: Former Smoker     Packs/day: 0.25     Years: 20.00     Pack years: 5.00     Types: Cigarettes   •  "Smokeless tobacco: Never Used   • Tobacco comment: Declines medication today. Not interested in patches. Has tried gum previously. Desires to continue weaning.    Substance and Sexual Activity   • Alcohol use: Yes     Comment: Alcoholic, Last drink 4-2-2018   • Drug use: Yes     Types: Cocaine, Hydrocodone     Comment: \"recovering addict\", last reports use June 2017   • Sexual activity: Defer       Debilities/Disabilities Identified: None    Emotional Behavior: Appropriate    CURRENT MEDICATIONS    Current Outpatient Medications:   •  azaTHIOprine (IMURAN) 50 MG tablet, TK 2 TS PO D, Disp: , Rfl:   •  busPIRone (BUSPAR) 10 MG tablet, Take 10 mg by mouth 3 (Three) Times a Day., Disp: , Rfl:   •  chlorhexidine (HIBICLENS) 4 % external liquid, by Intratympanic route., Disp: , Rfl:   •  doxepin (SINEquan) 10 MG capsule, Take 10 mg by mouth Every Night., Disp: , Rfl:   •  DULoxetine (CYMBALTA) 20 MG capsule, Take 20 mg by mouth Daily., Disp: , Rfl:   •  hydrocortisone (ANUSOL-HC) 2.5 % rectal cream, Insert  into the rectum 4 (Four) Times a Day As Needed for Hemorrhoids (rectal discomfort)., Disp: 30 g, Rfl: 5  •  omeprazole (priLOSEC) 20 MG capsule, Take 1 capsule by mouth Daily., Disp: 90 capsule, Rfl: 1  •  pilocarpine (SALAGEN) 5 MG tablet, TK 1 T PO TID, Disp: , Rfl: 3  •  prednisoLONE acetate (PRED FORTE) 1 % ophthalmic suspension, INSTILL 1 DROP INTO OD Q 2 H, Disp: , Rfl: 6    ALLERGIES  Fluconazole    VITALS  Vitals:    12/05/19 1226   BP: 128/80   Weight: 93.4 kg (205 lb 12.8 oz)   Height: 162.6 cm (64.02\")       LAST RESULTS   Lab on 12/05/2019   Component Date Value Ref Range Status   • Glucose 12/05/2019 89  65 - 99 mg/dL Final   • BUN 12/05/2019 8  6 - 20 mg/dL Final   • Creatinine 12/05/2019 0.82  0.57 - 1.00 mg/dL Final   • Sodium 12/05/2019 139  136 - 145 mmol/L Final   • Potassium 12/05/2019 3.8  3.5 - 5.2 mmol/L Final   • Chloride 12/05/2019 103  98 - 107 mmol/L Final   • CO2 12/05/2019 21.1* 22.0 - " 29.0 mmol/L Final   • Calcium 12/05/2019 9.2  8.6 - 10.5 mg/dL Final   • Total Protein 12/05/2019 8.1  6.0 - 8.5 g/dL Final   • Albumin 12/05/2019 4.60  3.50 - 5.20 g/dL Final   • ALT (SGPT) 12/05/2019 36* 1 - 33 U/L Final   • AST (SGOT) 12/05/2019 48* 1 - 32 U/L Final   • Alkaline Phosphatase 12/05/2019 51  39 - 117 U/L Final   • Total Bilirubin 12/05/2019 0.3  0.2 - 1.2 mg/dL Final   • eGFR Non African Amer 12/05/2019 78  >60 mL/min/1.73 Final   • Globulin 12/05/2019 3.5  gm/dL Final   • A/G Ratio 12/05/2019 1.3  g/dL Final   • BUN/Creatinine Ratio 12/05/2019 9.8  7.0 - 25.0 Final   • Anion Gap 12/05/2019 14.9  5.0 - 15.0 mmol/L Final   • WBC 12/05/2019 4.45  3.40 - 10.80 10*3/mm3 Final   • RBC 12/05/2019 4.14  3.77 - 5.28 10*6/mm3 Final   • Hemoglobin 12/05/2019 13.5  12.0 - 15.9 g/dL Final   • Hematocrit 12/05/2019 40.3  34.0 - 46.6 % Final   • MCV 12/05/2019 97.3* 79.0 - 97.0 fL Final   • MCH 12/05/2019 32.6  26.6 - 33.0 pg Final   • MCHC 12/05/2019 33.5  31.5 - 35.7 g/dL Final   • RDW 12/05/2019 12.7  12.3 - 15.4 % Final   • RDW-SD 12/05/2019 45.1  37.0 - 54.0 fl Final   • MPV 12/05/2019 9.9  6.0 - 12.0 fL Final   • Platelets 12/05/2019 280  140 - 450 10*3/mm3 Final   • Neutrophil % 12/05/2019 74.0  42.7 - 76.0 % Final   • Lymphocyte % 12/05/2019 14.6* 19.6 - 45.3 % Final   • Monocyte % 12/05/2019 9.0  5.0 - 12.0 % Final   • Eosinophil % 12/05/2019 0.7  0.3 - 6.2 % Final   • Basophil % 12/05/2019 0.4  0.0 - 1.5 % Final   • Immature Grans % 12/05/2019 1.3* 0.0 - 0.5 % Final   • Neutrophils, Absolute 12/05/2019 3.29  1.70 - 7.00 10*3/mm3 Final   • Lymphocytes, Absolute 12/05/2019 0.65* 0.70 - 3.10 10*3/mm3 Final   • Monocytes, Absolute 12/05/2019 0.40  0.10 - 0.90 10*3/mm3 Final   • Eosinophils, Absolute 12/05/2019 0.03  0.00 - 0.40 10*3/mm3 Final   • Basophils, Absolute 12/05/2019 0.02  0.00 - 0.20 10*3/mm3 Final   • Immature Grans, Absolute 12/05/2019 0.06* 0.00 - 0.05 10*3/mm3 Final   • nRBC 12/05/2019 0.0   0.0 - 0.2 /100 WBC Final     No results found.    PHYSICAL EXAM  Physical Exam   Constitutional: She is oriented to person, place, and time. She appears well-developed and well-nourished. No distress.   HENT:   Head: Normocephalic and atraumatic.   Mouth/Throat: Oropharynx is clear and moist.   Eyes: Pupils are equal, round, and reactive to light. EOM are normal.   Neck: Normal range of motion. No tracheal deviation present.   Cardiovascular: Normal rate, regular rhythm, normal heart sounds and intact distal pulses. Exam reveals no gallop and no friction rub.   No murmur heard.  Pulmonary/Chest: Effort normal and breath sounds normal. No stridor. No respiratory distress. She has no wheezes. She has no rales. She exhibits no tenderness.   Abdominal: Soft. Bowel sounds are normal. She exhibits no distension. There is no tenderness. There is no rebound and no guarding.   Musculoskeletal: She exhibits no edema.   Lymphadenopathy:     She has no cervical adenopathy.   Neurological: She is alert and oriented to person, place, and time.   Skin: Skin is warm. She is not diaphoretic.   Psychiatric: She has a normal mood and affect. Her behavior is normal. Judgment and thought content normal.   Nursing note and vitals reviewed.      ASSESSMENT  Diagnoses and all orders for this visit:    Gastroesophageal reflux disease without esophagitis    Ashton's esophagus without dysplasia          PLAN  No follow-ups on file.  Antireflux measures and dietary modifications reviewed. Low acid diet reviewed. Keep head of bed elevated. Stop eating/drinking at least 3 hours prior to bedtime. Eliminate caffeine and carbonated beverages.  Weight loss encouraged if BMI over 25.    Smoking cessation is strongly advised.  She states she cannot afford over-the-counter PPIs.  Samples are given to her.    Long discussion held with her in regards to Ashton's esophagus, the need for PPI use and smoking cessation.  She is not interested interested in  smoking cessation at this time.  Also discussed with her that if she quit smoking she might be able to afford over-the-counter medications.  Should we try getting prior authorization with her insurance and with no avail.

## 2020-02-11 ENCOUNTER — TELEPHONE (OUTPATIENT)
Dept: GASTROENTEROLOGY | Facility: CLINIC | Age: 40
End: 2020-02-11

## 2020-02-14 ENCOUNTER — OFFICE VISIT (OUTPATIENT)
Dept: ORTHOPEDIC SURGERY | Facility: CLINIC | Age: 40
End: 2020-02-14

## 2020-02-14 VITALS
HEART RATE: 89 BPM | WEIGHT: 205 LBS | SYSTOLIC BLOOD PRESSURE: 134 MMHG | HEIGHT: 64 IN | DIASTOLIC BLOOD PRESSURE: 81 MMHG | BODY MASS INDEX: 35 KG/M2

## 2020-02-14 DIAGNOSIS — M77.12 LATERAL EPICONDYLITIS OF LEFT ELBOW: ICD-10-CM

## 2020-02-14 DIAGNOSIS — R52 PAIN: Primary | ICD-10-CM

## 2020-02-14 PROCEDURE — 73080 X-RAY EXAM OF ELBOW: CPT | Performed by: ORTHOPAEDIC SURGERY

## 2020-02-14 PROCEDURE — 99214 OFFICE O/P EST MOD 30 MIN: CPT | Performed by: ORTHOPAEDIC SURGERY

## 2020-02-14 RX ORDER — HYDROXYZINE HYDROCHLORIDE 25 MG/1
TABLET, FILM COATED ORAL
COMMUNITY
Start: 2020-02-10 | End: 2021-05-10

## 2020-02-14 RX ORDER — METHYLPREDNISOLONE 4 MG/1
TABLET ORAL
Qty: 1 EACH | Refills: 0 | Status: SHIPPED | OUTPATIENT
Start: 2020-02-14 | End: 2020-04-09 | Stop reason: ALTCHOICE

## 2020-02-14 RX ORDER — MELOXICAM 15 MG/1
15 TABLET ORAL DAILY
Qty: 30 TABLET | Refills: 0 | Status: SHIPPED | OUTPATIENT
Start: 2020-02-14 | End: 2020-03-16

## 2020-02-14 NOTE — PROGRESS NOTES
"Subjective:     Patient ID: Aide Henry is a 39 y.o. female.    Chief Complaint: left elbow, new problem to provider    History of Present Illness  Aide Henry returns to clinic today for evaluation of her left elbow. She has been experiencing pain in her left elbow for the past month but denies any traumatic injury. She rates the pain as 5/10, describes it as sharp in nature.  Localizes pain to lateral aspect.  Has noted improvement with activity modification. She has tried taking ibuprofen without improvement of her symptoms.  Symptoms are exacerbated with pinching her fingers, lifting, pushing and pulling motions. Denies radiation of pain, denies associated numbness or tingling.     Social History     Occupational History   • Not on file   Tobacco Use   • Smoking status: Former Smoker     Packs/day: 0.25     Years: 20.00     Pack years: 5.00     Types: Cigarettes   • Smokeless tobacco: Never Used   • Tobacco comment: Declines medication today. Not interested in patches. Has tried gum previously. Desires to continue weaning.    Substance and Sexual Activity   • Alcohol use: Yes     Comment: Alcoholic, Last drink 2018   • Drug use: Yes     Types: Cocaine, Hydrocodone     Comment: \"recovering addict\", last reports use 2017   • Sexual activity: Defer      Past Medical History:   Diagnosis Date   • Anxiety    • Bacterial vaginosis    • Ashton esophagus    • Depression with anxiety    • Fibromyalgia    • Fracture, finger    • GERD (gastroesophageal reflux disease)    • Lateral epicondylitis 2013    RHUEMATOLOGIST   • Lupus (CMS/[facility])    • MRSA (methicillin resistant staph aureus) culture positive  ESTIMATE    GROIN AREA    • Sjogren's syndrome (CMS/[facility])    • Urinary tract infection      Past Surgical History:   Procedure Laterality Date   • ADENOIDECTOMY     •  SECTION     • COLONOSCOPY     • ENDOSCOPY N/A 5/10/2019    Procedure: ESOPHAGOGASTRODUODENOSCOPY with biopsies;  Surgeon: " "Shanon Vazquez MD;  Location: [facility] OR;  Service: Gastroenterology   • MOUTH SURGERY     • RHINOPLASTY     • SHOULDER ARTHROSCOPY Left 9/10/2018    Procedure: SHOULDER ARTHROSCOPY, rotator cuff repair; extensive debridement, open biceps tenodesis;  Surgeon: Joo Rivers MD;  Location: [facility] OR;  Service: Orthopedics   • TONSILLECTOMY     • TUBAL ABDOMINAL LIGATION     • TYMPANOSTOMY TUBE PLACEMENT     • WISDOM TOOTH EXTRACTION Bilateral        Family History   Problem Relation Age of Onset   • Cancer Father    • Heart disease Paternal Grandmother    • Diabetes Maternal Aunt    • Diabetes Maternal Grandmother    • Colon cancer Neg Hx    • Colon polyps Neg Hx          Review of Systems   Constitutional: Negative for chills, diaphoresis, fever and unexpected weight change.   HENT: Negative for hearing loss, nosebleeds, sore throat and tinnitus.    Eyes: Negative for pain and visual disturbance.   Respiratory: Negative for cough, shortness of breath and wheezing.    Cardiovascular: Negative for chest pain and palpitations.   Gastrointestinal: Negative for abdominal pain, diarrhea, nausea and vomiting.   Endocrine: Negative for cold intolerance, heat intolerance and polydipsia.   Genitourinary: Negative for difficulty urinating, dysuria and hematuria.   Musculoskeletal: Positive for myalgias. Negative for arthralgias and joint swelling.   Skin: Negative for rash and wound.   Allergic/Immunologic: Negative for environmental allergies.   Neurological: Negative for dizziness, syncope and numbness.   Hematological: Does not bruise/bleed easily.   Psychiatric/Behavioral: Negative for dysphoric mood and sleep disturbance. The patient is not nervous/anxious.            Objective:  Vitals:    02/14/20 0813   BP: 134/81   BP Location: Right arm   Pulse: 89   Weight: 93 kg (205 lb)   Height: 162.6 cm (64\")         02/14/20  0813   Weight: 93 kg (205 lb)     Body mass index is 35.19 kg/m².  Physical Exam    Vital signs " reviewed.   General: No acute distress, alert and oriented  Eyes: conjunctiva clear; pupils equally round and reactive  ENT: external ears and nose atraumatic; oropharynx clear  CV: no peripheral edema  Resp: normal respiratory effort  Skin: no rashes or wounds; normal turgor  Psych: mood and affect appropriate; recent and remote memory intact      Ortho Exam     Left Elbow-  ROM 0-155 degrees   5/5 strength on flexion and extension  Maximal tenderness lateral epicondyle  Stable to varus and valgus stress at 0 and 30 degrees   No radiocapitellar effusion  Increased pain on resisted wrist and finger extension  Full ROM left wrist  5/5 on wrist and finger motion  Positive sensation all distributions    Imaging:  Left Elbow X-Ray  Indication: Pain  Views: AP and Lateral views    Findings:  No fracture  No bony lesion  Normal soft tissues  Normal joint spaces    No prior studies were available for comparison.      Assessment:        1. Pain    2. Lateral epicondylitis of left elbow           Plan:          1. Discussed treatment options at length with patient at today's visit.   2. Patient given forearm strap today to be worn on the left elbow.  3. Prescribed medrol dosepak to be followed by Meloxicam.  4. Follow-up in 4 weeks, if her symptoms have not improved we may consider a cortisone injection to the left elbow.      Aide Henry was in agreement with plan and had all questions answered.     Orders:  Orders Placed This Encounter   Procedures   • XR Elbow 3+ View Left       Medications:  New Medications Ordered This Visit   Medications   • methylPREDNISolone (MEDROL, HALLE,) 4 MG tablet     Sig: Take as directed on package instructions.     Dispense:  1 each     Refill:  0   • meloxicam (MOBIC) 15 MG tablet     Sig: Take 1 tablet by mouth Daily.     Dispense:  30 tablet     Refill:  0       Followup:  Return in about 4 weeks (around 3/13/2020).    Aide was seen today for pain.    Diagnoses and all orders for this  visit:    Pain  -     XR Elbow 3+ View Left    Lateral epicondylitis of left elbow    Other orders  -     methylPREDNISolone (MEDROL, HALLE,) 4 MG tablet; Take as directed on package instructions.  -     meloxicam (MOBIC) 15 MG tablet; Take 1 tablet by mouth Daily.          By signing my name here, I Shirley Benson, attest that all documentation on 02/14/20 at 9:07 AM has been prepared under the direction and in the presence of Dr. Joo Rivers.    I, Dr. Joo Rivers, personally performed the services described in this documentation, as scribed by Shirley Benson, in my presence, and it is both accurate and complete.    Dictated utilizing Dragon dictation

## 2020-03-16 RX ORDER — MELOXICAM 15 MG/1
15 TABLET ORAL DAILY
Qty: 30 TABLET | Refills: 0 | Status: SHIPPED | OUTPATIENT
Start: 2020-03-16 | End: 2020-12-20

## 2020-04-09 ENCOUNTER — TELEMEDICINE (OUTPATIENT)
Dept: ORTHOPEDIC SURGERY | Facility: CLINIC | Age: 40
End: 2020-04-09

## 2020-04-09 DIAGNOSIS — M77.12 LATERAL EPICONDYLITIS OF LEFT ELBOW: Primary | ICD-10-CM

## 2020-04-09 PROCEDURE — 99213 OFFICE O/P EST LOW 20 MIN: CPT | Performed by: ORTHOPAEDIC SURGERY

## 2020-04-09 RX ORDER — DESOXIMETASONE 2.5 MG/G
CREAM TOPICAL
COMMUNITY
Start: 2020-03-20 | End: 2021-08-11

## 2020-04-09 NOTE — PROGRESS NOTES
"This was an audio and video enabled telemedicine encounter.      Subjective:     Patient ID: Aide Henry is a 39 y.o. female.    Chief Complaint:  F/U LEFT ELBOW EPICONDYLITIS   History of Present Illness  Aide Henry presents for telehealth visit for evaluation of left lateral elbow pain, pain has recurred with activities including lifting and folding clothes, localized to lateral left forearm, rates pain as 6-7/10, aching in nature, some radiation into forearm, denies associated numbness or tingling. Good short term improvement with oral steroid but pain has recurred at this time.       Social History     Occupational History   • Not on file   Tobacco Use   • Smoking status: Former Smoker     Packs/day: 0.25     Years: 20.00     Pack years: 5.00     Types: Cigarettes   • Smokeless tobacco: Never Used   • Tobacco comment: Declines medication today. Not interested in patches. Has tried gum previously. Desires to continue weaning.    Substance and Sexual Activity   • Alcohol use: Yes     Comment: Alcoholic, Last drink 2018   • Drug use: Yes     Types: Cocaine, Hydrocodone     Comment: \"recovering addict\", last reports use 2017   • Sexual activity: Defer      Past Medical History:   Diagnosis Date   • Anxiety    • Bacterial vaginosis    • Ashton esophagus    • Depression with anxiety    • Fibromyalgia    • Fracture, finger    • GERD (gastroesophageal reflux disease)    • Lateral epicondylitis 2013    RHUEMATOLOGIST   • Lupus (CMS/HCC)    • MRSA (methicillin resistant staph aureus) culture positive  ESTIMATE    GROIN AREA    • Sjogren's syndrome (CMS/[facility])    • Urinary tract infection      Past Surgical History:   Procedure Laterality Date   • ADENOIDECTOMY     •  SECTION     • COLONOSCOPY     • ENDOSCOPY N/A 5/10/2019    Procedure: ESOPHAGOGASTRODUODENOSCOPY with biopsies;  Surgeon: Shanon Vazquez MD;  Location: [facility];  Service: Gastroenterology   • MOUTH SURGERY     • " RHINOPLASTY     • SHOULDER ARTHROSCOPY Left 9/10/2018    Procedure: SHOULDER ARTHROSCOPY, rotator cuff repair; extensive debridement, open biceps tenodesis;  Surgeon: Joo Rivers MD;  Location: [facility];  Service: Orthopedics   • TONSILLECTOMY     • TUBAL ABDOMINAL LIGATION     • TYMPANOSTOMY TUBE PLACEMENT     • WISDOM TOOTH EXTRACTION Bilateral        Family History   Problem Relation Age of Onset   • Cancer Father    • Heart disease Paternal Grandmother    • Diabetes Maternal Aunt    • Diabetes Maternal Grandmother    • Colon cancer Neg Hx    • Colon polyps Neg Hx          Review of Systems   Constitutional: Negative for chills, diaphoresis, fever and unexpected weight change.   HENT: Negative for hearing loss, nosebleeds, sore throat and tinnitus.    Eyes: Negative for pain and visual disturbance.   Respiratory: Negative for cough, shortness of breath and wheezing.    Cardiovascular: Negative for chest pain and palpitations.   Gastrointestinal: Negative for abdominal pain, diarrhea, nausea and vomiting.   Endocrine: Negative for cold intolerance, heat intolerance and polydipsia.   Genitourinary: Negative for difficulty urinating, dysuria and hematuria.   Musculoskeletal: Positive for arthralgias, joint swelling and myalgias.   Skin: Negative for rash and wound.   Allergic/Immunologic: Negative for environmental allergies.   Neurological: Negative for dizziness, syncope and numbness.   Hematological: Does not bruise/bleed easily.   Psychiatric/Behavioral: Negative for dysphoric mood and sleep disturbance. The patient is not nervous/anxious.            Objective:  There were no vitals filed for this visit.  There were no vitals filed for this visit.  There is no height or weight on file to calculate BMI.  General: No acute distress.  Resp: normal respiratory effort  Skin: no rashes or wounds; normal turgor  Psych: mood and affect appropriate; recent and remote memory intact      Ortho Exam     Left  elbow-at range of motion 0 to 145 degrees, patient localizes maximal tenderness to lateral epicondyle, increased with resisted wrist extension, denies any radiation down into her forearm with percussion over the elbow.  States she has positive sensation to the left hand that is symmetric to her right, fingers are pink with good cap refill noted.    Imaging:    Assessment:        1. Lateral epicondylitis of left elbow           Plan:          1. Discussed treatment options at length with patient at today's visit.  Given short-term improvement with oral steroid as well as elbow strap and persistence of pain with daily activities, we discussed options and patient does wish to proceed with injection today.  We will get this set up for next week.  I reviewed risk, benefits, alternatives of injection she understood these had all questions answered.      Aide Henry was in agreement with plan and had all questions answered.     Orders:  No orders of the defined types were placed in this encounter.      Medications:  No orders of the defined types were placed in this encounter.      Followup:  Return in about 1 week (around 4/16/2020).    Aide was seen today for pain.    Diagnoses and all orders for this visit:    Lateral epicondylitis of left elbow          Dictated utilizing Dragon dictation

## 2020-04-15 ENCOUNTER — CLINICAL SUPPORT (OUTPATIENT)
Dept: ORTHOPEDIC SURGERY | Facility: CLINIC | Age: 40
End: 2020-04-15

## 2020-04-15 VITALS — BODY MASS INDEX: 35 KG/M2 | WEIGHT: 205 LBS | HEIGHT: 64 IN

## 2020-04-15 DIAGNOSIS — M77.12 LATERAL EPICONDYLITIS OF LEFT ELBOW: Primary | ICD-10-CM

## 2020-04-15 PROCEDURE — 20605 DRAIN/INJ JOINT/BURSA W/O US: CPT | Performed by: ORTHOPAEDIC SURGERY

## 2020-04-15 RX ORDER — LIDOCAINE HYDROCHLORIDE 10 MG/ML
2 INJECTION, SOLUTION INFILTRATION; PERINEURAL
Status: COMPLETED | OUTPATIENT
Start: 2020-04-15 | End: 2020-04-15

## 2020-04-15 RX ORDER — TRIAMCINOLONE ACETONIDE 40 MG/ML
40 INJECTION, SUSPENSION INTRA-ARTICULAR; INTRAMUSCULAR
Status: COMPLETED | OUTPATIENT
Start: 2020-04-15 | End: 2020-04-15

## 2020-04-15 RX ADMIN — TRIAMCINOLONE ACETONIDE 40 MG: 40 INJECTION, SUSPENSION INTRA-ARTICULAR; INTRAMUSCULAR at 13:42

## 2020-04-15 RX ADMIN — LIDOCAINE HYDROCHLORIDE 2 ML: 10 INJECTION, SOLUTION INFILTRATION; PERINEURAL at 13:42

## 2020-04-15 NOTE — PROGRESS NOTES
Cc: Left elbow lateral epicondylitis    Patient presents to clinic today for left elbow cortisone injection(s). I explained details of injections as well as risks, benefits and alternatives with the patient today, had all questions answered, wished to proceed with injections. Patient was instructed to watch for signs or symptoms of infection including redness, swelling, warmth to the touch, or significant increased pain and to contact our office immediately if any of these issues were noted.      Medium Joint Arthrocentesis: L elbow  Date/Time: 4/15/2020 1:42 PM  Consent given by: patient  Site marked: site marked  Timeout: Immediately prior to procedure a time out was called to verify the correct patient, procedure, equipment, support staff and site/side marked as required   Supporting Documentation  Indications: pain   Procedure Details  Location: elbow - L elbow  Preparation: Patient was prepped and draped in the usual sterile fashion  Needle size: 22 G  Approach: LATERAL.  Medications administered: 2 mL lidocaine 1 %; 40 mg triamcinolone acetonide 40 MG/ML  Patient tolerance: patient tolerated the procedure well with no immediate complications

## 2020-07-02 ENCOUNTER — OFFICE VISIT (OUTPATIENT)
Dept: ORTHOPEDIC SURGERY | Facility: CLINIC | Age: 40
End: 2020-07-02

## 2020-07-02 VITALS
SYSTOLIC BLOOD PRESSURE: 122 MMHG | HEIGHT: 64 IN | DIASTOLIC BLOOD PRESSURE: 82 MMHG | WEIGHT: 200 LBS | BODY MASS INDEX: 34.15 KG/M2

## 2020-07-02 DIAGNOSIS — R52 PAIN: Primary | ICD-10-CM

## 2020-07-02 DIAGNOSIS — S52.045A NONDISPLACED FRACTURE OF CORONOID PROCESS OF LEFT ULNA, INITIAL ENCOUNTER FOR CLOSED FRACTURE: ICD-10-CM

## 2020-07-02 PROCEDURE — 24670 CLTX ULNAR FX PROX W/O MNPJ: CPT | Performed by: ORTHOPAEDIC SURGERY

## 2020-07-02 PROCEDURE — 73080 X-RAY EXAM OF ELBOW: CPT | Performed by: ORTHOPAEDIC SURGERY

## 2020-07-02 PROCEDURE — 99214 OFFICE O/P EST MOD 30 MIN: CPT | Performed by: ORTHOPAEDIC SURGERY

## 2020-07-02 RX ORDER — FAMOTIDINE 20 MG/1
20 TABLET, FILM COATED ORAL 2 TIMES DAILY
COMMUNITY
Start: 2020-05-04 | End: 2020-07-02 | Stop reason: ALTCHOICE

## 2020-07-02 NOTE — PROGRESS NOTES
"Subjective:     Patient ID: Aide Henry is a 40 y.o. female.    Chief Complaint: Left elbow pain, new problem    History of Present Illness  Aide Henry returns to clinic today for evaluation of her left elbow. The patient states that yesterday, she slipped on her pool deck and landed directly on her left elbow and experienced immediate onset of elbow pain. She believes that she also suffered an elbow dislocation at that time, but her elbow is now back in place. She denies prior elbow dislocation.   Today, she rates her pain as a 7/10 in severity, described as aching and sharp. Patient also notes associated elbow swelling. Her pain is worsened with moving the elbow. Symptoms are alleviated with rest and keeping the elbow in a flexed position. Patient reports history of carpal tunnel, and she notes some initial tingling after the injury yesterday, but this is now resolved. Localizes pain to the posterior and posterolateral aspects of the elbow, with some intermittent radiation into the arm.     Social History     Occupational History   • Not on file   Tobacco Use   • Smoking status: Former Smoker     Packs/day: 0.25     Years: 20.00     Pack years: 5.00     Types: Cigarettes   • Smokeless tobacco: Never Used   • Tobacco comment: Declines medication today. Not interested in patches. Has tried gum previously. Desires to continue weaning.    Substance and Sexual Activity   • Alcohol use: Yes     Comment: Alcoholic, Last drink 4-2-2018   • Drug use: Yes     Types: Cocaine, Hydrocodone     Comment: \"recovering addict\", last reports use June 2017   • Sexual activity: Defer      Past Medical History:   Diagnosis Date   • Anxiety    • Bacterial vaginosis    • Ashton esophagus    • Depression with anxiety    • Fibromyalgia    • Fracture, finger    • GERD (gastroesophageal reflux disease)    • Lateral epicondylitis 9/16/2013    RHUEMATOLOGIST   • Lupus (CMS/[facility])    • MRSA (methicillin resistant staph aureus) culture " positive  ESTIMATE    GROIN AREA    • Sjogren's syndrome (CMS/HCC)    • Urinary tract infection      Past Surgical History:   Procedure Laterality Date   • ADENOIDECTOMY     •  SECTION     • COLONOSCOPY     • ENDOSCOPY N/A 5/10/2019    Procedure: ESOPHAGOGASTRODUODENOSCOPY with biopsies;  Surgeon: Shanon Vazquez MD;  Location: [facility] OR;  Service: Gastroenterology   • MOUTH SURGERY     • RHINOPLASTY     • SHOULDER ARTHROSCOPY Left 9/10/2018    Procedure: SHOULDER ARTHROSCOPY, rotator cuff repair; extensive debridement, open biceps tenodesis;  Surgeon: Joo Rivers MD;  Location: [facility] OR;  Service: Orthopedics   • TONSILLECTOMY     • TUBAL ABDOMINAL LIGATION     • TYMPANOSTOMY TUBE PLACEMENT     • WISDOM TOOTH EXTRACTION Bilateral        Family History   Problem Relation Age of Onset   • Cancer Father    • Heart disease Paternal Grandmother    • Diabetes Maternal Aunt    • Diabetes Maternal Grandmother    • Colon cancer Neg Hx    • Colon polyps Neg Hx          Review of Systems   Constitutional: Negative for chills, diaphoresis, fever and unexpected weight change.   HENT: Negative for hearing loss, nosebleeds, sore throat and tinnitus.    Eyes: Negative for pain and visual disturbance.   Respiratory: Negative for cough, shortness of breath and wheezing.    Cardiovascular: Negative for chest pain and palpitations.   Gastrointestinal: Negative for abdominal pain, diarrhea, nausea and vomiting.   Endocrine: Negative for cold intolerance, heat intolerance and polydipsia.   Genitourinary: Negative for difficulty urinating, dysuria and hematuria.   Musculoskeletal: Positive for arthralgias, joint swelling and myalgias.   Skin: Negative for rash and wound.   Allergic/Immunologic: Negative for environmental allergies.   Neurological: Negative for dizziness, syncope and numbness.   Hematological: Does not bruise/bleed easily.   Psychiatric/Behavioral: Negative for dysphoric mood and sleep disturbance.  "The patient is not nervous/anxious.            Objective:  Vitals:    07/02/20 1315   BP: 122/82   BP Location: Right arm   Weight: 90.7 kg (200 lb)   Height: 162.6 cm (64\")         07/02/20  1315   Weight: 90.7 kg (200 lb)     Body mass index is 34.33 kg/m².    Physical Exam    Vital signs reviewed.   General: No acute distress, alert and oriented  Eyes: conjunctiva clear; pupils equally round and reactive  ENT: external ears and nose atraumatic; oropharynx clear  CV: no peripheral edema  Resp: normal respiratory effort  Skin: no rashes or wounds; normal turgor  Psych: mood and affect appropriate; recent and remote memory intact         Ortho Exam     Left Elbow-  Maximal tenderness radiocapitellar joint and lateral aspect of the olceranon  Active and passive ROM is   4/5 strength on resistance  Moderate radiocapitellar effusion  Moderate soft tissue swelling  4+/5 strength on finger and wrist flexion and extension  Positive sensation to light touch all distributions  2+ radial pulse       Imaging:  Left Elbow X-Ray  Indication: Pain  Views: AP and Lateral views    Findings:  Small nondisplaced fracture from the tip of the coronoid process noted only on lateral view, no evidence of radial head fracture, mild elbow effusion noted  No bony lesion  Normal soft tissues  Normal joint spaces    No prior studies were available for comparison.        Assessment:        1. Pain    2. Nondisplaced fracture of coronoid process of left ulna, initial encounter for closed fracture           Plan:          1. Discussed treatment options at length with patient at today's visit.   2. Placed in a sling today. She may start into gentle passive range of motion out the sling in one week.   3. I will follow-up with her in 2 weeks with repeat x-rays of the left elbow. If stable, we will discontinue the sling at that time.      Aide Henry was in agreement with plan and had all questions answered.     Orders:  Orders Placed This " Encounter   Procedures   • XR Elbow 3+ View Left       Medications:  No orders of the defined types were placed in this encounter.      Followup:  Return in about 2 weeks (around 7/16/2020) for repeat xrays left elbow.    Aide was seen today for follow-up, pain and edema.    Diagnoses and all orders for this visit:    Pain  -     XR Elbow 3+ View Left    Nondisplaced fracture of coronoid process of left ulna, initial encounter for closed fracture        By signing my name here, I Eber Zarco, attest that all documentation on 07/14/20 at 09:30 has been prepared under the direction and in the presence of Dr. Joo Rivers MD.    I, Dr. Joo Rivers, personally performed the services described in this documentation, as scribed by Eber Zarco, in my presence, and it is both accurate and complete.         Dictated utilizing Dragon dictation

## 2020-07-16 ENCOUNTER — OFFICE VISIT (OUTPATIENT)
Dept: ORTHOPEDIC SURGERY | Facility: CLINIC | Age: 40
End: 2020-07-16

## 2020-07-16 VITALS — WEIGHT: 200 LBS | BODY MASS INDEX: 34.15 KG/M2 | HEIGHT: 64 IN

## 2020-07-16 DIAGNOSIS — S52.045A NONDISPLACED FRACTURE OF CORONOID PROCESS OF LEFT ULNA, INITIAL ENCOUNTER FOR CLOSED FRACTURE: Primary | ICD-10-CM

## 2020-07-16 PROCEDURE — 99024 POSTOP FOLLOW-UP VISIT: CPT | Performed by: ORTHOPAEDIC SURGERY

## 2020-07-16 PROCEDURE — 73080 X-RAY EXAM OF ELBOW: CPT | Performed by: ORTHOPAEDIC SURGERY

## 2020-07-16 RX ORDER — METHYLPREDNISOLONE 4 MG/1
TABLET ORAL
Qty: 1 EACH | Refills: 0 | Status: SHIPPED | OUTPATIENT
Start: 2020-07-16 | End: 2020-07-28

## 2020-07-28 ENCOUNTER — OFFICE VISIT (OUTPATIENT)
Dept: ORTHOPEDIC SURGERY | Facility: CLINIC | Age: 40
End: 2020-07-28

## 2020-07-28 DIAGNOSIS — S52.045A NONDISPLACED FRACTURE OF CORONOID PROCESS OF LEFT ULNA, INITIAL ENCOUNTER FOR CLOSED FRACTURE: Primary | ICD-10-CM

## 2020-07-28 PROCEDURE — 99024 POSTOP FOLLOW-UP VISIT: CPT | Performed by: ORTHOPAEDIC SURGERY

## 2020-07-28 PROCEDURE — 73080 X-RAY EXAM OF ELBOW: CPT | Performed by: ORTHOPAEDIC SURGERY

## 2020-07-28 RX ORDER — PREDNISONE 1 MG/1
TABLET ORAL
COMMUNITY
Start: 2020-07-23 | End: 2020-08-06

## 2020-07-28 NOTE — PROGRESS NOTES
Chief complaint: Follow-up closed treatment nondisplaced left coronoid process fracture, date of injury 7/1/2020    Interval history: Patient returns clinic today for follow-up evaluation left elbow stating has noted improvement in motion, not much help with oral steroid. Now on another oral steroid from PCP for another issue.       Exam:  Left elbow-mild tenderness over medial collateral ligament, range of motion is 5 to 135 degrees, 4+ out of 5 strength on flexion extension, stable to varus and valgus at 5 and 30 degrees.  No tenderness anteriorly over coronoid process or in the antecubital fossa.  Positive sensation light touch all descriptions left hand symmetric to right, brisk cap refill all digits, 2+ radial pulse.  No radiocapitellar effusion noted.      Imaging:  Three-view x-rays left elbow from today's visit, AP, lateral, oblique views, ordered and reviewed by me, indication status post closed treatment coronoid process fracture, compared to prior office x-rays.  Fracture appears to be stable in position with minimal callus formation noted at this time.  Anatomic alignment of the ulnohumeral and radiocapitellar joints noted with significant resolution of prior elbow effusion.    Impression: Closed treatment left nondisplaced coronoid process fracture    Plan:  1. Discontinue sling completely at this time.  Patient will work on range of motion on her own at home.  If we are unable to improve motion by next visit we may consider work with physical therapy.  2. Gave sample of pennsaid to use over medial elbow  3. F/u in 4 weeks to re-evaluate for motion, no xrays needed  4. All questions answered today

## 2020-08-27 ENCOUNTER — OFFICE VISIT (OUTPATIENT)
Dept: ORTHOPEDIC SURGERY | Facility: CLINIC | Age: 40
End: 2020-08-27

## 2020-08-27 VITALS — BODY MASS INDEX: 34.15 KG/M2 | WEIGHT: 200 LBS | HEIGHT: 64 IN

## 2020-08-27 DIAGNOSIS — S52.045A NONDISPLACED FRACTURE OF CORONOID PROCESS OF LEFT ULNA, INITIAL ENCOUNTER FOR CLOSED FRACTURE: Primary | ICD-10-CM

## 2020-08-27 PROCEDURE — 99024 POSTOP FOLLOW-UP VISIT: CPT | Performed by: ORTHOPAEDIC SURGERY

## 2020-08-27 PROCEDURE — 73080 X-RAY EXAM OF ELBOW: CPT | Performed by: ORTHOPAEDIC SURGERY

## 2020-08-30 NOTE — ED NOTES
Fredis complete #[id number]     Vitaly Salmon  09/13/17 5814    
Went over discharge instructions with patient, alert and oriented at time of discharge, no questions at this time. Left ambulatory.       Jimena Merino RN  09/13/17 4941    
 Symptoms

## 2020-09-03 ENCOUNTER — APPOINTMENT (OUTPATIENT)
Dept: OCCUPATIONAL THERAPY | Facility: HOSPITAL | Age: 40
End: 2020-09-03

## 2020-09-08 ENCOUNTER — HOSPITAL ENCOUNTER (OUTPATIENT)
Dept: OCCUPATIONAL THERAPY | Facility: HOSPITAL | Age: 40
Setting detail: THERAPIES SERIES
Discharge: HOME | End: 2020-09-08

## 2020-09-08 DIAGNOSIS — M25.522 LEFT ELBOW PAIN: Primary | ICD-10-CM

## 2020-09-08 DIAGNOSIS — S52.045A NONDISPLACED FRACTURE OF CORONOID PROCESS OF LEFT ULNA, INITIAL ENCOUNTER FOR CLOSED FRACTURE: ICD-10-CM

## 2020-09-08 DIAGNOSIS — M25.622 STIFFNESS OF LEFT ELBOW JOINT: ICD-10-CM

## 2020-09-08 PROCEDURE — 97165 OT EVAL LOW COMPLEX 30 MIN: CPT

## 2020-09-08 NOTE — THERAPY EVALUATION
Outpatient Occupational Therapy Ortho Initial Evaluation   Cecilia Jarquin     Patient Name: Aide Henry  : 1980  MRN: [id number]  Today's Date: 2020      Visit Date: 2020    Patient Active Problem List   Diagnosis   • Arthralgia of multiple joints   • Chronic back pain   • Chronic constipation   • Disorder of connective tissue (CMS/HCC)   • Depression with anxiety   • Fibromyalgia   • Gastroesophageal reflux disease without esophagitis   • Irritable bowel syndrome   • Muscle pain   • Palpitations   • Seasonal allergic rhinitis   • Sympathetic uveitis   • Tenderness of temporomandibular joint   • Bilateral carpal tunnel syndrome   • Eczema   • Shoulder pain, left   • Substance abuse (CMS/HCC)   • Lateral epicondylitis   • Drug addiction syndrome (CMS/HCC)   • Gastroenteritis   • Alcohol dependence in remission (CMS/HCC)   • History of placement of ear tubes   • Status post arthroscopy of shoulder   • Subacromial impingement of left shoulder   • Biceps tendinitis of left upper extremity   • Complete tear of left rotator cuff   • Smoker   • Sjogren's syndrome with keratoconjunctivitis sicca (CMS/HCC)   • Left ovarian cyst   • Pelvic pain   • Nondisplaced fracture of coronoid process of left ulna, initial encounter for closed fracture        Past Medical History:   Diagnosis Date   • Anxiety    • Bacterial vaginosis    • Ashton esophagus    • Depression with anxiety    • Fibromyalgia    • Fracture, finger    • GERD (gastroesophageal reflux disease)    • Lateral epicondylitis 2013    RHUEMATOLOGIST   • Lupus (CMS/Formerly [facility])    • MRSA (methicillin resistant staph aureus) culture positive  ESTIMATE    GROIN AREA    • Sjogren's syndrome (CMS/Formerly [facility])    • Urinary tract infection         Past Surgical History:   Procedure Laterality Date   • ADENOIDECTOMY     •  SECTION     • COLONOSCOPY     • ENDOSCOPY N/A 5/10/2019    Procedure: ESOPHAGOGASTRODUODENOSCOPY with biopsies;  Surgeon: Shanon Vazquez  MD;  Location: [facility] OR;  Service: Gastroenterology   • MOUTH SURGERY     • RHINOPLASTY     • SHOULDER ARTHROSCOPY Left 9/10/2018    Procedure: SHOULDER ARTHROSCOPY, rotator cuff repair; extensive debridement, open biceps tenodesis;  Surgeon: Joo Rivers MD;  Location: [facility] OR;  Service: Orthopedics   • TONSILLECTOMY     • TUBAL ABDOMINAL LIGATION     • TYMPANOSTOMY TUBE PLACEMENT     • WISDOM TOOTH EXTRACTION Bilateral          Visit Dx:    ICD-10-CM ICD-9-CM   1. Left elbow pain M25.522 719.42   2. Stiffness of left elbow joint M25.622 719.52   3. Nondisplaced fracture of coronoid process of left ulna, initial encounter for closed fracture S52.045A 813.02       Patient History     Row Name 09/08/20 0900             History    Chief Complaint  Joint stiffness;Difficulty with daily activities;Pain  -SD      Type of Pain  Elbow pain  -SD      Date Current Problem(s) Began  07/01/20  -SD      Brief Description of Current Complaint  Pt fell resulting in a nondisplaced fracture of coronoid process of left ulna. Pt was in a splint x 2 weeks. Pt has been attempting rom/activity at home, but c/o stiffness. Pt referred to therapy by Dr. Rivers  -SD      Patient/Caregiver Goals  Return to prior level of function  -SD      Current Tobacco Use  no  -SD      Smoking Status  former  -SD      Patient's Rating of General Health  Fair  -SD      Hand Dominance  right-handed  -SD      Occupation/sports/leisure activities  homemaker  -SD      Patient seeing anyone else for problem(s)?  Dr. Rivers  -SD      How has patient tried to help current problem?  rom, activity  -SD      What clinical tests have you had for this problem?  X-ray  -SD      Results of Clinical Tests  revealed fracture  -SD         Pain     Pain Location  Elbow  -SD      Pain at Present  0  -SD      Pain at Best  0  -SD      Pain at Worst  6 worse pain at night  -SD      Pain Frequency  Intermittent  -SD      Pain Description  Aching at night  -SD       What Performance Factors Make the Current Problem(s) WORSE?  nothing reported  -SD      Is your sleep disturbed?  Yes  -SD      Is medication used to assist with sleep?  No  -SD      Total hours of sleep per night  6  -SD      What position do you sleep in?  Right sidelying  -SD      Difficulties with ADL's?  Pt reports difficulty tasks that involve reaching and lifting.  -SD         Fall Risk Assessment    Any falls in the past year:  Yes  -SD      Number of falls reported in the last 12 months  1  -SD      Factors that contributed to the fall:  Slippery surface  -SD      Does patient have a fear of falling  No  -SD         Services    Prior Rehab/Home Health Experiences  Yes  -SD      When was the prior experience with Rehab/Home Health  2018  -SD      Where was the prior experience with Rehab/Home Health   [facility]  -SD      Are you currently receiving Home Health services  No  -SD      Do you plan to receive Home Health services in the near future  No  -SD         Daily Activities    Primary Language  English  -SD      Are you able to read  Yes  -SD      Are you able to write  Yes  -SD      How does patient learn best?  Listening  -SD      Teaching needs identified  Home Exercise Program;Management of Condition  -SD      Patient is concerned about/has problems with  Difficulty with self care (i.e. bathing, dressing, toileting:;Grasping objects lifting;Performing home management (household chores, shopping, care of dependents)  -SD      Does patient have problems with the following?  Depression;Anxiety;Panic Attack managed by PCP  -SD      Barriers to learning  None  -SD      Pt Participated in POC and Goals  Yes  -SD         Safety    Are you being hurt, hit, or frightened by anyone at home or in your life?  No  -SD      Are you being neglected by a caregiver  No  -SD        User Key  (r) = Recorded By, (t) = Taken By, (c) = Cosigned By    Initials Name Provider Type    Colt Samuel OTR Occupational  Therapist          OT Ortho     Row Name 09/08/20 1000             Right Upper Ext    Rt Elbow Extension/Flexion AROM  0-145 5 degrees hyperextensioin  -SD      Rt Elbow Supination AROM  70  -SD      Rt Elbow Pronation AROM  90  -SD         Left Upper Ext    Lt Elbow Extension/Flexion AROM   pain 4/10  -SD      Lt Elbow Extension/Flexion PROM    -SD      Lt Elbow Supination AROM  70  -SD      Lt Elbow Pronation AROM  90  -SD         MMT Right Upper Ext    Rt Elbow Flexion MMT, Gross Movement:  (5/5) normal  -SD      Rt Elbow Extension MMT, Gross Movement:  (5/5) normal  -SD      Rt Forearm Supination MMT, Gross Movement  (5/5) normal  -SD      Rt Forearm Pronation MMT, Gross Movement  (5/5) normal  -SD         MMT Left Upper Ext    Lt Elbow Flexion MMT, Gross Movement  (3-/5) fair minus  -SD      Lt Elbow Extension MMT, Gross Movement  (3-/5) fair minus  -SD      Lt Forearm Supination MMT, Gross Movement  (3+/5) fair plus  -SD      Lt Forearm Pronation MMT, Gross Movement  (3+/5) fair plus  -SD         RUE Edema - Circumference (cm)    Elbow Crease  27.7 cm  -SD         LUE Edema - Circumference (cm)    Elbow Crease  28.4 cm  -SD        User Key  (r) = Recorded By, (t) = Taken By, (c) = Cosigned By    Initials Name Provider Type    Colt Samuel OTR Occupational Therapist             Hand Therapy (last 24 hours)      Hand Eval     Row Name 09/08/20 1000              Strength Right    # Reps  1  -SD      Right Rung  2  -SD      Right  Test 1  84  -SD       Strength Average Right  84  -SD          Strength Left    # Reps  1  -SD      Left Rung  2  -SD      Left  Test 1  56  -SD       Strength Average Left  56  -SD        User Key  (r) = Recorded By, (t) = Taken By, (c) = Cosigned By    Initials Name Provider Type    Colt Samuel OTR Occupational Therapist              Therapy Education  Education Details: Education regarding edema management and HEP for rom.  Given:  HEP, Symptoms/condition management, Pain management, Edema management  Program: New  How Provided: Verbal, Demonstration  Provided to: Patient  Level of Understanding: Teach back education performed, Verbalized, Demonstrated    OT Goals     Row Name 09/08/20 1200          OT Short Term Goals    STG Date to Achieve  09/29/20  -SD     STG 1  Pt to be independent with HEP for edema management, rom and functional strengthening of left elbow.  -SD     STG 1 Progress  New  -SD     STG 2  Pt to improve ELLISON of left elbow to >120 to allow for increased adl independence.  -SD     STG 2 Progress  New  -SD     STG 2 Progress Comments  initial ELLISON 100  -SD        Long Term Goals    LTG Date to Achieve  10/13/20  -SD     LTG 1  Pt to improve Quick Dash measure by 15 to demonstate increased independence with daily tasks.   -SD     LTG 1 Progress  New  -SD     LTG 1 Progress Comments  Initial Quick Dash measure 54  -SD     LTG 2  Pt to improve right elbow strength to 4/5 to increase independence with HH tasks.   -SD     LTG 2 Progress  New  -SD     LTG 2 Progress Comments  Initial strength 3-/5  -SD     LTG 3  Pt to improve left  by 15# to increase adl independence.  -SD     LTG 3 Progress  New  -SD     LTG 3 Progress Comments  Initial  56#  -SD       User Key  (r) = Recorded By, (t) = Taken By, (c) = Cosigned By    Initials Name Provider Type    Colt Samuel OTR Occupational Therapist          OT Assessment/Plan     Row Name 09/08/20 1253          OT Assessment    Functional Limitations  Limitation in home management;Performance in self-care ADL  -SD     Impairments  Pain;Range of motion;Muscle strength;Edema  -SD     Assessment Comments  Pt presents with min edema, min/mod pain, decreased rom and functional strength of her left elbow following a coronoid process fracture of left ulna on 7-1-20. Pt reports the joint stiffness and left UE weakness limits her ability to manage daily tasks. Education provided  regarding edema management (retrograde massage and use of light compression /size F tubigrip) and rom program (arom, prom and place/hold exercises for left elbow). Plan to progress with strengthening as tolerated.   -SD     Please refer to paper survey for additional self-reported information  Yes  -SD     OT Diagnosis  pain, joint stiffness, weakness  -SD     OT Rehab Potential  Good  -SD     Patient/caregiver participated in establishment of treatment plan and goals  Yes  -SD     Patient would benefit from skilled therapy intervention  Yes  -SD        OT Plan    OT Frequency  2x/week 2x/week x 3 weeks, then decrease to weekly x 2 weeks.  8 visits until follow up with physician  -SD     Predicted Duration of Therapy Intervention (Therapy Eval)  5 weeks  -SD     Planned CPT's?  OT EVAL LOW COMPLEXITY: 13647;OT THER ACT EA 15 MIN: 56547PX;OT HOT/COLD PACK  -SD     Planned Therapy Interventions (Optional Details)  home exercise program;patient/family education;ROM (Range of Motion);strengthening  -SD     OT Plan Comments  Rec OT services 2x/week x 3 weeks, then weekly x 2 weeks for edema management, rom and functional strengthening of left UE follow her elbow injury.  -SD       User Key  (r) = Recorded By, (t) = Taken By, (c) = Cosigned By    Initials Name Provider Type    Colt Samuel OTR Occupational Therapist           OT Exercises     Row Name 09/08/20 1200             Subjective Comments    Subjective Comments  Pt stated her left elbow felt better after participation in rom program.  -SD         Exercise 1    Exercise Name 1  Education with edema management (retrograde massage and use of Tubigrip size F)  -SD         Exercise 2    Exercise Name 2  Education with HEP (arom, prom and place/hold exercises for left elbow)  -SD        User Key  (r) = Recorded By, (t) = Taken By, (c) = Cosigned By    Initials Name Provider Type    Colt Samuel OTR Occupational Therapist            Outcome Measure  Options: Quick DASH  Quick DASH  Open a tight or new jar.: Mild Difficulty  Do heavy household chores (e.g., wash walls, wash floors): Unable  Carry a shopping bag or briefcase: Mild Difficulty  Wash your back: Unable  Use a knife to cut food: No Difficulty  Recreational activities in which you take some force or impact through your arm, should or hand (e.g. golf, hammering, tennis, etc.): Severe Difficulty  During the past week, to what extent has your arm, shoulder, or hand problem interfered with your normal social activites with family, friends, neighbors or groups?: Quite a bit  During the past week, were you limited in your work or other regular daily activities as a result of your arm, shoulder or hand problem?: Moderately Limited  Arm, Shoulder, or hand pain: Severe  Tingling (pins and needles) in your arm, shoulder, or hand: Mild  During the past week, how much difficulty have you had sleeping because of the pain in your arm, shoulder or hand?: Moderate Difficiculty  Number of Questions Answered: 11  Quick DASH Score: 54.55         Time Calculation:    OT Start Time: 0950  OT Stop Time: 1035  OT Time Calculation (min): 45 min     Therapy Charges for Today     Code Description Service Date Service Provider Modifiers Qty    13160191740 HC OT EVAL LOW COMPLEXITY 3 9/8/2020 Colt Chan OTR GO 1                  HANK Moon  9/8/2020

## 2020-10-30 ENCOUNTER — DOCUMENTATION (OUTPATIENT)
Dept: OCCUPATIONAL THERAPY | Facility: HOSPITAL | Age: 40
End: 2020-10-30

## 2020-10-30 NOTE — THERAPY DISCHARGE NOTE
Outpatient Occupational Therapy Discharge Summary         Patient Name: Aide Henry  : 1980  MRN: [id number]    Today's Date: 10/30/2020      Visit Date: 10/30/2020      OT Goals     Row Name 10/30/20 1100          OT Short Term Goals    STG Date to Achieve  20  -SD     STG 1  Pt to be independent with HEP for edema management, rom and functional strengthening of left elbow.  -SD     STG 1 Progress  New  -SD     STG 2  Pt to improve ELLISON of left elbow to >120 to allow for increased adl independence.  -SD     STG 2 Progress  New  -SD        Long Term Goals    LTG Date to Achieve  10/13/20  -SD     LTG 1  Pt to improve Quick Dash measure by 15 to demonstate increased independence with daily tasks.   -SD     LTG 1 Progress  New  -SD     LTG 2  Pt to improve right elbow strength to 4/5 to increase independence with HH tasks.   -SD     LTG 2 Progress  New  -SD     LTG 3  Pt to improve left  by 15# to increase adl independence.  -SD     LTG 3 Progress  New  -SD       User Key  (r) = Recorded By, (t) = Taken By, (c) = Cosigned By    Initials Name Provider Type    Colt Samuel OTR Occupational Therapist           OP OT Discharge Summary  Date of Discharge: 10/30/20  Reason for Discharge: other (comment)(evaluation only)  Outcomes Achieved: Other(unsure of progress toward goal. pt only came in for evaluation)  Discharge Destination: Home with home program      Time Calculation:                         HANK Moon   10/30/2020

## 2020-11-24 ENCOUNTER — OFFICE VISIT (OUTPATIENT)
Dept: OBSTETRICS AND GYNECOLOGY | Facility: CLINIC | Age: 40
End: 2020-11-24

## 2020-11-24 VITALS
WEIGHT: 194.9 LBS | SYSTOLIC BLOOD PRESSURE: 130 MMHG | DIASTOLIC BLOOD PRESSURE: 80 MMHG | BODY MASS INDEX: 33.28 KG/M2 | HEIGHT: 64 IN

## 2020-11-24 DIAGNOSIS — M35.01 SJOGREN'S SYNDROME WITH KERATOCONJUNCTIVITIS SICCA (HCC): ICD-10-CM

## 2020-11-24 DIAGNOSIS — Z13.9 SCREENING FOR CONDITION: Primary | ICD-10-CM

## 2020-11-24 DIAGNOSIS — N81.6 RECTOCELE: ICD-10-CM

## 2020-11-24 DIAGNOSIS — N39.3 SUI (STRESS URINARY INCONTINENCE, FEMALE): ICD-10-CM

## 2020-11-24 DIAGNOSIS — F19.20: ICD-10-CM

## 2020-11-24 DIAGNOSIS — N63.20 LEFT BREAST LUMP: ICD-10-CM

## 2020-11-24 LAB
BILIRUB BLD-MCNC: NEGATIVE MG/DL
CLARITY, POC: CLEAR
COLOR UR: ABNORMAL
GLUCOSE UR STRIP-MCNC: NEGATIVE MG/DL
KETONES UR QL: NEGATIVE
LEUKOCYTE EST, POC: NEGATIVE
NITRITE UR-MCNC: NEGATIVE MG/ML
PH UR: 5 [PH] (ref 5–8)
PROT UR STRIP-MCNC: NEGATIVE MG/DL
RBC # UR STRIP: ABNORMAL /UL
SP GR UR: 1 (ref 1–1.03)
UROBILINOGEN UR QL: NORMAL

## 2020-11-24 PROCEDURE — 99395 PREV VISIT EST AGE 18-39: CPT | Performed by: OBSTETRICS & GYNECOLOGY

## 2020-11-24 PROCEDURE — 99213 OFFICE O/P EST LOW 20 MIN: CPT | Performed by: OBSTETRICS & GYNECOLOGY

## 2020-11-24 RX ORDER — DULOXETIN HYDROCHLORIDE 60 MG/1
60 CAPSULE, DELAYED RELEASE ORAL DAILY
COMMUNITY
End: 2021-05-10

## 2020-11-24 RX ORDER — AMLODIPINE BESYLATE 2.5 MG/1
2.5 TABLET ORAL EVERY EVENING
COMMUNITY
Start: 2020-11-11 | End: 2021-11-11

## 2020-11-24 RX ORDER — TRAZODONE HYDROCHLORIDE 100 MG/1
100 TABLET ORAL NIGHTLY
COMMUNITY
End: 2021-08-11

## 2020-11-24 NOTE — PROGRESS NOTES
GYN Annual Exam     CC- Here for annual exam.     Pt new to practice? No  Pt new to me? No     Aide Henry is a 40 y.o. No obstetric history on file. female who presents for annual well woman exam. Patient's last menstrual period was 11/20/2020 (approximate).    Problems in addition to need for annual: pt has new problem of stress urinary incontinence and a new left breast lump.     HPI: Breast Mass  This is a new problem. The current episode started 1 to 4 weeks ago. The problem occurs constantly. The problem has been unchanged. Pertinent negatives include no abdominal pain, fever or nausea. Nothing aggravates the symptoms. She has tried nothing for the symptoms.       PMHX:  Patient Active Problem List   Diagnosis   • Arthralgia of multiple joints   • Chronic back pain   • Chronic constipation   • Disorder of connective tissue (CMS/HCC)   • Depression with anxiety   • Fibromyalgia   • Gastroesophageal reflux disease without esophagitis   • Irritable bowel syndrome   • Muscle pain   • Palpitations   • Seasonal allergic rhinitis   • Sympathetic uveitis   • Tenderness of temporomandibular joint   • Bilateral carpal tunnel syndrome   • Eczema   • Shoulder pain, left   • Substance abuse (CMS/HCC)   • Lateral epicondylitis   • Drug addiction syndrome (CMS/HCC)   • Gastroenteritis   • Alcohol dependence in remission (CMS/HCC)   • History of placement of ear tubes   • Status post arthroscopy of shoulder   • Subacromial impingement of left shoulder   • Biceps tendinitis of left upper extremity   • Complete tear of left rotator cuff   • Smoker   • Sjogren's syndrome with keratoconjunctivitis sicca (CMS/HCC)   • Left ovarian cyst   • Pelvic pain   • Nondisplaced fracture of coronoid process of left ulna, initial encounter for closed fracture   • Left breast lump   • SARMAD (stress urinary incontinence, female)   • Rectocele   ; otherwise none    OB History    No obstetric history on file.           Past Medical History:    Diagnosis Date   • Anxiety    • Bacterial vaginosis    • Ashton esophagus    • Depression with anxiety    • Fibromyalgia    • Fracture, finger    • GERD (gastroesophageal reflux disease)    • Lateral epicondylitis 2013    RHUEMATOLOGIST   • Lupus (CMS/[facility])    • MRSA (methicillin resistant staph aureus) culture positive  ESTIMATE    GROIN AREA    • Sjogren's syndrome (CMS/[facility])    • Urinary tract infection        Past Surgical History:   Procedure Laterality Date   • ADENOIDECTOMY     •  SECTION     • COLONOSCOPY     • ENDOSCOPY N/A 5/10/2019    Procedure: ESOPHAGOGASTRODUODENOSCOPY with biopsies;  Surgeon: Shanon Vazquez MD;  Location: [facility] OR;  Service: Gastroenterology   • MOUTH SURGERY     • RHINOPLASTY     • SHOULDER ARTHROSCOPY Left 9/10/2018    Procedure: SHOULDER ARTHROSCOPY, rotator cuff repair; extensive debridement, open biceps tenodesis;  Surgeon: Joo Rivers MD;  Location: [facility] OR;  Service: Orthopedics   • TONSILLECTOMY     • TUBAL ABDOMINAL LIGATION     • TYMPANOSTOMY TUBE PLACEMENT     • WISDOM TOOTH EXTRACTION Bilateral          Current Outpatient Medications:   •  amLODIPine (NORVASC) 2.5 MG tablet, Take 2.5 mg by mouth Daily., Disp: , Rfl:   •  azaTHIOprine (IMURAN) 50 MG tablet, TK 2 TS PO D, Disp: , Rfl:   •  DULoxetine (CYMBALTA) 60 MG capsule, Take 60 mg by mouth Daily., Disp: , Rfl:   •  hydrocortisone (ANUSOL-HC) 2.5 % rectal cream, Insert  into the rectum 4 (Four) Times a Day As Needed for Hemorrhoids (rectal discomfort)., Disp: 30 g, Rfl: 5  •  hydrOXYzine (ATARAX) 25 MG tablet, TK 1 TO 2 TS PO QPM, Disp: , Rfl:   •  omeprazole (priLOSEC) 20 MG capsule, Take 1 capsule by mouth Daily., Disp: 90 capsule, Rfl: 1  •  pilocarpine (SALAGEN) 5 MG tablet, TK 1 T PO TID, Disp: , Rfl: 3  •  traZODone (DESYREL) 100 MG tablet, Take 100 mg by mouth Every Night., Disp: , Rfl:   •  ciclopirox (PENLAC) 8 % solution, OVIDIO EXT AA Q NIGHT, Disp: , Rfl:   •  desoximetasone  "(TOPICORT) 0.25 % cream, OVIDIO EXT AA BID, Disp: , Rfl:   •  meloxicam (MOBIC) 15 MG tablet, TAKE 1 TABLET BY MOUTH DAILY, Disp: 30 tablet, Rfl: 0    Allergies   Allergen Reactions   • Fluconazole Hives       Social History     Tobacco Use   • Smoking status: Former Smoker     Packs/day: 0.25     Years: 20.00     Pack years: 5.00     Types: Cigarettes   • Smokeless tobacco: Never Used   • Tobacco comment: Declines medication today. Not interested in patches. Has tried gum previously. Desires to continue weaning.  Vaping    Substance Use Topics   • Alcohol use: Yes     Comment: Alcoholic, Last drink 4-2-2018   • Drug use: Yes     Types: Cocaine(coke), Hydrocodone     Comment: \"recovering addict\", last reports use June 2017       Smoker: No    Family History   Problem Relation Age of Onset   • Cancer Father    • Heart disease Paternal Grandmother    • Diabetes Maternal Aunt    • Diabetes Maternal Grandmother    • Colon cancer Neg Hx    • Colon polyps Neg Hx        Review of Systems   Constitutional: Negative.  Negative for fever.   HENT: Negative.    Eyes: Negative.    Respiratory: Negative.    Cardiovascular: Negative.    Gastrointestinal: Negative.  Negative for abdominal pain and nausea.   Endocrine: Negative.    Genitourinary: Positive for difficulty urinating.   Musculoskeletal: Negative.    Skin: Negative.         Left breast lump   Allergic/Immunologic: Negative.    Neurological: Negative.    Hematological: Negative.    Psychiatric/Behavioral: Negative.            EXAM:  /80   Ht 162.6 cm (64.02\")   Wt 88.4 kg (194 lb 14.4 oz)   LMP 11/20/2020 (Approximate)   Breastfeeding No   BMI 33.44 kg/m²     Physical Exam  Vitals signs and nursing note reviewed. Exam conducted with a chaperone present.   Constitutional:       General: She is not in acute distress.     Appearance: She is well-developed. She is not diaphoretic.   HENT:      Head: Normocephalic and atraumatic.      Nose: Nose normal.   Eyes:      " Extraocular Movements: Extraocular movements intact.   Neck:      Musculoskeletal: Normal range of motion.   Cardiovascular:      Rate and Rhythm: Normal rate.   Pulmonary:      Effort: Pulmonary effort is normal.   Chest:      Breasts: Breasts are symmetrical.         Right: Normal. No mass, nipple discharge, skin change or tenderness.         Left: Mass present. No nipple discharge, skin change or tenderness.          Comments: 1cm mass at 3 oclock left breast, fixed, nontender  Abdominal:      General: There is no distension.      Palpations: Abdomen is soft. There is no mass.      Tenderness: There is no abdominal tenderness. There is no guarding.   Genitourinary:     General: Normal vulva.      Pubic Area: No rash.       Vagina: Normal. No vaginal discharge.      Cervix: Normal.      Uterus: Normal.       Adnexa: Right adnexa normal and left adnexa normal.      Comments: No pap  Mild rectocele  Musculoskeletal: Normal range of motion.         General: No tenderness or deformity.   Lymphadenopathy:      Upper Body:      Right upper body: No axillary adenopathy.      Left upper body: No axillary adenopathy.   Skin:     General: Skin is warm and dry.      Coloration: Skin is not pale.      Findings: No erythema or rash.   Neurological:      Mental Status: She is alert and oriented to person, place, and time.   Psychiatric:         Behavior: Behavior normal.         Thought Content: Thought content normal.         Judgment: Judgment normal.            As part of wellness and prevention, the following topics were discussed with the patient:  []  Nutrition  []  Physical activity/regular exercise   [x]  Healthy weight  []  Injury prevention  [x]  Substance misuse/abuse  []  Sexual behavior  []  STD prevention  []  Contaception  []  Dental health  [x]  Mental health  []  Immunization  [x]  Encouraged SBE     Counseling and guidance done:  Nutrition, physical activity, healthy weight, injury prevention, misuse of  tobacco, alcohol and drugs, sexual behavior and STDs, contraception, dental health, mental health, immunizations breast cancer screening and exams.    Assessment     1) GYN annual well woman exam.   2) PAP done today? No  3) problems addressed: as above    MAMMOGRAM UP TO DATE IF AGE APPROPRIATE?  No        Fhx breast cancer? Yes maternal aunt    Fhx ovarian cancer? No    Fhx colon cancer? No    Invitae testing offered? Yes           Plan       Follow up prn or one year.    Diagnoses and all orders for this visit:    1. Screening for condition (Primary)  -     POC Urinalysis Dipstick  -     Cancel: POC Pregnancy, Urine    2. SARMAD (stress urinary incontinence, female)  -     Cystometrogram; Future    3. Left breast lump  -     Mammo Diagnostic Digital Tomosynthesis Bilateral With CAD; Future  -     US Breast Left Complete; Future    4. Drug addiction syndrome (CMS/HCC)    5. Sjogren's syndrome with keratoconjunctivitis sicca (CMS/HCC)    6. Rectocele        RTO Return in about 1 year (around 11/24/2021) for Annual physical.          Rancho Mayberry MD  [unfilled]  15:19 EST

## 2020-12-09 ENCOUNTER — HOSPITAL ENCOUNTER (OUTPATIENT)
Dept: MAMMOGRAPHY | Facility: HOSPITAL | Age: 40
Discharge: HOME | End: 2020-12-09

## 2020-12-09 ENCOUNTER — HOSPITAL ENCOUNTER (OUTPATIENT)
Dept: ULTRASOUND IMAGING | Facility: HOSPITAL | Age: 40
Discharge: HOME | End: 2020-12-09

## 2020-12-09 DIAGNOSIS — N63.20 LEFT BREAST LUMP: ICD-10-CM

## 2020-12-09 PROCEDURE — 76642 ULTRASOUND BREAST LIMITED: CPT

## 2020-12-09 PROCEDURE — 77066 DX MAMMO INCL CAD BI: CPT

## 2020-12-09 PROCEDURE — G0279 TOMOSYNTHESIS, MAMMO: HCPCS

## 2020-12-16 ENCOUNTER — TELEPHONE (OUTPATIENT)
Dept: OBSTETRICS AND GYNECOLOGY | Facility: CLINIC | Age: 40
End: 2020-12-16

## 2020-12-16 NOTE — TELEPHONE ENCOUNTER
Patient went to [facility] Thursday of last week and had a ct scan of her pelvis and it stated she had endometrial fluid and needs pelvic US. Would you like to work her in? She does have a copy of her CT report.

## 2020-12-20 ENCOUNTER — HOSPITAL ENCOUNTER (EMERGENCY)
Facility: HOSPITAL | Age: 40
Discharge: HOME | End: 2020-12-20
Attending: EMERGENCY MEDICINE | Admitting: EMERGENCY MEDICINE

## 2020-12-20 VITALS
OXYGEN SATURATION: 98 % | SYSTOLIC BLOOD PRESSURE: 127 MMHG | DIASTOLIC BLOOD PRESSURE: 88 MMHG | TEMPERATURE: 98.5 F | WEIGHT: 200 LBS | BODY MASS INDEX: 34.15 KG/M2 | HEIGHT: 64 IN | RESPIRATION RATE: 18 BRPM | HEART RATE: 96 BPM

## 2020-12-20 DIAGNOSIS — K58.2 IRRITABLE BOWEL SYNDROME WITH BOTH CONSTIPATION AND DIARRHEA: Primary | ICD-10-CM

## 2020-12-20 LAB
ALBUMIN SERPL-MCNC: 4.1 G/DL (ref 3.5–5.2)
ALBUMIN/GLOB SERPL: 1.3 G/DL
ALP SERPL-CCNC: 65 U/L (ref 39–117)
ALT SERPL W P-5'-P-CCNC: 30 U/L (ref 1–33)
ANION GAP SERPL CALCULATED.3IONS-SCNC: 7 MMOL/L (ref 5–15)
AST SERPL-CCNC: 25 U/L (ref 1–32)
BACTERIA UR QL AUTO: ABNORMAL /HPF
BASOPHILS # BLD AUTO: 0.01 10*3/MM3 (ref 0–0.2)
BASOPHILS NFR BLD AUTO: 0.2 % (ref 0–1.5)
BILIRUB SERPL-MCNC: 0.3 MG/DL (ref 0–1.2)
BILIRUB UR QL STRIP: NEGATIVE
BUN SERPL-MCNC: 10 MG/DL (ref 6–20)
BUN/CREAT SERPL: 13.7 (ref 7–25)
CALCIUM SPEC-SCNC: 8.7 MG/DL (ref 8.6–10.5)
CHLORIDE SERPL-SCNC: 106 MMOL/L (ref 98–107)
CLARITY UR: ABNORMAL
CO2 SERPL-SCNC: 23 MMOL/L (ref 22–29)
COLOR UR: ABNORMAL
CREAT SERPL-MCNC: 0.73 MG/DL (ref 0.57–1)
DEPRECATED RDW RBC AUTO: 44.9 FL (ref 37–54)
EOSINOPHIL # BLD AUTO: 0.05 10*3/MM3 (ref 0–0.4)
EOSINOPHIL NFR BLD AUTO: 0.9 % (ref 0.3–6.2)
ERYTHROCYTE [DISTWIDTH] IN BLOOD BY AUTOMATED COUNT: 13 % (ref 12.3–15.4)
GFR SERPL CREATININE-BSD FRML MDRD: 89 ML/MIN/1.73
GLOBULIN UR ELPH-MCNC: 3.1 GM/DL
GLUCOSE SERPL-MCNC: 93 MG/DL (ref 65–99)
GLUCOSE UR STRIP-MCNC: NEGATIVE MG/DL
HCT VFR BLD AUTO: 39.8 % (ref 34–46.6)
HGB BLD-MCNC: 12.8 G/DL (ref 12–15.9)
HGB UR QL STRIP.AUTO: ABNORMAL
HYALINE CASTS UR QL AUTO: ABNORMAL /LPF
IMM GRANULOCYTES # BLD AUTO: 0.03 10*3/MM3 (ref 0–0.05)
IMM GRANULOCYTES NFR BLD AUTO: 0.6 % (ref 0–0.5)
KETONES UR QL STRIP: NEGATIVE
LEUKOCYTE ESTERASE UR QL STRIP.AUTO: NEGATIVE
LIPASE SERPL-CCNC: 32 U/L (ref 13–60)
LYMPHOCYTES # BLD AUTO: 0.59 10*3/MM3 (ref 0.7–3.1)
LYMPHOCYTES NFR BLD AUTO: 11.1 % (ref 19.6–45.3)
MCH RBC QN AUTO: 29.5 PG (ref 26.6–33)
MCHC RBC AUTO-ENTMCNC: 32.2 G/DL (ref 31.5–35.7)
MCV RBC AUTO: 91.7 FL (ref 79–97)
MONOCYTES # BLD AUTO: 0.41 10*3/MM3 (ref 0.1–0.9)
MONOCYTES NFR BLD AUTO: 7.7 % (ref 5–12)
NEUTROPHILS NFR BLD AUTO: 4.21 10*3/MM3 (ref 1.7–7)
NEUTROPHILS NFR BLD AUTO: 79.5 % (ref 42.7–76)
NITRITE UR QL STRIP: NEGATIVE
PH UR STRIP.AUTO: 6 [PH] (ref 4.5–8)
PLATELET # BLD AUTO: 308 10*3/MM3 (ref 140–450)
PMV BLD AUTO: 9.1 FL (ref 6–12)
POTASSIUM SERPL-SCNC: 4.1 MMOL/L (ref 3.5–5.2)
PROT SERPL-MCNC: 7.2 G/DL (ref 6–8.5)
PROT UR QL STRIP: ABNORMAL
RBC # BLD AUTO: 4.34 10*6/MM3 (ref 3.77–5.28)
RBC # UR: ABNORMAL /HPF
REF LAB TEST METHOD: ABNORMAL
SODIUM SERPL-SCNC: 136 MMOL/L (ref 136–145)
SP GR UR STRIP: 1.03 (ref 1–1.03)
SQUAMOUS #/AREA URNS HPF: ABNORMAL /HPF
UROBILINOGEN UR QL STRIP: ABNORMAL
WBC # BLD AUTO: 5.3 10*3/MM3 (ref 3.4–10.8)
WBC UR QL AUTO: ABNORMAL /HPF

## 2020-12-20 PROCEDURE — 63710000001 PROMETHAZINE PER 25 MG: Performed by: PHYSICIAN ASSISTANT

## 2020-12-20 PROCEDURE — 83690 ASSAY OF LIPASE: CPT | Performed by: PHYSICIAN ASSISTANT

## 2020-12-20 PROCEDURE — 99283 EMERGENCY DEPT VISIT LOW MDM: CPT

## 2020-12-20 PROCEDURE — 81001 URINALYSIS AUTO W/SCOPE: CPT | Performed by: EMERGENCY MEDICINE

## 2020-12-20 PROCEDURE — 85025 COMPLETE CBC W/AUTO DIFF WBC: CPT | Performed by: PHYSICIAN ASSISTANT

## 2020-12-20 PROCEDURE — 99282 EMERGENCY DEPT VISIT SF MDM: CPT | Performed by: PHYSICIAN ASSISTANT

## 2020-12-20 PROCEDURE — 80053 COMPREHEN METABOLIC PANEL: CPT | Performed by: PHYSICIAN ASSISTANT

## 2020-12-20 RX ORDER — PROCHLORPERAZINE MALEATE 5 MG/1
5 TABLET ORAL EVERY 6 HOURS PRN
Qty: 20 TABLET | Refills: 0 | Status: SHIPPED | OUTPATIENT
Start: 2020-12-20 | End: 2020-12-25

## 2020-12-20 RX ORDER — PROMETHAZINE HYDROCHLORIDE 25 MG/1
25 TABLET ORAL ONCE
Status: COMPLETED | OUTPATIENT
Start: 2020-12-20 | End: 2020-12-20

## 2020-12-20 RX ADMIN — SODIUM CHLORIDE 1000 ML: 9 INJECTION, SOLUTION INTRAVENOUS at 14:05

## 2020-12-20 RX ADMIN — PROMETHAZINE HYDROCHLORIDE 25 MG: 25 TABLET ORAL at 14:06

## 2020-12-20 NOTE — ED NOTES
Pt reports that she is scheduled for a pelvic u/s on 12/23/2020. Pt is unsure if she will be able to have the u/s due to being on her period right now.      Jazmin Kelly, RN  12/20/20 4650

## 2020-12-20 NOTE — ED NOTES
"Pt reports that these \"episodes\" last a couple of days and consist of her have nausea, vomiting, and pain. Per pt the pain will wake her up, \"awful\" nausea, 2x the episodes consisted of diarrhea, most just loose stools, and \"a whole lot of pain\". Pt reports that the pain is in all 4 quadrants of her abd.      Jazmin Kelly, RN  12/20/20 2130    "

## 2020-12-20 NOTE — ED PROVIDER NOTES
"Subjective   History of Present Illness  History of Present Illness    Chief complaint: Abdominal pain    Location: Diffuse, entire abdomen    Quality/Severity: Moderate    Timing/Duration: Since this morning    Modifying Factors: None    Associated Symptoms: Nausea, diarrhea    Narrative: Patient is a 40-year-old white female with history of alcohol abuse, IBS, lupus, and Sjogren's, who presented to the emergency department today complaining of abdominal pain.  Patient has had multiple \"episodes\" of this abdominal pain.  She initially saw her PCP on 11/25 and she was diagnosed with gastroenteritis and prescribed Zofran.  At that time she had diarrhea.  Patient states he got better for a while until 12/10 she experienced abdominal pain again and went to [facility] emergency room.  At that time she was constipated.  She had CT scan at that time which showed some endometrial fluid and she has pelvic ultrasound scheduled for 12/23.  Patient states today her abdominal pain woke her from sleep this morning.  She states pain is diffuse in all 4 quadrants.  She states this morning she had some diarrhea and one episode of vomiting.  Patient states she has a history of IBS but states she has not had abdominal pain like this with her IBS in the past.  Patient is on Vivitrol for alcohol abuse and she states she does not drink alcohol with this.  Patient is trying to establish with a gastroenterologist but states she is having a hard time getting a timely appointment.  Patient denies black or bloody stools.  Denies hematemesis.  Denies chest pain, shortness of air, palpitations.  Patient is currently on her period.  Patient denies loss of taste or smell, recent sick contacts or known Covid exposure.      Review of Systems   All other systems reviewed and are negative.      Past Medical History:   Diagnosis Date   • Anxiety    • Bacterial vaginosis    • Ashton esophagus    • Depression with anxiety    • Fibromyalgia    • " Fracture, finger    • GERD (gastroesophageal reflux disease)    • Lateral epicondylitis 2013    RHUEMATOLOGIST   • Lupus (CMS/[facility])    • MRSA (methicillin resistant staph aureus) culture positive  ESTIMATE    GROIN AREA    • Sjogren's syndrome (CMS/[facility])    • Urinary tract infection        Allergies   Allergen Reactions   • Fluconazole Hives       Past Surgical History:   Procedure Laterality Date   • ADENOIDECTOMY     •  SECTION     • COLONOSCOPY     • ENDOSCOPY N/A 5/10/2019    Procedure: ESOPHAGOGASTRODUODENOSCOPY with biopsies;  Surgeon: Shanon Vazquez MD;  Location: [facility];  Service: Gastroenterology   • MOUTH SURGERY     • RHINOPLASTY     • SHOULDER ARTHROSCOPY Left 9/10/2018    Procedure: SHOULDER ARTHROSCOPY, rotator cuff repair; extensive debridement, open biceps tenodesis;  Surgeon: Joo Rivers MD;  Location: [facility];  Service: Orthopedics   • TONSILLECTOMY     • TUBAL ABDOMINAL LIGATION     • TYMPANOSTOMY TUBE PLACEMENT     • WISDOM TOOTH EXTRACTION Bilateral        Family History   Problem Relation Age of Onset   • Cancer Father    • Heart disease Paternal Grandmother    • Diabetes Maternal Aunt    • Breast cancer Maternal Aunt    • Diabetes Maternal Grandmother    • Colon cancer Neg Hx    • Colon polyps Neg Hx        Social History     Socioeconomic History   • Marital status:      Spouse name: Not on file   • Number of children: Not on file   • Years of education: Not on file   • Highest education level: Not on file   Tobacco Use   • Smoking status: Former Smoker     Packs/day: 0.25     Years: 20.00     Pack years: 5.00     Types: Cigarettes   • Smokeless tobacco: Never Used   • Tobacco comment: Declines medication today. Not interested in patches. Has tried gum previously. Desires to continue weaning.  Vaping    Substance and Sexual Activity   • Alcohol use: Yes     Comment: Alcoholic, Last drink 2018   • Drug use: Yes     Types: Cocaine(coke), Hydrocodone      "Comment: \"recovering addict\", last reports use June 2017   • Sexual activity: Defer   Social History Narrative    She is  with 4 kids. She is a recovering acholic and drug user.           Objective   Physical Exam  Vitals:    12/20/20 1323   BP: 127/88   Pulse: 96   Resp: 18   Temp:    SpO2: 98%     Temperature 98.5    GENERAL: 41 YO female a/o x 4, NAD  SKIN: Warm pink and dry   HEENT:  PERRLA, EOM intact, conjunctiva normal, sclera clear  NECK: supple, no JVD  LUNGS: Clear to auscultation bilaterally without wheezes, rales or rhonchi.  No accessory muscle use and no nasal flaring.  CARDIAC:  Regular rate and rhythm, S1-S2.  No murmurs, rubs or gallops.  No peripheral edema.  Equal pulses bilaterally.  ABDOMEN: Soft, nontender, nondistended.  No guarding or rebound tenderness.  Mcarthur's sign negative.  Rovsing's sign negative.  Normal bowel sounds.  MUSCULOSKELETAL: Moves all extremities well.  No deformity.  NEURO: Cranial nerves II through XII grossly intact.  No gross focal deficits.  Alert.  Normal speech and motor.  PSYCH: Normal mood and affect      Procedures           ED Course  ED Course as of Dec 20 1458   Sun Dec 20, 2020   1455 Patient presents with abdominal pain, this is been going on intermittently since at least 11/25.  I had extensive conversation with patient about the nature of irritable bowel syndrome and that she needs to establish with a gastroenterologist so that they can discuss diet and lifestyle modifications as well as trial and error to find medicine that works for her.  She states that she has taken Bentyl in the past and it caused severe constipation and she does not want to try this again.  Patient states she has been prescribed Compazine in the past and this did help.  Will prescribe Compazine and hydrate and give a one-time dose of Phenergan here.  Will check labs.    [ND]   1457 Labs reviewed by me and they are unremarkable, largely within normal limits.  UA with some blood " as patient is currently on her period.    [ND]   9899 Discharge plan and instructions were discussed with the patient who verbalized understanding and is in agreement with the plan, all questions were answered at this time.  Patient is aware of signs symptoms that would require immediate return to the emergency room.  Patient understands importance of following up with primary care provider for further evaluation and worsening concerns.    Patient remained afebrile, nontoxic-appearing, no acute respiratory distress throughout entire emergency room stay. Patient was discharged in improved stable condition.        [ND]      ED Course User Index  [ND] Hanane Hodgson PA                                           MDM  Number of Diagnoses or Management Options  Irritable bowel syndrome with both constipation and diarrhea: established and worsening     Amount and/or Complexity of Data Reviewed  Clinical lab tests: reviewed and ordered    Risk of Complications, Morbidity, and/or Mortality  Presenting problems: moderate  Diagnostic procedures: low  Management options: moderate  General comments: My differential diagnosis for abdominal pain includes but is not limited to:  Gastritis, gastroenteritis, peptic ulcer disease, GERD, esophageal perforation, acute appendicitis, mesenteric adenitis, Meckel’s diverticulum, epiploic appendagitis, diverticulitis, colon cancer, ulcerative colitis, Crohn’s disease, intussusception, small bowel obstruction, adhesions, ischemic bowel, perforated viscus, ileus, obstipation, biliary colic, cholecystitis, cholelithiasis, Enrrique-Samym Kyle, hepatitis, pancreatitis, common bile duct obstruction, cholangitis, bile leak, splenic trauma, splenic rupture, splenic infarction, splenic abscess, abdominal abscess, ascites, spontaneous bacterial peritonitis, hernia, UTI, cystitis,ureterolithiasis, urinary obstruction, ovarian cyst, torsion, pregnancy, ectopic pregnancy, PID, pelvic abscess,  darlene, endometriosis, AAA, myocardial infarction, pneumonia, cancer, porphyria, DKA, medications, sickle cell, viral syndrome, zoster      Patient Progress  Patient progress: stable      Final diagnoses:   Irritable bowel syndrome with both constipation and diarrhea            Hanane Hodgson, PA  12/20/20 1453

## 2020-12-20 NOTE — ED NOTES
"Pt reports that she has been having these \"episodes\" since 11/2020. Pt has seen her Internist and also been seen at [facility]. Pt reports that she is supposed to be schedule for more testing, but she can't continue with the pain. Pt reports having difficulty finding a GI doctor to see. Pt also reports that she is in an alcohol program that she has had to miss due to this issue.      Jazmin Kelly RN  12/20/20 0017    "

## 2020-12-20 NOTE — DISCHARGE INSTRUCTIONS
Medication as directed.  Plenty of fluids.  Recommend low sugar Gatorade or Powerade.  Avoid caffeinated beverages.  Avoid high sugar beverages.  Follow-up with GI as above.  Try to incorporate additional fiber into your diet.  Return to ED for medical emergencies.

## 2020-12-23 ENCOUNTER — OFFICE VISIT (OUTPATIENT)
Dept: OBSTETRICS AND GYNECOLOGY | Facility: CLINIC | Age: 40
End: 2020-12-23

## 2020-12-23 VITALS — HEIGHT: 64 IN | BODY MASS INDEX: 32.8 KG/M2 | WEIGHT: 192.1 LBS

## 2020-12-23 DIAGNOSIS — F10.21 ALCOHOL DEPENDENCE IN REMISSION (HCC): ICD-10-CM

## 2020-12-23 DIAGNOSIS — F17.200 SMOKER: ICD-10-CM

## 2020-12-23 DIAGNOSIS — Z13.9 SCREENING FOR CONDITION: Primary | ICD-10-CM

## 2020-12-23 DIAGNOSIS — K58.2 IRRITABLE BOWEL SYNDROME WITH BOTH CONSTIPATION AND DIARRHEA: ICD-10-CM

## 2020-12-23 DIAGNOSIS — F19.20: ICD-10-CM

## 2020-12-23 LAB
B-HCG UR QL: NEGATIVE
BILIRUB BLD-MCNC: NEGATIVE MG/DL
CLARITY, POC: CLEAR
COLOR UR: ABNORMAL
GLUCOSE UR STRIP-MCNC: NEGATIVE MG/DL
INTERNAL NEGATIVE CONTROL: NEGATIVE
INTERNAL POSITIVE CONTROL: POSITIVE
KETONES UR QL: NEGATIVE
LEUKOCYTE EST, POC: NEGATIVE
Lab: 55
NITRITE UR-MCNC: NEGATIVE MG/ML
PH UR: 5 [PH] (ref 5–8)
PROT UR STRIP-MCNC: NEGATIVE MG/DL
RBC # UR STRIP: ABNORMAL /UL
SP GR UR: 1.03 (ref 1–1.03)
UROBILINOGEN UR QL: NORMAL

## 2020-12-23 PROCEDURE — 81025 URINE PREGNANCY TEST: CPT | Performed by: OBSTETRICS & GYNECOLOGY

## 2020-12-23 PROCEDURE — 99213 OFFICE O/P EST LOW 20 MIN: CPT | Performed by: OBSTETRICS & GYNECOLOGY

## 2020-12-23 RX ORDER — ONDANSETRON 4 MG/1
4 TABLET, ORALLY DISINTEGRATING ORAL
COMMUNITY
Start: 2020-12-10 | End: 2021-05-10

## 2020-12-23 RX ORDER — NALTREXONE HYDROCHLORIDE 50 MG/1
TABLET, FILM COATED ORAL
COMMUNITY
Start: 2020-11-24 | End: 2021-04-13

## 2020-12-23 NOTE — PROGRESS NOTES
EVALUATION AND MANAGEMENT ENCOUNTER    Aide Henry  Patient new to examiner? No  New problem to examiner? Yes  Patient referred? Yes    -----------------------------------------------------HISTORY---------------------------------------------------    Chief Complaint:   Chief Complaint   Patient presents with   • Follow-up     [place] had CT scan        HPI:  Aide Henry is a 40 y.o.  with Patient's last menstrual period was 2020 (approximate). here for f/u ER visit to [facility] for abdominal pain.  Pt was seen through PCP for pain and given zofran (per pt's account).  Pt had no relief and went on her own accord to [facility] Er and had a CT scan and she was referred for a small ovarian cyst and fluid in the uterus.  Pt states she is not really having any gyn complaints.     1. Location: abdomen      2. Severity:  severe      3.  Quality:  sharp      4. Modifying factors:  Has diarrhea and constipation      5.  Associated signs/symptoms:  none      Pt denies:  UTI symptoms or abnormal bleeding.     History of Present Illness    PFSH:   1.    Past Medical History:   Diagnosis Date   • Anxiety    • Bacterial vaginosis    • Ashton esophagus    • Depression with anxiety    • Fibromyalgia    • Fracture, finger    • GERD (gastroesophageal reflux disease)    • Lateral epicondylitis 2013    RHUEMATOLOGIST   • Lupus (CMS/HCC)    • MRSA (methicillin resistant staph aureus) culture positive  ESTIMATE    GROIN AREA    • Sjogren's syndrome (CMS/HCC)    • Urinary tract infection      2.   Family History   Problem Relation Age of Onset   • Cancer Father    • Heart disease Paternal Grandmother    • Diabetes Maternal Aunt    • Breast cancer Maternal Aunt    • Diabetes Maternal Grandmother    • Colon cancer Neg Hx    • Colon polyps Neg Hx      3.  Smoker: No, Alcohol: No, Recreational drugs: No    PAST HISTORY REVIEWED:  1.   Past Surgical History:   Procedure Laterality Date   • ADENOIDECTOMY     •   SECTION     • COLONOSCOPY     • ENDOSCOPY N/A 5/10/2019    Procedure: ESOPHAGOGASTRODUODENOSCOPY with biopsies;  Surgeon: Shanon Vazquez MD;  Location:  [facility];  Service: Gastroenterology   • MOUTH SURGERY     • RHINOPLASTY     • SHOULDER ARTHROSCOPY Left 9/10/2018    Procedure: SHOULDER ARTHROSCOPY, rotator cuff repair; extensive debridement, open biceps tenodesis;  Surgeon: Joo Rivers MD;  Location:  [facility];  Service: Orthopedics   • TONSILLECTOMY     • TUBAL ABDOMINAL LIGATION     • TYMPANOSTOMY TUBE PLACEMENT     • WISDOM TOOTH EXTRACTION Bilateral       2.   Current Outpatient Medications:   •  amLODIPine (NORVASC) 2.5 MG tablet, Take 2.5 mg by mouth Daily., Disp: , Rfl:   •  azaTHIOprine (IMURAN) 50 MG tablet, TK 2 TS PO D, Disp: , Rfl:   •  ciclopirox (PENLAC) 8 % solution, OVIDIO EXT AA Q NIGHT, Disp: , Rfl:   •  DULoxetine (CYMBALTA) 60 MG capsule, Take 60 mg by mouth Daily., Disp: , Rfl:   •  hydrocortisone (ANUSOL-HC) 2.5 % rectal cream, Insert  into the rectum 4 (Four) Times a Day As Needed for Hemorrhoids (rectal discomfort)., Disp: 30 g, Rfl: 5  •  hydrOXYzine (ATARAX) 25 MG tablet, TK 1 TO 2 TS PO QPM, Disp: , Rfl:   •  naltrexone (DEPADE) 50 MG tablet, TAKE 1 T PO QD, Disp: , Rfl:   •  omeprazole (priLOSEC) 20 MG capsule, Take 1 capsule by mouth Daily., Disp: 90 capsule, Rfl: 1  •  ondansetron ODT (ZOFRAN-ODT) 4 MG disintegrating tablet, Take 4 mg by mouth., Disp: , Rfl:   •  pilocarpine (SALAGEN) 5 MG tablet, TK 1 T PO TID, Disp: , Rfl: 3  •  prochlorperazine (COMPAZINE) 5 MG tablet, Take 1 tablet by mouth Every 6 (Six) Hours As Needed for Nausea or Vomiting for up to 5 days., Disp: 20 tablet, Rfl: 0  •  traZODone (DESYREL) 100 MG tablet, Take 100 mg by mouth Every Night., Disp: , Rfl:   •  desoximetasone (TOPICORT) 0.25 % cream, OVIDIO EXT AA BID, Disp: , Rfl:   3.   Allergies   Allergen Reactions   • Fluconazole Hives       ROS:  Review of Systems   Constitutional: Negative.    HENT:  "Negative.    Eyes: Negative.    Respiratory: Negative.    Cardiovascular: Negative.    Gastrointestinal: Positive for abdominal pain, constipation and diarrhea.   Endocrine: Negative.    Genitourinary: Negative for pelvic pain.   Musculoskeletal: Negative.    Skin: Negative.    Allergic/Immunologic: Negative.    Neurological: Negative.    Hematological: Negative.    Psychiatric/Behavioral: Negative.    :    -----------------------------------------------PHYSICAL EXAM----------------------------------------------    Vital Signs: Ht 162.6 cm (64.02\")   Wt 87.1 kg (192 lb 1.6 oz)   LMP 12/19/2020 (Approximate)   Breastfeeding No   BMI 32.96 kg/m²    Flowsheet Rows      First Filed Value   Admission Height  162.6 cm (64.02\") Documented at 12/23/2020 1539   Admission Weight  87.1 kg (192 lb 1.6 oz) Documented at 12/23/2020 1539          Physical Exam  Vitals signs and nursing note reviewed.   Constitutional:       Appearance: She is well-developed.   HENT:      Head: Normocephalic and atraumatic.   Neck:      Musculoskeletal: Normal range of motion.   Cardiovascular:      Rate and Rhythm: Normal rate.   Pulmonary:      Effort: Pulmonary effort is normal.   Abdominal:      General: There is no distension.      Palpations: Abdomen is soft. There is no mass.      Tenderness: There is no abdominal tenderness. There is no guarding.   Genitourinary:     Vagina: No vaginal discharge.   Musculoskeletal: Normal range of motion.         General: No tenderness or deformity.   Skin:     General: Skin is warm and dry.      Coloration: Skin is not pale.      Findings: No erythema or rash.   Neurological:      Mental Status: She is alert and oriented to person, place, and time.   Psychiatric:         Behavior: Behavior normal.         Thought Content: Thought content normal.         Judgment: Judgment normal.         -----------------------------------------------MEDICAL DECISION MAKING-----------------------------        DATA " Review & labs ordered:     1.   Lab Results (last 24 hours)     Procedure Component Value Units Date/Time    POC Pregnancy, Urine [[id number]]  (Normal) Collected: 12/23/20 1550    Specimen: Urine Updated: 12/23/20 1551     HCG, Urine, QL Negative     Lot Number 55     Internal Positive Control Positive     Internal Negative Control Negative    POC Urinalysis Dipstick [[id number]]  (Abnormal) Collected: 12/23/20 1550    Specimen: Urine Updated: 12/23/20 1550     Color Red     Clarity, UA Clear     Glucose, UA Negative mg/dL      Bilirubin Negative     Ketones, UA Negative     Specific Gravity  1.035     Blood, UA Large     pH, Urine 5.0     Protein, POC Negative mg/dL      Urobilinogen, UA Normal     Leukocytes Negative     Nitrite, UA Negative        2.   Imaging Results (Last 24 Hours)     ** No results found for the last 24 hours. **        3.   ECG/EMG Results (most recent)     None        4. Old records reviewed? Yes  5. Old records ordered?  Yes  6. Labs ordered?: Yes:  urinalysis, & u/s  7. Imaging other than ultrasound ordered?: No        Reviewed with other physician? yes     8. Ultrasound ordered and reviewed? Yes: small cyst with sm amt fluid in endometrial cavity.   9. Diagnoses and/or chronic conditions reviewed:      Diagnoses and all orders for this visit:    1. Screening for condition (Primary)  -     POC Urinalysis Dipstick  -     POC Pregnancy, Urine    2. Drug addiction syndrome (CMS/HCC)    3. Alcohol dependence in remission (CMS/HCC)    4. Smoker    5. Irritable bowel syndrome with both constipation and diarrhea  -     Ambulatory Referral to Gastroenterology        IMPRESSION/PROBLEM:      IBS with diarrhea and constipation.  Doubt gyn pathology.     (Established problem/s? No, worsening? No)    (New Problem/s? Yes, additional workup planned? Yes)      PLAN:     1. GI consult  2. RTO for scheduled urodynamics    Pt to call for any results from testing promptly if she does not hear from us.     RTO  Return if symptoms worsen or fail to improve. .  Instructions and precautions given.         Rancho Mayberry MD  16:01 EST  12/23/20

## 2021-01-05 ENCOUNTER — LAB (OUTPATIENT)
Dept: LAB | Facility: HOSPITAL | Age: 41
End: 2021-01-05

## 2021-01-05 ENCOUNTER — OFFICE VISIT (OUTPATIENT)
Dept: GASTROENTEROLOGY | Facility: CLINIC | Age: 41
End: 2021-01-05

## 2021-01-05 VITALS — TEMPERATURE: 98.2 F | HEIGHT: 64 IN | BODY MASS INDEX: 32.74 KG/M2 | WEIGHT: 191.8 LBS

## 2021-01-05 DIAGNOSIS — R14.0 FLATULENCE/GAS PAIN/BELCHING: ICD-10-CM

## 2021-01-05 DIAGNOSIS — K22.70 BARRETT'S ESOPHAGUS WITHOUT DYSPLASIA: ICD-10-CM

## 2021-01-05 DIAGNOSIS — K21.9 GASTROESOPHAGEAL REFLUX DISEASE WITHOUT ESOPHAGITIS: ICD-10-CM

## 2021-01-05 DIAGNOSIS — K63.89 SMALL INTESTINAL BACTERIAL OVERGROWTH: ICD-10-CM

## 2021-01-05 DIAGNOSIS — R14.0 BLOATING: ICD-10-CM

## 2021-01-05 DIAGNOSIS — B37.31 VAGINAL CANDIDA: ICD-10-CM

## 2021-01-05 DIAGNOSIS — K58.2 IRRITABLE BOWEL SYNDROME WITH BOTH CONSTIPATION AND DIARRHEA: Primary | ICD-10-CM

## 2021-01-05 PROCEDURE — 99214 OFFICE O/P EST MOD 30 MIN: CPT | Performed by: NURSE PRACTITIONER

## 2021-01-05 RX ORDER — FAMOTIDINE 40 MG/1
40 TABLET, FILM COATED ORAL 2 TIMES DAILY
Qty: 180 TABLET | Refills: 3 | Status: SHIPPED | OUTPATIENT
Start: 2021-01-05 | End: 2022-04-01

## 2021-01-05 RX ORDER — SACCHAROMYCES BOULARDII 250 MG
250 CAPSULE ORAL 2 TIMES DAILY
Qty: 180 CAPSULE | Refills: 3 | Status: SHIPPED | OUTPATIENT
Start: 2021-01-05 | End: 2021-04-13

## 2021-01-05 RX ORDER — OMEPRAZOLE 40 MG/1
40 CAPSULE, DELAYED RELEASE ORAL DAILY
Qty: 90 CAPSULE | Refills: 3 | Status: SHIPPED | OUTPATIENT
Start: 2021-01-05 | End: 2021-08-11 | Stop reason: SDUPTHER

## 2021-01-05 NOTE — PATIENT INSTRUCTIONS
Gastric Emptying study and endoscopy with Dr. Rice.     Increase your omeprazole to 40mg once a day.  Take a daily probiotic.  Xifaxan antibiotic for 2 weeks or I will send in the Augmentin for you instead.      Don't eat late, don't eat fried and don't eat too much at one time. Avoid tomato based products like pizza, lasagna, spagetti.  If you want to have these take an over the counter Pepcid immediately before you eat them.  Do not eat within 3-4 hrs of bedtime. Limit caffeine and carbonated beverages, if you must have them do not have later in the day.  If reflux is severe elevate the head of the bed. You may also use TUMS, maalox, or mylanta for breakthrough heartburn.    Take a daily probiotic (something with a billion and more different strains is better like probiotic 10 or probiotic gummies) for your gut as well.  AVOID taking NSAIDS (like ibuprofen, Aleve, Motrin, naproxen, meloxicam, etc) as much as possible and use acetaminophen (Tylenol) instead.    Drink lots of water, eat a high fiber diet with 25-30gm a day(check the fiber content in the foods you eat.  Protein bars with 15gm of fiber in each bar are a great supplement daily), and get regular exercise. Use over the counter simethicone xtra strength gas x (125-180mg) 2 twice a day as needed for gas.     High Fiber Food suggestions:    Beans, nuts, vegetables, whole grains, flax seed and yarely seeds (which can be stirred into other food) are high in fiber.    Robert Protein bars from [employer] = 10gm of fiber in each bar (also gluten free)  Quest Protein bars from grocery stores= 15gm of fiber each (also gluten free)  Fiber One bars from grocery stores = 5-6gm of fiber each  Kelloggs Bran Buds cereal 1/2 cup = 17gm of fiber  [employer]ger brand low sugar Craisins = 13gm of fiber per serving

## 2021-01-05 NOTE — PROGRESS NOTES
"    PATIENT INFORMATION  Aide Henry       - 1980    CHIEF COMPLAINT  Chief Complaint   Patient presents with   • Abdominal Pain   • Nausea       HISTORY OF PRESENT ILLNESS  5/10/19 EGD Dr. George Chapman, recall 2021!  hx GERD barretts, hemorrhoids, Dr. Vazquez    Has hb about once a week, has also had hemorrhoids since the birth of her first child.  Belching and passing gas a lot and in her sleep, does wake her from her sleep with diarrhea.  Cannot tolerate vegetable and can tolerate morales beans but not great northern, broccholi, lettuce sour croute.      Went to PCP  with nausea and diarrhea and though had the flu. Given zofran and went home.  Then had again.  Severe mid abd cramping pain.  [facility] and [facility] CT abd: IMPRESSION:   1.No acute abdominal or pelvic abnormalities.  2.Mild diffuse hepatic steatosis.  3.Mild colonic diverticulosis with no convincing evidence of acute diverticulitis.  4.A subcentimeter low-attenuation structure in the lower uterine segment most likely represent small endometrial fluid collection. It may be further characterized with pelvic ultrasonography.  5.A small amount of free pelvic fluid which is within physiologic limits in a premenopausal female.  6.Aortic atherosclerosis.    Has quit drinking in October and is on medication to help her quit.  In  developed severe autoimmune issues and started drinking with lupus and schogrens.    A couple of times a month gets diarrhea and lasts a day or two but all the rest of the time is constipated.      Had colonoscopy around  by Dr. darian hernandez in [place] off [facility].  For urgency and incont smearing at the time and was given bentyl that made her constipated and scope patch that \"didn't work for her\".      Laxatives hurt, colace and miralax do not produce results for her.   Since she started eating only once a day she started having solid bms.        REVIEWED PERTINENT " RESULTS/ LABS  Lab Results   Component Value Date    CASEREPORT  05/10/2019     Surgical Pathology Report                         Case: [id number]                                  Authorizing Provider:  Shanon Vazquez MD         Collected:           05/10/2019 01:00 PM          Ordering Location:     [facility]   Received:            05/10/2019 01:37 PM                                 OR                                                                           Pathologist:           Angel Gerber MD                                                         Specimens:   1) - Gastric, Body, Gastric Biopsy                                                                  2) - Esophagus, Distal, distal Esophagus Biopsy                                            FINALDX  05/10/2019     1. Gastric Biopsy: Benign gastric mucosa with    A. Minimal chronic inflammation.   B. No Helicobacter-like organisms present by routine staining.   C. No dysplasia nor malignancy identified.    2. Distal Esophagus Biopsy: Benign squamous and glandular mucosa with    A. Minimally active moderate chronic inflammation with few eosinophils noted consistent with chronic reflux.   B. Focal intestinal metaplasia is present by routine staining consistent with Ashton's esophagus.   C. Reactive change noted, no dysplasia nor malignancy identified.    jat/pkm        Lab Results   Component Value Date    HGB 12.7 12/30/2020    MCV 92.5 12/30/2020     12/30/2020    ALT 21 12/30/2020    AST 24 12/30/2020    INR 0.94 08/29/2018    TRIG 180 (H) 02/05/2020      Us Breast Left Limited    Result Date: 12/9/2020  Narrative: BILATERAL DIGITAL DIAGNOSTIC MAMMOGRAM WITH CAD 12/09/2020 3-D IMAGING OF BOTH BREASTS TARGETED LEFT BREAST ULTRASOUND  HISTORY: 40-year-old female complaining of a palpable left breast lump one month near the nipple at 2-3:00. Tenderness to palpation. Positive family history in a maternal aunt.  TECHNIQUE: CC and  MLO and 3-D imaging of both breasts was obtained and reviewed with an FDA approved CAD device. Baseline study, no comparisons  MAMMOGRAM FINDINGS: Breast parenchyma is composed of scattered fibroglandular densities. There is no dominant nodule or mass in either breast and there is no suspicious cluster of microcalcifications.  The marker overlies the area of patient palpable concern on the left near the nipple and localizing slightly superiorly and laterally. Deep to the marker there are multiple tiny calcified oil cysts, the largest of which measures 4 mm. There are also additional small calcified oil cysts in the anterior lateral right breast. Targeted ultrasound was also performed of the area of patient palpable concern.  ULTRASOUND FINDINGS: Targeted ultrasound of the left breast was performed. The patient was initially scanned independently by the technologist and also rescanned in my presence. Limited physical examination (with patient consent) was performed by me in the department and demonstrates a faint pea-sized nodule at the site of palpable concern. Ultrasound of the area demonstrates a superficial 4 mm calcified oil cysts corresponding to the patient's findings mammographically and imaging findings are concordant. Survey images of adjacent breast tissue demonstrate additional smaller calcified oil cysts, also demonstrated mammographically. There is no additional suspicious finding and imaging findings overall are benign and concordant.  SUMMARY: The area of patient palpable concern on the left corresponds to a benign calcified oil cyst. Adjacent smaller satellite calcified oil cysts are present. These findings are benign and clinical considerations should determine additional imaging at this point. The patient should plan on beginning a screening mammography regimen at age 42 or sooner based upon risk factors. FINDINGS and recommendations were discussed with the patient.      Impression: 1. Benign  calcified oil cyst corresponds to the patient's area of palpable concern in the left breast. There are additional tiny calcified oil cysts bilaterally.  BI-RADS CATEGORY 2: Benign Findings.  This report was finalized on 12/9/2020 3:10 PM by Dr. Adalid Coronel MD.      Mammo Diagnostic Digital Tomosynthesis Bilateral With Cad    Result Date: 12/9/2020  Narrative: BILATERAL DIGITAL DIAGNOSTIC MAMMOGRAM WITH CAD 12/09/2020 3-D IMAGING OF BOTH BREASTS TARGETED LEFT BREAST ULTRASOUND  HISTORY: 40-year-old female complaining of a palpable left breast lump one month near the nipple at 2-3:00. Tenderness to palpation. Positive family history in a maternal aunt.  TECHNIQUE: CC and MLO and 3-D imaging of both breasts was obtained and reviewed with an FDA approved CAD device. Baseline study, no comparisons  MAMMOGRAM FINDINGS: Breast parenchyma is composed of scattered fibroglandular densities. There is no dominant nodule or mass in either breast and there is no suspicious cluster of microcalcifications.  The marker overlies the area of patient palpable concern on the left near the nipple and localizing slightly superiorly and laterally. Deep to the marker there are multiple tiny calcified oil cysts, the largest of which measures 4 mm. There are also additional small calcified oil cysts in the anterior lateral right breast. Targeted ultrasound was also performed of the area of patient palpable concern.  ULTRASOUND FINDINGS: Targeted ultrasound of the left breast was performed. The patient was initially scanned independently by the technologist and also rescanned in my presence. Limited physical examination (with patient consent) was performed by me in the department and demonstrates a faint pea-sized nodule at the site of palpable concern. Ultrasound of the area demonstrates a superficial 4 mm calcified oil cysts corresponding to the patient's findings mammographically and imaging findings are concordant. Survey images of  adjacent breast tissue demonstrate additional smaller calcified oil cysts, also demonstrated mammographically. There is no additional suspicious finding and imaging findings overall are benign and concordant.  SUMMARY: The area of patient palpable concern on the left corresponds to a benign calcified oil cyst. Adjacent smaller satellite calcified oil cysts are present. These findings are benign and clinical considerations should determine additional imaging at this point. The patient should plan on beginning a screening mammography regimen at age 42 or sooner based upon risk factors. FINDINGS and recommendations were discussed with the patient.      Impression: 1. Benign calcified oil cyst corresponds to the patient's area of palpable concern in the left breast. There are additional tiny calcified oil cysts bilaterally.  BI-RADS CATEGORY 2: Benign Findings.  This report was finalized on 12/9/2020 3:10 PM by Dr. Adalid Coronel MD.        REVIEW OF SYSTEMS  Review of Systems   Gastrointestinal: Positive for abdominal pain and nausea.   All other systems reviewed and are negative.        ACTIVE PROBLEMS  Patient Active Problem List    Diagnosis   • Left breast lump [N63.20]   • SARMAD (stress urinary incontinence, female) [N39.3]   • Rectocele [N81.6]   • Nondisplaced fracture of coronoid process of left ulna, initial encounter for closed fracture [S52.045A]   • Left ovarian cyst [N83.202]   • Pelvic pain [R10.2]   • Smoker [F17.200]   • Sjogren's syndrome with keratoconjunctivitis sicca (CMS/HCC) [M35.01]   • Biceps tendinitis of left upper extremity [M75.22]   • Complete tear of left rotator cuff [M75.122]   • Subacromial impingement of left shoulder [M75.42]   • Status post arthroscopy of shoulder [Z98.890]   • Alcohol dependence in remission (CMS/HCC) [F10.21]   • History of placement of ear tubes [Z96.22]   • Gastroenteritis [K52.9]   • Drug addiction syndrome (CMS/HCC) [F19.20]   • Substance abuse (CMS/HCC) [F19.10]    • Bilateral carpal tunnel syndrome [G56.03]   • Eczema [L30.9]   • Shoulder pain, left [M25.512]   • Arthralgia of multiple joints [M25.50]   • Chronic back pain [M54.9, G89.29]   • Chronic constipation [K59.09]   • Disorder of connective tissue (CMS/HCC) [M35.9]   • Depression with anxiety [F41.8]   • Fibromyalgia [M79.7]   • Gastroesophageal reflux disease without esophagitis [K21.9]   • Irritable bowel syndrome [K58.9]   • Muscle pain [M79.10]   • Palpitations [R00.2]   • Seasonal allergic rhinitis [J30.2]   • 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]   • Tenderness of temporomandibular joint [M26.629]   • Lateral epicondylitis [M77.10]         PAST MEDICAL HISTORY  Past Medical History:   Diagnosis Date   • Anxiety    • Bacterial vaginosis    • Ashton esophagus    • Depression with anxiety    • Fibromyalgia    • Fracture, finger    • GERD (gastroesophageal reflux disease)    • Lateral epicondylitis 2013    RHUEMATOLOGIST   • Lupus (CMS/HCC)    • MRSA (methicillin resistant staph aureus) culture positive  ESTIMATE    GROIN AREA    • Sjogren's syndrome (CMS/HCC)    • Urinary tract infection          SURGICAL HISTORY  Past Surgical History:   Procedure Laterality Date   • ADENOIDECTOMY     •  SECTION     • COLONOSCOPY     • ENDOSCOPY N/A 5/10/2019    Procedure: ESOPHAGOGASTRODUODENOSCOPY with biopsies;  Surgeon: Shanon Vazquez MD;  Location: [facility] OR;  Service: Gastroenterology   • MOUTH SURGERY     • RHINOPLASTY     • SHOULDER ARTHROSCOPY Left 9/10/2018    Procedure: SHOULDER ARTHROSCOPY, rotator cuff repair; extensive debridement, open biceps tenodesis;  Surgeon: Joo Rivers MD;  Location: [facility] OR;  Service: Orthopedics   • TONSILLECTOMY     • TUBAL ABDOMINAL LIGATION     • TYMPANOSTOMY TUBE PLACEMENT     • WISDOM TOOTH EXTRACTION Bilateral          FAMILY HISTORY  Family History   Problem Relation Age of Onset   • Cancer Father    • Heart disease Paternal Grandmother    • Diabetes Maternal  "Aunt    • Breast cancer Maternal Aunt    • Diabetes Maternal Grandmother    • Colon cancer Neg Hx    • Colon polyps Neg Hx          SOCIAL HISTORY  Social History     Occupational History   • Not on file   Tobacco Use   • Smoking status: Former Smoker     Packs/day: 0.25     Years: 20.00     Pack years: 5.00     Types: Cigarettes   • Smokeless tobacco: Never Used   • Tobacco comment: Declines medication today. Not interested in patches. Has tried gum previously. Desires to continue weaning.  Vaping    Substance and Sexual Activity   • Alcohol use: Yes     Comment: Alcoholic, Last drink 4-2-2018   • Drug use: Yes     Types: Cocaine(coke), Hydrocodone     Comment: \"recovering addict\", last reports use June 2017   • Sexual activity: Defer         CURRENT MEDICATIONS    Current Outpatient Medications:   •  amLODIPine (NORVASC) 2.5 MG tablet, Take 2.5 mg by mouth Daily., Disp: , Rfl:   •  azaTHIOprine (IMURAN) 50 MG tablet, TK 2 TS PO D, Disp: , Rfl:   •  ciclopirox (PENLAC) 8 % solution, OVIDIO EXT AA Q NIGHT, Disp: , Rfl:   •  desoximetasone (TOPICORT) 0.25 % cream, OVIDIO EXT AA BID, Disp: , Rfl:   •  DULoxetine (CYMBALTA) 60 MG capsule, Take 60 mg by mouth Daily., Disp: , Rfl:   •  famotidine (PEPCID) 40 MG tablet, Take 1 tablet by mouth 2 (Two) Times a Day. With lunch and at bedtime, Disp: 180 tablet, Rfl: 3  •  hydrocortisone (ANUSOL-HC) 2.5 % rectal cream, Insert  into the rectum 4 (Four) Times a Day As Needed for Hemorrhoids (rectal discomfort)., Disp: 30 g, Rfl: 5  •  hydrOXYzine (ATARAX) 25 MG tablet, TK 1 TO 2 TS PO QPM, Disp: , Rfl:   •  miconazole (MICOTIN) 2 % vaginal cream, Insert 1 applicator into the vagina Every Night for 7 days., Disp: 7 g, Rfl: 2  •  naltrexone (DEPADE) 50 MG tablet, TAKE 1 T PO QD, Disp: , Rfl:   •  omeprazole (priLOSEC) 40 MG capsule, Take 1 capsule by mouth Daily., Disp: 90 capsule, Rfl: 3  •  ondansetron ODT (ZOFRAN-ODT) 4 MG disintegrating tablet, Take 4 mg by mouth., Disp: , Rfl:   •  " "pilocarpine (SALAGEN) 5 MG tablet, TK 1 T PO TID, Disp: , Rfl: 3  •  riFAXIMin (XIFAXAN) 550 MG tablet, Take 1 tablet by mouth Every 8 (Eight) Hours for 14 days., Disp: 42 tablet, Rfl: 3  •  saccharomyces boulardii (FLORASTOR) 250 MG capsule, Take 1 capsule by mouth 2 (Two) Times a Day., Disp: 180 capsule, Rfl: 3  •  traZODone (DESYREL) 100 MG tablet, Take 100 mg by mouth Every Night., Disp: , Rfl:     ALLERGIES  Fluconazole    VITALS  Vitals:    01/05/21 1524   Temp: 98.2 °F (36.8 °C)   TempSrc: Temporal   Weight: 87 kg (191 lb 12.8 oz)   Height: 162.6 cm (64.02\")       PHYSICAL EXAM  Debilities/Disabilities Identified: None  Emotional Behavior: Appropriate  Wt Readings from Last 3 Encounters:   01/05/21 87 kg (191 lb 12.8 oz)   12/23/20 87.1 kg (192 lb 1.6 oz)   12/20/20 90.7 kg (200 lb)     Ht Readings from Last 1 Encounters:   01/05/21 162.6 cm (64.02\")     Body mass index is 32.91 kg/m².  Physical Exam  Vitals signs and nursing note reviewed.   Constitutional:       Appearance: She is well-developed.   HENT:      Head: Normocephalic and atraumatic.   Eyes:      Conjunctiva/sclera: Conjunctivae normal.      Pupils: Pupils are equal, round, and reactive to light.   Neck:      Musculoskeletal: Normal range of motion and neck supple.   Cardiovascular:      Rate and Rhythm: Normal rate and regular rhythm.   Pulmonary:      Effort: Pulmonary effort is normal.      Breath sounds: Normal breath sounds.   Abdominal:      General: Bowel sounds are normal. There is no distension.      Palpations: Abdomen is soft.      Tenderness: There is abdominal tenderness in the right lower quadrant.   Musculoskeletal: Normal range of motion.   Lymphadenopathy:      Cervical: No cervical adenopathy.   Skin:     General: Skin is warm and dry.   Neurological:      Mental Status: She is alert and oriented to person, place, and time.   Psychiatric:         Behavior: Behavior normal.         CLINICAL DATA REVIEWED   reviewed previous lab " results and integrated with today's visit, reviewed notes from other physicians and/or last GI encounter, reviewed previous endoscopy results and available photos, reviewed surgical pathology results from previous biopsies    ASSESSMENT  Diagnoses and all orders for this visit:    Irritable bowel syndrome with both constipation and diarrhea  -     saccharomyces boulardii (FLORASTOR) 250 MG capsule; Take 1 capsule by mouth 2 (Two) Times a Day.  -     riFAXIMin (XIFAXAN) 550 MG tablet; Take 1 tablet by mouth Every 8 (Eight) Hours for 14 days.  -     Case Request; Standing  -     Follow Anesthesia Guidelines / Protocol; Future  -     Obtain Informed Consent; Standing  -     Case Request    Gastroesophageal reflux disease without esophagitis  -     NM Gastric Emptying; Future  -     omeprazole (priLOSEC) 40 MG capsule; Take 1 capsule by mouth Daily.  -     Case Request; Standing  -     Follow Anesthesia Guidelines / Protocol; Future  -     Obtain Informed Consent; Standing  -     Case Request  -     Helicobacter Pylori, IgA IgG IgM; Future  -     famotidine (PEPCID) 40 MG tablet; Take 1 tablet by mouth 2 (Two) Times a Day. With lunch and at bedtime    Ashton's esophagus without dysplasia  -     NM Gastric Emptying; Future  -     omeprazole (priLOSEC) 40 MG capsule; Take 1 capsule by mouth Daily.  -     Case Request; Standing  -     Follow Anesthesia Guidelines / Protocol; Future  -     Obtain Informed Consent; Standing  -     Case Request  -     Helicobacter Pylori, IgA IgG IgM; Future  -     famotidine (PEPCID) 40 MG tablet; Take 1 tablet by mouth 2 (Two) Times a Day. With lunch and at bedtime    Vaginal candida  -     miconazole (MICOTIN) 2 % vaginal cream; Insert 1 applicator into the vagina Every Night for 7 days.    Small intestinal bacterial overgrowth  -     riFAXIMin (XIFAXAN) 550 MG tablet; Take 1 tablet by mouth Every 8 (Eight) Hours for 14 days.    Bloating  -     Helicobacter Pylori, IgA IgG IgM;  Future    Flatulence/gas pain/belching  -     Helicobacter Pylori, IgA IgG IgM; Future          PLAN  Return in about 3 months (around 4/5/2021).   Gastric Emptying study and endoscopy with Dr. Rice.     Increase your omeprazole to 40mg once a day.  Take a daily probiotic.  Xifaxan antibiotic for 2 weeks or I will send in the Augmentin for you instead.      Don't eat late, don't eat fried and don't eat too much at one time. Avoid tomato based products like pizza, lasagna, spagetti.  If you want to have these take an over the counter Pepcid immediately before you eat them.  Do not eat within 3-4 hrs of bedtime. Limit caffeine and carbonated beverages, if you must have them do not have later in the day.  If reflux is severe elevate the head of the bed. You may also use TUMS, maalox, or mylanta for breakthrough heartburn.    Take a daily probiotic (something with a billion and more different strains is better like probiotic 10 or probiotic gummies) for your gut as well.  AVOID taking NSAIDS (like ibuprofen, Aleve, Motrin, naproxen, meloxicam, etc) as much as possible and use acetaminophen (Tylenol) instead.    Drink lots of water, eat a high fiber diet with 25-30gm a day(check the fiber content in the foods you eat.  Protein bars with 15gm of fiber in each bar are a great supplement daily), and get regular exercise. Use over the counter simethicone xtra strength gas x (125-180mg) 2 twice a day as needed for gas.     High Fiber Food suggestions:    Beans, nuts, vegetables, whole grains, flax seed and yarely seeds (which can be stirred into other food) are high in fiber.    Jones Protein bars from [employer] = 10gm of fiber in each bar (also gluten free)  Quest Protein bars from grocery stores= 15gm of fiber each (also gluten free)  Fiber One bars from grocery stores = 5-6gm of fiber each  Kelloggs Bran Buds cereal 1/2 cup = 17gm of fiber  [employer] brand low sugar Craisins = 13gm of fiber per serving            I have  discussed the above plan with the patient.  They verbalize understanding and are in agreement with the plan.  They have been advised to contact the office for any questions, concerns, or changes related to their health.

## 2021-01-07 PROBLEM — K22.70 BARRETT'S ESOPHAGUS WITHOUT DYSPLASIA: Status: ACTIVE | Noted: 2021-01-07

## 2021-01-07 PROBLEM — K58.2 IRRITABLE BOWEL SYNDROME WITH BOTH CONSTIPATION AND DIARRHEA: Status: ACTIVE | Noted: 2021-01-07

## 2021-01-26 ENCOUNTER — OFFICE VISIT (OUTPATIENT)
Dept: OBSTETRICS AND GYNECOLOGY | Facility: CLINIC | Age: 41
End: 2021-01-26

## 2021-01-26 DIAGNOSIS — N39.3 SUI (STRESS URINARY INCONTINENCE, FEMALE): ICD-10-CM

## 2021-01-26 PROCEDURE — 51729 CYSTOMETROGRAM W/VP&UP: CPT | Performed by: OBSTETRICS & GYNECOLOGY

## 2021-01-26 PROCEDURE — 51784 ANAL/URINARY MUSCLE STUDY: CPT | Performed by: OBSTETRICS & GYNECOLOGY

## 2021-01-26 PROCEDURE — 51741 ELECTRO-UROFLOWMETRY FIRST: CPT | Performed by: OBSTETRICS & GYNECOLOGY

## 2021-01-26 PROCEDURE — 51797 INTRAABDOMINAL PRESSURE TEST: CPT | Performed by: OBSTETRICS & GYNECOLOGY

## 2021-01-26 NOTE — PROGRESS NOTES
40-year-old female with symptoms of urinary leakage.  She is here for cystometrogram.  She has symptoms of recurrent UTI and is treated with antibiotics.  Sometimes the culture comes back negative.    Urodynamic testing was explained informed consent was obtained.    Patient underwent urodynamic testing under sterile conditions.  She tolerated procedure well.  She had increased sensation and decreased capacity.  Her detrusor was stable.  Stress urinary incontinence was not seen.  This pattern may be consistent with some interstitial cystitis.  The patient tolerated procedure well.    Impression: Stable detrusor, increased sensation and decreased capacity    Consider screening for interstitial cystitis.    Full study was scanned into the media tab in epic.    Alistair Pillai MD

## 2021-02-02 ENCOUNTER — TRANSCRIBE ORDERS (OUTPATIENT)
Dept: ADMINISTRATIVE | Facility: HOSPITAL | Age: 41
End: 2021-02-02

## 2021-02-02 DIAGNOSIS — Z01.818 OTHER SPECIFIED PRE-OPERATIVE EXAMINATION: Primary | ICD-10-CM

## 2021-02-22 ENCOUNTER — HOSPITAL ENCOUNTER (OUTPATIENT)
Dept: NUCLEAR MEDICINE | Facility: HOSPITAL | Age: 41
Discharge: HOME | End: 2021-02-22

## 2021-02-22 DIAGNOSIS — K21.9 GASTROESOPHAGEAL REFLUX DISEASE WITHOUT ESOPHAGITIS: ICD-10-CM

## 2021-02-22 DIAGNOSIS — K22.70 BARRETT'S ESOPHAGUS WITHOUT DYSPLASIA: ICD-10-CM

## 2021-02-22 PROCEDURE — 0 TECHNETIUM SULFUR COLLOID: Performed by: NURSE PRACTITIONER

## 2021-02-22 PROCEDURE — 78264 GASTRIC EMPTYING IMG STUDY: CPT

## 2021-02-22 PROCEDURE — A9541 TC99M SULFUR COLLOID: HCPCS | Performed by: NURSE PRACTITIONER

## 2021-02-22 RX ADMIN — TECHNETIUM TC 99M SULFUR COLLOID 1 DOSE: KIT at 07:10

## 2021-03-03 ENCOUNTER — OFFICE VISIT (OUTPATIENT)
Dept: OBSTETRICS AND GYNECOLOGY | Facility: CLINIC | Age: 41
End: 2021-03-03

## 2021-03-03 VITALS
HEIGHT: 65 IN | WEIGHT: 197 LBS | DIASTOLIC BLOOD PRESSURE: 62 MMHG | SYSTOLIC BLOOD PRESSURE: 120 MMHG | BODY MASS INDEX: 32.82 KG/M2

## 2021-03-03 DIAGNOSIS — N30.10 IC (INTERSTITIAL CYSTITIS): ICD-10-CM

## 2021-03-03 DIAGNOSIS — F17.200 SMOKER: ICD-10-CM

## 2021-03-03 DIAGNOSIS — N32.81 OVERACTIVE BLADDER: ICD-10-CM

## 2021-03-03 DIAGNOSIS — Z13.9 SCREENING FOR CONDITION: Primary | ICD-10-CM

## 2021-03-03 LAB
BILIRUB BLD-MCNC: NEGATIVE MG/DL
CLARITY, POC: CLEAR
COLOR UR: YELLOW
GLUCOSE UR STRIP-MCNC: NEGATIVE MG/DL
KETONES UR QL: NEGATIVE
LEUKOCYTE EST, POC: NEGATIVE
NITRITE UR-MCNC: NEGATIVE MG/ML
PH UR: 5 [PH] (ref 5–8)
PROT UR STRIP-MCNC: NEGATIVE MG/DL
RBC # UR STRIP: NEGATIVE /UL
SP GR UR: 1 (ref 1–1.03)
UROBILINOGEN UR QL: NORMAL

## 2021-03-03 PROCEDURE — 99214 OFFICE O/P EST MOD 30 MIN: CPT | Performed by: OBSTETRICS & GYNECOLOGY

## 2021-03-03 RX ORDER — PROCHLORPERAZINE MALEATE 5 MG/1
TABLET ORAL
COMMUNITY
Start: 2021-02-24 | End: 2021-05-10

## 2021-03-03 RX ORDER — TRAZODONE HYDROCHLORIDE 50 MG/1
TABLET ORAL
COMMUNITY
Start: 2021-02-24 | End: 2021-05-10

## 2021-03-03 RX ORDER — CARBOXYMETHYLCELLULOSE SODIUM 10 MG/ML
1 GEL OPHTHALMIC
COMMUNITY
Start: 2020-12-23 | End: 2022-04-01

## 2021-03-03 RX ORDER — ONDANSETRON 4 MG/1
TABLET, FILM COATED ORAL
COMMUNITY
Start: 2021-02-24 | End: 2021-05-10

## 2021-03-03 RX ORDER — HYDROXYZINE HYDROCHLORIDE 25 MG/1
TABLET, FILM COATED ORAL
COMMUNITY
Start: 2021-02-24 | End: 2021-05-10 | Stop reason: SDUPTHER

## 2021-03-03 RX ORDER — NALTREXONE HYDROCHLORIDE 50 MG/1
TABLET, FILM COATED ORAL
COMMUNITY
Start: 2021-02-24 | End: 2021-04-13

## 2021-03-03 RX ORDER — AMLODIPINE BESYLATE 2.5 MG/1
TABLET ORAL
COMMUNITY
Start: 2021-02-24 | End: 2021-05-10

## 2021-03-03 RX ORDER — DULOXETIN HYDROCHLORIDE 60 MG/1
60 CAPSULE, DELAYED RELEASE ORAL
COMMUNITY
Start: 2021-02-24 | End: 2021-05-10 | Stop reason: SDUPTHER

## 2021-03-03 NOTE — PROGRESS NOTES
"EVALUATION AND MANAGEMENT ENCOUNTER    Aide Henry  Patient new to examiner? No  New problem to examiner? No  Patient referred? No    -----------------------------------------------------HISTORY---------------------------------------------------    Chief Complaint:   Chief Complaint   Patient presents with   • Follow-up       HPI:  Aide Henry is a 40 y.o.  with No LMP recorded (lmp unknown). here for results of urodynamic studies.  Results consistent with overactive bladder with possible interstitial cystitis features.     1. Location: bladder      2. Severity:  severe      3.  Quality:  sharp      4. Modifying factors:  none      5.  Associated signs/symptoms:  Leakage of urine.       Pt denies:  Fever or dysuria.     History of Present Illness     Aide Henry  reports that she has quit smoking. Her smoking use included cigarettes. She has a 5.00 pack-year smoking history. She has never used smokeless tobacco.. I have educated her on the risk of diseases from using tobacco products such as cancer, COPD and heart disease.     I advised her to quit and she is not willing to quit.             ROS:  Review of Systems   Constitutional: Negative.    HENT: Negative.    Eyes: Negative.    Respiratory: Negative.    Cardiovascular: Negative.    Gastrointestinal: Negative.    Endocrine: Negative.    Genitourinary: Positive for difficulty urinating and pelvic pain.   Musculoskeletal: Negative.    Skin: Negative.    Allergic/Immunologic: Negative.    Neurological: Negative.    Hematological: Negative.    Psychiatric/Behavioral: Negative.        Patient reports that she IS currently experiencing any symptoms of urinary incontinence.      noTESTED FOR CHLAMYDIA?  -----------------------------------------------PHYSICAL EXAM----------------------------------------------    Vital Signs: /62   Ht 165.1 cm (65\")   Wt 89.4 kg (197 lb)   LMP  (LMP Unknown)   Breastfeeding No   BMI 32.78 kg/m²      Physical " Exam  Vitals and nursing note reviewed.   Constitutional:       Appearance: She is well-developed.   HENT:      Head: Normocephalic and atraumatic.   Cardiovascular:      Rate and Rhythm: Normal rate.   Pulmonary:      Effort: Pulmonary effort is normal.   Abdominal:      General: There is no distension.      Palpations: Abdomen is soft. There is no mass.      Tenderness: There is no abdominal tenderness. There is no guarding.   Genitourinary:     Vagina: No vaginal discharge.   Musculoskeletal:         General: No tenderness or deformity. Normal range of motion.      Cervical back: Normal range of motion.   Skin:     General: Skin is warm and dry.      Coloration: Skin is not pale.      Findings: No erythema or rash.   Neurological:      Mental Status: She is alert and oriented to person, place, and time.   Psychiatric:         Behavior: Behavior normal.         Thought Content: Thought content normal.         Judgment: Judgment normal.         -----------------------------------------------MEDICAL DECISION MAKING-----------------------------        DATA Review & labs ordered:     1.   Lab Results (last 24 hours)     ** No results found for the last 24 hours. **        2.   Imaging Results (Last 24 Hours)     ** No results found for the last 24 hours. **        3.   ECG/EMG Results (most recent)     None        4. Old records reviewed? Yes  5. Old records ordered?  No  6. Labs ordered?: Yes:  urinalysis: clr  7. Imaging other than ultrasound ordered?: No        Reviewed with other physician? Yes.  Dr Pillai     8. Ultrasound ordered and reviewed? No  9. Diagnoses and/or chronic conditions reviewed:      Diagnoses and all orders for this visit:    1. Screening for condition (Primary)  -     POC Urinalysis Dipstick    2. Overactive bladder  -     Ambulatory Referral to Urology    3. IC (interstitial cystitis)  -     Ambulatory Referral to Urology    4. Smoker        IMPRESSION/PROBLEM:      IC with overactive  bladder.  Pt desires urology consult.     (Established problem/s? No, worsening? Yes)    (New Problem/s? Yes, additional workup planned? Yes)      PLAN:     1.urology consult.  2. Stop smoking.     Pt to call for any results from testing promptly if she does not hear from us.     RTO No follow-ups on file. .  Instructions and precautions given.     TIME: More than 50% of time spent in counseling and/or coordination of care.Time spent in counseling 30 min.  Counseling included the following topics options for IC, incontinence with prognosis, differential diagnosis, risks, benefits of treatment, instructions, compliance and/or risk reduction and alternatives.       Rancho Mayberry MD  20:06 EST  03/08/21

## 2021-03-08 ENCOUNTER — TELEPHONE (OUTPATIENT)
Dept: GASTROENTEROLOGY | Facility: CLINIC | Age: 41
End: 2021-03-08

## 2021-03-08 ENCOUNTER — OFFICE VISIT (OUTPATIENT)
Dept: GASTROENTEROLOGY | Facility: CLINIC | Age: 41
End: 2021-03-08

## 2021-03-08 VITALS — BODY MASS INDEX: 32.79 KG/M2 | HEIGHT: 65 IN | WEIGHT: 196.8 LBS | TEMPERATURE: 98.4 F

## 2021-03-08 DIAGNOSIS — K22.70 BARRETT'S ESOPHAGUS WITHOUT DYSPLASIA: ICD-10-CM

## 2021-03-08 DIAGNOSIS — K31.84 GASTROPARESIS: Primary | ICD-10-CM

## 2021-03-08 DIAGNOSIS — K58.2 IRRITABLE BOWEL SYNDROME WITH BOTH CONSTIPATION AND DIARRHEA: ICD-10-CM

## 2021-03-08 DIAGNOSIS — R14.0 BLOATING: ICD-10-CM

## 2021-03-08 DIAGNOSIS — K21.9 GASTROESOPHAGEAL REFLUX DISEASE WITHOUT ESOPHAGITIS: ICD-10-CM

## 2021-03-08 DIAGNOSIS — R14.0 FLATULENCE/GAS PAIN/BELCHING: ICD-10-CM

## 2021-03-08 DIAGNOSIS — K59.04 CHRONIC IDIOPATHIC CONSTIPATION: ICD-10-CM

## 2021-03-08 PROCEDURE — 99214 OFFICE O/P EST MOD 30 MIN: CPT | Performed by: NURSE PRACTITIONER

## 2021-03-08 RX ORDER — ERYTHROMYCIN 250 MG/1
250 TABLET, DELAYED RELEASE ORAL 2 TIMES DAILY
Qty: 180 TABLET | Refills: 3 | Status: SHIPPED | OUTPATIENT
Start: 2021-03-08 | End: 2021-04-13

## 2021-03-08 NOTE — PATIENT INSTRUCTIONS
"4/14/21 EGD to recheck Barretts.     Your gastric emptying study shows that you have gastroparesis.  This means you have a slow emptying stomach that keeps food in it too long.  This is a permanent condition that can by managed with diet and by taking Erythromycin every day two times a day for life.  A prescription for this has been sent to your pharmacy and you should take a 1/2 tab two times a day with breakfast and dinner.  This will help your stomach to empty.  You also need to continue once daily or twice daily miralax to prevent constipation as this make the gastroparesis worse.  Diet for this is to eat 5-6 small meals a day with 2-3 of them being a liquid protein drink.  Eat small amounts and do not eat fried or fatty foods and do not eat within 3-4 hours of bedtime. This will make you feel better and have less acid reflux. Take your acid reflux medicine every day as directed.       Take a daily probiotic (something with a billion cultures and more different strains is better like \"Probiotic 10\" or probiotic gummies) for your gut as well.  AVOID taking NSAIDS (like ibuprofen, Aleve, Motrin, naproxen, meloxicam, etc) as much as possible and use acetaminophen (Tylenol) instead.    Don't eat late, don't eat fried and don't eat too much at one time. Avoid tomato based products like pizza, lasagna, spagetti.  If you want to have these take an over the counter Pepcid immediately before you eat them.  Do not eat within 3-4 hrs of bedtime. Limit caffeine and carbonated beverages, if you must have them do not have later in the day.  If reflux is severe elevate the head of the bed. You may also use TUMS, maalox, or mylanta for breakthrough heartburn.      Use over the counter simethicone xtra strength gas x (125-180mg) 2 twice a day as needed for gas.     High Fiber Food suggestions:     flax seed and yarely seeds (which can be stirred into other food) are high in fiber.      "

## 2021-03-08 NOTE — PROGRESS NOTES
PATIENT INFORMATION  Aide Henry       - 1980    CHIEF COMPLAINT  Chief Complaint   Patient presents with   • Follow-up     follow up on GES - Gastroparesis       HISTORY OF PRESENT ILLNESS  GES came back pos and is doing ok on her omeprazole 40mg once a day and occ pepcid. bms at this point 4-5 days apart.  So will discuss bowel mangement.     21 EGD   21 GES 33% at 4hr  5/10/19 EGD Dr. George Chapman, recall 2021!  hx GERD barretts, hemorrhoids, Dr. Vazquez  2010 colon by Dr. darian hernandez in [facility] [address]  has to use monistat 7day cream for yeast infection      REVIEWED PERTINENT RESULTS/ LABS  Lab Results   Component Value Date    CASEREPORT  05/10/2019     Surgical Pathology Report                         Case: [id number]                                  Authorizing Provider:  Shanon Vazquez MD         Collected:           05/10/2019 01:00 PM          Ordering Location:     [facility]   Received:            05/10/2019 01:37 PM                                 OR                                                                           Pathologist:           Angel Gerber MD                                                         Specimens:   1) - Gastric, Body, Gastric Biopsy                                                                  2) - Esophagus, Distal, distal Esophagus Biopsy                                            FINALDX  05/10/2019     1. Gastric Biopsy: Benign gastric mucosa with    A. Minimal chronic inflammation.   B. No Helicobacter-like organisms present by routine staining.   C. No dysplasia nor malignancy identified.    2. Distal Esophagus Biopsy: Benign squamous and glandular mucosa with    A. Minimally active moderate chronic inflammation with few eosinophils noted consistent with chronic reflux.   B. Focal intestinal metaplasia is present by routine staining consistent with Ashton's esophagus.   C. Reactive change noted, no  dysplasia nor malignancy identified.    jat/pkm        Lab Results   Component Value Date    HGB 12.0 03/03/2021    MCV 90.4 03/03/2021     03/03/2021    ALT 17 03/03/2021    AST 22 03/03/2021    INR 0.94 08/29/2018    TRIG 180 (H) 02/05/2020      NM Gastric Emptying    Result Date: 2/22/2021  Narrative: NUCLEAR MEDICINE GASTRIC EMPTYING STUDY  HISTORY:  Dysphagia, gastroesophageal reflux disease.  TECHNIQUE:  4 Hour solid gastric emptying study.    562 uCi of 99m technetium sulfur colloid mixed in a standard solid meal with cooked eggs, anterior static imaging over the abdomen performed hourly for 4 hours.  FINDINGS: The gastric retention measures: 77% at 1 hour (normal between 30-90%) 58% at 2 hours (normal 60% or less) 42% at 3 hours (normal 30% or less) 33% at 4 hours (normal 10% or less)      Impression: Gastric retention is initially normal though becomes abnormally increased at 3 hours and 4 hours. Gastric retention at 4 hours measures 33% (normal 10% or less).  This report was finalized on 2/22/2021 1:48 PM by Dr. Marco Holcomb M.D.        REVIEW OF SYSTEMS  Review of Systems   All other systems reviewed and are negative.        ACTIVE PROBLEMS  Patient Active Problem List    Diagnosis    • Gastroparesis [K31.84]    • IC (interstitial cystitis) [N30.10]    • Overactive bladder [N32.81]    • Irritable bowel syndrome with both constipation and diarrhea [K58.2]    • Ashton's esophagus without dysplasia [K22.70]    • Left breast lump [N63.20]    • SARMAD (stress urinary incontinence, female) [N39.3]    • Rectocele [N81.6]    • Nondisplaced fracture of coronoid process of left ulna, initial encounter for closed fracture [S52.045A]    • Left ovarian cyst [N83.202]    • Pelvic pain [R10.2]    • Smoker [F17.200]    • Sjogren's syndrome with keratoconjunctivitis sicca (CMS/HCC) [M35.01]    • Biceps tendinitis of left upper extremity [M75.22]    • Complete tear of left rotator cuff [M75.122]    • Subacromial  impingement of left shoulder [M75.42]    • Status post arthroscopy of shoulder [Z98.890]    • Alcohol dependence in remission (CMS/[facility]) [F10.21]    • History of placement of ear tubes [Z96.22]    • Gastroenteritis [K52.9]    • Drug addiction syndrome (CMS/[facility]) [F19.20]    • Substance abuse (CMS/[facility]) [F19.10]    • Bilateral carpal tunnel syndrome [G56.03]    • Eczema [L30.9]    • Shoulder pain, left [M25.512]    • Arthralgia of multiple joints [M25.50]    • Chronic back pain [M54.9, G89.29]    • Chronic constipation [K59.09]    • Disorder of connective tissue (CMS/[facility]) [M35.9]    • Depression with anxiety [F41.8]    • Fibromyalgia [M79.7]    • Gastroesophageal reflux disease without esophagitis [K21.9]    • Irritable bowel syndrome [K58.9]    • Muscle pain [M79.10]    • Palpitations [R00.2]    • Seasonal allergic rhinitis [J30.2]    • 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]    • Tenderness of temporomandibular joint [M26.629]    • Lateral epicondylitis [M77.10]          PAST MEDICAL HISTORY  Past Medical History:   Diagnosis Date   • Anxiety    • Bacterial vaginosis    • Ashton esophagus    • Depression with anxiety    • Fibromyalgia    • Fracture, finger    • GERD (gastroesophageal reflux disease)    • Lateral epicondylitis 2013    RHUEMATOLOGIST   • Lupus (CMS/[facility])    • MRSA (methicillin resistant staph aureus) culture positive 2011 ESTIMATE    GROIN AREA    • Sjogren's syndrome (CMS/[facility])    • Urinary tract infection          SURGICAL HISTORY  Past Surgical History:   Procedure Laterality Date   • ADENOIDECTOMY     •  SECTION     • COLONOSCOPY     • ENDOSCOPY N/A 5/10/2019    Procedure: ESOPHAGOGASTRODUODENOSCOPY with biopsies;  Surgeon: Shanon Vazquez MD;  Location: [facility];  Service: Gastroenterology   • MOUTH SURGERY     • RHINOPLASTY     • SHOULDER ARTHROSCOPY Left 9/10/2018    Procedure: SHOULDER ARTHROSCOPY, rotator cuff repair; extensive debridement, open biceps tenodesis;  Surgeon: Joo Rivers  "MD AGAPITO;  Location: [facility];  Service: Orthopedics   • TONSILLECTOMY     • TUBAL ABDOMINAL LIGATION     • TYMPANOSTOMY TUBE PLACEMENT     • WISDOM TOOTH EXTRACTION Bilateral          FAMILY HISTORY  Family History   Problem Relation Age of Onset   • Cancer Father    • Heart disease Paternal Grandmother    • Diabetes Maternal Aunt    • Breast cancer Maternal Aunt    • Diabetes Maternal Grandmother    • Colon cancer Neg Hx    • Colon polyps Neg Hx          SOCIAL HISTORY  Social History     Occupational History   • Not on file   Tobacco Use   • Smoking status: Former Smoker     Packs/day: 0.25     Years: 20.00     Pack years: 5.00     Types: Cigarettes   • Smokeless tobacco: Never Used   • Tobacco comment: Declines medication today. Not interested in patches. Has tried gum previously. Desires to continue weaning.  Vaping    Substance and Sexual Activity   • Alcohol use: Yes     Comment: Alcoholic, Last drink 4-2-2018   • Drug use: Yes     Types: Cocaine(coke), Hydrocodone     Comment: \"recovering addict\", last reports use June 2017   • Sexual activity: Defer         CURRENT MEDICATIONS    Current Outpatient Medications:   •  amLODIPine (NORVASC) 2.5 MG tablet, Take 2.5 mg by mouth Daily., Disp: , Rfl:   •  amLODIPine (NORVASC) 2.5 MG tablet, , Disp: , Rfl:   •  azaTHIOprine (IMURAN PO), , Disp: , Rfl:   •  azaTHIOprine (IMURAN) 50 MG tablet, TK 2 TS PO D, Disp: , Rfl:   •  carboxymethylcellulose sod, PF, (Refresh Celluvisc) 1 % gel eye gel, PLACE 1 DROP IN AFFECTED EYE 6 TIMES DAILY, Disp: , Rfl:   •  ciclopirox (PENLAC) 8 % solution, OVIDIO EXT AA Q NIGHT, Disp: , Rfl:   •  desoximetasone (TOPICORT) 0.25 % cream, OVIDIO EXT AA BID, Disp: , Rfl:   •  DULoxetine (CYMBALTA) 60 MG capsule, Take 60 mg by mouth Daily., Disp: , Rfl:   •  DULoxetine (Cymbalta) 60 MG capsule, 60 mg., Disp: , Rfl:   •  Erythromycin 250 MG EC tablet, Take 1 tablet by mouth 2 (Two) Times a Day., Disp: 180 tablet, Rfl: 3  •  famotidine (PEPCID) 40 MG " "tablet, Take 1 tablet by mouth 2 (Two) Times a Day. With lunch and at bedtime, Disp: 180 tablet, Rfl: 3  •  hydrocortisone (ANUSOL-HC) 2.5 % rectal cream, Insert  into the rectum 4 (Four) Times a Day As Needed for Hemorrhoids (rectal discomfort)., Disp: 30 g, Rfl: 5  •  hydrOXYzine (ATARAX) 25 MG tablet, TK 1 TO 2 TS PO QPM, Disp: , Rfl:   •  hydrOXYzine (ATARAX) 25 MG tablet, , Disp: , Rfl:   •  naltrexone (DEPADE) 50 MG tablet, TAKE 1 T PO QD, Disp: , Rfl:   •  naltrexone (DEPADE) 50 MG tablet, , Disp: , Rfl:   •  omeprazole (priLOSEC) 40 MG capsule, Take 1 capsule by mouth Daily., Disp: 90 capsule, Rfl: 3  •  ondansetron (ZOFRAN) 4 MG tablet, , Disp: , Rfl:   •  ondansetron ODT (ZOFRAN-ODT) 4 MG disintegrating tablet, Take 4 mg by mouth., Disp: , Rfl:   •  pilocarpine (SALAGEN) 5 MG tablet, TK 1 T PO TID, Disp: , Rfl: 3  •  prochlorperazine (COMPAZINE) 5 MG tablet, , Disp: , Rfl:   •  saccharomyces boulardii (FLORASTOR) 250 MG capsule, Take 1 capsule by mouth 2 (Two) Times a Day., Disp: 180 capsule, Rfl: 3  •  traZODone (DESYREL) 100 MG tablet, Take 100 mg by mouth Every Night., Disp: , Rfl:   •  traZODone (DESYREL) 50 MG tablet, , Disp: , Rfl:     ALLERGIES  Fluconazole    VITALS  Vitals:    03/08/21 1443   Temp: 98.4 °F (36.9 °C)   TempSrc: Temporal   Weight: 89.3 kg (196 lb 12.8 oz)   Height: 165.1 cm (65\")       PHYSICAL EXAM  Debilities/Disabilities Identified: None  Emotional Behavior: Appropriate  Wt Readings from Last 3 Encounters:   03/08/21 89.3 kg (196 lb 12.8 oz)   03/03/21 89.4 kg (197 lb)   01/05/21 87 kg (191 lb 12.8 oz)     Ht Readings from Last 1 Encounters:   03/08/21 165.1 cm (65\")     Body mass index is 32.75 kg/m².  Physical Exam  Vitals and nursing note reviewed.   Constitutional:       Appearance: She is well-developed.   HENT:      Head: Normocephalic and atraumatic.   Eyes:      Conjunctiva/sclera: Conjunctivae normal.      Pupils: Pupils are equal, round, and reactive to light. "   Cardiovascular:      Rate and Rhythm: Normal rate and regular rhythm.   Pulmonary:      Effort: Pulmonary effort is normal.      Breath sounds: Normal breath sounds.   Abdominal:      General: Bowel sounds are normal. There is no distension.      Palpations: Abdomen is soft.      Tenderness: There is abdominal tenderness in the right upper quadrant, right lower quadrant, epigastric area and left upper quadrant.   Musculoskeletal:         General: Normal range of motion.      Cervical back: Normal range of motion and neck supple.   Lymphadenopathy:      Cervical: No cervical adenopathy.   Skin:     General: Skin is warm and dry.   Neurological:      Mental Status: She is alert and oriented to person, place, and time.   Psychiatric:         Behavior: Behavior normal.         CLINICAL DATA REVIEWED   reviewed previous lab results and integrated with today's visit, reviewed notes from other physicians and/or last GI encounter, reviewed previous endoscopy results and available photos, reviewed surgical pathology results from previous biopsies    ASSESSMENT  Diagnoses and all orders for this visit:    Gastroparesis  -     Erythromycin 250 MG EC tablet; Take 1 tablet by mouth 2 (Two) Times a Day.    Irritable bowel syndrome with both constipation and diarrhea    Ashton's esophagus without dysplasia    Flatulence/gas pain/belching    Gastroesophageal reflux disease without esophagitis    Bloating    Chronic idiopathic constipation  -     Erythromycin 250 MG EC tablet; Take 1 tablet by mouth 2 (Two) Times a Day.          PLAN  Return in about 2 months (around 5/8/2021).   4/14/21 EGD to recheck Barretts.     Your gastric emptying study shows that you have gastroparesis.  This means you have a slow emptying stomach that keeps food in it too long.  This is a permanent condition that can by managed with diet and by taking Erythromycin every day two times a day for life.  A prescription for this has been sent to your pharmacy  "and you should take a 1/2 tab two times a day with breakfast and dinner.  This will help your stomach to empty.  You also need to continue once daily or twice daily miralax to prevent constipation as this make the gastroparesis worse.  Diet for this is to eat 5-6 small meals a day with 2-3 of them being a liquid protein drink.  Eat small amounts and do not eat fried or fatty foods and do not eat within 3-4 hours of bedtime. This will make you feel better and have less acid reflux. Take your acid reflux medicine every day as directed.     Don't eat late, don't eat fried and don't eat too much at one time. Avoid tomato based products like pizza, lasagna, spagetti.  If you want to have these take an over the counter Pepcid immediately before you eat them.  Do not eat within 3-4 hrs of bedtime. Limit caffeine and carbonated beverages, if you must have them do not have later in the day.  If reflux is severe elevate the head of the bed. You may also use TUMS, maalox, or mylanta for breakthrough heartburn.    Take a daily probiotic (something with a billion cultures and more different strains is better like \"Probiotic 10\" or probiotic gummies) for your gut as well.  AVOID taking NSAIDS (like ibuprofen, Aleve, Motrin, naproxen, meloxicam, etc) as much as possible and use acetaminophen (Tylenol) instead.      I have discussed the above plan with the patient.  They verbalize understanding and are in agreement with the plan.  They have been advised to contact the office for any questions, concerns, or changes related to their health.                      "

## 2021-04-08 ENCOUNTER — TRANSCRIBE ORDERS (OUTPATIENT)
Dept: ADMINISTRATIVE | Facility: HOSPITAL | Age: 41
End: 2021-04-08

## 2021-04-08 DIAGNOSIS — R31.9 HEMATURIA SYNDROME: Primary | ICD-10-CM

## 2021-04-12 ENCOUNTER — LAB (OUTPATIENT)
Dept: LAB | Facility: HOSPITAL | Age: 41
End: 2021-04-12

## 2021-04-12 DIAGNOSIS — Z01.818 OTHER SPECIFIED PRE-OPERATIVE EXAMINATION: ICD-10-CM

## 2021-04-12 LAB — SARS-COV-2 RNA PNL SPEC NAA+PROBE: NOT DETECTED

## 2021-04-12 PROCEDURE — 87635 SARS-COV-2 COVID-19 AMP PRB: CPT | Performed by: OBSTETRICS & GYNECOLOGY

## 2021-04-12 PROCEDURE — C9803 HOPD COVID-19 SPEC COLLECT: HCPCS

## 2021-04-13 ENCOUNTER — ANESTHESIA EVENT (OUTPATIENT)
Dept: PERIOP | Facility: HOSPITAL | Age: 41
End: 2021-04-13

## 2021-04-14 ENCOUNTER — HOSPITAL ENCOUNTER (OUTPATIENT)
Facility: HOSPITAL | Age: 41
Setting detail: HOSPITAL OUTPATIENT SURGERY
Discharge: HOME | End: 2021-04-14
Attending: INTERNAL MEDICINE | Admitting: INTERNAL MEDICINE

## 2021-04-14 ENCOUNTER — APPOINTMENT (OUTPATIENT)
Dept: CT IMAGING | Facility: HOSPITAL | Age: 41
End: 2021-04-14

## 2021-04-14 ENCOUNTER — ANESTHESIA (OUTPATIENT)
Dept: PERIOP | Facility: HOSPITAL | Age: 41
End: 2021-04-14

## 2021-04-14 VITALS
BODY MASS INDEX: 32.32 KG/M2 | HEART RATE: 86 BPM | WEIGHT: 194.2 LBS | TEMPERATURE: 98.5 F | DIASTOLIC BLOOD PRESSURE: 78 MMHG | OXYGEN SATURATION: 97 % | SYSTOLIC BLOOD PRESSURE: 103 MMHG | RESPIRATION RATE: 12 BRPM

## 2021-04-14 DIAGNOSIS — K58.2 IRRITABLE BOWEL SYNDROME WITH BOTH CONSTIPATION AND DIARRHEA: ICD-10-CM

## 2021-04-14 DIAGNOSIS — K21.9 GASTROESOPHAGEAL REFLUX DISEASE WITHOUT ESOPHAGITIS: ICD-10-CM

## 2021-04-14 DIAGNOSIS — K22.70 BARRETT'S ESOPHAGUS WITHOUT DYSPLASIA: ICD-10-CM

## 2021-04-14 PROCEDURE — 25010000002 PROPOFOL 10 MG/ML EMULSION: Performed by: NURSE ANESTHETIST, CERTIFIED REGISTERED

## 2021-04-14 PROCEDURE — 88305 TISSUE EXAM BY PATHOLOGIST: CPT | Performed by: INTERNAL MEDICINE

## 2021-04-14 PROCEDURE — 43239 EGD BIOPSY SINGLE/MULTIPLE: CPT | Performed by: INTERNAL MEDICINE

## 2021-04-14 RX ORDER — SODIUM CHLORIDE 9 MG/ML
40 INJECTION, SOLUTION INTRAVENOUS AS NEEDED
Status: DISCONTINUED | OUTPATIENT
Start: 2021-04-14 | End: 2021-04-14 | Stop reason: HOSPADM

## 2021-04-14 RX ORDER — SODIUM CHLORIDE 0.9 % (FLUSH) 0.9 %
10 SYRINGE (ML) INJECTION AS NEEDED
Status: DISCONTINUED | OUTPATIENT
Start: 2021-04-14 | End: 2021-04-14 | Stop reason: HOSPADM

## 2021-04-14 RX ORDER — SODIUM CHLORIDE, SODIUM LACTATE, POTASSIUM CHLORIDE, CALCIUM CHLORIDE 600; 310; 30; 20 MG/100ML; MG/100ML; MG/100ML; MG/100ML
100 INJECTION, SOLUTION INTRAVENOUS CONTINUOUS
Status: DISCONTINUED | OUTPATIENT
Start: 2021-04-14 | End: 2021-04-14 | Stop reason: HOSPADM

## 2021-04-14 RX ORDER — ONDANSETRON 2 MG/ML
4 INJECTION INTRAMUSCULAR; INTRAVENOUS ONCE AS NEEDED
Status: DISCONTINUED | OUTPATIENT
Start: 2021-04-14 | End: 2021-04-14 | Stop reason: HOSPADM

## 2021-04-14 RX ORDER — SODIUM CHLORIDE, SODIUM LACTATE, POTASSIUM CHLORIDE, CALCIUM CHLORIDE 600; 310; 30; 20 MG/100ML; MG/100ML; MG/100ML; MG/100ML
9 INJECTION, SOLUTION INTRAVENOUS CONTINUOUS
Status: DISCONTINUED | OUTPATIENT
Start: 2021-04-14 | End: 2021-04-14 | Stop reason: HOSPADM

## 2021-04-14 RX ORDER — SODIUM CHLORIDE 0.9 % (FLUSH) 0.9 %
10 SYRINGE (ML) INJECTION EVERY 12 HOURS SCHEDULED
Status: DISCONTINUED | OUTPATIENT
Start: 2021-04-14 | End: 2021-04-14 | Stop reason: HOSPADM

## 2021-04-14 RX ORDER — LIDOCAINE HYDROCHLORIDE 10 MG/ML
0.5 INJECTION, SOLUTION EPIDURAL; INFILTRATION; INTRACAUDAL; PERINEURAL ONCE AS NEEDED
Status: DISCONTINUED | OUTPATIENT
Start: 2021-04-14 | End: 2021-04-14 | Stop reason: HOSPADM

## 2021-04-14 RX ORDER — PROPOFOL 10 MG/ML
VIAL (ML) INTRAVENOUS AS NEEDED
Status: DISCONTINUED | OUTPATIENT
Start: 2021-04-14 | End: 2021-04-14 | Stop reason: SURG

## 2021-04-14 RX ORDER — LIDOCAINE HYDROCHLORIDE 20 MG/ML
INJECTION, SOLUTION INFILTRATION; PERINEURAL AS NEEDED
Status: DISCONTINUED | OUTPATIENT
Start: 2021-04-14 | End: 2021-04-14 | Stop reason: SURG

## 2021-04-14 RX ORDER — SODIUM CHLORIDE, SODIUM LACTATE, POTASSIUM CHLORIDE, CALCIUM CHLORIDE 600; 310; 30; 20 MG/100ML; MG/100ML; MG/100ML; MG/100ML
INJECTION, SOLUTION INTRAVENOUS CONTINUOUS PRN
Status: DISCONTINUED | OUTPATIENT
Start: 2021-04-14 | End: 2021-04-14 | Stop reason: SURG

## 2021-04-14 RX ADMIN — LIDOCAINE HYDROCHLORIDE 100 MG: 20 INJECTION, SOLUTION INFILTRATION; PERINEURAL at 14:24

## 2021-04-14 RX ADMIN — PROPOFOL 40 MG: 10 INJECTION, EMULSION INTRAVENOUS at 14:34

## 2021-04-14 RX ADMIN — SODIUM CHLORIDE, POTASSIUM CHLORIDE, SODIUM LACTATE AND CALCIUM CHLORIDE: 600; 310; 30; 20 INJECTION, SOLUTION INTRAVENOUS at 13:50

## 2021-04-14 RX ADMIN — PROPOFOL 50 MG: 10 INJECTION, EMULSION INTRAVENOUS at 14:24

## 2021-04-14 RX ADMIN — PROPOFOL 40 MG: 10 INJECTION, EMULSION INTRAVENOUS at 14:27

## 2021-04-14 RX ADMIN — PROPOFOL 40 MG: 10 INJECTION, EMULSION INTRAVENOUS at 14:30

## 2021-04-14 NOTE — ANESTHESIA POSTPROCEDURE EVALUATION
Patient: Aide Henry    Procedure Summary     Date: 04/14/21 Room / Location: [facility] ENDOSCOPY 1 /  [facility]    Anesthesia Start: 1422 Anesthesia Stop: 1438    Procedure: ESOPHAGOGASTRODUODENOSCOPY with biopsies (N/A Esophagus) Diagnosis:       Irritable bowel syndrome with both constipation and diarrhea      Gastroesophageal reflux disease without esophagitis      Ashton's esophagus without dysplasia      Gastritis      (Irritable bowel syndrome with both constipation and diarrhea [K58.2])      (Gastroesophageal reflux disease without esophagitis [K21.9])      (Ashton's esophagus without dysplasia [K22.70])    Surgeons: Romain Rice MD Provider: Ruby Dickey CRNA    Anesthesia Type: MAC ASA Status: 3          Anesthesia Type: MAC    Vitals  Vitals Value Taken Time   /86 04/14/21 1450   Temp     Pulse 82 04/14/21 1450   Resp 12 04/14/21 1450   SpO2 100 % 04/14/21 1450           Post Anesthesia Care and Evaluation    Patient location during evaluation: bedside  Patient participation: complete - patient participated  Level of consciousness: awake and alert  Pain score: 0  Pain management: adequate  Airway patency: patent  Anesthetic complications: No anesthetic complications  PONV Status: none  Cardiovascular status: acceptable  Respiratory status: acceptable  Hydration status: acceptable  No anesthesia care post op

## 2021-04-14 NOTE — H&P
Patient Care Team:  Lou Willard APRN as PCP - General (Family Medicine)    CHIEF COMPLAINT: Barretts Esophagus    HISTORY OF PRESENT ILLNESS:  Last EGd 2018    Past Medical History:   Diagnosis Date   • Anxiety    • Bacterial vaginosis    • Ashton esophagus    • Depression with anxiety    • Fibromyalgia    • Fracture, finger    • GERD (gastroesophageal reflux disease)    • Lateral epicondylitis 2013    RHUEMATOLOGIST   • Lupus (CMS/[facility])    • MRSA (methicillin resistant staph aureus) culture positive  ESTIMATE    GROIN AREA    • Sjogren's syndrome (CMS/[facility])    • Urinary tract infection      Past Surgical History:   Procedure Laterality Date   • ADENOIDECTOMY     •  SECTION     • COLONOSCOPY     • ENDOSCOPY N/A 5/10/2019    Procedure: ESOPHAGOGASTRODUODENOSCOPY with biopsies;  Surgeon: Shanon Vazquez MD;  Location: [facility] OR;  Service: Gastroenterology   • MOUTH SURGERY     • RHINOPLASTY     • SHOULDER ARTHROSCOPY Left 9/10/2018    Procedure: SHOULDER ARTHROSCOPY, rotator cuff repair; extensive debridement, open biceps tenodesis;  Surgeon: Joo Rivers MD;  Location: [facility] OR;  Service: Orthopedics   • TONSILLECTOMY     • TUBAL ABDOMINAL LIGATION     • TYMPANOSTOMY TUBE PLACEMENT     • WISDOM TOOTH EXTRACTION Bilateral      Family History   Problem Relation Age of Onset   • Cancer Father    • Heart disease Paternal Grandmother    • Diabetes Maternal Aunt    • Breast cancer Maternal Aunt    • Diabetes Maternal Grandmother    • Colon cancer Neg Hx    • Colon polyps Neg Hx      Social History     Tobacco Use   • Smoking status: Former Smoker     Packs/day: 0.25     Years: 20.00     Pack years: 5.00     Types: Cigarettes   • Smokeless tobacco: Never Used   • Tobacco comment: Declines medication today. Not interested in patches. Has tried gum previously. Desires to continue weaning.  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   4/13/2021 at Unknown time   • amLODIPine (NORVASC) 2.5 MG tablet    4/12/2021 at Unknown time   • azaTHIOprine (IMURAN PO)    4/13/2021 at Unknown time   • azaTHIOprine (IMURAN) 50 MG tablet TK 2 TS PO D   4/12/2021 at Unknown time   • carboxymethylcellulose sod, PF, (Refresh Celluvisc) 1 % gel eye gel PLACE 1 DROP IN AFFECTED EYE 6 TIMES DAILY   4/12/2021 at Unknown time   • DULoxetine (CYMBALTA) 60 MG capsule Take 60 mg by mouth Daily.   4/13/2021 at Unknown time   • DULoxetine (Cymbalta) 60 MG capsule 60 mg.   4/12/2021 at Unknown time   • hydrocortisone (ANUSOL-HC) 2.5 % rectal cream Insert  into the rectum 4 (Four) Times a Day As Needed for Hemorrhoids (rectal discomfort). 30 g 5 4/13/2021 at Unknown time   • hydrOXYzine (ATARAX) 25 MG tablet TK 1 TO 2 TS PO QPM   Past Week at Unknown time   • hydrOXYzine (ATARAX) 25 MG tablet    Past Week at Unknown time   • omeprazole (priLOSEC) 40 MG capsule Take 1 capsule by mouth Daily. 90 capsule 3 4/13/2021 at Unknown time   • pilocarpine (SALAGEN) 5 MG tablet TK 1 T PO TID  3 4/12/2021 at Unknown time   • traZODone (DESYREL) 100 MG tablet Take 100 mg by mouth Every Night.   4/13/2021 at Unknown time   • traZODone (DESYREL) 50 MG tablet    4/12/2021 at Unknown time   • ciclopirox (PENLAC) 8 % solution OVIDIO EXT AA Q NIGHT   Unknown at Unknown time   • desoximetasone (TOPICORT) 0.25 % cream OVIDIO EXT AA BID      • famotidine (PEPCID) 40 MG tablet Take 1 tablet by mouth 2 (Two) Times a Day. With lunch and at bedtime 180 tablet 3 More than a month at Unknown time   • ondansetron (ZOFRAN) 4 MG tablet    More than a month at Unknown time   • ondansetron ODT (ZOFRAN-ODT) 4 MG disintegrating tablet Take 4 mg by mouth.      • " prochlorperazine (COMPAZINE) 5 MG tablet         Allergies:  Fluconazole    REVIEW OF SYSTEMS:  Please see the above history of present illness for pertinent positives and negatives.  The remainder of the patient's systems have been reviewed and are negative.     Vital Signs  Temp:  [98.5 °F (36.9 °C)] 98.5 °F (36.9 °C)  Heart Rate:  [84] 84  Resp:  [12] 12  BP: (124)/(76) 124/76    Flowsheet Rows      First Filed Value   Admission Height  --   Admission Weight  88.1 kg (194 lb 3.2 oz) Documented at 04/14/2021 1342           Physical Exam:  Physical Exam   Constitutional: Patient appears well-developed and well-nourished and in no acute distress   HEENT:   Head: Normocephalic and atraumatic.   Eyes:  Pupils are equal, round, and reactive to light. EOM are intact. Sclerae are anicteric and non-injected.  Mouth and Throat: Patient has moist mucous membranes. Oropharynx is clear of any erythema or exudate.     Neck: Neck supple. No JVD present. No thyromegaly present. No lymphadenopathy present.  Cardiovascular: Regular rate, regular rhythm, S1 normal and S2 normal.  Exam reveals no gallop and no friction rub.  No murmur heard.  Pulmonary/Chest: Lungs are clear to auscultation bilaterally. No respiratory distress. No wheezes. No rhonchi. No rales.   Abdominal: Soft. Bowel sounds are normal. No distension and no mass. There is no hepatosplenomegaly. There is no tenderness.   Musculoskeletal: Normal Muscle tone  Extremities: No edema. Pulses are palpable in all 4 extremities.  Neurological: Patient is alert and oriented to person, place, and time. Cranial nerves II-XII are grossly intact with no focal deficits.  Skin: Skin is warm. No rash noted. Nails show no clubbing.  No cyanosis or erythema.    Debilities/Disabilities Identified: None  Emotional Behavior: Appropriate     Results Review:    I reviewed the patient's new clinical results.  Lab Results (most recent)     None          Imaging Results (Most Recent)      None        reviewed    ECG/EMG Results (most recent)     None        reviewed    Assessment/Plan   Barretts Esophagus/  EGD      I discussed the patients findings and my recommendations with patient.     Romain Rice MD  04/14/21  14:19 EDT    Time: 10 min prior to procedure.

## 2021-04-14 NOTE — BRIEF OP NOTE
ESOPHAGOGASTRODUODENOSCOPY  Progress Note    Aide Henry  4/14/2021    Pre-op Diagnosis:   Irritable bowel syndrome with both constipation and diarrhea [K58.2]  Gastroesophageal reflux disease without esophagitis [K21.9]  Ashton's esophagus without dysplasia [K22.70]       Post-Op Diagnosis Codes:     * Irritable bowel syndrome with both constipation and diarrhea [K58.2]     * Gastroesophageal reflux disease without esophagitis [K21.9]     * Ashton's esophagus without dysplasia [K22.70]     * Gastritis [K29.70]    Procedure/CPT® Codes:        Procedure(s):  ESOPHAGOGASTRODUODENOSCOPY with biopsies    Surgeon(s):  Romain Rice MD    Anesthesia: Monitored Anesthesia Care    Staff:   Circulator: Marilynn Clay RN  Scrub Person: Marilynn Chowdhury         Estimated Blood Loss: none    Urine Voided: * No values recorded between 4/14/2021  2:22 PM and 4/14/2021  2:37 PM *    Specimens:                Specimens     ID Source Type Tests Collected By Collected At Frozen?    A Stomach Tissue · TISSUE PATHOLOGY EXAM   Romain Rice MD 4/14/21 1433     Description: bx    B Esophagus, Distal Tissue · TISSUE PATHOLOGY EXAM   Romain Rice MD 4/14/21 1434     Description: bx                Drains: * No LDAs found *    Findings: Normal Duodenum  Mild gastritis-Biopsy  Reflux esophagitis-biopsy(Previous Ashton's)    Complications: none          Romain Rice MD     Date: 4/14/2021  Time: 14:39 EDT

## 2021-04-14 NOTE — OP NOTE
ESOPHAGOGASTRODUODENOSCOPY  Procedure Report    Patient Name:  Aide Henry  YOB: 1980    Date of Surgery:  4/14/2021     Indications:  Irritable bowel syndrome with both constipation and diarrhea [K58.2]  Gastroesophageal reflux disease without esophagitis [K21.9]  Ashton's esophagus without dysplasia [K22.70]      Pre-op Diagnosis:   Irritable bowel syndrome with both constipation and diarrhea [K58.2]  Gastroesophageal reflux disease without esophagitis [K21.9]  Ashton's esophagus without dysplasia [K22.70]    Post-Op Diagnosis Codes:     * Irritable bowel syndrome with both constipation and diarrhea [K58.2]     * Gastroesophageal reflux disease without esophagitis [K21.9]     * Ashton's esophagus without dysplasia [K22.70]     * Gastritis [K29.70]         Procedure/CPT® Codes:      Procedure(s):  ESOPHAGOGASTRODUODENOSCOPY with biopsies    Staff:  Surgeon(s):  Romain Rice MD         Anesthesia: Monitored Anesthesia Care    Estimated Blood Loss: none    Specimens:   ID Type Source Tests Collected by Time   A : bx Tissue Stomach TISSUE PATHOLOGY EXAM Romain Rice MD 4/14/2021 1433   B : bx Tissue Esophagus, Distal TISSUE PATHOLOGY EXAM Romain Rice MD 4/14/2021 1434       Implants:    Nothing was implanted during the procedure      Description of Procedure: After having signed informed consent, she was brought to the endoscopy suite, placed in left lateral decubitus position and given her IV sedation. A bite block was placed between her incisors. The scope was introduced in the oropharynx, advanced under direct visualization in the esophagus, through the distal esophagus. The Z-line was only minimally irregular, and there was 1 small splenius island, 2 mm in size. The scope was advanced through the lower esophagus into the stomach, retroflexed revealing normal fundus and cardia, deretroflexed and advanced in the antrum. There was mild streaky  erythema leading to the pylorus. The scope was advanced through the pylorus. There was no retained food within the stomach. In the duodenum, there was 1 donovan of residual food noted, but no duodenitis, no ulcerations. The scope was advanced around the angle up to the 2nd and 3rd portions of the duodenum, which were normal. The scope was withdrawn back in the antrum. Biopsies were taken to rule out H. pylori. The scope was withdrawn in the distal esophagus and targeted biopsies were taken of the irregular Z-line, but Ashton's esophagus was not suspected. As the scope was withdrawn, the remainder of the esophagus was normal-appearing. The scope was taken from the patient. She tolerated the procedure very well.           Findings: Normal Duodenum  Mild gastritis-Biopsy  Reflux esophagitis-biopsy(Previous Ashton's)       Complications: None    Recommendations: Results to be called.      Romain Rice MD     Date: 4/14/2021  Time: 14:41 EDT

## 2021-04-26 ENCOUNTER — APPOINTMENT (OUTPATIENT)
Dept: CT IMAGING | Facility: HOSPITAL | Age: 41
End: 2021-04-26

## 2021-05-04 ENCOUNTER — HOSPITAL ENCOUNTER (OUTPATIENT)
Dept: CT IMAGING | Facility: HOSPITAL | Age: 41
Discharge: HOME | End: 2021-05-04
Admitting: UROLOGY

## 2021-05-04 DIAGNOSIS — R31.9 HEMATURIA SYNDROME: ICD-10-CM

## 2021-05-04 PROCEDURE — 74178 CT ABD&PLV WO CNTR FLWD CNTR: CPT

## 2021-05-04 PROCEDURE — 0 IOPAMIDOL PER 1 ML: Performed by: UROLOGY

## 2021-05-04 RX ADMIN — IOPAMIDOL 100 ML: 755 INJECTION, SOLUTION INTRAVENOUS at 12:39

## 2021-05-10 ENCOUNTER — OFFICE VISIT (OUTPATIENT)
Dept: GASTROENTEROLOGY | Facility: CLINIC | Age: 41
End: 2021-05-10

## 2021-05-10 VITALS — BODY MASS INDEX: 31.72 KG/M2 | HEIGHT: 65 IN | WEIGHT: 190.4 LBS

## 2021-05-10 DIAGNOSIS — K59.04 CHRONIC IDIOPATHIC CONSTIPATION: ICD-10-CM

## 2021-05-10 DIAGNOSIS — K31.84 GASTROPARESIS: ICD-10-CM

## 2021-05-10 DIAGNOSIS — K21.9 GASTROESOPHAGEAL REFLUX DISEASE WITHOUT ESOPHAGITIS: ICD-10-CM

## 2021-05-10 DIAGNOSIS — R14.0 BLOATING: ICD-10-CM

## 2021-05-10 DIAGNOSIS — K22.70 BARRETT'S ESOPHAGUS WITHOUT DYSPLASIA: Primary | ICD-10-CM

## 2021-05-10 PROCEDURE — 99214 OFFICE O/P EST MOD 30 MIN: CPT | Performed by: NURSE PRACTITIONER

## 2021-05-10 RX ORDER — BUSPIRONE HYDROCHLORIDE 15 MG/1
15 TABLET ORAL 2 TIMES DAILY
COMMUNITY
Start: 2021-05-04 | End: 2021-10-26

## 2021-05-10 RX ORDER — ERYTHROMYCIN STEARATE 250 MG
250 TABLET ORAL 2 TIMES DAILY
Qty: 180 TABLET | Refills: 3 | Status: SHIPPED | OUTPATIENT
Start: 2021-05-10 | End: 2021-05-14

## 2021-05-10 RX ORDER — DULOXETIN HYDROCHLORIDE 60 MG/1
60 CAPSULE, DELAYED RELEASE ORAL DAILY
COMMUNITY

## 2021-05-10 RX ORDER — PREDNISONE 1 MG/1
5 TABLET ORAL DAILY
COMMUNITY
Start: 2021-04-18 | End: 2022-02-14

## 2021-05-10 NOTE — PATIENT INSTRUCTIONS
"Barretts is healed to just reflux to repeat EGD in 3 years!    Continue with your Omeprazole 40mg daily and Famotidine before acid or large meals.      Your gastric emptying study shows that you have gastroparesis.  This means you have a slow emptying stomach that keeps food in it too long.  This is a permanent condition that can by managed with diet and by taking Erythromycin every day two times a day for life.  A prescription for this has been sent to your pharmacy and you should take a 1 tab two times a day with breakfast and dinner.  This will help your stomach to empty.  You also need to continue once daily or twice daily miralax to prevent constipation as this make the gastroparesis worse.  Diet for this is to eat 5-6 small meals a day with 2-3 of them being a liquid protein drink.  Eat small amounts and do not eat fried or fatty foods and do not eat within 3-4 hours of bedtime. This will make you feel better and have less acid reflux. Take your acid reflux medicine every day as directed.     Low Acid Diet Instructions:   Don't eat late, don't eat fried or spicy, and don't eat too much at one time. Avoid alcohol and  tomato based products like pizza, lasagna, spaghetti.  If you want to have these take an over the counter Pepcid or TUMS immediately before you eat them.  Do not eat within 3-4 hrs of bedtime. Limit caffeine and carbonated beverages, if you must have them do not have later in the day.  If reflux is severe elevate the head of the bed. You may also use TUMS, maalox, or mylanta for breakthrough heartburn.    Take a daily probiotic (something with a billion cultures and more different strains is better, examples like \"Probiotic 10\" or probiotic gummies) for your gut as well.  AVOID taking NSAIDS (like ibuprofen, Aleve, Motrin, naproxen, meloxicam, etc) as much as possible and use acetaminophen (Tylenol) instead.    Drink lots of water, eat a high fiber diet with 25-30gm a day(check the fiber content " in the foods you eat.  Protein bars with 15gm of fiber in each bar are a great supplement daily), and get regular exercise. Use over the counter simethicone xtra strength gas x (125-180mg) 2 twice a day as needed for gas.     High Fiber Food suggestions:  Beans, nuts, vegetables, whole grains, flax seed and yarely seeds (which can be stirred into other food) are high in fiber.    Jones Protein bars from [employer] = 10gm of fiber in each bar (also gluten free)  Quest Protein bars from grocery [employer], [employer], [employer]= 15gm of fiber each (also gluten free)  Fiber One bars from grocery [employer] = 5-6gm of fiber each  Kelloggs Bran Buds cereal 1/2 cup = 17gm of fiber  Kroger brand low sugar Craisins = 13gm of fiber per serving  Melalueca Fiberwise powder found on [employer]=15gm fiber per scoop  CarbBalance tortillas= 15gm of fiber each  Natural Ovens Keto-Friendly Bread found at [employer]=12gm fiber per serving  No Daron vegan bar at [employer]=15gm fiber per serving

## 2021-05-10 NOTE — PROGRESS NOTES
PATIENT INFORMATION  Aide Henry     - 1980    CHIEF COMPLAINT  Chief Complaint   Patient presents with   • Follow-up     EGD w/o Barretts       HISTORY OF PRESENT ILLNESS  GES came back pos and is doing ok on her omeprazole 40mg once a day and occ pepcid. bms at this point 4-5 days apart.  So will discuss bowel mangement.      21 EGD   21 GES 33% at 4hr  5/10/19 EGD Dr. George Chapman, recall 2021!  hx GERD barretts, hemorrhoids, Dr. Vazquez  2010 colon by Dr. farmer somebody in [place] [address]  has to use monistat 7day cream for yeast infection    Today:  Is doing better some with her diet has cut out fiber but uses yarely seeds.  Taking daily probiotic.  Constipation and bloating, once daily miralax with multiple bms a day.  So is managing her constipation well at this point. Wants to work on wt loss but needs to get her gut functioning better.         REVIEWED PERTINENT RESULTS/ LABS  Lab Results   Component Value Date    CASEREPORT  2021     Surgical Pathology Report                         Case: [id number]                                  Authorizing Provider:  Romain Rice        Collected:           2021 02:33 PM                                 MD Arianne                                                                   Ordering Location:     [facility]   Received:            2021 02:50 PM                                 OR                                                                           Pathologist:           Jose L Grullon MD                                                         Specimens:   1) - Stomach, bx                                                                                    2) - Esophagus, Distal, bx                                                                 FINALDX  2021     1. Stomach, Biopsy: Benign gastric mucosa with   A. Mild chronic inflammation (mild nonspecific chronic gastritis).   B. No  intestinal metaplasia.   C. No Helicobacter pylori.    2. Esophagus, Biopsy: Benign squamous and gastric mucosa with   A. Chronic inflammation in the gastric mucosa.   B. No intestinal metaplasia.   C. No Helicobacter pylori.    jamel/jse        Lab Results   Component Value Date    HGB 11.9 (L) 04/14/2021    MCV 89.9 04/14/2021     04/14/2021    ALT 11 (L) 04/14/2021    AST 21 04/14/2021    INR 0.94 08/29/2018    TRIG 180 (H) 02/05/2020      CT Abdomen Pelvis With & Without Contrast    Result Date: 5/4/2021  Narrative: CT Abdomen Pelvis WO W INDICATION: 40-year-old female with hematuria on prior urinalysis several months. Dysuria. TECHNIQUE: CT of the abdomen and pelvis with and without IV contrast. Coronal and sagittal reconstructions were obtained.  Radiation dose reduction techniques included automated exposure control or exposure modulation based on body size. Count of known CT and cardiac nuc med studies performed in previous 12 months: 0. COMPARISON: 9/20/2016 FINDINGS: Abdomen: Included lung bases are clear. Normal caliber aorta and atherosclerotic change. The spleen is enlarged to 13 cm. The adrenal glands are negative and the pancreas is unremarkable. Negative gallbladder and mild hepatic steatosis. RENAL/URINARY FINDINGS: No radiopaque stone or hydronephrosis of either kidney. Symmetric and equal enhancement of the kidneys bilaterally. There is no cystic or solid renal mass on either side. Symmetric and equal excretion of contrast by both kidneys. No filling defect. The bladder is negative. Pelvis: There is no adenopathy. No drainable fluid collection or free fluid. Small follicle in the left ovary and incidental nabothian cyst in the cervix. The bowel is negative. The appendix is normal. Inguinal canals are negative. No suspicious bone lesion. Negative  image.     Impression: 1. No radiopaque stone or hydronephrosis of either kidney. There is no renal mass. Kidneys appear unremarkable and the  bladder is negative. 2. Normal appendix. 3. Splenomegaly. 4. Dominant follicle left ovary. Signer Name: Adalid Coronel MD  Signed: 5/4/2021 2:43 PM  Workstation Name: [id number]  [facility]      CURRENT MEDICATIONS    Current Outpatient Medications:   •  amLODIPine (NORVASC) 2.5 MG tablet, Take 2.5 mg by mouth Daily., Disp: , Rfl:   •  azaTHIOprine (IMURAN) 50 MG tablet, TK 2 TS PO D, Disp: , Rfl:   •  busPIRone (BUSPAR) 15 MG tablet, , Disp: , Rfl:   •  carboxymethylcellulose sod, PF, (Refresh Celluvisc) 1 % gel eye gel, PLACE 1 DROP IN AFFECTED EYE 6 TIMES DAILY, Disp: , Rfl:   •  desoximetasone (TOPICORT) 0.25 % cream, OVIDIO EXT AA BID, Disp: , Rfl:   •  famotidine (PEPCID) 40 MG tablet, Take 1 tablet by mouth 2 (Two) Times a Day. With lunch and at bedtime, Disp: 180 tablet, Rfl: 3  •  hydrocortisone (ANUSOL-HC) 2.5 % rectal cream, Insert  into the rectum 4 (Four) Times a Day As Needed for Hemorrhoids (rectal discomfort)., Disp: 30 g, Rfl: 5  •  omeprazole (priLOSEC) 40 MG capsule, Take 1 capsule by mouth Daily., Disp: 90 capsule, Rfl: 3  •  pilocarpine (SALAGEN) 5 MG tablet, TK 1 T PO TID, Disp: , Rfl: 3  •  predniSONE (DELTASONE) 5 MG tablet, Take 5 mg by mouth Daily., Disp: , Rfl:   •  traZODone (DESYREL) 100 MG tablet, Take 100 mg by mouth Every Night., Disp: , Rfl:   •  ciclopirox (PENLAC) 8 % solution, OVIDIO EXT AA Q NIGHT, Disp: , Rfl:   •  DULoxetine (Cymbalta) 60 MG capsule, 60 mg., Disp: , Rfl:   •  hydrOXYzine (ATARAX) 25 MG tablet, , Disp: , Rfl:   •  prochlorperazine (COMPAZINE) 5 MG tablet, , Disp: , Rfl:     ALLERGIES  Fluconazole    REVIEW OF SYSTEMS  ROS: 14 point review of systems was performed and all other systems were reviewed and are negative except for documented findings in HPI and today's encounter.   Review of Systems   Constitutional: Negative for chills, diaphoresis and unexpected weight change.   HENT: Negative for hearing loss, nosebleeds, sore throat and tinnitus.     Eyes: Negative for pain and visual disturbance.   Respiratory: Negative for cough, shortness of breath and wheezing.    Cardiovascular: Negative for chest pain and palpitations.   Gastrointestinal: Negative for abdominal pain, diarrhea, nausea and vomiting.   Endocrine: Negative for cold intolerance, heat intolerance and polydipsia.   Genitourinary: Negative for difficulty urinating, dyspareunia and hematuria.   Musculoskeletal: Negative for arthralgias.   Skin: Negative for rash and wound.   Allergic/Immunologic: Negative for environmental allergies.   Neurological: Negative for dizziness, syncope and numbness.   Hematological: Does not bruise/bleed easily.   Psychiatric/Behavioral: Negative for dysphoric mood and sleep disturbance. The patient is not nervous/anxious.          History     Last Reviewed by Tamra Cummins APRN on 5/10/2021 at 11:26 AM    Sections Reviewed    Tobacco, Family, Medical, Surgical      Problem list reviewed by Tamra Cummins APRN on 5/10/2021 at 11:26 AM  Medicines reviewed by Tamra Cummins APRN on 5/10/2021 at 11:26 AM  Allergies reviewed by Tamra Cummins APRN on 5/10/2021 at 11:26 AM      ACTIVE PROBLEMS  Patient Active Problem List    Diagnosis    • Gastroparesis [K31.84]    • IC (interstitial cystitis) [N30.10]    • Overactive bladder [N32.81]    • Irritable bowel syndrome with both constipation and diarrhea [K58.2]    • Ashton's esophagus without dysplasia [K22.70]    • Left breast lump [N63.20]    • SARMAD (stress urinary incontinence, female) [N39.3]    • Rectocele [N81.6]    • Nondisplaced fracture of coronoid process of left ulna, initial encounter for closed fracture [S52.045A]    • Left ovarian cyst [N83.202]    • Pelvic pain [R10.2]    • Smoker [F17.200]    • Sjogren's syndrome with keratoconjunctivitis sicca (CMS/HCC) [M35.01]    • Biceps tendinitis of left upper extremity [M75.22]    • Complete tear of left rotator cuff [M75.122]    • Subacromial impingement of left  shoulder [M75.42]    • Status post arthroscopy of shoulder [Z98.890]    • Alcohol dependence in remission (CMS/[facility]) [F10.21]    • History of placement of ear tubes [Z96.22]    • Gastroenteritis [K52.9]    • Drug addiction syndrome (CMS/[facility]) [F19.20]    • Substance abuse (CMS/[facility]) [F19.10]    • Bilateral carpal tunnel syndrome [G56.03]    • Eczema [L30.9]    • Shoulder pain, left [M25.512]    • Arthralgia of multiple joints [M25.50]    • Chronic back pain [M54.9, G89.29]    • Chronic constipation [K59.09]    • Disorder of connective tissue (CMS/[facility]) [M35.9]    • Depression with anxiety [F41.8]    • Fibromyalgia [M79.7]    • Gastroesophageal reflux disease without esophagitis [K21.9]    • Irritable bowel syndrome [K58.9]    • Muscle pain [M79.10]    • Palpitations [R00.2]    • Seasonal allergic rhinitis [J30.2]    • 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]    • Tenderness of temporomandibular joint [M26.629]    • Lateral epicondylitis [M77.10]          PAST MEDICAL HISTORY  Past Medical History:   Diagnosis Date   • Anxiety    • Bacterial vaginosis    • Ashton esophagus    • Depression with anxiety    • Fibromyalgia    • Fracture, finger    • GERD (gastroesophageal reflux disease)    • Lateral epicondylitis 2013    RHUEMATOLOGIST   • Lupus (CMS/[facility])    • MRSA (methicillin resistant staph aureus) culture positive  ESTIMATE    GROIN AREA    • Sjogren's syndrome (CMS/[facility])    • Urinary tract infection          SURGICAL HISTORY  Past Surgical History:   Procedure Laterality Date   • ADENOIDECTOMY     •  SECTION     • COLONOSCOPY     • ENDOSCOPY N/A 5/10/2019    Procedure: ESOPHAGOGASTRODUODENOSCOPY with biopsies;  Surgeon: Shanon Vazquez MD;  Location: [facility] OR;  Service: Gastroenterology   • ENDOSCOPY N/A 2021    Procedure: ESOPHAGOGASTRODUODENOSCOPY with biopsies;  Surgeon: Romain Rice MD;  Location: [facility] OR;  Service: Gastroenterology;  Laterality: N/A;  barretts  gastritis   • MOUTH  "SURGERY     • RHINOPLASTY     • SHOULDER ARTHROSCOPY Left 9/10/2018    Procedure: SHOULDER ARTHROSCOPY, rotator cuff repair; extensive debridement, open biceps tenodesis;  Surgeon: Joo Rivers MD;  Location: [facility];  Service: Orthopedics   • TONSILLECTOMY     • TUBAL ABDOMINAL LIGATION     • TYMPANOSTOMY TUBE PLACEMENT     • WISDOM TOOTH EXTRACTION Bilateral          FAMILY HISTORY  Family History   Problem Relation Age of Onset   • Cancer Father    • Heart disease Paternal Grandmother    • Diabetes Maternal Aunt    • Breast cancer Maternal Aunt    • Diabetes Maternal Grandmother    • Colon cancer Neg Hx    • Colon polyps Neg Hx          SOCIAL HISTORY  Social History     Occupational History   • Not on file   Tobacco Use   • Smoking status: Former Smoker     Packs/day: 0.25     Years: 20.00     Pack years: 5.00     Types: Cigarettes   • Smokeless tobacco: Never Used   • Tobacco comment: Declines medication today. Not interested in patches. Has tried gum previously. Desires to continue weaning.  Vaping    Substance and Sexual Activity   • Alcohol use: Yes     Comment: Alcoholic, Last drink 4-2-2018   • Drug use: Yes     Types: Cocaine(coke), Hydrocodone     Comment: \"recovering addict\", last reports use June 2017   • Sexual activity: Defer         LAST RESULTS  See also labs reviewed above.   Admission on 04/14/2021, Discharged on 04/14/2021   Component Date Value Ref Range Status   • Case Report 04/14/2021    Final                    Value:Surgical Pathology Report                         Case: [id number]                                  Authorizing Provider:  Romain Rice        Collected:           04/14/2021 02:33 PM                                 MD Arianne                                                                   Ordering Location:     [facility]   Received:            04/14/2021 02:50 PM                                 OR                                                 "                           Pathologist:           Jose L Grullon MD                                                         Specimens:   1) - Stomach, bx                                                                                    2) - Esophagus, Distal, bx                                                                • Final Diagnosis 04/14/2021    Final                    Value:This result contains rich text formatting which cannot be displayed here.   • Gross Description 04/14/2021    Final                    Value:This result contains rich text formatting which cannot be displayed here.   • Microscopic Description 04/14/2021    Final                    Value:This result contains rich text formatting which cannot be displayed here.     CT Abdomen Pelvis With & Without Contrast    Result Date: 5/4/2021  Narrative: CT Abdomen Pelvis WO W INDICATION: 40-year-old female with hematuria on prior urinalysis several months. Dysuria. TECHNIQUE: CT of the abdomen and pelvis with and without IV contrast. Coronal and sagittal reconstructions were obtained.  Radiation dose reduction techniques included automated exposure control or exposure modulation based on body size. Count of known CT and cardiac nuc med studies performed in previous 12 months: 0. COMPARISON: 9/20/2016 FINDINGS: Abdomen: Included lung bases are clear. Normal caliber aorta and atherosclerotic change. The spleen is enlarged to 13 cm. The adrenal glands are negative and the pancreas is unremarkable. Negative gallbladder and mild hepatic steatosis. RENAL/URINARY FINDINGS: No radiopaque stone or hydronephrosis of either kidney. Symmetric and equal enhancement of the kidneys bilaterally. There is no cystic or solid renal mass on either side. Symmetric and equal excretion of contrast by both kidneys. No filling defect. The bladder is negative. Pelvis: There is no adenopathy. No drainable fluid collection or free fluid. Small follicle in the left ovary  "and incidental nabothian cyst in the cervix. The bowel is negative. The appendix is normal. Inguinal canals are negative. No suspicious bone lesion. Negative  image.     Impression: 1. No radiopaque stone or hydronephrosis of either kidney. There is no renal mass. Kidneys appear unremarkable and the bladder is negative. 2. Normal appendix. 3. Splenomegaly. 4. Dominant follicle left ovary. Signer Name: Adalid Coronel MD  Signed: 5/4/2021 2:43 PM  Workstation Name: [id number]  [facility]      PHYSICAL EXAM  Debilities/Disabilities Identified: None  Emotional Behavior: Appropriate  40 y.o. female  Wt Readings from Last 3 Encounters:   05/10/21 86.4 kg (190 lb 6.4 oz)   04/14/21 88.1 kg (194 lb 3.2 oz)   03/08/21 89.3 kg (196 lb 12.8 oz)     Ht Readings from Last 1 Encounters:   05/10/21 165.1 cm (65\")     Body mass index is 31.68 kg/m².  Vitals:    05/10/21 1106   Weight: 86.4 kg (190 lb 6.4 oz)   Height: 165.1 cm (65\")     Vital signs reviewed.          Physical Exam  Vitals and nursing note reviewed.   Constitutional:       Appearance: She is well-developed.   HENT:      Head: Normocephalic and atraumatic.   Eyes:      Conjunctiva/sclera: Conjunctivae normal.      Pupils: Pupils are equal, round, and reactive to light.   Cardiovascular:      Rate and Rhythm: Normal rate and regular rhythm.   Pulmonary:      Effort: Pulmonary effort is normal.      Breath sounds: Normal breath sounds.   Abdominal:      General: Bowel sounds are normal. There is no distension.      Palpations: Abdomen is soft.      Tenderness: There is abdominal tenderness in the right upper quadrant, right lower quadrant, epigastric area and left upper quadrant.   Musculoskeletal:         General: Normal range of motion.      Cervical back: Normal range of motion and neck supple.   Lymphadenopathy:      Cervical: No cervical adenopathy.   Skin:     General: Skin is warm and dry.   Neurological:      Mental Status: She is " alert and oriented to person, place, and time.   Psychiatric:         Behavior: Behavior normal.           CLINICAL DATA REVIEWED   reviewed previous lab results and integrated with today's visit, reviewed notes from other physicians and/or last GI encounter, reviewed previous endoscopy results and available photos, reviewed surgical pathology results from previous biopsies      ASSESSMENT  Diagnoses and all orders for this visit:    Ashton's esophagus without dysplasia    Gastroesophageal reflux disease without esophagitis    Chronic idiopathic constipation    Bloating    Gastroparesis    Other orders  -     predniSONE (DELTASONE) 5 MG tablet; Take 5 mg by mouth Daily.  -     busPIRone (BUSPAR) 15 MG tablet          PLAN  Return in about 3 months (around 8/10/2021).   I reordered a different formulation of Erythromycin to see if better coverage.  Did discuss possibility of trying reglan if nothing else helps but will maximize diet and nutrition first.     Barretts is healed to just reflux to repeat EGD in 5 years!  Try melaluca 15gm fiber supplement and Amate Fruit/veggie supplement pills for nutrition with her gastroparesis diet. 3 mo f/u to see how she is doing see if miralax or liquid fiber work better for her.     Continue with your Omeprazole 40mg daily and Famotidine before acid or large meals.      Your gastric emptying study shows that you have gastroparesis.  This means you have a slow emptying stomach that keeps food in it too long.  This is a permanent condition that can by managed with diet and by taking Erythromycin every day two times a day for life.  A prescription for this has been sent to your pharmacy and you should take a 1 tab two times a day with breakfast and dinner.  This will help your stomach to empty.  You also need to continue once daily or twice daily miralax to prevent constipation as this make the gastroparesis worse.  Diet for this is to eat 5-6 small meals a day with 2-3 of them  "being a liquid protein drink.  Eat small amounts and do not eat fried or fatty foods and do not eat within 3-4 hours of bedtime. This will make you feel better and have less acid reflux. Take your acid reflux medicine every day as directed.     Low Acid Diet Instructions:   Don't eat late, don't eat fried or spicy, and don't eat too much at one time. Avoid alcohol and  tomato based products like pizza, lasagna, spaghetti.  If you want to have these take an over the counter Pepcid or TUMS immediately before you eat them.  Do not eat within 3-4 hrs of bedtime. Limit caffeine and carbonated beverages, if you must have them do not have later in the day.  If reflux is severe elevate the head of the bed. You may also use TUMS, maalox, or mylanta for breakthrough heartburn.    Take a daily probiotic (something with a billion cultures and more different strains is better, examples like \"Probiotic 10\" or probiotic gummies) for your gut as well.  AVOID taking NSAIDS (like ibuprofen, Aleve, Motrin, naproxen, meloxicam, etc) as much as possible and use acetaminophen (Tylenol) instead.    Drink lots of water, eat a high fiber diet with 25-30gm a day(check the fiber content in the foods you eat.  Protein bars with 15gm of fiber in each bar are a great supplement daily), and get regular exercise. Use over the counter simethicone xtra strength gas x (125-180mg) 2 twice a day as needed for gas.     High Fiber Food suggestions:  Beans, nuts, vegetables, whole grains, flax seed and yarely seeds (which can be stirred into other food) are high in fiber.    Jones Protein bars from [employer] = 10gm of fiber in each bar (also gluten free)  Quest Protein bars from grocery stores [employer], [employer], [employer]= 15gm of fiber each (also gluten free)  Fiber One bars from grocery stores = 5-6gm of fiber each  Kelloggs Bran Buds cereal 1/2 cup = 17gm of fiber  [employer] brand low sugar Craisins = 13gm of fiber per serving  Melalueca Fiberwise powder found on " [employer]=15gm fiber per scoop  CarbBalance tortillas= 15gm of fiber each  Natural Ovens Keto-Friendly Bread found at [employer]=12gm fiber per serving  No Daron vegan bar at [facility]=15gm fiber per serving        I have discussed the above plan with the patient.  They verbalize understanding and are in agreement with the plan.  They have been advised to contact the office for any questions, concerns, or changes related to their health.    30 min spent with patient today >50% spent counseling about natural history and expected course of assessed complaint and reviewed treatment options that have been tried and not tried and those currently available. Questions answered.

## 2021-05-14 DIAGNOSIS — K31.84 GASTROPARESIS: Primary | ICD-10-CM

## 2021-05-14 RX ORDER — ERYTHROMYCIN 250 MG/1
250 TABLET, COATED ORAL 2 TIMES DAILY
Qty: 180 TABLET | Refills: 3 | Status: SHIPPED | OUTPATIENT
Start: 2021-05-14 | End: 2021-08-11

## 2021-08-11 ENCOUNTER — OFFICE VISIT (OUTPATIENT)
Dept: GASTROENTEROLOGY | Facility: CLINIC | Age: 41
End: 2021-08-11

## 2021-08-11 VITALS
DIASTOLIC BLOOD PRESSURE: 82 MMHG | WEIGHT: 187 LBS | SYSTOLIC BLOOD PRESSURE: 118 MMHG | HEIGHT: 65 IN | BODY MASS INDEX: 31.16 KG/M2

## 2021-08-11 DIAGNOSIS — K64.8 OTHER HEMORRHOIDS: ICD-10-CM

## 2021-08-11 DIAGNOSIS — K64.8 INTERNAL HEMORRHOIDS: ICD-10-CM

## 2021-08-11 DIAGNOSIS — K21.9 GASTROESOPHAGEAL REFLUX DISEASE WITHOUT ESOPHAGITIS: ICD-10-CM

## 2021-08-11 DIAGNOSIS — K31.84 GASTROPARESIS: Primary | ICD-10-CM

## 2021-08-11 DIAGNOSIS — K22.70 BARRETT'S ESOPHAGUS WITHOUT DYSPLASIA: ICD-10-CM

## 2021-08-11 DIAGNOSIS — K58.2 IRRITABLE BOWEL SYNDROME WITH BOTH CONSTIPATION AND DIARRHEA: ICD-10-CM

## 2021-08-11 DIAGNOSIS — K59.04 CHRONIC IDIOPATHIC CONSTIPATION: ICD-10-CM

## 2021-08-11 PROCEDURE — 99213 OFFICE O/P EST LOW 20 MIN: CPT | Performed by: NURSE PRACTITIONER

## 2021-08-11 RX ORDER — AZITHROMYCIN 500 MG/1
500 TABLET, FILM COATED ORAL DAILY
Qty: 90 TABLET | Refills: 3 | Status: SHIPPED | OUTPATIENT
Start: 2021-08-11 | End: 2021-10-26

## 2021-08-11 RX ORDER — HYDROCORTISONE 25 MG/G
CREAM TOPICAL 2 TIMES DAILY
Qty: 3 EACH | Refills: 3 | Status: SHIPPED | OUTPATIENT
Start: 2021-08-11 | End: 2022-04-01

## 2021-08-11 RX ORDER — HYDROXYZINE HYDROCHLORIDE 25 MG/1
25 TABLET, FILM COATED ORAL 2 TIMES DAILY
COMMUNITY
Start: 2021-07-27 | End: 2022-04-01

## 2021-08-11 RX ORDER — PRAMOXINE HYDROCHLORIDE HYDROCORTISONE ACETATE 100; 100 MG/10G; MG/10G
1 AEROSOL, FOAM TOPICAL AS NEEDED
COMMUNITY
Start: 2021-08-09 | End: 2022-04-01

## 2021-08-11 RX ORDER — OMEPRAZOLE 40 MG/1
40 CAPSULE, DELAYED RELEASE ORAL 2 TIMES DAILY
Qty: 180 CAPSULE | Refills: 3 | Status: SHIPPED | OUTPATIENT
Start: 2021-08-11 | End: 2022-01-03

## 2021-08-11 RX ORDER — MONTELUKAST SODIUM 4 MG/1
2 TABLET, CHEWABLE ORAL NIGHTLY PRN
Qty: 180 TABLET | Refills: 3 | Status: SHIPPED | OUTPATIENT
Start: 2021-08-11 | End: 2023-02-13 | Stop reason: SDUPTHER

## 2021-08-11 NOTE — PROGRESS NOTES
PATIENT INFORMATION  Aide Henry     - 1980    CHIEF COMPLAINT  Chief Complaint   Patient presents with   • Heartburn   • Ashton's   • Constipation       HISTORY OF PRESENT ILLNESS  GES came back pos and is doing ok on her omeprazole 40mg once a day and occ pepcid. bms at this point 4-5 days apart.  So will discuss bowel mangement.      21 EGD   21 GES 33% at 4hr  5/10/19 EGD Dr. George Chapman, recall 2021!  hx GERD barretts, hemorrhoids, Dr. Vazquez  2010 colon by Dr. farmer somebody in [place] [address]  has to use monistat 7day cream for yeast infection     Is doing better some with her diet has cut out fiber but uses yarely seeds.  Taking daily probiotic.  Constipation and bloating, once daily miralax with multiple bms a day.  So is managing her constipation well at this point. Wants to work on wt loss but needs to get her gut functioning better.     Today:    Was doing well on the gastroparesis diet with protein drinks and then started eating things again and messed her up.  She doesn't have any hb and when she drinks the liquid it doesn't come back up but when she eats it comes back up urping every day.  Now she has loose bms with every br trip and is sore with hemorrhoids.  Keep miralax for when constipation happens but has not been using.  Is on a daily probiotic.      Taking omeprazole 40mg once daily and prn pepcid for when she eats tomatoes as she loves tomatoes.  Unable to try EES for her gastroparesis d/t inadequate coverage so will try azithromycin 500mg q am for gastroparesis.         REVIEWED PERTINENT RESULTS/ LABS  Lab Results   Component Value Date    CASEREPORT  2021     Surgical Pathology Report                         Case: [id number]                                  Authorizing Provider:  Romain Rice        Collected:           2021 02:33 PM                                 MD Arianne                                                                    Ordering Location:     [facility]   Received:            04/14/2021 02:50 PM                                 OR                                                                           Pathologist:           Jose L Grullon MD                                                         Specimens:   1) - Stomach, bx                                                                                    2) - Esophagus, Distal, bx                                                                 FINALDX  04/14/2021     1. Stomach, Biopsy: Benign gastric mucosa with   A. Mild chronic inflammation (mild nonspecific chronic gastritis).   B. No intestinal metaplasia.   C. No Helicobacter pylori.    2. Esophagus, Biopsy: Benign squamous and gastric mucosa with   A. Chronic inflammation in the gastric mucosa.   B. No intestinal metaplasia.   C. No Helicobacter pylori.    jamel/jse        Lab Results   Component Value Date    HGB 12.3 08/09/2021    MCV 99.0 08/09/2021     08/09/2021     (H) 08/09/2021     (H) 08/09/2021    INR 0.94 08/29/2018    TRIG 180 (H) 02/05/2020      No results found.    CURRENT MEDICATIONS    Current Outpatient Medications:   •  amLODIPine (NORVASC) 2.5 MG tablet, Take 2.5 mg by mouth Daily., Disp: , Rfl:   •  azaTHIOprine (IMURAN) 50 MG tablet, TK 2 TS PO D, Disp: , Rfl:   •  busPIRone (BUSPAR) 15 MG tablet, Take 15 mg by mouth 2 (two) times a day., Disp: , Rfl:   •  carboxymethylcellulose sod, PF, (Refresh Celluvisc) 1 % gel eye gel, PLACE 1 DROP IN AFFECTED EYE 6 TIMES DAILY, Disp: , Rfl:   •  DULoxetine (CYMBALTA) 60 MG capsule, Take 60 mg by mouth Daily., Disp: , Rfl:   •  famotidine (PEPCID) 40 MG tablet, Take 1 tablet by mouth 2 (Two) Times a Day. With lunch and at bedtime (Patient taking differently: Take 40 mg by mouth 2 (Two) Times a Day As Needed. With lunch and at bedtime), Disp: 180 tablet, Rfl: 3  •  hydrocortisone (ANUSOL-HC) 2.5 % rectal cream, Insert   into the rectum 4 (Four) Times a Day As Needed for Hemorrhoids (rectal discomfort)., Disp: 30 g, Rfl: 5  •  hydrOXYzine (ATARAX) 25 MG tablet, Take 25 mg by mouth 2 (Two) Times a Day., Disp: , Rfl:   •  omeprazole (priLOSEC) 40 MG capsule, Take 1 capsule by mouth 2 (two) times a day., Disp: 180 capsule, Rfl: 3  •  pilocarpine (SALAGEN) 5 MG tablet, TK 1 T PO TID, Disp: , Rfl: 3  •  predniSONE (DELTASONE) 5 MG tablet, Take 5 mg by mouth Daily., Disp: , Rfl:   •  Proctofoam HC 1-1 % rectal foam, , Disp: , Rfl:   •  witch hazel-glycerin (TUCKS) pad, Insert  into the rectum., Disp: , Rfl:   •  azithromycin (ZITHROMAX) 500 MG tablet, Take 1 tablet by mouth Daily. With your first meal of the day for gastroparesis, Disp: 90 tablet, Rfl: 3  •  colestipol (COLESTID) 1 g tablet, Take 2 tablets by mouth At Night As Needed (diarrhea or loose bms.)., Disp: 180 tablet, Rfl: 3  •  Hydrocortisone, Perianal, (ANUSOL-HC) 2.5 % rectal cream, Insert  into the rectum 2 (Two) Times a Day., Disp: 3 each, Rfl: 3    ALLERGIES  Fluconazole    REVIEW OF SYSTEMS  ROS: 14 point review of systems was performed and all other systems were reviewed and are negative except for documented findings in HPI and today's encounter.   Review of Systems   Constitutional: Negative for activity change, chills, fever and unexpected weight change.   HENT: Negative for congestion.    Eyes: Negative for visual disturbance.   Respiratory: Negative for shortness of breath.    Cardiovascular: Negative for chest pain and palpitations.   Gastrointestinal: Positive for abdominal pain, anal bleeding, diarrhea and rectal pain. Negative for blood in stool.   Endocrine: Negative for cold intolerance and heat intolerance.   Genitourinary: Negative for hematuria.   Musculoskeletal: Negative for gait problem.   Skin: Negative for color change.   Allergic/Immunologic: Negative for immunocompromised state.   Neurological: Negative for weakness and light-headedness.    Hematological: Negative for adenopathy.   Psychiatric/Behavioral: Negative for sleep disturbance. The patient is not nervous/anxious.          History     Last Reviewed by Tamra Cummins APRN on 8/11/2021 at 12:22 PM    Sections Reviewed    Tobacco, Family, Medical, Surgical      Problem list reviewed by Tamra Cummins APRN on 8/11/2021 at 12:22 PM  Medicines reviewed by Tamra Cummins APRN on 8/11/2021 at 12:22 PM  Allergies reviewed by Tamra Cummins APRN on 8/11/2021 at 12:22 PM      ACTIVE PROBLEMS  Patient Active Problem List    Diagnosis    • Gastroparesis [K31.84]    • IC (interstitial cystitis) [N30.10]    • Overactive bladder [N32.81]    • Irritable bowel syndrome with both constipation and diarrhea [K58.2]    • Ashton's esophagus without dysplasia [K22.70]    • Left breast lump [N63.20]    • SARMAD (stress urinary incontinence, female) [N39.3]    • Rectocele [N81.6]    • Nondisplaced fracture of coronoid process of left ulna, initial encounter for closed fracture [S52.045A]    • Left ovarian cyst [N83.202]    • Pelvic pain [R10.2]    • Smoker [F17.200]    • Sjogren's syndrome with keratoconjunctivitis sicca (CMS/HCC) [M35.01]    • Biceps tendinitis of left upper extremity [M75.22]    • Complete tear of left rotator cuff [M75.122]    • Subacromial impingement of left shoulder [M75.42]    • Status post arthroscopy of shoulder [Z98.890]    • Alcohol dependence in remission (CMS/HCC) [F10.21]    • History of placement of ear tubes [Z96.22]    • Gastroenteritis [K52.9]    • Drug addiction syndrome (CMS/HCC) [F19.20]    • Substance abuse (CMS/HCC) [F19.10]    • Bilateral carpal tunnel syndrome [G56.03]    • Eczema [L30.9]    • Shoulder pain, left [M25.512]    • Arthralgia of multiple joints [M25.50]    • Chronic back pain [M54.9, G89.29]    • Chronic constipation [K59.09]    • Disorder of connective tissue (CMS/HCC) [M35.9]    • Depression with anxiety [F41.8]    • Fibromyalgia [M79.7]    • Gastroesophageal  reflux disease without esophagitis [K21.9]    • Irritable bowel syndrome [K58.9]    • Muscle pain [M79.10]    • Palpitations [R00.2]    • Seasonal allergic rhinitis [J30.2]    • 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]    • Tenderness of temporomandibular joint [M26.629]    • Lateral epicondylitis [M77.10]          PAST MEDICAL HISTORY  Past Medical History:   Diagnosis Date   • Anxiety    • Bacterial vaginosis    • Ashton esophagus    • Depression with anxiety    • Fibromyalgia    • Fracture, finger    • GERD (gastroesophageal reflux disease)    • Lateral epicondylitis 2013    RHUEMATOLOGIST   • Lupus (CMS/HCC)    • MRSA (methicillin resistant staph aureus) culture positive  ESTIMATE    GROIN AREA    • Sjogren's syndrome (CMS/HCC)    • Urinary tract infection          SURGICAL HISTORY  Past Surgical History:   Procedure Laterality Date   • ADENOIDECTOMY     •  SECTION     • COLONOSCOPY     • ENDOSCOPY N/A 5/10/2019    Procedure: ESOPHAGOGASTRODUODENOSCOPY with biopsies;  Surgeon: Shanon Vazquez MD;  Location: [facility] OR;  Service: Gastroenterology   • ENDOSCOPY N/A 2021    Procedure: ESOPHAGOGASTRODUODENOSCOPY with biopsies;  Surgeon: Romain Rice MD;  Location: [facility] OR;  Service: Gastroenterology;  Laterality: N/A;  barretts  gastritis   • MOUTH SURGERY     • RHINOPLASTY     • SHOULDER ARTHROSCOPY Left 9/10/2018    Procedure: SHOULDER ARTHROSCOPY, rotator cuff repair; extensive debridement, open biceps tenodesis;  Surgeon: Joo Rivers MD;  Location: [facility] OR;  Service: Orthopedics   • TONSILLECTOMY     • TUBAL ABDOMINAL LIGATION     • TYMPANOSTOMY TUBE PLACEMENT     • WISDOM TOOTH EXTRACTION Bilateral          FAMILY HISTORY  Family History   Problem Relation Age of Onset   • Cancer Father    • Heart disease Paternal Grandmother    • Diabetes Maternal Aunt    • Breast cancer Maternal Aunt    • Diabetes Maternal Grandmother    • Colon cancer Neg Hx    • Colon polyps Neg Hx   "        SOCIAL HISTORY  Social History     Occupational History   • Not on file   Tobacco Use   • Smoking status: Former Smoker     Packs/day: 0.25     Years: 20.00     Pack years: 5.00     Types: Cigarettes   • Smokeless tobacco: Never Used   • Tobacco comment: Declines medication today. Not interested in patches. Has tried gum previously. Desires to continue weaning.  Vaping    Substance and Sexual Activity   • Alcohol use: Yes     Comment: Alcoholic, Last drink 4-2-2018   • Drug use: Yes     Types: Cocaine(coke), Hydrocodone     Comment: \"recovering addict\", last reports use June 2017   • Sexual activity: Defer         LAST RESULTS  See also labs reviewed above.   Admission on 04/14/2021, Discharged on 04/14/2021   Component Date Value Ref Range Status   • Case Report 04/14/2021    Final                    Value:Surgical Pathology Report                         Case: [id number]                                  Authorizing Provider:  Romain Rice        Collected:           04/14/2021 02:33 PM                                 MD Arianne                                                                   Ordering Location[facility]     [facility]   Received:            04/14/2021 02:50 PM                                 OR                                                                           Pathologist:           Jose L Grullon MD                                                         Specimens:   1) - Stomach, bx                                                                                    2) - Esophagus, Distal, bx                                                                • Final Diagnosis 04/14/2021    Final                    Value:This result contains rich text formatting which cannot be displayed here.   • Gross Description 04/14/2021    Final                    Value:This result contains rich text formatting which cannot be displayed here.   • Microscopic Description 04/14/2021    " "Final                    Value:This result contains rich text formatting which cannot be displayed here.     No results found.    PHYSICAL EXAM  Debilities/Disabilities Identified: None  Emotional Behavior: Appropriate  41 y.o. female  Wt Readings from Last 3 Encounters:   08/11/21 84.8 kg (187 lb)   05/10/21 86.4 kg (190 lb 6.4 oz)   04/14/21 88.1 kg (194 lb 3.2 oz)     Ht Readings from Last 1 Encounters:   08/11/21 165.1 cm (65\")     Body mass index is 31.12 kg/m².  Vitals:    08/11/21 1129   BP: 118/82   BP Location: Left arm   Patient Position: Sitting   Cuff Size: Large Adult   Weight: 84.8 kg (187 lb)   Height: 165.1 cm (65\")     Vital signs reviewed.          Physical Exam  Vitals and nursing note reviewed.   Constitutional:       Appearance: She is well-developed.   HENT:      Head: Normocephalic and atraumatic.   Eyes:      Conjunctiva/sclera: Conjunctivae normal.      Pupils: Pupils are equal, round, and reactive to light.   Cardiovascular:      Rate and Rhythm: Normal rate and regular rhythm.   Pulmonary:      Effort: Pulmonary effort is normal.      Breath sounds: Normal breath sounds.   Abdominal:      General: Bowel sounds are normal. There is no distension.      Palpations: Abdomen is soft.      Tenderness: There is abdominal tenderness in the epigastric area and left lower quadrant.   Musculoskeletal:         General: Normal range of motion.      Cervical back: Normal range of motion and neck supple.   Lymphadenopathy:      Cervical: No cervical adenopathy.   Skin:     General: Skin is warm and dry.   Neurological:      Mental Status: She is alert and oriented to person, place, and time.   Psychiatric:         Behavior: Behavior normal.           CLINICAL DATA REVIEWED   reviewed previous lab results and integrated with today's visit, reviewed notes from other physicians and/or last GI encounter, reviewed previous endoscopy results and available photos, reviewed surgical pathology results from " previous biopsies      ASSESSMENT  Diagnoses and all orders for this visit:    Gastroparesis  -     azithromycin (ZITHROMAX) 500 MG tablet; Take 1 tablet by mouth Daily. With your first meal of the day for gastroparesis    Gastroesophageal reflux disease without esophagitis  -     omeprazole (priLOSEC) 40 MG capsule; Take 1 capsule by mouth 2 (two) times a day.    Ashton's esophagus without dysplasia  -     omeprazole (priLOSEC) 40 MG capsule; Take 1 capsule by mouth 2 (two) times a day.    Irritable bowel syndrome with both constipation and diarrhea  -     colestipol (COLESTID) 1 g tablet; Take 2 tablets by mouth At Night As Needed (diarrhea or loose bms.).    Chronic idiopathic constipation    Other hemorrhoids    Internal hemorrhoids  -     Hydrocortisone, Perianal, (ANUSOL-HC) 2.5 % rectal cream; Insert  into the rectum 2 (Two) Times a Day.    Other orders  -     Proctofoam HC 1-1 % rectal foam  -     hydrOXYzine (ATARAX) 25 MG tablet; Take 25 mg by mouth 2 (Two) Times a Day.  -     witch hazel-glycerin (TUCKS) pad; Insert  into the rectum.          PLAN  Return in about 2 months (around 10/11/2021).     Discussed the importance of taking Omeprazole/Prilosec, 40mg twice daily before breakfast and dinner permanently due to known risk with long term acid reflux of Ashton's esophagus/esophageal cancer., In addition, take famotidine (Pepcid) 40mg twice a day.     Colestid take 1 tab an hour after dinner (this will help to bulk up your stool and prevent diarrhea) try 2 a day if bms are >3 a day.      Hemorrhoids are out of control (ansusol cream sent)  so has a referral to colorectal surgery but will treat her diarrhea with GERD tx and colestid and if improves may not need the consult.      Your gastric emptying study shows that you have gastroparesis.  This means you have a slow emptying stomach that keeps food in it too long.  This is a permanent condition that can by managed with diet and by taking Azythromycin  "every morning with first meal for life.  A prescription for this has been sent to your pharmacy and you should take a 1 tab a day with breakfast.  This will help your stomach to empty.  You also need to continue once daily or twice daily miralax to prevent constipation as this make the gastroparesis worse.  Diet for this is to eat 5-6 small meals a day with 2-3 of them being a liquid protein drink.  Eat small amounts and do not eat fried or fatty foods and do not eat within 3-4 hours of bedtime. This will make you feel better and have less acid reflux. Take your acid reflux medicine every day as directed.     Low Acid Diet Instructions:   Don't eat late, don't eat fried or spicy, and don't eat too much at one time. Avoid alcohol and  tomato based products like pizza, lasagna, spaghetti.  If you want to have these take an over the counter Pepcid or TUMS immediately before you eat them.  Do not eat within 3-4 hrs of bedtime. Limit caffeine and carbonated beverages, if you must have them do not have later in the day.  If reflux is severe elevate the head of the bed. You may also use TUMS, maalox, or mylanta for breakthrough heartburn.    Take a daily probiotic (something with a billion cultures and more different strains is better, examples like \"Probiotic 10\" or probiotic gummies) for your gut as well.  AVOID taking NSAIDS (like ibuprofen, Aleve, Motrin, naproxen, meloxicam, etc) as much as possible and use acetaminophen (Tylenol) instead.    Drink lots of water, eat a high fiber diet with 25-30gm a day(check the fiber content in the foods you eat.  Protein bars with 15gm of fiber in each bar are a great supplement daily), and get regular exercise. Use over the counter simethicone xtra strength gas x (125-180mg) 2 twice a day as needed for gas.     High Fiber Food suggestions:  Beans, nuts, vegetables, whole grains, flax seed and yarely seeds (which can be stirred into other food) are high in fiber.    Jones Protein " bars from [employer] = 10gm of fiber in each bar (also gluten free)  Quest Protein bars from grocery stores [employer], [employer], [employer]= 15gm of fiber each (also gluten free)  Fiber One bars from grocery stores = 5-6gm of fiber each  Kelloggs Bran Buds cereal 1/2 cup = 17gm of fiber  Kroger brand low sugar Craisins = 13gm of fiber per serving  Melalueca Fiberwise powder found on [employer]=15gm fiber per serving (2 scoops)  CarbBalance tortillas= 15gm of fiber each  Natural Ovens Keto-Friendly Bread found at [facility]=12gm fiber per serving  No Daron vegan bar at [facility]=15gm fiber per serving  Aunt Millies Live Light bread from [facility]=7gm in 2 slices with 80 daron.       I have discussed the above plan with the patient.  They verbalize understanding and are in agreement with the plan.  They have been advised to contact the office for any questions, concerns, or changes related to their health.    30 min spent with patient today >50% spent counseling about natural history and expected course of assessed complaint and reviewed treatment options that have been tried and not tried and those currently available. Questions answered.

## 2021-08-11 NOTE — PATIENT INSTRUCTIONS
Discussed the importance of taking Omeprazole/Prilosec, 40mg twice daily before breakfast and dinner permanently due to known risk with long term acid reflux of Ashton's esophagus/esophageal cancer., In addition, take famotidine (Pepcid) 40mg twice a day.     Colestid take 1 tab an hour after dinner (this will help to bulk up your stool and prevent diarrhea) try 2 a day if bms are >3 a day.      Hemorrhoids are out of control (ansusol cream sent)  so has a referral to colorectal surgery but will treat her diarrhea with GERD tx and colestid and if improves may not need the consult.      Your gastric emptying study shows that you have gastroparesis.  This means you have a slow emptying stomach that keeps food in it too long.  This is a permanent condition that can by managed with diet and by taking Azythromycin every morning with first meal for life.  A prescription for this has been sent to your pharmacy and you should take a 1 tab a day with breakfast.  This will help your stomach to empty.  You also need to continue once daily or twice daily miralax to prevent constipation as this make the gastroparesis worse.  Diet for this is to eat 5-6 small meals a day with 2-3 of them being a liquid protein drink.  Eat small amounts and do not eat fried or fatty foods and do not eat within 3-4 hours of bedtime. This will make you feel better and have less acid reflux. Take your acid reflux medicine every day as directed.     Low Acid Diet Instructions:   Don't eat late, don't eat fried or spicy, and don't eat too much at one time. Avoid alcohol and  tomato based products like pizza, lasagna, spaghetti.  If you want to have these take an over the counter Pepcid or TUMS immediately before you eat them.  Do not eat within 3-4 hrs of bedtime. Limit caffeine and carbonated beverages, if you must have them do not have later in the day.  If reflux is severe elevate the head of the bed. You may also use TUMS, maalox, or mylanta for  "breakthrough heartburn.    Take a daily probiotic (something with a billion cultures and more different strains is better, examples like \"Probiotic 10\" or probiotic gummies) for your gut as well.  AVOID taking NSAIDS (like ibuprofen, Aleve, Motrin, naproxen, meloxicam, etc) as much as possible and use acetaminophen (Tylenol) instead.    Drink lots of water, eat a high fiber diet with 25-30gm a day(check the fiber content in the foods you eat.  Protein bars with 15gm of fiber in each bar are a great supplement daily), and get regular exercise. Use over the counter simethicone xtra strength gas x (125-180mg) 2 twice a day as needed for gas.     High Fiber Food suggestions:  Beans, nuts, vegetables, whole grains, flax seed and yarely seeds (which can be stirred into other food) are high in fiber.    Jones Protein bars from [employer] = 10gm of fiber in each bar (also gluten free)  Quest Protein bars from grocery [employer], [employer], [employer]= 15gm of fiber each (also gluten free)  Fiber One bars from grocery stores = 5-6gm of fiber each  Kelloggs Bran Buds cereal 1/2 cup = 17gm of fiber  Kroger brand low sugar Craisins = 13gm of fiber per serving  Melalueca Fiberwise powder found on [employer]=15gm fiber per serving (2 scoops)  CarbBalance tortillas= 15gm of fiber each  Natural Ovens Keto-Friendly Bread found at [facility]=12gm fiber per serving  No Daron vegan bar at [employer]=15gm fiber per serving  Aunt Millies Live Light bread from [facility]=7gm in 2 slices with 80 daron.       "

## 2021-09-23 ENCOUNTER — OFFICE VISIT (OUTPATIENT)
Dept: ORTHOPEDIC SURGERY | Facility: CLINIC | Age: 41
End: 2021-09-23

## 2021-09-23 VITALS
HEIGHT: 65 IN | DIASTOLIC BLOOD PRESSURE: 62 MMHG | SYSTOLIC BLOOD PRESSURE: 110 MMHG | WEIGHT: 187 LBS | BODY MASS INDEX: 31.16 KG/M2

## 2021-09-23 DIAGNOSIS — R52 PAIN: Primary | ICD-10-CM

## 2021-09-23 DIAGNOSIS — M94.269 CHONDROMALACIA OF KNEE, UNSPECIFIED LATERALITY: ICD-10-CM

## 2021-09-23 PROCEDURE — 73562 X-RAY EXAM OF KNEE 3: CPT | Performed by: ORTHOPAEDIC SURGERY

## 2021-09-23 PROCEDURE — 99213 OFFICE O/P EST LOW 20 MIN: CPT | Performed by: ORTHOPAEDIC SURGERY

## 2021-09-23 RX ORDER — NALTREXONE HYDROCHLORIDE 50 MG/1
TABLET, FILM COATED ORAL
COMMUNITY
Start: 2021-08-09 | End: 2021-11-11

## 2021-09-23 RX ORDER — TRAZODONE HYDROCHLORIDE 150 MG/1
150 TABLET ORAL
COMMUNITY
Start: 2021-09-11 | End: 2022-05-12

## 2021-09-23 NOTE — PROGRESS NOTES
"Subjective:     Patient ID: Aide Henry is a 41 y.o. female.    Chief Complaint:  Bilateral knee pain, new problem    History of Present Illness     Aide Henry presents to clinic today for evaluation of bilateral knee pain.    The patient has been experiencing bilateral knee pain for over a year. Her pain is localized to the lateral aspect of the left of the knee, her right bothers her as well. Her left bothers her more than the right and she rates her pain as a 6 out of 10. It is aching, dull, sharp and shooting in nature and radiates down her leg. She denies any numbness or tingling. The patient notes that she experiences swelling and warmth at times. The patient notes that her pain is aggravated by activity such as ascending stairs. She has not found relief with use of NSAIDs or ice/heat therapy.       Social History     Occupational History   • Not on file   Tobacco Use   • Smoking status: Former Smoker     Packs/day: 0.25     Years: 20.00     Pack years: 5.00     Types: Cigarettes   • Smokeless tobacco: Never Used   • Tobacco comment: Declines medication today. Not interested in patches. Has tried gum previously. Desires to continue weaning.  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)    • MRSA (methicillin resistant staph aureus) culture positive 2011 ESTIMATE    GROIN AREA  " "  • Sjogren's syndrome (CMS/HCC)    • Urinary tract infection      Past Surgical History:   Procedure Laterality Date   • ADENOIDECTOMY     •  SECTION     • COLONOSCOPY     • ENDOSCOPY N/A 5/10/2019    Procedure: ESOPHAGOGASTRODUODENOSCOPY with biopsies;  Surgeon: Shanon Vazquez MD;  Location: [facility];  Service: Gastroenterology   • ENDOSCOPY N/A 2021    Procedure: ESOPHAGOGASTRODUODENOSCOPY with biopsies;  Surgeon: Romain Rice MD;  Location: [facility];  Service: Gastroenterology;  Laterality: N/A;  barretts  gastritis   • MOUTH SURGERY     • RHINOPLASTY     • SHOULDER ARTHROSCOPY Left 9/10/2018    Procedure: SHOULDER ARTHROSCOPY, rotator cuff repair; extensive debridement, open biceps tenodesis;  Surgeon: Joo Rivers MD;  Location: [facility];  Service: Orthopedics   • TONSILLECTOMY     • TUBAL ABDOMINAL LIGATION     • TYMPANOSTOMY TUBE PLACEMENT     • WISDOM TOOTH EXTRACTION Bilateral        Family History   Problem Relation Age of Onset   • Cancer Father    • Heart disease Paternal Grandmother    • Diabetes Maternal Aunt    • Breast cancer Maternal Aunt    • Diabetes Maternal Grandmother    • Colon cancer Neg Hx    • Colon polyps Neg Hx          Review of Systems        Objective:  Vitals:    21 1354   BP: 110/62   BP Location: Left arm   Weight: 84.8 kg (187 lb)   Height: 165.1 cm (65\")         21  1354   Weight: 84.8 kg (187 lb)     Body mass index is 31.12 kg/m².  Physical Exam    Vital signs reviewed.   General: No acute distress, alert and oriented  Eyes: conjunctiva clear; pupils equally round and reactive  ENT: external ears and nose atraumatic; oropharynx clear  CV: no peripheral edema  Resp: normal respiratory effort  Skin: no rashes or wounds; normal turgor  Psych: mood and affect appropriate; recent and remote memory intact          Ortho Exam       Bilateral Knee-    ROM 0 to 130 degrees  4+/5 on flexion  4+/5 on extension  Maximal tenderness - " lateral patellar facet bilaterally, left greater than right.    Effusion- mild   Grade 1A Lachman  Anterior drawer- negative  Posterior drawer- negative   Stable opening on varus and valgus stress at 0 and 30 degrees  Active patellar compression test- positive     Log roll-  negative  Stinchfield-  negative    Craig's- negative    Positive sensation light tough all distributions symmetric to contralateral side  Brisk cap refill all digits  2+ dorsalis pedis pulse      Imaging:  Bilateral Knee X-Ray  Indication: Pain    AP, Lateral, and [place] views    Findings:  No fracture  No bony lesion  Normal soft tissues  Normal joint spaces    No prior studies were available for comparison.    Assessment:        1. Pain    2. Chondromalacia of knee, unspecified laterality           Plan:          1. Discussed treatment options at length with patient at today's visit.   2. After a long discussion with the patient in regards to options, she will touch base again with her primary care provider as well as her rheumatologist. I don't see any evidence on her films today that this is osteoarthritis in nature. Her x-rays look very good. The symptom that she described as well as the negative findings from her x-ray are most consistent with a rheumatologic origin to her pain and limitations.   3. I did offer physical therapy, she wants to consider this.  4. I will see her back as needed.      Aide Henry was in agreement with plan and had all questions answered.     Orders:  Orders Placed This Encounter   Procedures   • XR Knee 3 View Bilateral       Medications:  No orders of the defined types were placed in this encounter.      Followup:  Return if symptoms worsen or fail to improve.    Diagnoses and all orders for this visit:    1. Pain (Primary)  -     XR Knee 3 View Bilateral    2. Chondromalacia of knee, unspecified laterality          Dictated utilizing Dragon dictation     Transcribed from ambient dictation for Joo  BIENVENIDO Rivers MD by Vernon Torres.  09/23/21   15:40 EDT    I have personally performed the services described in this document as transcribed by the above individual, and it is both accurate and complete.  Joo Rivers MD  9/28/2021  13:01 EDT

## 2021-09-28 PROBLEM — M94.269 CHONDROMALACIA OF KNEE: Status: ACTIVE | Noted: 2021-09-28

## 2021-10-26 ENCOUNTER — OFFICE VISIT (OUTPATIENT)
Dept: OBSTETRICS AND GYNECOLOGY | Facility: CLINIC | Age: 41
End: 2021-10-26

## 2021-10-26 VITALS
WEIGHT: 197 LBS | BODY MASS INDEX: 32.82 KG/M2 | DIASTOLIC BLOOD PRESSURE: 72 MMHG | HEIGHT: 65 IN | SYSTOLIC BLOOD PRESSURE: 122 MMHG

## 2021-10-26 DIAGNOSIS — N39.3 SUI (STRESS URINARY INCONTINENCE, FEMALE): ICD-10-CM

## 2021-10-26 DIAGNOSIS — N95.1 MENOPAUSAL SYMPTOMS: ICD-10-CM

## 2021-10-26 DIAGNOSIS — M32.9 LUPUS (HCC): ICD-10-CM

## 2021-10-26 DIAGNOSIS — M35.01 SJOGREN'S SYNDROME WITH KERATOCONJUNCTIVITIS SICCA (HCC): ICD-10-CM

## 2021-10-26 DIAGNOSIS — Z13.89 SCREENING FOR GENITOURINARY CONDITION: Primary | ICD-10-CM

## 2021-10-26 DIAGNOSIS — N92.1 MENOMETRORRHAGIA: ICD-10-CM

## 2021-10-26 DIAGNOSIS — K21.9 GASTROESOPHAGEAL REFLUX DISEASE WITHOUT ESOPHAGITIS: ICD-10-CM

## 2021-10-26 PROBLEM — IMO0002 LUPUS: Status: ACTIVE | Noted: 2021-10-26

## 2021-10-26 LAB

## 2021-10-26 PROCEDURE — 99214 OFFICE O/P EST MOD 30 MIN: CPT | Performed by: OBSTETRICS & GYNECOLOGY

## 2021-10-26 PROCEDURE — 81025 URINE PREGNANCY TEST: CPT | Performed by: OBSTETRICS & GYNECOLOGY

## 2021-10-26 RX ORDER — HYDROCODONE BITARTRATE AND ACETAMINOPHEN 5; 325 MG/1; MG/1
1 TABLET ORAL
COMMUNITY
Start: 2021-10-06 | End: 2021-11-11

## 2021-10-26 RX ORDER — BUPROPION HYDROCHLORIDE 75 MG/1
75 TABLET ORAL DAILY
COMMUNITY
End: 2022-04-01

## 2021-10-26 NOTE — PROGRESS NOTES
"EVALUATION AND MANAGEMENT ENCOUNTER    Aide Henry  Patient new to examiner? No  New problem to examiner? Yes  Patient referred? No    -----------------------------------------------------HISTORY---------------------------------------------------    Chief Complaint:   Chief Complaint   Patient presents with   • Menstrual Problem       HPI:  Aide Henry is a 41 y.o.  with No LMP recorded (lmp unknown). here for irregular periods, has light periods sometimes.  Pt thinks she might be going through menopause.  Pt is having shorter intervals between periods.  Pt doesn't use contraception and declines OCs. Pt has lupus.     Menorrhagia  The current episode started more than 1 month ago. The problem occurs intermittently. The problem has been waxing and waning. Associated symptoms include fatigue. Pertinent negatives include no abdominal pain. The symptoms are aggravated by exertion. She has tried nothing for the symptoms.        Aide Henry  reports that she has quit smoking. Her smoking use included cigarettes. She has a 5.00 pack-year smoking history. She has never used smokeless tobacco.           ROS:  Review of Systems   Constitutional: Positive for fatigue.   HENT: Negative.    Eyes: Negative.    Respiratory: Negative.    Cardiovascular: Negative.    Gastrointestinal: Negative.  Negative for abdominal pain.   Endocrine: Negative.    Genitourinary: Positive for menorrhagia and menstrual problem.   Musculoskeletal: Negative.    Skin: Negative.    Allergic/Immunologic: Negative.    Neurological: Negative.    Hematological: Negative.    Psychiatric/Behavioral: Negative.    :    Patient reports that she  currently experiencing any symptoms of urinary incontinence.      noTESTED FOR CHLAMYDIA?  -----------------------------------------------PHYSICAL EXAM----------------------------------------------    Vital Signs: /72   Ht 165.1 cm (65\")   Wt 89.4 kg (197 lb)   LMP  (LMP Unknown)   " "Breastfeeding No   BMI 32.78 kg/m²    Flowsheet Rows      First Filed Value   Admission Height 165.1 cm (65\") Documented at 10/26/2021 1113   Admission Weight 89.4 kg (197 lb) Documented at 10/26/2021 1113          Physical Exam  Vitals and nursing note reviewed.   Constitutional:       Appearance: She is well-developed.   HENT:      Head: Normocephalic and atraumatic.   Cardiovascular:      Rate and Rhythm: Normal rate.   Pulmonary:      Effort: Pulmonary effort is normal.   Abdominal:      General: There is no distension.      Palpations: Abdomen is soft. There is no mass.      Tenderness: There is no abdominal tenderness. There is no guarding.   Genitourinary:     Vagina: No vaginal discharge.   Musculoskeletal:         General: No tenderness or deformity. Normal range of motion.      Cervical back: Normal range of motion.   Skin:     General: Skin is warm and dry.      Coloration: Skin is not pale.      Findings: No erythema or rash.   Neurological:      Mental Status: She is alert and oriented to person, place, and time.   Psychiatric:         Behavior: Behavior normal.         Thought Content: Thought content normal.         Judgment: Judgment normal.         I saw the patient with a face mask, gloves and eye protection  The patient herself was masked.  Social distancing was observed as appropriate. All COVID precautions observed.     -----------------------------------------------MEDICAL DECISION MAKING-----------------------------        DATA Review & labs ordered:     1.   Lab Results (last 24 hours)     Procedure Component Value Units Date/Time    POC Urinalysis Dipstick [[id number]] Collected: 10/26/21 1126    Specimen: Urine Updated: 10/26/21 1126     Color Yellow     Clarity, UA Clear     Glucose, UA Negative mg/dL      Bilirubin Negative     Ketones, UA Negative     Specific Gravity  1.005     Blood, UA Negative     pH, Urine 5.0     Protein, POC Negative mg/dL      Urobilinogen, UA Normal     " Leukocytes Negative     Nitrite, UA Negative    POC Pregnancy, Urine [[id number]] Collected: 10/26/21 1126    Specimen: Urine Updated: 10/26/21 1126     HCG, Urine, QL Negative     Lot Number [id number]     Internal Positive Control Positive     Internal Negative Control Negative     Expiration Date 1/31/23        2.   Imaging Results (Last 24 Hours)     ** No results found for the last 24 hours. **        3.   ECG/EMG Results (most recent)     None        4. Old records reviewed? Yes  5. Old records ordered?  No  6. Labs ordered?: Yes:  urinalysis: clr  7. Imaging other than ultrasound ordered?: No        Reviewed with other physician? no     8. Ultrasound ordered and reviewed? No  9. Diagnoses and/or chronic conditions reviewed:      Diagnoses and all orders for this visit:    1. Screening for genitourinary condition (Primary)  -     POC Pregnancy, Urine  -     POC Urinalysis Dipstick    2. Sjogren's syndrome with keratoconjunctivitis sicca (HCC)    3. Gastroesophageal reflux disease without esophagitis    4. Lupus (HCC)    5. Menometrorrhagia  -     Case Request; Standing  -     COVID PRE-OP / PRE-PROCEDURE SCREENING ORDER (NO ISOLATION) - Swab, Nasopharynx; Future  -     CBC and Differential; Future  -     Case Request    6. Menopausal symptoms  -     Follicle Stimulating Hormone    7. SARMAD (stress urinary incontinence, female)    Other orders  -     Follow Anesthesia Guidelines / Standing Orders; Future  -     Chlorhexidine Skin Prep; Future        IMPRESSION/PROBLEM:      Menometorrhagia.  No desire for children.      (Established problem/s? No, worsening? Yes)    (New Problem/s? Yes, additional workup planned? Yes)      PLAN:     1. Dx hysteroscopy, D&C with possible novasure ablation in the future.  2. RISKS, ALTERNATIVES, COMPLICATIONS OF THE PROCEDURE INCLUDING BUT NOT LIMITED TO:    INTRAOPERATIVE RISKS: INJURY TO INTERNAL AND ADJACENT ORGANS AND STRUCTURES (BOWEL, BLADDER, URETER,BLOOD VESSELS) OR  HEMORRHAGE REQUIRING FURTHER SURGERY (LAPAROTOMY),  POSSIBLE NON-DIAGNOSTIC FINDINGS, DISCOVERY OF POSSIBLE MALIGNANCY, INFECTION, AND DEATH;   POSTOP COMPLICATIONS: BLEEDING, INFECTION (REQUIRING POSSIBLE REOPERATION), FAILURE OF GOAL OF SURGERY AND RECURRENCE OF ORIGINAL SYMPTOMS, PNEUMONIA, PULMONARY EMBOLISM, AND DEATH;  WERE EXPLAINED TO THE PT WHO VERBALIZED HER UNDERSTANDING.          Pt to call for any results from testing promptly if she does not hear from us.     RTO Return in about 2 weeks (around 11/9/2021) for postop check. FOR postop check.  Instructions and precautions given.           Rancho Mayberry MD  11:44 EDT  10/26/21

## 2021-10-27 LAB — FSH SERPL-ACNC: 2.4 MIU/ML

## 2021-10-29 ENCOUNTER — HOSPITAL ENCOUNTER (OUTPATIENT)
Dept: MRI IMAGING | Facility: HOSPITAL | Age: 41
Discharge: HOME | End: 2021-10-29
Admitting: NURSE PRACTITIONER

## 2021-10-29 ENCOUNTER — TRANSCRIBE ORDERS (OUTPATIENT)
Dept: ADMINISTRATIVE | Facility: HOSPITAL | Age: 41
End: 2021-10-29

## 2021-10-29 DIAGNOSIS — R29.818 TRANSIENT NEUROLOGIC DEFICIT: ICD-10-CM

## 2021-10-29 DIAGNOSIS — R51.9 NONINTRACTABLE HEADACHE, UNSPECIFIED CHRONICITY PATTERN, UNSPECIFIED HEADACHE TYPE: ICD-10-CM

## 2021-10-29 DIAGNOSIS — R29.818 TRANSIENT NEUROLOGIC DEFICIT: Primary | ICD-10-CM

## 2021-10-29 PROCEDURE — A9577 INJ MULTIHANCE: HCPCS | Performed by: NURSE PRACTITIONER

## 2021-10-29 PROCEDURE — 0 GADOBENATE DIMEGLUMINE 529 MG/ML SOLUTION: Performed by: NURSE PRACTITIONER

## 2021-10-29 PROCEDURE — 70553 MRI BRAIN STEM W/O & W/DYE: CPT

## 2021-10-29 RX ADMIN — GADOBENATE DIMEGLUMINE 18 ML: 529 INJECTION, SOLUTION INTRAVENOUS at 13:29

## 2021-11-11 ENCOUNTER — PRE-ADMISSION TESTING (OUTPATIENT)
Dept: PREADMISSION TESTING | Facility: HOSPITAL | Age: 41
End: 2021-11-11

## 2021-11-11 VITALS
SYSTOLIC BLOOD PRESSURE: 104 MMHG | OXYGEN SATURATION: 98 % | RESPIRATION RATE: 16 BRPM | HEART RATE: 92 BPM | HEIGHT: 65 IN | DIASTOLIC BLOOD PRESSURE: 72 MMHG | BODY MASS INDEX: 33.47 KG/M2 | WEIGHT: 200.9 LBS

## 2021-11-11 LAB
ANION GAP SERPL CALCULATED.3IONS-SCNC: 7.8 MMOL/L (ref 5–15)
BUN SERPL-MCNC: 8 MG/DL (ref 6–20)
BUN/CREAT SERPL: 11 (ref 7–25)
CALCIUM SPEC-SCNC: 8.6 MG/DL (ref 8.6–10.5)
CHLORIDE SERPL-SCNC: 104 MMOL/L (ref 98–107)
CO2 SERPL-SCNC: 24.2 MMOL/L (ref 22–29)
CREAT SERPL-MCNC: 0.73 MG/DL (ref 0.57–1)
GFR SERPL CREATININE-BSD FRML MDRD: 89 ML/MIN/1.73
GLUCOSE SERPL-MCNC: 95 MG/DL (ref 65–99)
POTASSIUM SERPL-SCNC: 4.4 MMOL/L (ref 3.5–5.2)
SODIUM SERPL-SCNC: 136 MMOL/L (ref 136–145)

## 2021-11-11 PROCEDURE — 85025 COMPLETE CBC W/AUTO DIFF WBC: CPT | Performed by: OBSTETRICS & GYNECOLOGY

## 2021-11-11 PROCEDURE — 93010 ELECTROCARDIOGRAM REPORT: CPT | Performed by: INTERNAL MEDICINE

## 2021-11-11 PROCEDURE — 36415 COLL VENOUS BLD VENIPUNCTURE: CPT

## 2021-11-11 PROCEDURE — 93005 ELECTROCARDIOGRAM TRACING: CPT

## 2021-11-11 PROCEDURE — 80048 BASIC METABOLIC PNL TOTAL CA: CPT | Performed by: OBSTETRICS & GYNECOLOGY

## 2021-11-11 RX ORDER — POLYETHYLENE GLYCOL 3350 17 G/17G
17 POWDER, FOR SOLUTION ORAL NIGHTLY
COMMUNITY
End: 2022-04-01

## 2021-11-11 NOTE — DISCHARGE INSTRUCTIONS
PRE-ADMISSION TESTING INSTRUCTIONS FOR ADULTS    Take these medications the morning of surgery with a small sip of water: hydroxyzine, omeprazole, pepcid, and bupropion      No aspirin, advil, aleve, ibuprofen, naproxen, diet pills, decongestants, or herbal/vitamins for a week prior to surgery.    General Instructions:    • Do not eat solid food after midnight the night before surgery.  No gum, mints, or hard candy after midnight the night before surgery.  • You may drink clear liquids the day of surgery up until 2 hours before your arrival time.  (5:30 am)  • Clear liquids are liquids you can see through. Nothing RED in color.    Plain water    Sports drinks  Sodas     Gelatin (Jell-O)  Fruit juices without pulp such as white grape juice and apple juice  Popsicles that contain no fruit or yogurt  Tea or coffee (no cream or milk added)    • It is beneficial for you to have a clear drink that contains carbohydrates just before you leave your house and before your fasting time begins.  We suggest a 20 ounce bottle of Gatorade or Powerade for non-diabetic patients or a 20 ounce bottle of G2 or Powerade Zero for diabetic patients.  (5:30 am)    • Patients who avoid smoking, chewing tobacco and alcohol for 4 weeks prior to surgery have a reduced risk of post-operative complications.  If at all possible, quit smoking as many days before surgery as you can.    • Do not smoke, use chewing tobacco or drink alcohol the day of surgery    • Bring your C-PAP/ BI-PAP machine if you use one.  • Wear clean comfortable clothes and socks.  • Do not wear contact lenses, lotion, deodorant, or make-up.  Bring a case for your glasses if applicable. You may brush your teeth the morning of surgery.  • You may wear dentures/partials, do not put adhesive/glue on them.    • Leave all other jewelry and valuables at home.      Preventing a Surgical Site Infection:    • Shower the night before and on the morning of surgery using the  chlorhexidine soap you were given.  Use a clean washcloth with the soap.  Place clean sheets on your bed after showering the night before surgery. Do not use the CHG soap on your hair, face, or private areas. Wash your body gently for five (5) minutes. Do not scrub your skin.  Dry with a clean towel and dress in clean clothing.    • Do not shave the surgical area for 10 days-2 weeks prior to surgery  because the razor can irritate skin and make it easier to develop an infection.  • Make sure you, your family, and all healthcare providers clean their hands with soap and water or an alcohol based hand  before caring for you or your wound.      Day of surgery:    Your surgeon’s office will advise you of your arrival time for the day of surgery.    Upon arrival, a Pre-op nurse and Anesthesia provider will review your health history, obtain vital signs, and answer questions you may have.  The only belongings needed at this time will be your home medications and if applicable your C-PAP/BI-PAP machine.  If you are staying overnight your family can leave the rest of your belongings in the car and bring them to your room later.  A Pre-op nurse will start an IV and you may receive medication in preparation for surgery, including something to help you relax.  Your family will be able to see you in the Pre-op area.  While you are in surgery your family should notify the waiting room  if they leave the waiting room area and provide a contact phone number.    IF you have any questions, you can call the Pre-Admission Department at [phone] or your surgeon's office.  Notify your surgeon if  you become sick, have a fever, productive cough, or cannot be here the day of surgery    Please be aware that surgery does come with discomfort.  We want to make every effort to control your discomfort so please discuss any uncontrolled symptoms with your nurse.   Your doctor will most likely have prescribed pain  medications.      If you are going home after surgery, you will receive individualized written care instructions before being discharged.  A responsible adult (over the age of 18) must drive you to and from the hospital on the day of your surgery and stay with you for 24 hours after anesthesia.    If you are staying overnight following surgery, you will be transported to your hospital room following the recovery period.  [facility] has all private rooms.    You may receive a survey regarding the care you received. Your feedback is very important and will be used to collect the necessary data to help us to continue to provide excellent care.     Deductibles and co-payments are collected on the day of service. Please be prepared to pay the required co-pay, deductible or deposit on the day of service as defined by your plan.

## 2021-11-11 NOTE — PAT
Pt here for PAT visit.  Pre-op tests completed, shower w/anitbacterial soap, and instructions reviewed.  Instructed clears until 5:30 am dos, voiced understanding. COVID test 11/16

## 2021-11-12 LAB — QT INTERVAL: 378 MS

## 2021-11-14 ENCOUNTER — HOSPITAL ENCOUNTER (EMERGENCY)
Facility: HOSPITAL | Age: 41
Discharge: HOME | End: 2021-11-14
Attending: EMERGENCY MEDICINE | Admitting: EMERGENCY MEDICINE

## 2021-11-14 ENCOUNTER — APPOINTMENT (OUTPATIENT)
Dept: CT IMAGING | Facility: HOSPITAL | Age: 41
End: 2021-11-14

## 2021-11-14 VITALS
DIASTOLIC BLOOD PRESSURE: 84 MMHG | OXYGEN SATURATION: 100 % | HEART RATE: 74 BPM | WEIGHT: 200 LBS | SYSTOLIC BLOOD PRESSURE: 113 MMHG | BODY MASS INDEX: 33.32 KG/M2 | RESPIRATION RATE: 16 BRPM | TEMPERATURE: 98 F | HEIGHT: 65 IN

## 2021-11-14 DIAGNOSIS — R30.0 DYSURIA: Primary | ICD-10-CM

## 2021-11-14 LAB
AMORPH URATE CRY URNS QL MICRO: ABNORMAL /HPF
B-HCG UR QL: NEGATIVE
BACTERIA UR QL AUTO: ABNORMAL /HPF
BILIRUB UR QL STRIP: NEGATIVE
CLARITY UR: ABNORMAL
COLOR UR: YELLOW
GLUCOSE UR STRIP-MCNC: NEGATIVE MG/DL
HGB UR QL STRIP.AUTO: ABNORMAL
HYALINE CASTS UR QL AUTO: ABNORMAL /LPF
KETONES UR QL STRIP: ABNORMAL
LEUKOCYTE ESTERASE UR QL STRIP.AUTO: NEGATIVE
NITRITE UR QL STRIP: NEGATIVE
PH UR STRIP.AUTO: 6 [PH] (ref 4.5–8)
PROT UR QL STRIP: ABNORMAL
RBC # UR: ABNORMAL /HPF
REF LAB TEST METHOD: ABNORMAL
SP GR UR STRIP: 1.03 (ref 1–1.03)
SQUAMOUS #/AREA URNS HPF: ABNORMAL /HPF
UROBILINOGEN UR QL STRIP: ABNORMAL
WBC UR QL AUTO: ABNORMAL /HPF

## 2021-11-14 PROCEDURE — 81001 URINALYSIS AUTO W/SCOPE: CPT | Performed by: EMERGENCY MEDICINE

## 2021-11-14 PROCEDURE — 99283 EMERGENCY DEPT VISIT LOW MDM: CPT

## 2021-11-14 PROCEDURE — 74176 CT ABD & PELVIS W/O CONTRAST: CPT

## 2021-11-14 PROCEDURE — 99282 EMERGENCY DEPT VISIT SF MDM: CPT

## 2021-11-14 PROCEDURE — 81025 URINE PREGNANCY TEST: CPT

## 2021-11-14 RX ORDER — PHENAZOPYRIDINE HYDROCHLORIDE 200 MG/1
200 TABLET, FILM COATED ORAL 3 TIMES DAILY PRN
Qty: 6 TABLET | Refills: 0 | Status: SHIPPED | OUTPATIENT
Start: 2021-11-14 | End: 2021-11-16

## 2021-11-14 NOTE — ED NOTES
PT C/O DYSURIA, HEMATURIA, URGENCY, AND FREQUENCY. STATES YESTERDAY THE HEMATURIA STARTED FOLLOWED WITH OTHER SYMPTOMS TODAY. STATES SHE HAS HX OF FREQUENT UTIS AND OVERACTIVE BLADDER. PT CURRENTLY REQUESTING PYRIDIUM. NOTIFIED Petra Velez, EMELY  11/14/21 9245

## 2021-11-14 NOTE — ED PROVIDER NOTES
EMERGENCY DEPARTMENT ENCOUNTER      Room Number: 11/11    History is provided by the patient, no translation services needed    HPI:    Chief complaint: Dysuria    Location: Lower urinary tract, right flank pain    Quality/Severity: burning pain with urination; some pain radiating around to right flank    Timing/Duration: Sudden onset today prior to arrival    Modifying Factors: History of interstitial cystitis.  History of kidney stones.    Associated Symptoms: Decreased urine production.  Increased urgency.  Hematuria x2 days ago.  No fever.    Narrative: Pt is a 41 y.o. female who presents complaining of sudden onset burning with urination that started today prior to arrival.  Reports some pain radiating around to the right flank.  Patient has a history of interstitial cystitis and kidney stones.  [facility].  Associated symptoms include decreased amount and increased urgency.  Reports hematuria x2 days ago.  Denies fever.    PMD: Lou Willard APRN    REVIEW OF SYSTEMS  Review of Systems   Constitutional: Negative for chills and fever.   HENT: Negative for congestion.    Eyes: Negative for visual disturbance.   Respiratory: Negative for cough and shortness of breath.    Cardiovascular: Negative for chest pain.   Gastrointestinal: Negative for abdominal pain, constipation, diarrhea, nausea and vomiting.   Genitourinary: Positive for decreased urine volume, difficulty urinating, dysuria, flank pain, hematuria and urgency. Negative for vaginal bleeding.   Musculoskeletal: Negative for arthralgias and myalgias.   Skin: Negative for rash and wound.   Neurological: Negative for dizziness, syncope and headaches.       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   • Tenderness of temporomandibular joint 02/10/2016   • Bilateral carpal tunnel syndrome 05/03/2016   • Eczema 05/03/2016   • Shoulder pain, left 05/03/2016   • Substance abuse ([facility]) 01/05/2017   • Lateral epicondylitis 09/16/2013   • Drug addiction syndrome ([facility]) 09/19/2017   • Gastroenteritis 12/12/2017   • Alcohol dependence in remission ([facility]) 04/25/2018   • History of placement of ear tubes 04/25/2018   • Status post arthroscopy of shoulder 09/18/2018   • Subacromial impingement of left shoulder 12/18/2018   • Biceps tendinitis of left upper extremity 03/28/2019   • Complete tear of left rotator cuff 03/28/2019   • Smoker 06/12/2019   • Sjogren's syndrome with keratoconjunctivitis sicca ([facility]) 06/12/2019   • Left ovarian cyst 06/25/2019   • Pelvic pain 06/25/2019   • Nondisplaced fracture of coronoid process of left ulna, initial encounter for closed fracture 07/02/2020   • Left breast lump 11/24/2020   • SARMAD (stress urinary incontinence, female) 11/24/2020   • Rectocele 11/24/2020   • Irritable bowel syndrome with both constipation and diarrhea 01/07/2021   • Ashton's esophagus without dysplasia 01/07/2021   • IC (interstitial cystitis) 03/03/2021   • Overactive bladder 03/03/2021   • Gastroparesis 03/08/2021   • Chondromalacia of knee 09/28/2021   • Lupus ([facility]) 10/26/2021   • Menometrorrhagia 10/26/2021     Resolved Ambulatory Problems     Diagnosis Date Noted   • Anxiety 02/10/2016   • Bacterial vaginosis 02/10/2016   • Foreign body of cornea 02/10/2016   • Fever 02/10/2016   • Right upper quadrant pain 02/10/2016   • Sjogren's syndrome ([facility]) 02/10/2016   • Acute cystitis without hematuria 02/10/2016   • Drug addiction ([facility]) 05/10/2017   • Alcohol  abuse 05/10/2017   • Wound abscess 2017   • Subacute vaginitis 10/30/2017   • Trichomonal vaginitis 2017   • Tendinopathy of left rotator cuff 2018   • Biceps tendinitis of left upper extremity 2018   • Complete tear of left rotator cuff 2018   • Pre-operative clearance 2018     Past Medical History:   Diagnosis Date   • Ashton esophagus    • Bulging lumbar disc    • Fracture, finger    • GERD (gastroesophageal reflux disease)    • Hemorrhoids    • History of anemia    • Hypertension    • IBS (irritable bowel syndrome)    • MRSA (methicillin resistant staph aureus) culture positive  ESTIMATE   • Urinary tract infection        PAST SURGICAL HISTORY  Past Surgical History:   Procedure Laterality Date   • ADENOIDECTOMY     •  SECTION     • COLONOSCOPY     • ENDOSCOPY N/A 5/10/2019    Procedure: ESOPHAGOGASTRODUODENOSCOPY with biopsies;  Surgeon: Shanon Vazquez MD;  Location: [facility] OR;  Service: Gastroenterology   • ENDOSCOPY N/A 2021    Procedure: ESOPHAGOGASTRODUODENOSCOPY with biopsies;  Surgeon: Romain Rice MD;  Location: [facility] OR;  Service: Gastroenterology;  Laterality: N/A;  barretts  gastritis   • FINGER GANGLION CYST EXCISION Right     middle finger   • MOUTH SURGERY     • OTHER SURGICAL HISTORY      reversal of tubal   • RHINOPLASTY     • SHOULDER ARTHROSCOPY Left 9/10/2018    Procedure: SHOULDER ARTHROSCOPY, rotator cuff repair; extensive debridement, open biceps tenodesis;  Surgeon: Joo Rivers MD;  Location: [facility] OR;  Service: Orthopedics   • TONSILLECTOMY     • TUBAL ABDOMINAL LIGATION     • TYMPANOSTOMY TUBE PLACEMENT     • WISDOM TOOTH EXTRACTION Bilateral        FAMILY HISTORY  Family History   Problem Relation Age of Onset   • Lung cancer Father    • Heart disease Paternal Grandmother    • Diabetes Maternal Aunt    • Breast cancer Maternal Aunt    • Diabetes Maternal Grandmother    • COPD Mother    • Colon cancer Neg Hx   "  • Colon polyps Neg Hx    • Malig Hyperthermia Neg Hx        SOCIAL HISTORY  Social History     Socioeconomic History   • Marital status:    Tobacco Use   • Smoking status: Former Smoker     Packs/day: 0.25     Years: 20.00     Pack years: 5.00     Types: Cigarettes     Quit date:      Years since quittin.8   • Smokeless tobacco: Never Used   Vaping Use   • Vaping Use: Former   Substance and Sexual Activity   • Alcohol use: Not Currently     Comment: Alcoholic, Last drink 2018   • Drug use: Not Currently     Types: Cocaine(coke), Hydrocodone     Comment: \"recovering addict\", last reports use 2017   • Sexual activity: Defer       ALLERGIES  Fluconazole    No current facility-administered medications for this encounter.    Current Outpatient Medications:   •  azaTHIOprine (IMURAN) 50 MG tablet, Take 100 mg by mouth Daily., Disp: , Rfl:   •  Azithromycin (ZITHROMAX PO), Take  by mouth Daily. For stomach, unsure of dose, Disp: , Rfl:   •  buPROPion (WELLBUTRIN) 75 MG tablet, Take 75 mg by mouth Daily., Disp: , Rfl:   •  carboxymethylcellulose sod, PF, (Refresh Celluvisc) 1 % gel eye gel, Administer 1 drop to both eyes Every 2 (Two) Hours., Disp: , Rfl:   •  colestipol (COLESTID) 1 g tablet, Take 2 tablets by mouth At Night As Needed (diarrhea or loose bms.). (Patient taking differently: Take 1-2 g by mouth Every Night.), Disp: 180 tablet, Rfl: 3  •  DULoxetine (CYMBALTA) 60 MG capsule, Take 60 mg by mouth Every Evening., Disp: , Rfl:   •  famotidine (PEPCID) 40 MG tablet, Take 1 tablet by mouth 2 (Two) Times a Day. With lunch and at bedtime (Patient taking differently: Take 40 mg by mouth 2 (Two) Times a Day.), Disp: 180 tablet, Rfl: 3  •  hydrocortisone (ANUSOL-HC) 2.5 % rectal cream, Insert  into the rectum 4 (Four) Times a Day As Needed for Hemorrhoids (rectal discomfort). (Patient taking differently: Insert 1 application into the rectum 4 (Four) Times a Day As Needed for Hemorrhoids (rectal " discomfort).), Disp: 30 g, Rfl: 5  •  Hydrocortisone, Perianal, (ANUSOL-HC) 2.5 % rectal cream, Insert  into the rectum 2 (Two) Times a Day. (Patient taking differently: Insert 1 application into the rectum 2 (Two) Times a Day.), Disp: 3 each, Rfl: 3  •  hydrOXYzine (ATARAX) 25 MG tablet, Take 25 mg by mouth 2 (Two) Times a Day., Disp: , Rfl:   •  omeprazole (priLOSEC) 40 MG capsule, Take 1 capsule by mouth 2 (two) times a day., Disp: 180 capsule, Rfl: 3  •  phenazopyridine (PYRIDIUM) 200 MG tablet, Take 1 tablet by mouth 3 (Three) Times a Day As Needed for Bladder Spasms for up to 2 days., Disp: 6 tablet, Rfl: 0  •  pilocarpine (SALAGEN) 5 MG tablet, Take 5 mg by mouth 3 (Three) Times a Day As Needed (for tear/saliva production)., Disp: , Rfl: 3  •  polyethylene glycol (MiraLax) 17 g packet, Take 17 g by mouth Every Night., Disp: , Rfl:   •  predniSONE (DELTASONE) 5 MG tablet, Take 5 mg by mouth Daily., Disp: , Rfl:   •  Proctofoam HC 1-1 % rectal foam, Insert 1 application into the rectum As Needed for Hemorrhoids., Disp: , Rfl:   •  traZODone (DESYREL) 150 MG tablet, Take 150 mg by mouth every night at bedtime., Disp: , Rfl:   •  witch hazel-glycerin (TUCKS) pad, Insert 1 pad into the rectum As Needed for Hemorrhoids., Disp: , Rfl:     PHYSICAL EXAM  ED Triage Vitals [11/14/21 1333]   Temp Heart Rate Resp BP SpO2   98 °F (36.7 °C) 109 16 137/89 99 %      Temp src Heart Rate Source Patient Position BP Location FiO2 (%)   Oral Monitor Sitting Right arm --       Physical Exam      LAB RESULTS  Lab Results (last 24 hours)     Procedure Component Value Units Date/Time    Urinalysis With Culture If Indicated - Urine, Clean Catch [[id number]]  (Abnormal) Collected: 11/14/21 1335    Specimen: Urine, Clean Catch Updated: 11/14/21 1346     Color, UA Yellow     Appearance, UA Cloudy     pH, UA 6.0     Specific Gravity, UA 1.033     Comment: Result obtained by Refractometer        Glucose, UA Negative     Ketones, UA Trace      Bilirubin, UA Negative     Blood, UA Trace     Protein, UA 30 mg/dL (1+)     Leuk Esterase, UA Negative     Nitrite, UA Negative     Urobilinogen, UA 0.2 E.U./dL    Urinalysis, Microscopic Only - Urine, Clean Catch [[id number]]  (Abnormal) Collected: 11/14/21 1335    Specimen: Urine, Clean Catch Updated: 11/14/21 1402     RBC, UA 0-2 /HPF      WBC, UA 0-2 /HPF      Bacteria, UA None Seen /HPF      Squamous Epithelial Cells, UA 3-6 /HPF      Hyaline Casts, UA None Seen /LPF      Amorphous Crystals, UA Large/3+ /HPF      Methodology Manual Light Microscopy    Pregnancy, Urine - Urine, Clean Catch [[id number]]  (Normal) Collected: 11/14/21 1335    Specimen: Urine, Clean Catch Updated: 11/14/21 1411     HCG, Urine QL Negative            I ordered the above labs and reviewed the results    RADIOLOGY  CT Abdomen Pelvis Without Contrast    Result Date: 11/14/2021  CT Abdomen Pelvis WO INDICATION: Right flank pain today. Hematuria and dysuria. Lupus and Sjogren's syndrome. TECHNIQUE: CT of the abdomen and pelvis without IV contrast. Coronal and sagittal reconstructions were obtained.  Radiation dose reduction techniques included automated exposure control or exposure modulation based on body size. Count of known CT and cardiac nuc med studies performed in previous 12 months: 1. COMPARISON: 5/4/2021 FINDINGS: Abdomen: Included lung bases are clear. There is no effusion. Normal caliber aorta and mild atherosclerotic change. The spleen is prominent and stable and the adrenal glands are negative. Pancreas is negative and the gallbladder unremarkable. The liver is negative. No hydronephrosis of either kidney. No radiopaque stone. Ureters not well visualized but no secondary signs of obstruction or ureteral stone. Stable shotty retroperitoneal lymph nodes Pelvis: The bladder is decompressed. There is no drainable fluid collection. Mild probable leiomyomatous change of the uterus. There is no inflammatory change of the bowel and  the appendix is normal. Probable reactive iliac chain lymph nodes bilaterally. There is no inguinal adenopathy. The largest iliac chain lymph node is on the left and measures 10 mm short axis previously 8 mm. There is no suspicious bone lesion.  negative.     1. No clearly acute process in the abdomen or pelvis. Normal appendix. 2. Probable reactive lymph nodes as described. Signer Name: Adalid Coronel MD  Signed: 11/14/2021 3:05 PM  Workstation Name: [facility]  [facility]      I ordered the above radiologic testing and reviewed the results    PROCEDURES  Procedures      PROGRESS AND CONSULTS  ED Course as of 11/14/21 1524   Sun Nov 14, 2021   1404 Will evaluate for kidney stone based on history and symptoms. [AS]   1522 Updated patient on results of CT scan.  Let her know about the finding of enlarged inguinal lymph nodes bilaterally.  Patient states she will follow-up with PCP.  Discussed likelihood that her symptoms are due to previous diagnosis of interstitial cystitis.  Patient verbalizes understanding and will follow up with [facility] as she is already established with them.  Prescribed Pyridium for symptom control.  Patient agreeable to discharge. [AS]      ED Course User Index  [AS] Snehal Thacker PA-C           MEDICAL DECISION MAKING    MDM     Differential diagnosis includes but is not limited to:  UTI, cystitis, ureterolithiasis, urinary obstruction    DIAGNOSIS  Final diagnoses:   Dysuria       Latest Documented Vital Signs:  As of 15:24 EST  BP- 113/84 HR- 74 Temp- 98 °F (36.7 °C) (Oral) O2 sat- 100%    DISPOSITION  Discharged home.    Discussed pertinent findings with the patient/family.  Patient/Family voiced understanding of need to follow-up for recheck and further testing as needed.  Return to the Emergency Department warnings were given.         Medication List      New Prescriptions    phenazopyridine 200 MG tablet  Commonly known as: PYRIDIUM  Take 1 tablet by  mouth 3 (Three) Times a Day As Needed for Bladder Spasms for up to 2 days.        Changed    colestipol 1 g tablet  Commonly known as: COLESTID  Take 2 tablets by mouth At Night As Needed (diarrhea or loose bms.).  What changed:   · how much to take  · when to take this     famotidine 40 MG tablet  Commonly known as: PEPCID  Take 1 tablet by mouth 2 (Two) Times a Day. With lunch and at bedtime  What changed: additional instructions     * hydrocortisone 2.5 % rectal cream  Commonly known as: ANUSOL-HC  Insert  into the rectum 4 (Four) Times a Day As Needed for Hemorrhoids (rectal discomfort).  What changed: how much to take     * Hydrocortisone (Perianal) 2.5 % rectal cream  Commonly known as: ANUSOL-HC  Insert  into the rectum 2 (Two) Times a Day.  What changed: how much to take         * This list has 2 medication(s) that are the same as other medications prescribed for you. Read the directions carefully, and ask your doctor or other care provider to review them with you.               Where to Get Your Medications      These medications were sent to [id number] - [place] [facility] & [address] - [phone]  - [phone] [address] [address]    Phone: [phone]   · phenazopyridine 200 MG tablet              Follow-up Information     [facility].    Contact information:  [address] [place] [address] [phone]                         Dictated utilizing Dragon dictation     Snehal Thacker PA-C  11/14/21 [id number]

## 2021-11-14 NOTE — DISCHARGE INSTRUCTIONS
Make a follow-up appointment with your established provider at [facility].  Take Pyridium as prescribed for burning pain with urination.    Make a follow-up appointment with primary care to further evaluate iliac lymph nodes as seen on CT scan today.

## 2021-11-16 ENCOUNTER — LAB (OUTPATIENT)
Dept: LAB | Facility: HOSPITAL | Age: 41
End: 2021-11-16

## 2021-11-16 PROCEDURE — 87635 SARS-COV-2 COVID-19 AMP PRB: CPT | Performed by: OBSTETRICS & GYNECOLOGY

## 2021-11-17 ENCOUNTER — ANESTHESIA EVENT (OUTPATIENT)
Dept: PERIOP | Facility: HOSPITAL | Age: 41
End: 2021-11-17

## 2021-11-18 ENCOUNTER — ANESTHESIA (OUTPATIENT)
Dept: PERIOP | Facility: HOSPITAL | Age: 41
End: 2021-11-18

## 2021-11-18 ENCOUNTER — HOSPITAL ENCOUNTER (OUTPATIENT)
Facility: HOSPITAL | Age: 41
Setting detail: HOSPITAL OUTPATIENT SURGERY
Discharge: HOME | End: 2021-11-18
Attending: OBSTETRICS & GYNECOLOGY | Admitting: OBSTETRICS & GYNECOLOGY

## 2021-11-18 VITALS
TEMPERATURE: 98.7 F | SYSTOLIC BLOOD PRESSURE: 119 MMHG | BODY MASS INDEX: 34.18 KG/M2 | RESPIRATION RATE: 16 BRPM | OXYGEN SATURATION: 97 % | WEIGHT: 205.4 LBS | HEART RATE: 100 BPM | DIASTOLIC BLOOD PRESSURE: 75 MMHG

## 2021-11-18 DIAGNOSIS — N92.1 MENOMETRORRHAGIA: ICD-10-CM

## 2021-11-18 PROCEDURE — 25010000002 MIDAZOLAM PER 1MG: Performed by: ANESTHESIOLOGY

## 2021-11-18 PROCEDURE — 25010000002 DEXAMETHASONE PER 1 MG: Performed by: ANESTHESIOLOGY

## 2021-11-18 PROCEDURE — 88305 TISSUE EXAM BY PATHOLOGIST: CPT | Performed by: OBSTETRICS & GYNECOLOGY

## 2021-11-18 PROCEDURE — 25010000002 FENTANYL CITRATE (PF) 50 MCG/ML SOLUTION: Performed by: NURSE ANESTHETIST, CERTIFIED REGISTERED

## 2021-11-18 PROCEDURE — 25010000002 PROPOFOL 10 MG/ML EMULSION: Performed by: NURSE ANESTHETIST, CERTIFIED REGISTERED

## 2021-11-18 PROCEDURE — 58558 HYSTEROSCOPY BIOPSY: CPT | Performed by: OBSTETRICS & GYNECOLOGY

## 2021-11-18 PROCEDURE — 25010000002 KETOROLAC TROMETHAMINE PER 15 MG: Performed by: NURSE ANESTHETIST, CERTIFIED REGISTERED

## 2021-11-18 PROCEDURE — 25010000002 ONDANSETRON PER 1 MG: Performed by: ANESTHESIOLOGY

## 2021-11-18 RX ORDER — FAMOTIDINE 10 MG/ML
20 INJECTION, SOLUTION INTRAVENOUS
Status: COMPLETED | OUTPATIENT
Start: 2021-11-18 | End: 2021-11-18

## 2021-11-18 RX ORDER — SODIUM CHLORIDE 9 MG/ML
INJECTION, SOLUTION INTRAVENOUS AS NEEDED
Status: DISCONTINUED | OUTPATIENT
Start: 2021-11-18 | End: 2021-11-18 | Stop reason: HOSPADM

## 2021-11-18 RX ORDER — KETAMINE HYDROCHLORIDE 10 MG/ML
INJECTION INTRAMUSCULAR; INTRAVENOUS AS NEEDED
Status: DISCONTINUED | OUTPATIENT
Start: 2021-11-18 | End: 2021-11-18 | Stop reason: SURG

## 2021-11-18 RX ORDER — SODIUM CHLORIDE, SODIUM LACTATE, POTASSIUM CHLORIDE, CALCIUM CHLORIDE 600; 310; 30; 20 MG/100ML; MG/100ML; MG/100ML; MG/100ML
9 INJECTION, SOLUTION INTRAVENOUS CONTINUOUS
Status: DISCONTINUED | OUTPATIENT
Start: 2021-11-18 | End: 2021-11-18 | Stop reason: HOSPADM

## 2021-11-18 RX ORDER — ONDANSETRON 2 MG/ML
4 INJECTION INTRAMUSCULAR; INTRAVENOUS ONCE AS NEEDED
Status: DISCONTINUED | OUTPATIENT
Start: 2021-11-18 | End: 2021-11-18 | Stop reason: HOSPADM

## 2021-11-18 RX ORDER — KETOROLAC TROMETHAMINE 30 MG/ML
INJECTION, SOLUTION INTRAMUSCULAR; INTRAVENOUS AS NEEDED
Status: DISCONTINUED | OUTPATIENT
Start: 2021-11-18 | End: 2021-11-18 | Stop reason: SURG

## 2021-11-18 RX ORDER — DEXAMETHASONE SODIUM PHOSPHATE 4 MG/ML
8 INJECTION, SOLUTION INTRA-ARTICULAR; INTRALESIONAL; INTRAMUSCULAR; INTRAVENOUS; SOFT TISSUE ONCE
Status: COMPLETED | OUTPATIENT
Start: 2021-11-18 | End: 2021-11-18

## 2021-11-18 RX ORDER — PROPOFOL 10 MG/ML
VIAL (ML) INTRAVENOUS AS NEEDED
Status: DISCONTINUED | OUTPATIENT
Start: 2021-11-18 | End: 2021-11-18 | Stop reason: SURG

## 2021-11-18 RX ORDER — MIDAZOLAM HYDROCHLORIDE 2 MG/2ML
1 INJECTION, SOLUTION INTRAMUSCULAR; INTRAVENOUS
Status: DISCONTINUED | OUTPATIENT
Start: 2021-11-18 | End: 2021-11-18 | Stop reason: HOSPADM

## 2021-11-18 RX ORDER — SODIUM CHLORIDE 0.9 % (FLUSH) 0.9 %
10 SYRINGE (ML) INJECTION EVERY 12 HOURS SCHEDULED
Status: DISCONTINUED | OUTPATIENT
Start: 2021-11-18 | End: 2021-11-18 | Stop reason: HOSPADM

## 2021-11-18 RX ORDER — ONDANSETRON 2 MG/ML
4 INJECTION INTRAMUSCULAR; INTRAVENOUS ONCE AS NEEDED
Status: COMPLETED | OUTPATIENT
Start: 2021-11-18 | End: 2021-11-18

## 2021-11-18 RX ORDER — SODIUM CHLORIDE, SODIUM LACTATE, POTASSIUM CHLORIDE, CALCIUM CHLORIDE 600; 310; 30; 20 MG/100ML; MG/100ML; MG/100ML; MG/100ML
100 INJECTION, SOLUTION INTRAVENOUS CONTINUOUS
Status: DISCONTINUED | OUTPATIENT
Start: 2021-11-18 | End: 2021-11-18 | Stop reason: HOSPADM

## 2021-11-18 RX ORDER — FENTANYL CITRATE 50 UG/ML
INJECTION, SOLUTION INTRAMUSCULAR; INTRAVENOUS AS NEEDED
Status: DISCONTINUED | OUTPATIENT
Start: 2021-11-18 | End: 2021-11-18 | Stop reason: SURG

## 2021-11-18 RX ORDER — LIDOCAINE HYDROCHLORIDE 10 MG/ML
0.5 INJECTION, SOLUTION EPIDURAL; INFILTRATION; INTRACAUDAL; PERINEURAL ONCE AS NEEDED
Status: DISCONTINUED | OUTPATIENT
Start: 2021-11-18 | End: 2021-11-18 | Stop reason: HOSPADM

## 2021-11-18 RX ORDER — SODIUM CHLORIDE 9 MG/ML
40 INJECTION, SOLUTION INTRAVENOUS AS NEEDED
Status: DISCONTINUED | OUTPATIENT
Start: 2021-11-18 | End: 2021-11-18 | Stop reason: HOSPADM

## 2021-11-18 RX ORDER — ACETAMINOPHEN 500 MG
1000 TABLET ORAL ONCE
Status: COMPLETED | OUTPATIENT
Start: 2021-11-18 | End: 2021-11-18

## 2021-11-18 RX ORDER — LIDOCAINE HYDROCHLORIDE 20 MG/ML
INJECTION, SOLUTION INFILTRATION; PERINEURAL AS NEEDED
Status: DISCONTINUED | OUTPATIENT
Start: 2021-11-18 | End: 2021-11-18 | Stop reason: SURG

## 2021-11-18 RX ORDER — SODIUM CHLORIDE 0.9 % (FLUSH) 0.9 %
10 SYRINGE (ML) INJECTION AS NEEDED
Status: DISCONTINUED | OUTPATIENT
Start: 2021-11-18 | End: 2021-11-18 | Stop reason: HOSPADM

## 2021-11-18 RX ORDER — FENTANYL CITRATE 50 UG/ML
25 INJECTION, SOLUTION INTRAMUSCULAR; INTRAVENOUS
Status: DISCONTINUED | OUTPATIENT
Start: 2021-11-18 | End: 2021-11-18 | Stop reason: HOSPADM

## 2021-11-18 RX ADMIN — DEXAMETHASONE SODIUM PHOSPHATE 8 MG: 4 INJECTION, SOLUTION INTRAMUSCULAR; INTRAVENOUS at 08:36

## 2021-11-18 RX ADMIN — SODIUM CHLORIDE, POTASSIUM CHLORIDE, SODIUM LACTATE AND CALCIUM CHLORIDE: 600; 310; 30; 20 INJECTION, SOLUTION INTRAVENOUS at 09:05

## 2021-11-18 RX ADMIN — ONDANSETRON 4 MG: 2 INJECTION INTRAMUSCULAR; INTRAVENOUS at 08:35

## 2021-11-18 RX ADMIN — FENTANYL CITRATE 50 MCG: 50 INJECTION INTRAMUSCULAR; INTRAVENOUS at 09:09

## 2021-11-18 RX ADMIN — FAMOTIDINE 20 MG: 10 INJECTION, SOLUTION INTRAVENOUS at 08:35

## 2021-11-18 RX ADMIN — LIDOCAINE HYDROCHLORIDE 50 MG: 20 INJECTION, SOLUTION INFILTRATION; PERINEURAL at 09:09

## 2021-11-18 RX ADMIN — MIDAZOLAM HYDROCHLORIDE 1 MG: 2 INJECTION, SOLUTION INTRAMUSCULAR; INTRAVENOUS at 08:39

## 2021-11-18 RX ADMIN — ACETAMINOPHEN 1000 MG: 500 TABLET, FILM COATED ORAL at 08:36

## 2021-11-18 RX ADMIN — KETAMINE HYDROCHLORIDE 30 MG: 10 INJECTION, SOLUTION INTRAMUSCULAR; INTRAVENOUS at 09:09

## 2021-11-18 RX ADMIN — KETOROLAC TROMETHAMINE 30 MG: 30 INJECTION, SOLUTION INTRAMUSCULAR; INTRAVENOUS at 09:25

## 2021-11-18 RX ADMIN — PROPOFOL 20 MG: 10 INJECTION, EMULSION INTRAVENOUS at 09:09

## 2021-11-18 RX ADMIN — PROPOFOL 150 MCG/KG/MIN: 10 INJECTION, EMULSION INTRAVENOUS at 09:09

## 2021-11-18 NOTE — OP NOTE
OPERATIVE REPORT      PROCEDURE:   DIAGNOSTIC HYSTEROSCOPY, DILATATION & CURETTAGE    PREOP DIAGNOSIS:  menometrorrhagia    POSTOP DIAGNOSIS:  same    SURGEON:   Jefe    ASSIST:  none    ANESTHESIA:  MAC    EBL:   1cc    IVFS:  200cc    URINE OUTPUT:  50cc    COMPLICATIONS:  none    FINDINGS:  Normal endometrial cavity    SPECIMENS:  Endometrial curettings    ANTIBIOTICS:  None        DESCRIPTION OF THE PROCEDURE:     After informed consent was obtained, pt was taken to the OR and placed on the table in the DORSOLITHOTOMY position.  LMA was induced without difficulty.  Time out was done, no antibiotics were given. Pt was prepped and draped in the usual fashion.    An open graves speculum was placed in the vagina and the anterior lip of the cervix was grasped with a single toothed tenaculum.  The cervix appeared normal.  The uterus was sounded to 8 cms    The cervix was serially dilated with dowd dilators with no difficulty.   Dowd dilators were then used to serially dilate the endocervical canal sufficiently to admit the diagnostic hysteroscope.    Using sterile water, the hysteroscope was used to visualize the endometrial cavity in its entirety.  Both tubal ostia were seen, the cavity was smooth, and no masses were visualized; the endometrial cavity appeared normal and symmetrical.  The hysteroscope was removed.    A small serrated curette was used to curette out the entire endometrial cavity.  This specimen was sent for permanent section.      All instruments were removed.  There was no evidence of cervical laceration or uterine perforation.    All sponge, instrument counts were correct x 3 according to the OR personnel.  Pt went to  in satisfactory condition.      Rancho Mayberry MD  09:28 EST  11/18/21

## 2021-11-18 NOTE — H&P
PREOPERATIVE HISTORY AND PHYSICAL      Patient Care Team:  Lou Willard APRN as PCP - General (Family Medicine)    Chief complaint: Menometrorrhagia    Pt is a 41 y.o.   Patient's last menstrual period was 2021 (approximate).     HPI:Pt desires possible ablation for menometrorrhagia and declined a sonohystogram in office.       PMHx:   Past Medical History:   Diagnosis Date   • Anxiety    • Bacterial vaginosis    • Ashton esophagus    • Bulging lumbar disc     lower back per patient   • Depression with anxiety    • Fibromyalgia    • Fracture, finger    • Gastroparesis    • GERD (gastroesophageal reflux disease)    • Hemorrhoids    • History of anemia    • Hypertension    • IBS (irritable bowel syndrome)    • Lateral epicondylitis 2013    RHUEMATOLOGIST   • Lupus ([facility]ly [facility])    • MRSA (methicillin resistant staph aureus) culture positive  ESTIMATE    GROIN AREA    • Overactive bladder    • Sjogren's syndrome ([facility]ly [facility])    • Urinary tract infection        Current problem list:  Patient Active Problem List   Diagnosis   • Arthralgia of multiple joints   • Chronic back pain   • Chronic constipation   • Disorder of connective tissue ([facility]ly [facility])   • Depression with anxiety   • Fibromyalgia   • Gastroesophageal reflux disease without esophagitis   • Irritable bowel syndrome   • Muscle pain   • Palpitations   • Seasonal allergic rhinitis   • Sympathetic uveitis   • Tenderness of temporomandibular joint   • Bilateral carpal tunnel syndrome   • Eczema   • Shoulder pain, left   • Substance abuse ([facility]ly [facility])   • Lateral epicondylitis   • Drug addiction syndrome ([facility]ly [facility])   • Gastroenteritis   • Alcohol dependence in remission ([facility]ly [facility])   • History of placement of ear tubes   • Status post arthroscopy of shoulder   • Subacromial impingement of left shoulder   • Biceps tendinitis of left upper extremity   • Complete tear of left rotator cuff   • Smoker   • Sjogren's syndrome with keratoconjunctivitis sicca ([facility]ly [facility])   • Left ovarian cyst    • Pelvic pain   • Nondisplaced fracture of coronoid process of left ulna, initial encounter for closed fracture   • Left breast lump   • SARMAD (stress urinary incontinence, female)   • Rectocele   • Irritable bowel syndrome with both constipation and diarrhea   • Ashton's esophagus without dysplasia   • IC (interstitial cystitis)   • Overactive bladder   • Gastroparesis   • Chondromalacia of knee   • Lupus (HCC)   • Menometrorrhagia       PSHx:   Past Surgical History:   Procedure Laterality Date   • ADENOIDECTOMY     •  SECTION     • COLONOSCOPY     • ENDOSCOPY N/A 5/10/2019    Procedure: ESOPHAGOGASTRODUODENOSCOPY with biopsies;  Surgeon: Shanon Vazquez MD;  Location: [facility] OR;  Service: Gastroenterology   • ENDOSCOPY N/A 2021    Procedure: ESOPHAGOGASTRODUODENOSCOPY with biopsies;  Surgeon: Romain Rice MD;  Location: [facility] OR;  Service: Gastroenterology;  Laterality: N/A;  barretts  gastritis   • FINGER GANGLION CYST EXCISION Right     middle finger   • MOUTH SURGERY     • OTHER SURGICAL HISTORY      reversal of tubal   • RHINOPLASTY     • SHOULDER ARTHROSCOPY Left 9/10/2018    Procedure: SHOULDER ARTHROSCOPY, rotator cuff repair; extensive debridement, open biceps tenodesis;  Surgeon: Joo Rivers MD;  Location: [facility] OR;  Service: Orthopedics   • TONSILLECTOMY     • TUBAL ABDOMINAL LIGATION     • TYMPANOSTOMY TUBE PLACEMENT     • WISDOM TOOTH EXTRACTION Bilateral        Social Hx:   Social History     Socioeconomic History   • Marital status:    Tobacco Use   • Smoking status: Former Smoker     Packs/day: 0.25     Years: 20.00     Pack years: 5.00     Types: Cigarettes     Quit date: 2020     Years since quittin.8   • Smokeless tobacco: Never Used   Vaping Use   • Vaping Use: Former   Substance and Sexual Activity   • Alcohol use: Not Currently     Comment: Alcoholic, Last drink 2018   • Drug use: Not Currently     Types: Cocaine(coke), Hydrocodone      "Comment: \"recovering addict\", last reports use 2017   • Sexual activity: Defer       FHx:   Family History   Problem Relation Age of Onset   • Lung cancer Father    • Heart disease Paternal Grandmother    • Diabetes Maternal Aunt    • Breast cancer Maternal Aunt    • Diabetes Maternal Grandmother    • COPD Mother    • Colon cancer Neg Hx    • Colon polyps Neg Hx    • Malig Hyperthermia Neg Hx        Debilities/Disabilities Identified: None    Emotional Behavior: Appropriate    PGyn Hx:  otherwise noncontributory    POBHx:   OB History    Para Term  AB Living   4 4 4 0 0 0   SAB IAB Ectopic Molar Multiple Live Births   0 0 0 0 0 0      # Outcome Date GA Lbr Nguyễn/2nd Weight Sex Delivery Anes PTL Lv   4 Term            3 Term            2 Term            1 Term                Allergies: Fluconazole    Medications:   No medications prior to admission.                            No current facility-administered medications for this encounter.    Current Outpatient Medications:   •  azaTHIOprine (IMURAN) 50 MG tablet, Take 100 mg by mouth Daily., Disp: , Rfl:   •  Azithromycin (ZITHROMAX PO), Take  by mouth Daily. For stomach, unsure of dose, Disp: , Rfl:   •  buPROPion (WELLBUTRIN) 75 MG tablet, Take 75 mg by mouth Daily., Disp: , Rfl:   •  carboxymethylcellulose sod, PF, (Refresh Celluvisc) 1 % gel eye gel, Administer 1 drop to both eyes Every 2 (Two) Hours., Disp: , Rfl:   •  colestipol (COLESTID) 1 g tablet, Take 2 tablets by mouth At Night As Needed (diarrhea or loose bms.). (Patient taking differently: Take 1-2 g by mouth Every Night.), Disp: 180 tablet, Rfl: 3  •  DULoxetine (CYMBALTA) 60 MG capsule, Take 60 mg by mouth Every Evening., Disp: , Rfl:   •  famotidine (PEPCID) 40 MG tablet, Take 1 tablet by mouth 2 (Two) Times a Day. With lunch and at bedtime (Patient taking differently: Take 40 mg by mouth 2 (Two) Times a Day.), Disp: 180 tablet, Rfl: 3  •  hydrocortisone (ANUSOL-HC) 2.5 % rectal " cream, Insert  into the rectum 4 (Four) Times a Day As Needed for Hemorrhoids (rectal discomfort). (Patient taking differently: Insert 1 application into the rectum 4 (Four) Times a Day As Needed for Hemorrhoids (rectal discomfort).), Disp: 30 g, Rfl: 5  •  Hydrocortisone, Perianal, (ANUSOL-HC) 2.5 % rectal cream, Insert  into the rectum 2 (Two) Times a Day. (Patient taking differently: Insert 1 application into the rectum 2 (Two) Times a Day.), Disp: 3 each, Rfl: 3  •  hydrOXYzine (ATARAX) 25 MG tablet, Take 25 mg by mouth 2 (Two) Times a Day., Disp: , Rfl:   •  omeprazole (priLOSEC) 40 MG capsule, Take 1 capsule by mouth 2 (two) times a day., Disp: 180 capsule, Rfl: 3  •  pilocarpine (SALAGEN) 5 MG tablet, Take 5 mg by mouth 3 (Three) Times a Day As Needed (for tear/saliva production)., Disp: , Rfl: 3  •  polyethylene glycol (MiraLax) 17 g packet, Take 17 g by mouth Every Night., Disp: , Rfl:   •  predniSONE (DELTASONE) 5 MG tablet, Take 5 mg by mouth Daily., Disp: , Rfl:   •  Proctofoam HC 1-1 % rectal foam, Insert 1 application into the rectum As Needed for Hemorrhoids., Disp: , Rfl:   •  traZODone (DESYREL) 150 MG tablet, Take 150 mg by mouth every night at bedtime., Disp: , Rfl:   •  witch hazel-glycerin (TUCKS) pad, Insert 1 pad into the rectum As Needed for Hemorrhoids., Disp: , Rfl:         Review of Systems   Constitutional: Negative.    HENT: Negative.    Eyes: Negative.    Respiratory: Negative.    Cardiovascular: Negative.    Gastrointestinal: Negative.    Endocrine: Negative.    Genitourinary: Positive for menstrual problem.   Musculoskeletal: Negative.    Skin: Negative.    Allergic/Immunologic: Negative.    Neurological: Negative.    Hematological: Negative.    Psychiatric/Behavioral: Negative.        Vital Signs  [facility] 11/01/2021 (Approximate)     Physical Exam  Vitals and nursing note reviewed.   Constitutional:       Appearance: She is well-developed.   HENT:      Head: Normocephalic and atraumatic.    Cardiovascular:      Rate and Rhythm: Normal rate.   Pulmonary:      Effort: Pulmonary effort is normal.   Abdominal:      General: There is no distension.      Palpations: Abdomen is soft. There is no mass.      Tenderness: There is no abdominal tenderness. There is no guarding.   Genitourinary:     Vagina: No vaginal discharge.   Musculoskeletal:         General: No tenderness or deformity. Normal range of motion.      Cervical back: Normal range of motion.   Skin:     General: Skin is warm and dry.      Coloration: Skin is not pale.      Findings: No erythema or rash.   Neurological:      Mental Status: She is alert and oriented to person, place, and time.   Psychiatric:         Behavior: Behavior normal.         Thought Content: Thought content normal.         Judgment: Judgment normal.             IMPRESSION:    Menometrorrhagia                                    PLAN:    Procedure(s):  DILATATION AND CURETTAGE HYSTEROSCOPY    RISKS, ALTERNATIVES, COMPLICATIONS OF THE PROCEDURE INCLUDING BUT NOT LIMITED TO:    INTRAOPERATIVE RISKS: INJURY TO INTERNAL AND ADJACENT ORGANS AND STRUCTURES (BOWEL, BLADDER, URETER,BLOOD VESSELS) OR HEMORRHAGE REQUIRING FURTHER SURGERY (LAPAROTOMY),  POSSIBLE NON-DIAGNOSTIC FINDINGS, DISCOVERY OF POSSIBLE MALIGNANCY, INFECTION, AND DEATH;   POSTOP COMPLICATIONS: BLEEDING, INFECTION (REQUIRING POSSIBLE REOPERATION), FAILURE OF GOAL OF SURGERY AND RECURRENCE OF ORIGINAL SYMPTOMS, PNEUMONIA, PULMONARY EMBOLISM, AND DEATH;  WERE EXPLAINED TO THE PT WHO VERBALIZED HER UNDERSTANDING.             I discussed the patients findings and my recommendations with patient.     Rancho Mayberry MD  11/17/21  20:55 EST

## 2021-11-18 NOTE — INTERVAL H&P NOTE
H&P reviewed. The patient was examined and there are no changes to the H&P.    /95 (BP Location: Left arm, Patient Position: Lying)   Pulse 95   Temp 98.5 °F (36.9 °C) (Oral)   Resp 16   Wt 93.2 kg (205 lb 6.4 oz)   LMP 11/01/2021 (Approximate)   SpO2 96%   BMI 34.18 kg/m²     Medications Prior to Admission   Medication Sig Dispense Refill Last Dose    Azithromycin (ZITHROMAX PO) Take  by mouth Daily. For stomach, unsure of dose   11/17/2021 at Unknown time    buPROPion (WELLBUTRIN) 75 MG tablet Take 75 mg by mouth Daily.   11/17/2021 at 2300    carboxymethylcellulose sod, PF, (Refresh Celluvisc) 1 % gel eye gel Administer 1 drop to both eyes Every 2 (Two) Hours.   11/18/2021 at Unknown time    colestipol (COLESTID) 1 g tablet Take 2 tablets by mouth At Night As Needed (diarrhea or loose bms.). (Patient taking differently: Take 1-2 g by mouth Every Night.) 180 tablet 3 11/17/2021 at 2300    DULoxetine (CYMBALTA) 60 MG capsule Take 60 mg by mouth Every Evening.   11/17/2021 at 2300    famotidine (PEPCID) 40 MG tablet Take 1 tablet by mouth 2 (Two) Times a Day. With lunch and at bedtime (Patient taking differently: Take 40 mg by mouth 2 (Two) Times a Day.) 180 tablet 3 11/17/2021 at Unknown time    hydrocortisone (ANUSOL-HC) 2.5 % rectal cream Insert  into the rectum 4 (Four) Times a Day As Needed for Hemorrhoids (rectal discomfort). (Patient taking differently: Insert 1 application into the rectum 4 (Four) Times a Day As Needed for Hemorrhoids (rectal discomfort).) 30 g 5 Past Week at Unknown time    Hydrocortisone, Perianal, (ANUSOL-HC) 2.5 % rectal cream Insert  into the rectum 2 (Two) Times a Day. (Patient taking differently: Insert 1 application into the rectum 2 (Two) Times a Day.) 3 each 3 Past Week at Unknown time    hydrOXYzine (ATARAX) 25 MG tablet Take 25 mg by mouth 2 (Two) Times a Day.   11/17/2021 at 2300    omeprazole (priLOSEC) 40 MG capsule Take 1 capsule by mouth 2 (two) times a day. 180  capsule 3 11/17/2021 at Unknown time    pilocarpine (SALAGEN) 5 MG tablet Take 5 mg by mouth 3 (Three) Times a Day As Needed (for tear/saliva production).  3 11/17/2021 at Unknown time    polyethylene glycol (MiraLax) 17 g packet Take 17 g by mouth Every Night.   Past Week at Unknown time    predniSONE (DELTASONE) 5 MG tablet Take 5 mg by mouth Daily.   11/17/2021 at Unknown time    traZODone (DESYREL) 150 MG tablet Take 150 mg by mouth every night at bedtime.   11/17/2021 at 2300    azaTHIOprine (IMURAN) 50 MG tablet Take 100 mg by mouth Daily.   More than a month at Unknown time    Proctofoam HC 1-1 % rectal foam Insert 1 application into the rectum As Needed for Hemorrhoids.   More than a month at Unknown time    witch hazel-glycerin (TUCKS) pad Insert 1 pad into the rectum As Needed for Hemorrhoids.
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
## 2021-11-18 NOTE — ANESTHESIA POSTPROCEDURE EVALUATION
Patient: Aide Henry    Procedure Summary     Date: 11/18/21 Room / Location: [facility] OR 4 /  [facility] OR    Anesthesia Start: 0905 Anesthesia Stop: 0937    Procedure: DILATATION AND CURETTAGE HYSTEROSCOPY (N/A Uterus) Diagnosis:       Menometrorrhagia      (Menometrorrhagia [N92.1])    Surgeons: Rancho Mayberry MD Provider: Paolo Whipple CRNA    Anesthesia Type: MAC ASA Status: 2          Anesthesia Type: MAC    Vitals  Vitals Value Taken Time   BP     Temp 98.7 °F (37.1 °C) 11/18/21 0936   Pulse     Resp     SpO2             Post Anesthesia Care and Evaluation    Patient location during evaluation: PHASE II  Patient participation: complete - patient participated  Level of consciousness: awake and alert  Pain score: 0  Pain management: adequate  Airway patency: patent  Anesthetic complications: No anesthetic complications  PONV Status: none  Cardiovascular status: acceptable  Respiratory status: acceptable  Hydration status: acceptable

## 2021-11-19 LAB
LAB AP CASE REPORT: NORMAL
PATH REPORT.FINAL DX SPEC: NORMAL
PATH REPORT.GROSS SPEC: NORMAL

## 2021-12-07 ENCOUNTER — OFFICE VISIT (OUTPATIENT)
Dept: OBSTETRICS AND GYNECOLOGY | Facility: CLINIC | Age: 41
End: 2021-12-07

## 2021-12-07 VITALS
HEIGHT: 65 IN | DIASTOLIC BLOOD PRESSURE: 74 MMHG | BODY MASS INDEX: 34.59 KG/M2 | SYSTOLIC BLOOD PRESSURE: 126 MMHG | WEIGHT: 207.6 LBS

## 2021-12-07 DIAGNOSIS — Z01.419 WELL WOMAN EXAM: ICD-10-CM

## 2021-12-07 DIAGNOSIS — N94.6 DYSMENORRHEA: ICD-10-CM

## 2021-12-07 DIAGNOSIS — Z11.51 ENCOUNTER FOR SCREENING FOR HUMAN PAPILLOMAVIRUS (HPV): ICD-10-CM

## 2021-12-07 DIAGNOSIS — N92.1 MENORRHAGIA WITH IRREGULAR CYCLE: ICD-10-CM

## 2021-12-07 DIAGNOSIS — Z01.419 PAP SMEAR, LOW-RISK: Primary | ICD-10-CM

## 2021-12-07 DIAGNOSIS — Z01.419 ROUTINE GYNECOLOGICAL EXAMINATION: ICD-10-CM

## 2021-12-07 LAB
B-HCG UR QL: NEGATIVE
BILIRUB BLD-MCNC: NEGATIVE MG/DL
CLARITY, POC: CLEAR
COLOR UR: YELLOW
EXPIRATION DATE: NORMAL
GLUCOSE UR STRIP-MCNC: NEGATIVE MG/DL
INTERNAL NEGATIVE CONTROL: NEGATIVE
INTERNAL POSITIVE CONTROL: POSITIVE
KETONES UR QL: NEGATIVE
LEUKOCYTE EST, POC: NEGATIVE
Lab: 55
NITRITE UR-MCNC: NEGATIVE MG/ML
PH UR: 8 [PH] (ref 5–8)
PROT UR STRIP-MCNC: NEGATIVE MG/DL
RBC # UR STRIP: ABNORMAL /UL
SP GR UR: 1 (ref 1–1.03)
UROBILINOGEN UR QL: NORMAL

## 2021-12-07 PROCEDURE — 3008F BODY MASS INDEX DOCD: CPT | Performed by: OBSTETRICS & GYNECOLOGY

## 2021-12-07 PROCEDURE — 99395 PREV VISIT EST AGE 18-39: CPT | Performed by: OBSTETRICS & GYNECOLOGY

## 2021-12-07 PROCEDURE — 99214 OFFICE O/P EST MOD 30 MIN: CPT | Performed by: OBSTETRICS & GYNECOLOGY

## 2021-12-07 PROCEDURE — 81025 URINE PREGNANCY TEST: CPT | Performed by: OBSTETRICS & GYNECOLOGY

## 2021-12-07 PROCEDURE — 2014F MENTAL STATUS ASSESS: CPT | Performed by: OBSTETRICS & GYNECOLOGY

## 2021-12-07 RX ORDER — AMLODIPINE BESYLATE 2.5 MG/1
5 TABLET ORAL DAILY
COMMUNITY
Start: 2021-12-03 | End: 2022-08-05

## 2021-12-07 RX ORDER — AZITHROMYCIN 500 MG/1
500 TABLET, FILM COATED ORAL DAILY
COMMUNITY
Start: 2021-11-15 | End: 2022-02-14

## 2021-12-07 RX ORDER — TRAZODONE HYDROCHLORIDE 100 MG/1
TABLET ORAL
COMMUNITY
Start: 2021-12-03 | End: 2021-12-14

## 2021-12-07 RX ORDER — BUSPIRONE HYDROCHLORIDE 15 MG/1
15 TABLET ORAL 2 TIMES DAILY
COMMUNITY
Start: 2021-11-05

## 2021-12-07 RX ORDER — ACETAMINOPHEN AND CODEINE PHOSPHATE 120; 12 MG/5ML; MG/5ML
1 SOLUTION ORAL DAILY
Qty: 84 TABLET | Refills: 3 | Status: SHIPPED | OUTPATIENT
Start: 2021-12-07 | End: 2022-04-01

## 2021-12-07 RX ORDER — PHENAZOPYRIDINE HYDROCHLORIDE 200 MG/1
TABLET, FILM COATED ORAL
COMMUNITY
Start: 2021-11-16 | End: 2022-04-01

## 2021-12-07 NOTE — PROGRESS NOTES
GYN Annual Exam     CC- Here for annual exam.     Pt new to practice? No  Pt new to me? No     Aide Henry is a 41 y.o.  female who presents for annual well woman exam. Patient's last menstrual period was 2021.    Problems in addition to need for annual: pt still bleeding after D&C.  Clots, pain, irregular.  Pt would prefer an ablation at this point.  I explained it was not contraceptive, so I will erx micronor for contraception, to start now.     HPI: Menorrhagia  This is a recurrent problem. The problem occurs intermittently. The problem has been rapidly worsening. Associated symptoms include abdominal pain. The symptoms are aggravated by exertion. Treatments tried: surgery. The treatment provided no relief.       PMHX:  Patient Active Problem List   Diagnosis   • Arthralgia of multiple joints   • Chronic back pain   • Chronic constipation   • Disorder of connective tissue ([facility])   • Depression with anxiety   • Fibromyalgia   • Gastroesophageal reflux disease without esophagitis   • Irritable bowel syndrome   • Muscle pain   • Palpitations   • Seasonal allergic rhinitis   • Sympathetic uveitis   • Tenderness of temporomandibular joint   • Bilateral carpal tunnel syndrome   • Eczema   • Shoulder pain, left   • Substance abuse ([facility])   • Lateral epicondylitis   • Drug addiction syndrome ([facility])   • Gastroenteritis   • Alcohol dependence in remission ([facility])   • History of placement of ear tubes   • Status post arthroscopy of shoulder   • Subacromial impingement of left shoulder   • Biceps tendinitis of left upper extremity   • Complete tear of left rotator cuff   • Smoker   • Sjogren's syndrome with keratoconjunctivitis sicca ([facility])   • Left ovarian cyst   • Pelvic pain   • Nondisplaced fracture of coronoid process of left ulna, initial encounter for closed fracture   • Left breast lump   • SARMAD (stress urinary incontinence, female)   • Rectocele   • Irritable bowel syndrome with both constipation and  diarrhea   • Ashton's esophagus without dysplasia   • IC (interstitial cystitis)   • Overactive bladder   • Gastroparesis   • Chondromalacia of knee   • Lupus (HCC)   • Menometrorrhagia   • Menorrhagia with irregular cycle   • Dysmenorrhea   ; otherwise none    OB History        4    Para   4    Term   4            AB        Living           SAB        IAB        Ectopic        Molar        Multiple        Live Births                      Past Medical History:   Diagnosis Date   • Anxiety    • Bacterial vaginosis    • Ashton esophagus    • Bulging lumbar disc     lower back per patient   • Depression with anxiety    • Fibromyalgia    • Fracture, finger    • Gastroparesis    • GERD (gastroesophageal reflux disease)    • Hemorrhoids    • History of anemia    • Hypertension    • IBS (irritable bowel syndrome)    • Lateral epicondylitis 2013    RHUEMATOLOGIST   • Lupus (HCC)    • MRSA (methicillin resistant staph aureus) culture positive  ESTIMATE    GROIN AREA    • Overactive bladder    • Sjogren's syndrome (HCC)    • Urinary tract infection        Past Surgical History:   Procedure Laterality Date   • ADENOIDECTOMY     •  SECTION     • COLONOSCOPY     • D & C HYSTEROSCOPY N/A 2021    Procedure: DILATATION AND CURETTAGE HYSTEROSCOPY;  Surgeon: Rancho Mayberry MD;  Location: [facility] OR;  Service: Obstetrics/Gynecology;  Laterality: N/A;   • ENDOSCOPY N/A 5/10/2019    Procedure: ESOPHAGOGASTRODUODENOSCOPY with biopsies;  Surgeon: Shanon Vazquez MD;  Location: [facility] OR;  Service: Gastroenterology   • ENDOSCOPY N/A 2021    Procedure: ESOPHAGOGASTRODUODENOSCOPY with biopsies;  Surgeon: Romain Rice MD;  Location: [facility] OR;  Service: Gastroenterology;  Laterality: N/A;  barretts  gastritis   • FINGER GANGLION CYST EXCISION Right     middle finger   • MOUTH SURGERY     • OTHER SURGICAL HISTORY      reversal of tubal   • RHINOPLASTY     • SHOULDER  ARTHROSCOPY Left 9/10/2018    Procedure: SHOULDER ARTHROSCOPY, rotator cuff repair; extensive debridement, open biceps tenodesis;  Surgeon: Joo Rivers MD;  Location: [facility];  Service: Orthopedics   • TONSILLECTOMY     • TUBAL ABDOMINAL LIGATION     • TYMPANOSTOMY TUBE PLACEMENT     • WISDOM TOOTH EXTRACTION Bilateral          Current Outpatient Medications:   •  amLODIPine (NORVASC) 2.5 MG tablet, , Disp: , Rfl:   •  azaTHIOprine (IMURAN) 50 MG tablet, Take 100 mg by mouth Daily., Disp: , Rfl:   •  Azithromycin (ZITHROMAX PO), Take  by mouth Daily. For stomach, unsure of dose, Disp: , Rfl:   •  azithromycin (ZITHROMAX) 500 MG tablet, TAKE 1 TABLET BY MOUTH DAILY WITH FIRST MEAL OF THE DAY, Disp: , Rfl:   •  buPROPion (WELLBUTRIN) 75 MG tablet, Take 75 mg by mouth Daily., Disp: , Rfl:   •  busPIRone (BUSPAR) 15 MG tablet, Take 15 mg by mouth 2 (Two) Times a Day., Disp: , Rfl:   •  carboxymethylcellulose sod, PF, (Refresh Celluvisc) 1 % gel eye gel, Administer 1 drop to both eyes Every 2 (Two) Hours., Disp: , Rfl:   •  colestipol (COLESTID) 1 g tablet, Take 2 tablets by mouth At Night As Needed (diarrhea or loose bms.). (Patient taking differently: Take 1-2 g by mouth Every Night.), Disp: 180 tablet, Rfl: 3  •  DULoxetine (CYMBALTA) 60 MG capsule, Take 60 mg by mouth Every Evening., Disp: , Rfl:   •  famotidine (PEPCID) 40 MG tablet, Take 1 tablet by mouth 2 (Two) Times a Day. With lunch and at bedtime (Patient taking differently: Take 40 mg by mouth 2 (Two) Times a Day.), Disp: 180 tablet, Rfl: 3  •  hydrocortisone (ANUSOL-HC) 2.5 % rectal cream, Insert  into the rectum 4 (Four) Times a Day As Needed for Hemorrhoids (rectal discomfort). (Patient taking differently: Insert 1 application into the rectum 4 (Four) Times a Day As Needed for Hemorrhoids (rectal discomfort).), Disp: 30 g, Rfl: 5  •  Hydrocortisone, Perianal, (ANUSOL-HC) 2.5 % rectal cream, Insert  into the rectum 2 (Two) Times a Day. (Patient  "taking differently: Insert 1 application into the rectum 2 (Two) Times a Day.), Disp: 3 each, Rfl: 3  •  hydrOXYzine (ATARAX) 25 MG tablet, Take 25 mg by mouth 2 (Two) Times a Day., Disp: , Rfl:   •  norethindrone (MICRONOR) 0.35 MG tablet, Take 1 tablet by mouth Daily., Disp: 84 tablet, Rfl: 3  •  omeprazole (priLOSEC) 40 MG capsule, Take 1 capsule by mouth 2 (two) times a day., Disp: 180 capsule, Rfl: 3  •  phenazopyridine (PYRIDIUM) 200 MG tablet, TAKE 1 TABLET BY MOUTH THREE TIMES DAILY FOR UP TO 2 DAYS AS NEEDED FOR BLADDER SPASMS, Disp: , Rfl:   •  pilocarpine (SALAGEN) 5 MG tablet, Take 5 mg by mouth 3 (Three) Times a Day As Needed (for tear/saliva production)., Disp: , Rfl: 3  •  polyethylene glycol (MiraLax) 17 g packet, Take 17 g by mouth Every Night., Disp: , Rfl:   •  predniSONE (DELTASONE) 5 MG tablet, Take 5 mg by mouth Daily., Disp: , Rfl:   •  Proctofoam HC 1-1 % rectal foam, Insert 1 application into the rectum As Needed for Hemorrhoids., Disp: , Rfl:   •  traZODone (DESYREL) 100 MG tablet, , Disp: , Rfl:   •  traZODone (DESYREL) 150 MG tablet, Take 150 mg by mouth every night at bedtime., Disp: , Rfl:   •  witch hazel-glycerin (TUCKS) pad, Insert 1 pad into the rectum As Needed for Hemorrhoids., Disp: , Rfl:     Allergies   Allergen Reactions   • Fluconazole Hives       Social History     Tobacco Use   • Smoking status: Former Smoker     Packs/day: 0.25     Years: 20.00     Pack years: 5.00     Types: Cigarettes     Quit date: 2020     Years since quittin.9   • Smokeless tobacco: Never Used   Vaping Use   • Vaping Use: Former   Substance Use Topics   • Alcohol use: Not Currently     Comment: Alcoholic, Last drink 2018   • Drug use: Not Currently     Types: Cocaine(coke), Hydrocodone     Comment: \"recovering addict\", last reports use 2017       Aide Henry            Family History   Problem Relation Age of Onset   • Lung cancer Father    • Heart disease Paternal Grandmother    • " "Diabetes Maternal Aunt    • Breast cancer Maternal Aunt    • Diabetes Maternal Grandmother    • COPD Mother    • Colon cancer Neg Hx    • Colon polyps Neg Hx    • Malig Hyperthermia Neg Hx        Review of Systems   Constitutional: Negative.    HENT: Negative.    Eyes: Negative.    Respiratory: Negative.    Cardiovascular: Negative.    Gastrointestinal: Positive for abdominal pain.   Endocrine: Negative.    Genitourinary: Positive for menorrhagia, menstrual problem and pelvic pain.   Musculoskeletal: Negative.    Skin: Negative.    Allergic/Immunologic: Negative.    Neurological: Negative.    Hematological: Negative.    Psychiatric/Behavioral: Negative.        Patient reports that she is not currently experiencing any symptoms of urinary incontinence.      noTESTED FOR CHLAMYDIA?    EXAM:  /74   Ht 165.1 cm (65\")   Wt 94.2 kg (207 lb 9.6 oz)   LMP 12/01/2021   Breastfeeding No   BMI 34.55 kg/m²     Labs:   Lab Results (last 24 hours)     Procedure Component Value Units Date/Time    POC Pregnancy, Urine [[id number]]  (Normal) Collected: 12/07/21 1033    Specimen: Urine Updated: 12/07/21 1033     HCG, Urine, QL Negative     Lot Number 55     Internal Positive Control Positive     Internal Negative Control Negative     Expiration Date 5/23    POC Urinalysis Dipstick [[id number]]  (Abnormal) Collected: 12/07/21 1032    Specimen: Urine Updated: 12/07/21 1033     Color Yellow     Clarity, UA Clear     Glucose, UA Negative mg/dL      Bilirubin Negative     Ketones, UA Negative     Specific Gravity  1.005     Blood, UA Trace     pH, Urine 8.0     Protein, POC Negative mg/dL      Urobilinogen, UA Normal     Leukocytes Negative     Nitrite, UA Negative          Physical Exam  Vitals and nursing note reviewed. Exam conducted with a chaperone present.   Constitutional:       General: She is not in acute distress.     Appearance: She is well-developed. She is not diaphoretic.   HENT:      Head: Normocephalic and " atraumatic.      Nose: Nose normal.   Eyes:      Extraocular Movements: Extraocular movements intact.   Cardiovascular:      Rate and Rhythm: Normal rate.   Pulmonary:      Effort: Pulmonary effort is normal.   Chest:   Breasts: Breasts are symmetrical.      Right: Normal. No mass, nipple discharge, skin change, tenderness or axillary adenopathy.      Left: Normal. No mass, nipple discharge, skin change, tenderness or axillary adenopathy.       Abdominal:      General: There is no distension.      Palpations: Abdomen is soft. There is no mass.      Tenderness: There is no abdominal tenderness. There is no guarding.   Genitourinary:     General: Normal vulva.      Pubic Area: No rash.       Vagina: Normal. No vaginal discharge.      Cervix: Normal.      Uterus: Normal.       Adnexa: Right adnexa normal and left adnexa normal.   Musculoskeletal:         General: No tenderness or deformity. Normal range of motion.      Cervical back: Normal range of motion.   Lymphadenopathy:      Upper Body:      Right upper body: No axillary adenopathy.      Left upper body: No axillary adenopathy.   Skin:     General: Skin is warm and dry.      Coloration: Skin is not pale.      Findings: No erythema or rash.   Neurological:      Mental Status: She is alert and oriented to person, place, and time.   Psychiatric:         Behavior: Behavior normal.         Thought Content: Thought content normal.         Judgment: Judgment normal.            As part of wellness and prevention, the following topics were discussed with the patient: healthy weight, substance abuse/misuse, mental health, encouraging self breast exam, and other counseling and guidance done:  Nutrition, physical activity, healthy weight, injury prevention, misuse of tobacco, alcohol and drugs, sexual behavior and STDs, contraception, dental health, mental health, immunizations breast cancer screening and exams.    I saw the patient with a face mask, gloves and eye  protection  The patient herself was masked.  Social distancing was observed as appropriate. All COVID precautions observed.  Pt encouraged to receive COVID vaccine if she hadn't already done this.     Assessment     1) GYN annual well woman exam.   2) PAP done today? Yes  3) problems addressed: yes  4) menorrhagia and dysmenorrhea refractory to medical and surgical therapy.  5) need for contraception.              Plan       Follow up prn or one year.    Diagnoses and all orders for this visit:    1. Pap smear, low-risk (Primary)  -     IgP, Aptima HPV    2. Routine gynecological examination  -     POC Urinalysis Dipstick  -     POC Pregnancy, Urine    3. Encounter for screening for human papillomavirus (HPV)  -     IgP, Aptima HPV    4. Menorrhagia with irregular cycle  -     Case Request; Standing  -     COVID PRE-OP / PRE-PROCEDURE SCREENING ORDER (NO ISOLATION) - Swab, Nasopharynx; Future  -     CBC and Differential; Future  -     Case Request    5. Well woman exam    6. Dysmenorrhea    Other orders  -     Follow Anesthesia Guidelines / Standing Orders; Future  -     Chlorhexidine Skin Prep; Future  -     norethindrone (MICRONOR) 0.35 MG tablet; Take 1 tablet by mouth Daily.  Dispense: 84 tablet; Refill: 3      DX HYSTEROSCOPY, DILATATION AND CURETTAGE, NOVASURE ENDOMETRIAL ABLATION    RISKS, ALTERNATIVES, COMPLICATIONS OF THE PROCEDURE INCLUDING BUT NOT LIMITED TO:    INTRAOPERATIVE RISKS: INJURY TO INTERNAL AND ADJACENT ORGANS AND STRUCTURES (BOWEL, BLADDER, URETER,BLOOD VESSELS) OR HEMORRHAGE REQUIRING FURTHER SURGERY (LAPAROTOMY),  POSSIBLE NON-DIAGNOSTIC FINDINGS, DISCOVERY OF POSSIBLE MALIGNANCY, INFECTION, AND DEATH;   POSTOP COMPLICATIONS: BLEEDING, INFECTION (REQUIRING POSSIBLE REOPERATION), FAILURE OF GOAL OF SURGERY AND RECURRENCE OF ORIGINAL SYMPTOMS, PNEUMONIA, PULMONARY EMBOLISM, AND DEATH;  WERE EXPLAINED TO THE PT WHO VERBALIZED HER UNDERSTANDING.        RTO Return in about 4 weeks (around  1/4/2022) for postop check.      Rancho Mayberry MD  [unfilled]  11:08 EST

## 2021-12-09 LAB
CYTOLOGIST CVX/VAG CYTO: NORMAL
CYTOLOGY CVX/VAG DOC CYTO: NORMAL
CYTOLOGY CVX/VAG DOC THIN PREP: NORMAL
DX ICD CODE: NORMAL
HIV 1 & 2 AB SER-IMP: NORMAL
HPV I/H RISK 4 DNA CVX QL PROBE+SIG AMP: NEGATIVE
OTHER STN SPEC: NORMAL
STAT OF ADQ CVX/VAG CYTO-IMP: NORMAL

## 2021-12-13 ENCOUNTER — ANESTHESIA EVENT (OUTPATIENT)
Dept: PERIOP | Facility: HOSPITAL | Age: 41
End: 2021-12-13

## 2021-12-14 NOTE — PRE-PROCEDURE INSTRUCTIONS
Pt history updated by phone. Instructed clears until 8:30 am dos and shower w/antibacterial soap, voiced understanding. COVID test 12/15

## 2021-12-15 ENCOUNTER — LAB (OUTPATIENT)
Dept: LAB | Facility: HOSPITAL | Age: 41
End: 2021-12-15

## 2021-12-15 DIAGNOSIS — N92.1 MENORRHAGIA WITH IRREGULAR CYCLE: ICD-10-CM

## 2021-12-15 LAB — SARS-COV-2 RNA PNL SPEC NAA+PROBE: NOT DETECTED

## 2021-12-15 PROCEDURE — C9803 HOPD COVID-19 SPEC COLLECT: HCPCS

## 2021-12-15 PROCEDURE — 87635 SARS-COV-2 COVID-19 AMP PRB: CPT | Performed by: OBSTETRICS & GYNECOLOGY

## 2021-12-16 PROBLEM — Z98.51 HISTORY OF BILATERAL TUBAL LIGATION: Status: ACTIVE | Noted: 2021-12-16

## 2021-12-16 PROBLEM — N83.202 LEFT OVARIAN CYST: Status: RESOLVED | Noted: 2019-06-25 | Resolved: 2021-12-16

## 2021-12-16 PROBLEM — N92.1 MENORRHAGIA WITH IRREGULAR CYCLE: Status: RESOLVED | Noted: 2021-12-07 | Resolved: 2021-12-16

## 2021-12-17 ENCOUNTER — ANESTHESIA (OUTPATIENT)
Dept: PERIOP | Facility: HOSPITAL | Age: 41
End: 2021-12-17

## 2021-12-17 ENCOUNTER — TELEPHONE (OUTPATIENT)
Dept: GASTROENTEROLOGY | Facility: CLINIC | Age: 41
End: 2021-12-17

## 2021-12-17 ENCOUNTER — HOSPITAL ENCOUNTER (OUTPATIENT)
Facility: HOSPITAL | Age: 41
Setting detail: HOSPITAL OUTPATIENT SURGERY
Discharge: HOME | End: 2021-12-17
Attending: OBSTETRICS & GYNECOLOGY | Admitting: OBSTETRICS & GYNECOLOGY

## 2021-12-17 VITALS
HEIGHT: 65 IN | OXYGEN SATURATION: 94 % | TEMPERATURE: 98.8 F | WEIGHT: 207 LBS | HEART RATE: 95 BPM | DIASTOLIC BLOOD PRESSURE: 94 MMHG | BODY MASS INDEX: 34.49 KG/M2 | RESPIRATION RATE: 16 BRPM | SYSTOLIC BLOOD PRESSURE: 134 MMHG

## 2021-12-17 DIAGNOSIS — Z98.890 S/P ENDOMETRIAL ABLATION: Primary | ICD-10-CM

## 2021-12-17 DIAGNOSIS — N92.1 MENORRHAGIA WITH IRREGULAR CYCLE: ICD-10-CM

## 2021-12-17 LAB — HCG SERPL QL: NEGATIVE

## 2021-12-17 PROCEDURE — 58563 HYSTEROSCOPY ABLATION: CPT | Performed by: OBSTETRICS & GYNECOLOGY

## 2021-12-17 PROCEDURE — 25010000002 ONDANSETRON PER 1 MG: Performed by: NURSE ANESTHETIST, CERTIFIED REGISTERED

## 2021-12-17 PROCEDURE — 84703 CHORIONIC GONADOTROPIN ASSAY: CPT | Performed by: NURSE ANESTHETIST, CERTIFIED REGISTERED

## 2021-12-17 PROCEDURE — 25010000002 MIDAZOLAM PER 1MG: Performed by: NURSE ANESTHETIST, CERTIFIED REGISTERED

## 2021-12-17 PROCEDURE — 25010000002 KETOROLAC TROMETHAMINE PER 15 MG: Performed by: ANESTHESIOLOGY

## 2021-12-17 PROCEDURE — 25010000002 PROPOFOL 10 MG/ML EMULSION: Performed by: ANESTHESIOLOGY

## 2021-12-17 PROCEDURE — 25010000002 HYDROMORPHONE 1 MG/ML SOLUTION: Performed by: ANESTHESIOLOGY

## 2021-12-17 PROCEDURE — 25010000002 FENTANYL CITRATE (PF) 50 MCG/ML SOLUTION: Performed by: ANESTHESIOLOGY

## 2021-12-17 PROCEDURE — 88305 TISSUE EXAM BY PATHOLOGIST: CPT | Performed by: OBSTETRICS & GYNECOLOGY

## 2021-12-17 PROCEDURE — 25010000002 DEXAMETHASONE PER 1 MG: Performed by: NURSE ANESTHETIST, CERTIFIED REGISTERED

## 2021-12-17 RX ORDER — HYDROCODONE BITARTRATE AND ACETAMINOPHEN 5; 325 MG/1; MG/1
1 TABLET ORAL EVERY 6 HOURS PRN
Qty: 10 TABLET | Refills: 0 | Status: SHIPPED | OUTPATIENT
Start: 2021-12-17 | End: 2022-04-01

## 2021-12-17 RX ORDER — FAMOTIDINE 10 MG/ML
20 INJECTION, SOLUTION INTRAVENOUS
Status: DISCONTINUED | OUTPATIENT
Start: 2021-12-17 | End: 2021-12-17 | Stop reason: HOSPADM

## 2021-12-17 RX ORDER — ONDANSETRON 2 MG/ML
4 INJECTION INTRAMUSCULAR; INTRAVENOUS ONCE
Status: COMPLETED | OUTPATIENT
Start: 2021-12-17 | End: 2021-12-17

## 2021-12-17 RX ORDER — FENTANYL CITRATE 50 UG/ML
INJECTION, SOLUTION INTRAMUSCULAR; INTRAVENOUS AS NEEDED
Status: DISCONTINUED | OUTPATIENT
Start: 2021-12-17 | End: 2021-12-17 | Stop reason: SURG

## 2021-12-17 RX ORDER — SODIUM CHLORIDE 9 MG/ML
INJECTION, SOLUTION INTRAVENOUS AS NEEDED
Status: DISCONTINUED | OUTPATIENT
Start: 2021-12-17 | End: 2021-12-17 | Stop reason: HOSPADM

## 2021-12-17 RX ORDER — KETOROLAC TROMETHAMINE 30 MG/ML
INJECTION, SOLUTION INTRAMUSCULAR; INTRAVENOUS AS NEEDED
Status: DISCONTINUED | OUTPATIENT
Start: 2021-12-17 | End: 2021-12-17 | Stop reason: SURG

## 2021-12-17 RX ORDER — KETAMINE HYDROCHLORIDE 10 MG/ML
INJECTION INTRAMUSCULAR; INTRAVENOUS AS NEEDED
Status: DISCONTINUED | OUTPATIENT
Start: 2021-12-17 | End: 2021-12-17 | Stop reason: SURG

## 2021-12-17 RX ORDER — DEXAMETHASONE SODIUM PHOSPHATE 4 MG/ML
4 INJECTION, SOLUTION INTRA-ARTICULAR; INTRALESIONAL; INTRAMUSCULAR; INTRAVENOUS; SOFT TISSUE ONCE AS NEEDED
Status: COMPLETED | OUTPATIENT
Start: 2021-12-17 | End: 2021-12-17

## 2021-12-17 RX ORDER — MIDAZOLAM HYDROCHLORIDE 2 MG/2ML
1 INJECTION, SOLUTION INTRAMUSCULAR; INTRAVENOUS
Status: DISCONTINUED | OUTPATIENT
Start: 2021-12-17 | End: 2021-12-17 | Stop reason: HOSPADM

## 2021-12-17 RX ORDER — MAGNESIUM HYDROXIDE 1200 MG/15ML
LIQUID ORAL AS NEEDED
Status: DISCONTINUED | OUTPATIENT
Start: 2021-12-17 | End: 2021-12-17 | Stop reason: HOSPADM

## 2021-12-17 RX ORDER — PROPOFOL 10 MG/ML
VIAL (ML) INTRAVENOUS CONTINUOUS PRN
Status: DISCONTINUED | OUTPATIENT
Start: 2021-12-17 | End: 2021-12-17 | Stop reason: SURG

## 2021-12-17 RX ORDER — HYDROCODONE BITARTRATE AND ACETAMINOPHEN 5; 325 MG/1; MG/1
1 TABLET ORAL ONCE AS NEEDED
Status: COMPLETED | OUTPATIENT
Start: 2021-12-17 | End: 2021-12-17

## 2021-12-17 RX ORDER — ONDANSETRON 2 MG/ML
4 INJECTION INTRAMUSCULAR; INTRAVENOUS ONCE AS NEEDED
Status: DISCONTINUED | OUTPATIENT
Start: 2021-12-17 | End: 2021-12-17 | Stop reason: HOSPADM

## 2021-12-17 RX ORDER — SODIUM CHLORIDE, SODIUM LACTATE, POTASSIUM CHLORIDE, CALCIUM CHLORIDE 600; 310; 30; 20 MG/100ML; MG/100ML; MG/100ML; MG/100ML
100 INJECTION, SOLUTION INTRAVENOUS CONTINUOUS
Status: DISCONTINUED | OUTPATIENT
Start: 2021-12-17 | End: 2021-12-17 | Stop reason: HOSPADM

## 2021-12-17 RX ORDER — SODIUM CHLORIDE 9 MG/ML
40 INJECTION, SOLUTION INTRAVENOUS AS NEEDED
Status: DISCONTINUED | OUTPATIENT
Start: 2021-12-17 | End: 2021-12-17 | Stop reason: HOSPADM

## 2021-12-17 RX ORDER — LIDOCAINE HYDROCHLORIDE 10 MG/ML
0.5 INJECTION, SOLUTION EPIDURAL; INFILTRATION; INTRACAUDAL; PERINEURAL ONCE AS NEEDED
Status: DISCONTINUED | OUTPATIENT
Start: 2021-12-17 | End: 2021-12-17 | Stop reason: HOSPADM

## 2021-12-17 RX ORDER — SODIUM CHLORIDE 0.9 % (FLUSH) 0.9 %
10 SYRINGE (ML) INJECTION EVERY 12 HOURS SCHEDULED
Status: DISCONTINUED | OUTPATIENT
Start: 2021-12-17 | End: 2021-12-17 | Stop reason: HOSPADM

## 2021-12-17 RX ORDER — IBUPROFEN 800 MG/1
800 TABLET ORAL EVERY 8 HOURS PRN
Qty: 30 TABLET | Refills: 0 | Status: SHIPPED | OUTPATIENT
Start: 2021-12-17 | End: 2022-04-01

## 2021-12-17 RX ORDER — SODIUM CHLORIDE, SODIUM LACTATE, POTASSIUM CHLORIDE, CALCIUM CHLORIDE 600; 310; 30; 20 MG/100ML; MG/100ML; MG/100ML; MG/100ML
9 INJECTION, SOLUTION INTRAVENOUS CONTINUOUS PRN
Status: DISCONTINUED | OUTPATIENT
Start: 2021-12-17 | End: 2021-12-17 | Stop reason: HOSPADM

## 2021-12-17 RX ORDER — LIDOCAINE HYDROCHLORIDE 20 MG/ML
INJECTION, SOLUTION INFILTRATION; PERINEURAL AS NEEDED
Status: DISCONTINUED | OUTPATIENT
Start: 2021-12-17 | End: 2021-12-17 | Stop reason: SURG

## 2021-12-17 RX ORDER — SODIUM CHLORIDE 0.9 % (FLUSH) 0.9 %
10 SYRINGE (ML) INJECTION AS NEEDED
Status: DISCONTINUED | OUTPATIENT
Start: 2021-12-17 | End: 2021-12-17 | Stop reason: HOSPADM

## 2021-12-17 RX ORDER — ACETAMINOPHEN 500 MG
1000 TABLET ORAL ONCE
Status: COMPLETED | OUTPATIENT
Start: 2021-12-17 | End: 2021-12-17

## 2021-12-17 RX ADMIN — DEXAMETHASONE SODIUM PHOSPHATE 4 MG: 4 INJECTION, SOLUTION INTRAMUSCULAR; INTRAVENOUS at 10:18

## 2021-12-17 RX ADMIN — SODIUM CHLORIDE, POTASSIUM CHLORIDE, SODIUM LACTATE AND CALCIUM CHLORIDE 9 ML/HR: 600; 310; 30; 20 INJECTION, SOLUTION INTRAVENOUS at 10:10

## 2021-12-17 RX ADMIN — FENTANYL CITRATE 25 MCG: 50 INJECTION INTRAMUSCULAR; INTRAVENOUS at 10:45

## 2021-12-17 RX ADMIN — HYDROCODONE BITARTRATE AND ACETAMINOPHEN 1 TABLET: 5; 325 TABLET ORAL at 11:19

## 2021-12-17 RX ADMIN — FAMOTIDINE 20 MG: 10 INJECTION, SOLUTION INTRAVENOUS at 10:18

## 2021-12-17 RX ADMIN — HYDROMORPHONE HYDROCHLORIDE 1 MG: 1 INJECTION, SOLUTION INTRAMUSCULAR; INTRAVENOUS; SUBCUTANEOUS at 12:37

## 2021-12-17 RX ADMIN — PROPOFOL 100 MCG/KG/MIN: 10 INJECTION, EMULSION INTRAVENOUS at 10:34

## 2021-12-17 RX ADMIN — ONDANSETRON 4 MG: 2 INJECTION INTRAMUSCULAR; INTRAVENOUS at 10:18

## 2021-12-17 RX ADMIN — FENTANYL CITRATE 25 MCG: 50 INJECTION INTRAMUSCULAR; INTRAVENOUS at 10:53

## 2021-12-17 RX ADMIN — ACETAMINOPHEN 1000 MG: 500 TABLET, FILM COATED ORAL at 10:17

## 2021-12-17 RX ADMIN — KETAMINE HYDROCHLORIDE 20 MG: 10 INJECTION, SOLUTION INTRAMUSCULAR; INTRAVENOUS at 10:37

## 2021-12-17 RX ADMIN — LIDOCAINE HYDROCHLORIDE 80 MG: 20 INJECTION, SOLUTION INFILTRATION; PERINEURAL at 10:34

## 2021-12-17 RX ADMIN — Medication 30 MCG/KG/MIN: at 10:34

## 2021-12-17 RX ADMIN — MIDAZOLAM HYDROCHLORIDE 1 MG: 1 INJECTION, SOLUTION INTRAMUSCULAR; INTRAVENOUS at 10:26

## 2021-12-17 RX ADMIN — FENTANYL CITRATE 25 MCG: 50 INJECTION INTRAMUSCULAR; INTRAVENOUS at 10:34

## 2021-12-17 RX ADMIN — KETOROLAC TROMETHAMINE 30 MG: 30 INJECTION, SOLUTION INTRAMUSCULAR at 10:31

## 2021-12-17 NOTE — ANESTHESIA POSTPROCEDURE EVALUATION
Patient: Aide Henry    Procedure Summary     Date: 12/17/21 Room / Location:  [facility] /  [facility]    Anesthesia Start: 1029 Anesthesia Stop: 1110    Procedure: DILATATION AND CURETTAGE HYSTEROSCOPY,  NOVASURE ENDOMETRIAL ABLATION (N/A Vagina) Diagnosis:       Menorrhagia with irregular cycle      (Menorrhagia with irregular cycle [N92.1])    Surgeons: Rancho Mayberry MD Provider: Hemalatha Stewart MD    Anesthesia Type: MAC ASA Status: 2          Anesthesia Type: MAC    Vitals  Vitals Value Taken Time   /94 12/17/21 1259   Temp     Pulse 95 12/17/21 1259   Resp 16 12/17/21 1259   SpO2 94 % 12/17/21 1259           Post Anesthesia Care and Evaluation    Patient location during evaluation: bedside  Patient participation: complete - patient participated  Level of consciousness: awake and alert  Pain score: 5  Pain management: adequate  Airway patency: patent  Anesthetic complications: No anesthetic complications  PONV Status: none  Cardiovascular status: acceptable  Respiratory status: acceptable  Hydration status: acceptable
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
## 2021-12-17 NOTE — OP NOTE
OPERATIVE REPORT    PROCEDURE:   DX HYSTEROSCOPY, DILATATION & CURETTAGE, NOVASURE ENDOMETRIAL ABLATION    PREOP DIAGNOSIS:  menometrorrhagia    POSTOP DIAGNOSIS:  same    SURGEON:   Jefe    ANESTHESIA:  MAC    EBL:   1cc    IVFS:  400cc    URINE OUTPUT:  25cc    COMPLICATIONS:  none    FINDINGS:  Completely normal endometrial cavity    SPECIMENS:  Endometrial curettings    ANTIBIOTICS:  None        DESCRIPTION OF THE PROCEDURE:     After informed consent was obtained, pt was taken to the OR and placed on the table in a DORSOLITHOTOMY position.  LMA was induced without difficulty.  Time out was done, no antibiotics were given. Pt was prepped and draped in the usual fashion.    An open graves speculum was placed in the vagina and the anterior lip of the cervix was grasped with a single toothed tenaculum.  The cervix appeared normal .    The cervix was serially dilated with dowd dilators with no difficulty. The uterus was sounded to 9 cms.  A serrated curette was used to curette out the entire endometrial cavity.  The dowd dilators were used to serially dilate the endocervical canal sufficiently to admit the diagnostic hysteroscope.    Using sterile saline, the hysteroscope was used to visualize the endometrial cavity in its entirety.  Both tubal ostia were seen, the cavity was smooth, and no masses were visualized.  The hysteroscope was removed.    The Novasure endometrial ablation apparatus was then placed in the endometrial cavity per protocol and using the settings: 5.0cms x 3.4cms x 94W x 74s, an ablation was performed without problems.  The apparatus was removed and the dx hysteroscopy was reinserted and using sterile saline the endometrial cavity was revisualized in its entirety.  There appeared to be a good carlos alberto and no evidence of injury or complication.       All instruments were removed.  There was no evidence of cervical laceration or uterine perforation.    All sponge, instrument counts were correct  x 3 according to the OR personnel.  Pt went to RR in satisfactory condition.      Rancho Mayberry MD  11:05 EST  12/17/21

## 2021-12-17 NOTE — INTERVAL H&P NOTE
"H&P reviewed. The patient was examined and there are no changes to the H&P.    Temp 98.4 °F (36.9 °C) (Oral)   Ht 165.1 cm (65\")   Wt 93.9 kg (207 lb)   LMP 12/01/2021   BMI 34.45 kg/m²     Medications Prior to Admission   Medication Sig Dispense Refill Last Dose    amLODIPine (NORVASC) 2.5 MG tablet Take 2.5 mg by mouth Daily.       azithromycin (ZITHROMAX) 500 MG tablet Take 500 mg by mouth Daily.       busPIRone (BUSPAR) 15 MG tablet Take 15 mg by mouth 2 (Two) Times a Day.       carboxymethylcellulose sod, PF, (Refresh Celluvisc) 1 % gel eye gel Administer 1 drop to both eyes Every 2 (Two) Hours.       colestipol (COLESTID) 1 g tablet Take 2 tablets by mouth At Night As Needed (diarrhea or loose bms.). (Patient taking differently: Take 1-2 g by mouth Every Night.) 180 tablet 3     DULoxetine (CYMBALTA) 60 MG capsule Take 60 mg by mouth Every Evening.       famotidine (PEPCID) 40 MG tablet Take 1 tablet by mouth 2 (Two) Times a Day. With lunch and at bedtime (Patient taking differently: Take 40 mg by mouth 2 (Two) Times a Day.) 180 tablet 3     hydrocortisone (ANUSOL-HC) 2.5 % rectal cream Insert  into the rectum 4 (Four) Times a Day As Needed for Hemorrhoids (rectal discomfort). (Patient taking differently: Insert 1 application into the rectum 4 (Four) Times a Day As Needed for Hemorrhoids (rectal discomfort).) 30 g 5     Hydrocortisone, Perianal, (ANUSOL-HC) 2.5 % rectal cream Insert  into the rectum 2 (Two) Times a Day. (Patient taking differently: Insert 1 application into the rectum 2 (Two) Times a Day.) 3 each 3     hydrOXYzine (ATARAX) 25 MG tablet Take 25 mg by mouth 2 (Two) Times a Day.       norethindrone (MICRONOR) 0.35 MG tablet Take 1 tablet by mouth Daily. 84 tablet 3     omeprazole (priLOSEC) 40 MG capsule Take 1 capsule by mouth 2 (two) times a day. 180 capsule 3     phenazopyridine (PYRIDIUM) 200 MG tablet TAKE 1 TABLET BY MOUTH THREE TIMES DAILY FOR UP TO 2 DAYS AS NEEDED FOR BLADDER SPASMS  "      pilocarpine (SALAGEN) 5 MG tablet Take 5 mg by mouth 3 (Three) Times a Day As Needed (for tear/saliva production).  3     polyethylene glycol (MiraLax) 17 g packet Take 17 g by mouth Every Night.       predniSONE (DELTASONE) 5 MG tablet Take 5 mg by mouth Daily.       Proctofoam HC 1-1 % rectal foam Insert 1 application into the rectum As Needed for Hemorrhoids.       traZODone (DESYREL) 150 MG tablet Take 150 mg by mouth every night at bedtime.       witch hazel-glycerin (TUCKS) pad Insert 1 pad into the rectum As Needed for Hemorrhoids.       azaTHIOprine (IMURAN) 50 MG tablet Take 100 mg by mouth Daily.   More than a month at Unknown time    buPROPion (WELLBUTRIN) 75 MG tablet Take 75 mg by mouth Daily.

## 2021-12-17 NOTE — H&P
PREOPERATIVE HISTORY AND PHYSICAL      Patient Care Team:  Lou Willard APRN as PCP - General (Family Medicine)    Chief complaint: Menometrorrhagia    Pt is a 41 y.o.   Patient's last menstrual period was 2021.     HPI:Pt is a 41 y.o. here for an ablation for abnormal bleeding.  Pt has had her tubes tied.       PMHx:   Past Medical History:   Diagnosis Date   • Anxiety    • Bacterial vaginosis    • Ashton esophagus    • Bulging lumbar disc     lower back per patient   • Depression with anxiety    • Fibromyalgia    • Fracture, finger    • Gastroparesis    • GERD (gastroesophageal reflux disease)    • Hemorrhoids    • History of anemia    • Hypertension    • IBS (irritable bowel syndrome)    • Lateral epicondylitis 2013    RHUEMATOLOGIST   • Lupus ([facility])    • MRSA (methicillin resistant staph aureus) culture positive 2011 ESTIMATE    GROIN AREA    • Overactive bladder    • Sjogren's syndrome ([facility])    • Urinary tract infection        Current problem list:  Patient Active Problem List   Diagnosis   • Arthralgia of multiple joints   • Chronic back pain   • Chronic constipation   • Disorder of connective tissue ([facility])   • Depression with anxiety   • Fibromyalgia   • Gastroesophageal reflux disease without esophagitis   • Muscle pain   • Palpitations   • Seasonal allergic rhinitis   • Eczema   • Shoulder pain, left   • Substance abuse ([facility])   • Lateral epicondylitis   • Drug addiction syndrome ([facility])   • Gastroenteritis   • Alcohol dependence in remission ([facility])   • History of placement of ear tubes   • Status post arthroscopy of shoulder   • Subacromial impingement of left shoulder   • Biceps tendinitis of left upper extremity   • Complete tear of left rotator cuff   • Smoker   • Sjogren's syndrome with keratoconjunctivitis sicca ([facility])   • Pelvic pain   • Nondisplaced fracture of coronoid process of left ulna, initial encounter for closed fracture   • Left breast lump   • SARMAD (stress urinary  incontinence, female)   • Rectocele   • Irritable bowel syndrome with both constipation and diarrhea   • Ashton's esophagus without dysplasia   • IC (interstitial cystitis)   • Overactive bladder   • Gastroparesis   • Chondromalacia of knee   • Lupus ([facility])   • Menometrorrhagia   • Dysmenorrhea   • History of bilateral tubal ligation       PSHx:   Past Surgical History:   Procedure Laterality Date   • ADENOIDECTOMY     •  SECTION     • COLONOSCOPY     • D & C HYSTEROSCOPY N/A 2021    Procedure: DILATATION AND CURETTAGE HYSTEROSCOPY;  Surgeon: Rancho Mayberry MD;  Location: [facility] OR;  Service: Obstetrics/Gynecology;  Laterality: N/A;   • ENDOSCOPY N/A 5/10/2019    Procedure: ESOPHAGOGASTRODUODENOSCOPY with biopsies;  Surgeon: Shanon Vazquez MD;  Location: [facility] OR;  Service: Gastroenterology   • ENDOSCOPY N/A 2021    Procedure: ESOPHAGOGASTRODUODENOSCOPY with biopsies;  Surgeon: Romain Rice MD;  Location: [facility] OR;  Service: Gastroenterology;  Laterality: N/A;  barretts  gastritis   • FINGER GANGLION CYST EXCISION Right     middle finger   • MOUTH SURGERY     • OTHER SURGICAL HISTORY      reversal of tubal   • RHINOPLASTY     • SHOULDER ARTHROSCOPY Left 9/10/2018    Procedure: SHOULDER ARTHROSCOPY, rotator cuff repair; extensive debridement, open biceps tenodesis;  Surgeon: Joo Rivers MD;  Location: [facility];  Service: Orthopedics   • TONSILLECTOMY     • TUBAL ABDOMINAL LIGATION     • TYMPANOSTOMY TUBE PLACEMENT     • WISDOM TOOTH EXTRACTION Bilateral        Social Hx:   Social History     Socioeconomic History   • Marital status:    Tobacco Use   • Smoking status: Former Smoker     Packs/day: 0.25     Years: 20.00     Pack years: 5.00     Types: Cigarettes     Quit date:      Years since quittin.9   • Smokeless tobacco: Never Used   Vaping Use   • Vaping Use: Former   Substance and Sexual Activity   • Alcohol use: Not Currently     Comment:  "Alcoholic, Last drink 2018   • Drug use: Not Currently     Types: Cocaine(coke), Hydrocodone     Comment: \"recovering addict\", last reports use 2017   • Sexual activity: Defer       FHx:   Family History   Problem Relation Age of Onset   • Lung cancer Father    • Heart disease Paternal Grandmother    • Diabetes Maternal Aunt    • Breast cancer Maternal Aunt    • Diabetes Maternal Grandmother    • COPD Mother    • Colon cancer Neg Hx    • Colon polyps Neg Hx    • Malig Hyperthermia Neg Hx        Debilities/Disabilities Identified: None    Emotional Behavior: Appropriate    PGyn Hx:  otherwise noncontributory    POBHx:   OB History    Para Term  AB Living   4 4 4 0 0 4   SAB IAB Ectopic Molar Multiple Live Births   0 0 0 0 0 0      # Outcome Date GA Lbr Nguyễn/2nd Weight Sex Delivery Anes PTL Lv   4 Term      CS-LTranv      3 Term            2 Term            1 Term                Allergies: Fluconazole    Medications:   No medications prior to admission.                            No current facility-administered medications for this encounter.    Current Outpatient Medications:   •  amLODIPine (NORVASC) 2.5 MG tablet, Take 2.5 mg by mouth Daily., Disp: , Rfl:   •  azithromycin (ZITHROMAX) 500 MG tablet, Take 500 mg by mouth Daily., Disp: , Rfl:   •  busPIRone (BUSPAR) 15 MG tablet, Take 15 mg by mouth 2 (Two) Times a Day., Disp: , Rfl:   •  carboxymethylcellulose sod, PF, (Refresh Celluvisc) 1 % gel eye gel, Administer 1 drop to both eyes Every 2 (Two) Hours., Disp: , Rfl:   •  colestipol (COLESTID) 1 g tablet, Take 2 tablets by mouth At Night As Needed (diarrhea or loose bms.). (Patient taking differently: Take 1-2 g by mouth Every Night.), Disp: 180 tablet, Rfl: 3  •  DULoxetine (CYMBALTA) 60 MG capsule, Take 60 mg by mouth Every Evening., Disp: , Rfl:   •  famotidine (PEPCID) 40 MG tablet, Take 1 tablet by mouth 2 (Two) Times a Day. With lunch and at bedtime (Patient taking differently: Take " 40 mg by mouth 2 (Two) Times a Day.), Disp: 180 tablet, Rfl: 3  •  hydrocortisone (ANUSOL-HC) 2.5 % rectal cream, Insert  into the rectum 4 (Four) Times a Day As Needed for Hemorrhoids (rectal discomfort). (Patient taking differently: Insert 1 application into the rectum 4 (Four) Times a Day As Needed for Hemorrhoids (rectal discomfort).), Disp: 30 g, Rfl: 5  •  Hydrocortisone, Perianal, (ANUSOL-HC) 2.5 % rectal cream, Insert  into the rectum 2 (Two) Times a Day. (Patient taking differently: Insert 1 application into the rectum 2 (Two) Times a Day.), Disp: 3 each, Rfl: 3  •  hydrOXYzine (ATARAX) 25 MG tablet, Take 25 mg by mouth 2 (Two) Times a Day., Disp: , Rfl:   •  norethindrone (MICRONOR) 0.35 MG tablet, Take 1 tablet by mouth Daily., Disp: 84 tablet, Rfl: 3  •  omeprazole (priLOSEC) 40 MG capsule, Take 1 capsule by mouth 2 (two) times a day., Disp: 180 capsule, Rfl: 3  •  phenazopyridine (PYRIDIUM) 200 MG tablet, TAKE 1 TABLET BY MOUTH THREE TIMES DAILY FOR UP TO 2 DAYS AS NEEDED FOR BLADDER SPASMS, Disp: , Rfl:   •  pilocarpine (SALAGEN) 5 MG tablet, Take 5 mg by mouth 3 (Three) Times a Day As Needed (for tear/saliva production)., Disp: , Rfl: 3  •  polyethylene glycol (MiraLax) 17 g packet, Take 17 g by mouth Every Night., Disp: , Rfl:   •  predniSONE (DELTASONE) 5 MG tablet, Take 5 mg by mouth Daily., Disp: , Rfl:   •  Proctofoam HC 1-1 % rectal foam, Insert 1 application into the rectum As Needed for Hemorrhoids., Disp: , Rfl:   •  traZODone (DESYREL) 150 MG tablet, Take 150 mg by mouth every night at bedtime., Disp: , Rfl:   •  witch hazel-glycerin (TUCKS) pad, Insert 1 pad into the rectum As Needed for Hemorrhoids., Disp: , Rfl:   •  azaTHIOprine (IMURAN) 50 MG tablet, Take 100 mg by mouth Daily., Disp: , Rfl:   •  buPROPion (WELLBUTRIN) 75 MG tablet, Take 75 mg by mouth Daily., Disp: , Rfl:         Review of Systems   Constitutional: Negative.    HENT: Negative.    Eyes: Negative.    Respiratory:  "Negative.    Cardiovascular: Negative.    Gastrointestinal: Positive for abdominal pain, constipation and diarrhea.   Endocrine: Negative.    Genitourinary: Positive for menstrual problem and pelvic pain.   Musculoskeletal: Negative.    Skin: Negative.    Allergic/Immunologic: Negative.    Neurological: Negative.    Hematological: Negative.    Psychiatric/Behavioral: Negative.        Vital Signs  Ht 165.1 cm (65\")   Wt 93.9 kg (207 lb)   LMP 12/01/2021   BMI 34.45 kg/m²     Physical Exam  Vitals and nursing note reviewed.   Constitutional:       Appearance: She is well-developed.   HENT:      Head: Normocephalic and atraumatic.   Cardiovascular:      Rate and Rhythm: Normal rate.   Pulmonary:      Effort: Pulmonary effort is normal.   Abdominal:      General: There is no distension.      Palpations: Abdomen is soft. There is no mass.      Tenderness: There is no abdominal tenderness. There is no guarding.   Genitourinary:     Vagina: No vaginal discharge.   Musculoskeletal:         General: No tenderness or deformity. Normal range of motion.      Cervical back: Normal range of motion.   Skin:     General: Skin is warm and dry.      Coloration: Skin is not pale.      Findings: No erythema or rash.   Neurological:      Mental Status: She is alert and oriented to person, place, and time.   Psychiatric:         Behavior: Behavior normal.         Thought Content: Thought content normal.         Judgment: Judgment normal.             IMPRESSION:    Menometrorrhagia, hx tubal ligation.                                     PLAN:    Procedure(s):  DILATATION AND CURETTAGE HYSTEROSCOPY,  NOVASURE ENDOMETRIAL ABLATION    RISKS, ALTERNATIVES, COMPLICATIONS OF THE PROCEDURE INCLUDING BUT NOT LIMITED TO:    INTRAOPERATIVE RISKS: INJURY TO INTERNAL AND ADJACENT ORGANS AND STRUCTURES (BOWEL, BLADDER, URETER,BLOOD VESSELS) OR HEMORRHAGE REQUIRING FURTHER SURGERY (LAPAROTOMY),  POSSIBLE NON-DIAGNOSTIC FINDINGS, DISCOVERY OF " POSSIBLE MALIGNANCY, INFECTION, AND DEATH;   POSTOP COMPLICATIONS: BLEEDING, INFECTION (REQUIRING POSSIBLE REOPERATION), FAILURE OF GOAL OF SURGERY AND RECURRENCE OF ORIGINAL SYMPTOMS, PNEUMONIA, PULMONARY EMBOLISM, AND DEATH;  WERE EXPLAINED TO THE PT WHO VERBALIZED HER UNDERSTANDING.             I discussed the patients findings and my recommendations with patient and family.     Rancho Mayberry MD  12/16/21  19:48 EST

## 2021-12-20 LAB
LAB AP CASE REPORT: NORMAL
PATH REPORT.FINAL DX SPEC: NORMAL
PATH REPORT.GROSS SPEC: NORMAL

## 2021-12-20 NOTE — TELEPHONE ENCOUNTER
PA approved per [employer] via CoverMyMeds 12/20/21.  Good from 12/20/21 through 12/29/22.  Patient advised.

## 2022-01-01 ENCOUNTER — HOSPITAL ENCOUNTER (EMERGENCY)
Facility: HOSPITAL | Age: 42
Discharge: HOME | End: 2022-01-01
Attending: EMERGENCY MEDICINE | Admitting: EMERGENCY MEDICINE

## 2022-01-01 VITALS
BODY MASS INDEX: 34.73 KG/M2 | TEMPERATURE: 98.8 F | SYSTOLIC BLOOD PRESSURE: 148 MMHG | RESPIRATION RATE: 20 BRPM | OXYGEN SATURATION: 97 % | HEART RATE: 118 BPM | DIASTOLIC BLOOD PRESSURE: 104 MMHG | WEIGHT: 208.7 LBS

## 2022-01-01 DIAGNOSIS — B34.9 ACUTE VIRAL SYNDROME: Primary | ICD-10-CM

## 2022-01-01 DIAGNOSIS — K21.9 GASTROESOPHAGEAL REFLUX DISEASE WITHOUT ESOPHAGITIS: ICD-10-CM

## 2022-01-01 DIAGNOSIS — K22.70 BARRETT'S ESOPHAGUS WITHOUT DYSPLASIA: ICD-10-CM

## 2022-01-01 LAB
FLUAV RNA RESP QL NAA+PROBE: NOT DETECTED
FLUBV RNA RESP QL NAA+PROBE: NOT DETECTED
S PYO AG THROAT QL: NEGATIVE
SARS-COV-2 RNA RESP QL NAA+PROBE: NOT DETECTED

## 2022-01-01 PROCEDURE — 87636 SARSCOV2 & INF A&B AMP PRB: CPT | Performed by: EMERGENCY MEDICINE

## 2022-01-01 PROCEDURE — 99283 EMERGENCY DEPT VISIT LOW MDM: CPT

## 2022-01-01 PROCEDURE — 94640 AIRWAY INHALATION TREATMENT: CPT

## 2022-01-01 PROCEDURE — 99282 EMERGENCY DEPT VISIT SF MDM: CPT | Performed by: EMERGENCY MEDICINE

## 2022-01-01 PROCEDURE — 87880 STREP A ASSAY W/OPTIC: CPT | Performed by: EMERGENCY MEDICINE

## 2022-01-01 PROCEDURE — 87081 CULTURE SCREEN ONLY: CPT | Performed by: EMERGENCY MEDICINE

## 2022-01-01 RX ORDER — ALBUTEROL SULFATE 90 UG/1
2 AEROSOL, METERED RESPIRATORY (INHALATION) EVERY 4 HOURS PRN
Qty: 8 G | Refills: 0 | Status: SHIPPED | OUTPATIENT
Start: 2022-01-01 | End: 2022-01-08

## 2022-01-01 RX ORDER — ALBUTEROL SULFATE 90 UG/1
2 AEROSOL, METERED RESPIRATORY (INHALATION) ONCE
Status: COMPLETED | OUTPATIENT
Start: 2022-01-01 | End: 2022-01-01

## 2022-01-01 RX ORDER — GUAIFENESIN AND CODEINE PHOSPHATE 100; 10 MG/5ML; MG/5ML
5 SOLUTION ORAL 4 TIMES DAILY PRN
Qty: 180 ML | Refills: 0 | Status: SHIPPED | OUTPATIENT
Start: 2022-01-01 | End: 2022-04-01

## 2022-01-01 RX ADMIN — ALBUTEROL SULFATE 2 PUFF: 90 AEROSOL, METERED RESPIRATORY (INHALATION) at 19:42

## 2022-01-02 NOTE — ED PROVIDER NOTES
"Landy Henry is a 41-year-old white female who present secondary to viral symptoms x3 to 4 days.  Symptoms include sore throat, sinus drainage, bilateral earache and cough.  Patient reports her cough is getting worse.  No associated shortness of breath.  Patient states \"I can hear myself wheeze\".  No known Covid exposures although patient is obviously concerned that her symptoms could be secondary to Covid.  Thus she elected to come to the ED for evaluation.    Known ill exposure-daughter with influenza approximately 2 weeks ago.  Patient has an off-and-on smoker.  She is quit several times.  She has not smoked in the past year and a half.      History provided by:  Patient      Review of Systems   Constitutional: Negative.  Negative for fever.   HENT: Positive for ear pain, postnasal drip and sore throat. Negative for rhinorrhea.    Eyes: Negative.  Negative for redness.   Respiratory: Positive for cough, shortness of breath and wheezing.    Cardiovascular: Negative for chest pain.   Gastrointestinal: Positive for diarrhea (Chronic.  Unchanged). Negative for abdominal pain.   Endocrine: Negative.    Genitourinary: Negative.  Negative for difficulty urinating.   Musculoskeletal: Negative.  Negative for back pain.   Skin: Negative.  Negative for color change.   Neurological: Positive for headaches. Negative for syncope.   Hematological: Negative.    Psychiatric/Behavioral: Negative.    All other systems reviewed and are negative.      Past Medical History:   Diagnosis Date   • Anxiety    • Bacterial vaginosis    • Ashton esophagus    • Bulging lumbar disc     lower back per patient   • Depression with anxiety    • Fibromyalgia    • Fracture, finger    • Gastroparesis    • GERD (gastroesophageal reflux disease)    • Hemorrhoids    • History of anemia    • Hypertension    • IBS (irritable bowel syndrome)    • Lateral epicondylitis 9/16/2013    RHUEMATOLOGIST   • Lupus ([facility])    • MRSA (methicillin " resistant staph aureus) culture positive  ESTIMATE    GROIN AREA    • Overactive bladder    • Sjogren's syndrome ([facility])    • Urinary tract infection        Allergies   Allergen Reactions   • Fluconazole Hives       Past Surgical History:   Procedure Laterality Date   • ADENOIDECTOMY     •  SECTION     • COLONOSCOPY     • D & C HYSTEROSCOPY N/A 2021    Procedure: DILATATION AND CURETTAGE HYSTEROSCOPY;  Surgeon: Rancho Mayberry MD;  Location: [facility] OR;  Service: Obstetrics/Gynecology;  Laterality: N/A;   • D & C HYSTEROSCOPY ENDOMETRIAL ABLATION N/A 2021    Procedure: DILATATION AND CURETTAGE HYSTEROSCOPY,  NOVASURE ENDOMETRIAL ABLATION;  Surgeon: Rancho Mayberry MD;  Location: [facility] OR;  Service: Obstetrics/Gynecology;  Laterality: N/A;   • ENDOSCOPY N/A 5/10/2019    Procedure: ESOPHAGOGASTRODUODENOSCOPY with biopsies;  Surgeon: Shanon Vazquez MD;  Location: [facility] OR;  Service: Gastroenterology   • ENDOSCOPY N/A 2021    Procedure: ESOPHAGOGASTRODUODENOSCOPY with biopsies;  Surgeon: Romain Rice MD;  Location: [facility] OR;  Service: Gastroenterology;  Laterality: N/A;  barretts  gastritis   • FINGER GANGLION CYST EXCISION Right     middle finger   • MOUTH SURGERY     • OTHER SURGICAL HISTORY      reversal of tubal   • RHINOPLASTY     • SHOULDER ARTHROSCOPY Left 9/10/2018    Procedure: SHOULDER ARTHROSCOPY, rotator cuff repair; extensive debridement, open biceps tenodesis;  Surgeon: Joo Rivers MD;  Location: [facility] OR;  Service: Orthopedics   • TONSILLECTOMY     • TUBAL ABDOMINAL LIGATION     • TYMPANOSTOMY TUBE PLACEMENT     • WISDOM TOOTH EXTRACTION Bilateral        Family History   Problem Relation Age of Onset   • Lung cancer Father    • Heart disease Paternal Grandmother    • Diabetes Maternal Aunt    • Breast cancer Maternal Aunt    • Diabetes Maternal Grandmother    • COPD Mother    • Colon cancer Neg Hx    • Colon polyps Neg Hx    •  "Malig Hyperthermia Neg Hx        Social History     Socioeconomic History   • Marital status:    Tobacco Use   • Smoking status: Former Smoker     Packs/day: 0.25     Years: 20.00     Pack years: 5.00     Types: Cigarettes     Quit date:      Years since quittin.0   • Smokeless tobacco: Never Used   Vaping Use   • Vaping Use: Former   Substance and Sexual Activity   • Alcohol use: Not Currently     Comment: Alcoholic, Last drink 2018   • Drug use: Not Currently     Types: Cocaine(coke), Hydrocodone     Comment: \"recovering addict\", last reports use 2017   • Sexual activity: Defer           Objective   Physical Exam  Vitals and nursing note reviewed.   Constitutional:       General: She is not in acute distress.     Appearance: She is well-developed. She is ill-appearing. She is not toxic-appearing or diaphoretic.      Comments: 41-year-old female sitting in bed.  Patient is a bit overweight.  She otherwise appears in good health for age.  Vital signs notable for heart rate of 116 and blood pressure 148/104.  Patient states she has anxiety.  She is anxious appearing. Tearful at times during history and physical.   HENT:      Head: Normocephalic and atraumatic.      Right Ear: External ear normal.      Left Ear: External ear normal.      Nose: Nose normal.      Mouth/Throat:      Mouth: Mucous membranes are moist.      Pharynx: Oropharynx is clear.   Eyes:      Conjunctiva/sclera: Conjunctivae normal.      Pupils: Pupils are equal, round, and reactive to light.   Cardiovascular:      Rate and Rhythm: Normal rate and regular rhythm.      Pulses: Normal pulses.      Heart sounds: Normal heart sounds. No murmur heard.  No friction rub. No gallop.    Pulmonary:      Effort: Respiratory distress present.      Breath sounds: Wheezing (Wheeze at end of expiration.) present.   Abdominal:      General: Bowel sounds are normal. There is no distension.      Palpations: Abdomen is soft.      Tenderness: " There is no abdominal tenderness. There is no guarding or rebound.   Musculoskeletal:         General: Normal range of motion.      Cervical back: Normal range of motion.   Skin:     General: Skin is warm and dry.      Capillary Refill: Capillary refill takes less than 2 seconds.   Neurological:      Mental Status: She is alert and oriented to person, place, and time.   Psychiatric:         Mood and Affect: Mood normal.         Behavior: Behavior normal.         Procedures           ED Course  ED Course as of 01/01/22 2056   Sat Jan 01, 2022   1930 Obtaining flu and Covid swabs as well as rapid strep screen.  Patient has slight expiratory wheeze.  Giving albuterol 2 puffs via metered-dose inhaler. [SS]   2051 Patient feeling better after albuterol.  Covid, flu and strep swabs all negative.  I feel patient symptoms are non-Covid, nonflu viral in origin.  Symptomatic treatment indicated.  Will prescribe albuterol MDI and cough medications for home.  Discussed at length with patient all results, diagnosis, treatment.  Will DC home.Prescription1-albuterol MDI2-Robitussin-AC [SS]      ED Course User Index  [SS] Clifford Sanders MD      Labs Reviewed   COVID-19 AND FLU A/B, NP SWAB IN TRANSPORT MEDIA 8-12 HR TAT - Normal    Narrative:     Fact sheet for providers: https://www.fda.gov/media/402057/download    Fact sheet for patients: https://www.fda.gov/media/267938/download    Test performed by PCR.   RAPID STREP A SCREEN - Normal   BETA HEMOLYTIC STREP CULTURE, THROAT          My differential diagnosis for cough includes but is not limited to:  Upper respiratory infection, bronchitis, pneumonia, COPD exacerbation, cough variant asthma, cardiac asthma, coronary artery disease, congestive heart failure, bacterial, viral or lung infections, lung cancer, aspiration pneumonitis, aspiration of foreign body and Covid -19                                            MDM    Final diagnoses:   Acute viral syndrome       ED  Disposition  ED Disposition     ED Disposition Condition Comment    Discharge Stable           Lou Willard, APRN  [address] [facility]  [place] [address] [phone]    Schedule an appointment as soon as possible for a visit in 1 week  for continued or worsened symptoms, Sooner if needed         Medication List      New Prescriptions    albuterol sulfate  (90 Base) MCG/ACT inhaler  Commonly known as: PROVENTIL HFA;VENTOLIN HFA;PROAIR HFA  Inhale 2 puffs Every 4 (Four) Hours As Needed for Wheezing or Shortness of Air for up to 7 days.     guaiFENesin-codeine 100-10 MG/5ML liquid  Commonly known as: GUAIFENESIN AC  Take 5 mL by mouth 4 (Four) Times a Day As Needed for Cough (1-2 teaspoons) for up to 20 doses.        Changed    colestipol 1 g tablet  Commonly known as: COLESTID  Take 2 tablets by mouth At Night As Needed (diarrhea or loose bms.).  What changed:   · how much to take  · when to take this     famotidine 40 MG tablet  Commonly known as: PEPCID  Take 1 tablet by mouth 2 (Two) Times a Day. With lunch and at bedtime  What changed: additional instructions     * hydrocortisone 2.5 % rectal cream  Commonly known as: ANUSOL-HC  Insert  into the rectum 4 (Four) Times a Day As Needed for Hemorrhoids (rectal discomfort).  What changed: how much to take     * Hydrocortisone (Perianal) 2.5 % rectal cream  Commonly known as: ANUSOL-HC  Insert  into the rectum 2 (Two) Times a Day.  What changed: how much to take         * This list has 2 medication(s) that are the same as other medications prescribed for you. Read the directions carefully, and ask your doctor or other care provider to review them with you.               Where to Get Your Medications      These medications were sent to [id number] - [place] [facility] & [address] - [phone]  - [phone] [place] [address], [place] [address]    Phone: [phone]   · albuterol sulfate   (90 Base) MCG/ACT inhaler  · guaiFENesin-codeine 100-10 MG/5ML liquid          Clifford Sanders MD  01/01/22 9557

## 2022-01-02 NOTE — DISCHARGE INSTRUCTIONS
You have an viral upper respiratory infection.  Medication as directed.  Always use AeroChamber with albuterol metered-dose inhaler.  Plenty of fluids and rest.  Follow-up with your PCP as above.  Return to ED for medical emergencies.

## 2022-01-03 LAB — BACTERIA SPEC AEROBE CULT: NORMAL

## 2022-01-03 RX ORDER — OMEPRAZOLE 40 MG/1
CAPSULE, DELAYED RELEASE ORAL
Qty: 90 CAPSULE | Refills: 3 | Status: SHIPPED | OUTPATIENT
Start: 2022-01-03 | End: 2023-02-13 | Stop reason: SDUPTHER

## 2022-01-04 ENCOUNTER — OFFICE VISIT (OUTPATIENT)
Dept: OBSTETRICS AND GYNECOLOGY | Facility: CLINIC | Age: 42
End: 2022-01-04

## 2022-01-04 VITALS
WEIGHT: 210.8 LBS | BODY MASS INDEX: 35.12 KG/M2 | HEIGHT: 65 IN | SYSTOLIC BLOOD PRESSURE: 118 MMHG | DIASTOLIC BLOOD PRESSURE: 88 MMHG

## 2022-01-04 DIAGNOSIS — Z09 POSTOP CHECK: ICD-10-CM

## 2022-01-04 DIAGNOSIS — Z13.89 SCREENING FOR GENITOURINARY CONDITION: Primary | ICD-10-CM

## 2022-01-04 DIAGNOSIS — Z98.890 S/P ENDOMETRIAL ABLATION: ICD-10-CM

## 2022-01-04 LAB
BILIRUB BLD-MCNC: NEGATIVE MG/DL
CLARITY, POC: CLEAR
COLOR UR: YELLOW
GLUCOSE UR STRIP-MCNC: NEGATIVE MG/DL
KETONES UR QL: NEGATIVE
LEUKOCYTE EST, POC: NEGATIVE
NITRITE UR-MCNC: NEGATIVE MG/ML
PH UR: 6.5 [PH] (ref 5–8)
PROT UR STRIP-MCNC: NEGATIVE MG/DL
RBC # UR STRIP: NEGATIVE /UL
SP GR UR: 1.02 (ref 1–1.03)
UROBILINOGEN UR QL: NORMAL

## 2022-01-04 PROCEDURE — 99213 OFFICE O/P EST LOW 20 MIN: CPT | Performed by: OBSTETRICS & GYNECOLOGY

## 2022-01-04 RX ORDER — MELATONIN
1000 DAILY
COMMUNITY
End: 2022-04-01

## 2022-01-04 RX ORDER — MULTIPLE VITAMINS W/ MINERALS TAB 9MG-400MCG
3 TAB ORAL DAILY
COMMUNITY
End: 2022-04-01

## 2022-01-04 NOTE — PROGRESS NOTES
POSTOP VISIT    Patient Care Team:  Lou Willard APRN as PCP - General (Family Medicine)  -----------------------------------------------------HISTORY---------------------------------------------------    Chief Complaint: Pt here for postop check      41 y.o.  No LMP recorded. Patient has had an ablation.    HPI:  Pt here for postop check for procedure: Dilatation And Curettage Hysteroscopy,  Novasure Endometrial Ablation completed on date: 2021  Pt reports complaints: some discharge.  Tolerating diet well, normal bladder and bowel function, incision/s healing well.  Vaginal bleeding? no    PFSH:   1.    Past Medical History:   Diagnosis Date   • Anxiety    • Bacterial vaginosis    • Ashton esophagus    • Bulging lumbar disc     lower back per patient   • Depression with anxiety    • Fibromyalgia    • Fracture, finger    • Gastroparesis    • GERD (gastroesophageal reflux disease)    • Hemorrhoids    • History of anemia    • Hypertension    • IBS (irritable bowel syndrome)    • Lateral epicondylitis 2013    RHUEMATOLOGIST   • Lupus (HCC)    • MRSA (methicillin resistant staph aureus) culture positive  ESTIMATE    GROIN AREA    • Overactive bladder    • Sjogren's syndrome (HCC)    • Urinary tract infection      2.   Family History   Problem Relation Age of Onset   • Lung cancer Father    • Heart disease Paternal Grandmother    • Diabetes Maternal Aunt    • Breast cancer Maternal Aunt    • Diabetes Maternal Grandmother    • COPD Mother    • Colon cancer Neg Hx    • Colon polyps Neg Hx    • Malig Hyperthermia Neg Hx          PAST HISTORY REVIEWED:  1.   Past Surgical History:   Procedure Laterality Date   • ADENOIDECTOMY     •  SECTION     • COLONOSCOPY     • D & C HYSTEROSCOPY N/A 2021    Procedure: DILATATION AND CURETTAGE HYSTEROSCOPY;  Surgeon: Rancho Mayberry MD;  Location: [facility];  Service: Obstetrics/Gynecology;  Laterality: N/A;   • D & C HYSTEROSCOPY  ENDOMETRIAL ABLATION N/A 12/17/2021    Procedure: DILATATION AND CURETTAGE HYSTEROSCOPY,  NOVASURE ENDOMETRIAL ABLATION;  Surgeon: Rancho Mayberry MD;  Location: [facility];  Service: Obstetrics/Gynecology;  Laterality: N/A;   • ENDOSCOPY N/A 5/10/2019    Procedure: ESOPHAGOGASTRODUODENOSCOPY with biopsies;  Surgeon: Shanon Vazquez MD;  Location: [facility];  Service: Gastroenterology   • ENDOSCOPY N/A 4/14/2021    Procedure: ESOPHAGOGASTRODUODENOSCOPY with biopsies;  Surgeon: Romain Rice MD;  Location: [facility];  Service: Gastroenterology;  Laterality: N/A;  barretts  gastritis   • FINGER GANGLION CYST EXCISION Right     middle finger   • MOUTH SURGERY     • OTHER SURGICAL HISTORY      reversal of tubal   • RHINOPLASTY     • SHOULDER ARTHROSCOPY Left 9/10/2018    Procedure: SHOULDER ARTHROSCOPY, rotator cuff repair; extensive debridement, open biceps tenodesis;  Surgeon: Joo Rivers MD;  Location: [facility];  Service: Orthopedics   • TONSILLECTOMY     • TUBAL ABDOMINAL LIGATION     • TYMPANOSTOMY TUBE PLACEMENT     • WISDOM TOOTH EXTRACTION Bilateral       2.   Current Outpatient Medications:   •  albuterol sulfate  (90 Base) MCG/ACT inhaler, Inhale 2 puffs Every 4 (Four) Hours As Needed for Wheezing or Shortness of Air for up to 7 days., Disp: 8 g, Rfl: 0  •  amLODIPine (NORVASC) 2.5 MG tablet, Take 2.5 mg by mouth Daily., Disp: , Rfl:   •  azaTHIOprine (IMURAN) 50 MG tablet, Take 100 mg by mouth Daily., Disp: , Rfl:   •  azithromycin (ZITHROMAX) 500 MG tablet, Take 500 mg by mouth Daily., Disp: , Rfl:   •  buPROPion (WELLBUTRIN) 75 MG tablet, Take 75 mg by mouth Daily., Disp: , Rfl:   •  busPIRone (BUSPAR) 15 MG tablet, Take 15 mg by mouth 2 (Two) Times a Day., Disp: , Rfl:   •  carboxymethylcellulose sod, PF, (Refresh Celluvisc) 1 % gel eye gel, Administer 1 drop to both eyes Every 2 (Two) Hours., Disp: , Rfl:   •  cholecalciferol (Cholecalciferol) 25 MCG (1000 UT)  tablet, Take 1,000 Units by mouth Daily., Disp: , Rfl:   •  colestipol (COLESTID) 1 g tablet, Take 2 tablets by mouth At Night As Needed (diarrhea or loose bms.). (Patient taking differently: Take 1-2 g by mouth Every Night.), Disp: 180 tablet, Rfl: 3  •  DULoxetine (CYMBALTA) 60 MG capsule, Take 60 mg by mouth Every Evening., Disp: , Rfl:   •  famotidine (PEPCID) 40 MG tablet, Take 1 tablet by mouth 2 (Two) Times a Day. With lunch and at bedtime (Patient taking differently: Take 40 mg by mouth 2 (Two) Times a Day.), Disp: 180 tablet, Rfl: 3  •  guaiFENesin-codeine (GUAIFENESIN AC) 100-10 MG/5ML liquid, Take 5 mL by mouth 4 (Four) Times a Day As Needed for Cough (1-2 teaspoons) for up to 20 doses., Disp: 180 mL, Rfl: 0  •  HYDROcodone-acetaminophen (Norco) 5-325 MG per tablet, Take 1 tablet by mouth Every 6 (Six) Hours As Needed for Severe Pain., Disp: 10 tablet, Rfl: 0  •  hydrocortisone (ANUSOL-HC) 2.5 % rectal cream, Insert  into the rectum 4 (Four) Times a Day As Needed for Hemorrhoids (rectal discomfort). (Patient taking differently: Insert 1 application into the rectum 4 (Four) Times a Day As Needed for Hemorrhoids (rectal discomfort).), Disp: 30 g, Rfl: 5  •  Hydrocortisone, Perianal, (ANUSOL-HC) 2.5 % rectal cream, Insert  into the rectum 2 (Two) Times a Day. (Patient taking differently: Insert 1 application into the rectum 2 (Two) Times a Day.), Disp: 3 each, Rfl: 3  •  hydrOXYzine (ATARAX) 25 MG tablet, Take 25 mg by mouth 2 (Two) Times a Day., Disp: , Rfl:   •  ibuprofen (ADVIL,MOTRIN) 800 MG tablet, Take 1 tablet by mouth Every 8 (Eight) Hours As Needed for Mild Pain., Disp: 30 tablet, Rfl: 0  •  multivitamin with minerals (Hair Skin and Nails Formula) tablet tablet, Take 3 tablets by mouth Daily., Disp: , Rfl:   •  norethindrone (MICRONOR) 0.35 MG tablet, Take 1 tablet by mouth Daily., Disp: 84 tablet, Rfl: 3  •  omeprazole (priLOSEC) 40 MG capsule, TAKE 1 CAPSULE BY MOUTH DAILY, Disp: 90 capsule, Rfl:  "3  •  phenazopyridine (PYRIDIUM) 200 MG tablet, TAKE 1 TABLET BY MOUTH THREE TIMES DAILY FOR UP TO 2 DAYS AS NEEDED FOR BLADDER SPASMS, Disp: , Rfl:   •  pilocarpine (SALAGEN) 5 MG tablet, Take 5 mg by mouth 3 (Three) Times a Day As Needed (for tear/saliva production)., Disp: , Rfl: 3  •  polyethylene glycol (MiraLax) 17 g packet, Take 17 g by mouth Every Night., Disp: , Rfl:   •  predniSONE (DELTASONE) 5 MG tablet, Take 5 mg by mouth Daily., Disp: , Rfl:   •  Proctofoam HC 1-1 % rectal foam, Insert 1 application into the rectum As Needed for Hemorrhoids., Disp: , Rfl:   •  traZODone (DESYREL) 150 MG tablet, Take 150 mg by mouth every night at bedtime., Disp: , Rfl:   •  witch hazel-glycerin (TUCKS) pad, Insert 1 pad into the rectum As Needed for Hemorrhoids., Disp: , Rfl:   3.   Allergies   Allergen Reactions   • Fluconazole Hives       ROS:  Review of Systems   Constitutional: Negative.    Respiratory: Negative.    Cardiovascular: Negative.    Gastrointestinal: Negative.    Genitourinary: Negative.    Neurological: Negative.    Psychiatric/Behavioral: Negative.    :    -----------------------------------------------PHYSICAL EXAM----------------------------------------------    Vital Signs: Ht 165.1 cm (65\")   Wt 95.6 kg (210 lb 12.8 oz)   BMI 35.08 kg/m²      Physical Exam  Vitals and nursing note reviewed.   Constitutional:       Appearance: She is well-developed.   HENT:      Head: Normocephalic and atraumatic.   Cardiovascular:      Rate and Rhythm: Normal rate.   Pulmonary:      Effort: Pulmonary effort is normal.   Abdominal:      General: There is no distension.      Palpations: Abdomen is soft. There is no mass.      Tenderness: There is no abdominal tenderness. There is no guarding.   Genitourinary:     Vagina: No vaginal discharge.   Musculoskeletal:         General: No tenderness or deformity. Normal range of motion.      Cervical back: Normal range of motion.   Skin:     General: Skin is warm and dry. "      Coloration: Skin is not pale.      Findings: No erythema or rash.   Neurological:      Mental Status: She is alert and oriented to person, place, and time.   Psychiatric:         Behavior: Behavior normal.         Thought Content: Thought content normal.         Judgment: Judgment normal.         -----------------------------------------------MEDICAL DECISION MAKING-----------------------------      DATA Review & labs ordered:     1.   Lab Results (last 24 hours)     ** No results found for the last 24 hours. **        2.   Imaging Results (Last 24 Hours)     ** No results found for the last 24 hours. **        3.   ECG/EMG Results (most recent)     None        :       Diagnoses and all orders for this visit:    1. Screening for genitourinary condition (Primary)  -     POC Urinalysis Dipstick    2. S/P endometrial ablation: 12/17/21    3. Postop check      9. Risk counseling done:  yes  10. Ultrasound ordered and reviewed?   no  11.  Path report reviewed? yes    IMPRESSION & DIAGNOSIS:      Doing well postop.         PLAN:     RTO 1 yr.     RTO Return in about 1 year (around 1/4/2023) for Annual physical.       I spent 20+ minutes caring for Aide on this date of service. This time includes time spent by me in the following activities: preparing for the visit, reviewing tests, obtaining and/or reviewing a separately obtained history, performing a medically appropriate examination and/or evaluation, counseling and educating the patient/family/caregiver, ordering medications, tests, or procedures, referring and communicating with other health care professionals, documenting information in the medical record, independently interpreting results and communicating that information with the patient/family/caregiver and care coordination    Rancho Mayberry MD  13:50 EST  01/04/22

## 2022-01-31 ENCOUNTER — OFFICE VISIT (OUTPATIENT)
Dept: OBSTETRICS AND GYNECOLOGY | Facility: CLINIC | Age: 42
End: 2022-01-31

## 2022-01-31 VITALS
HEIGHT: 65 IN | DIASTOLIC BLOOD PRESSURE: 78 MMHG | SYSTOLIC BLOOD PRESSURE: 112 MMHG | BODY MASS INDEX: 34.99 KG/M2 | WEIGHT: 210 LBS

## 2022-01-31 DIAGNOSIS — K31.84 GASTROPARESIS: ICD-10-CM

## 2022-01-31 DIAGNOSIS — K21.9 GASTROESOPHAGEAL REFLUX DISEASE WITHOUT ESOPHAGITIS: ICD-10-CM

## 2022-01-31 DIAGNOSIS — K22.70 BARRETT'S ESOPHAGUS WITHOUT DYSPLASIA: ICD-10-CM

## 2022-01-31 DIAGNOSIS — N89.8 VAGINAL DISCHARGE: Primary | ICD-10-CM

## 2022-01-31 DIAGNOSIS — Z98.890 S/P ENDOMETRIAL ABLATION: ICD-10-CM

## 2022-01-31 DIAGNOSIS — T14.8XXA BLOOD BLISTER: ICD-10-CM

## 2022-01-31 DIAGNOSIS — Z13.89 SCREENING FOR GENITOURINARY CONDITION: ICD-10-CM

## 2022-01-31 DIAGNOSIS — M35.01 SJOGREN'S SYNDROME WITH KERATOCONJUNCTIVITIS SICCA: ICD-10-CM

## 2022-01-31 DIAGNOSIS — N32.81 OVERACTIVE BLADDER: ICD-10-CM

## 2022-01-31 DIAGNOSIS — N39.3 SUI (STRESS URINARY INCONTINENCE, FEMALE): ICD-10-CM

## 2022-01-31 PROBLEM — N92.1 MENOMETRORRHAGIA: Status: RESOLVED | Noted: 2021-10-26 | Resolved: 2022-01-31

## 2022-01-31 PROCEDURE — 99213 OFFICE O/P EST LOW 20 MIN: CPT | Performed by: OBSTETRICS & GYNECOLOGY

## 2022-01-31 RX ORDER — NITROFURANTOIN 25; 75 MG/1; MG/1
100 CAPSULE ORAL 2 TIMES DAILY
COMMUNITY
Start: 2021-12-13 | End: 2022-02-14

## 2022-02-01 PROBLEM — N89.8 VAGINAL DISCHARGE: Status: ACTIVE | Noted: 2022-02-01

## 2022-02-01 PROBLEM — T14.8XXA BLOOD BLISTER: Status: ACTIVE | Noted: 2022-02-01

## 2022-02-04 LAB
A VAGINAE DNA VAG QL NAA+PROBE: NORMAL SCORE
BVAB2 DNA VAG QL NAA+PROBE: NORMAL SCORE
C ALBICANS DNA VAG QL NAA+PROBE: NEGATIVE
C GLABRATA DNA VAG QL NAA+PROBE: NEGATIVE
C TRACH DNA VAG QL NAA+PROBE: NEGATIVE
M GENITALIUM DNA SPEC QL NAA+PROBE: NEGATIVE
M HOMINIS DNA SPEC QL NAA+PROBE: NEGATIVE
MEGA1 DNA VAG QL NAA+PROBE: NORMAL SCORE
N GONORRHOEA DNA VAG QL NAA+PROBE: NEGATIVE
T VAGINALIS DNA VAG QL NAA+PROBE: NEGATIVE
UREAPLASMA DNA SPEC QL NAA+PROBE: POSITIVE

## 2022-02-07 PROBLEM — Z22.39 CARRIER OF UREAPLASMA UREALYTICUM: Status: ACTIVE | Noted: 2022-02-07

## 2022-02-07 RX ORDER — AZITHROMYCIN 250 MG/1
TABLET, FILM COATED ORAL
Qty: 6 TABLET | Refills: 0 | Status: SHIPPED | OUTPATIENT
Start: 2022-02-07 | End: 2022-02-14

## 2022-02-14 ENCOUNTER — OFFICE VISIT (OUTPATIENT)
Dept: ORTHOPEDIC SURGERY | Facility: CLINIC | Age: 42
End: 2022-02-14

## 2022-02-14 VITALS — HEIGHT: 65 IN | WEIGHT: 210 LBS | BODY MASS INDEX: 34.99 KG/M2

## 2022-02-14 DIAGNOSIS — M25.512 CHRONIC LEFT SHOULDER PAIN: Primary | ICD-10-CM

## 2022-02-14 DIAGNOSIS — M75.82 ROTATOR CUFF TENDONITIS, LEFT: ICD-10-CM

## 2022-02-14 DIAGNOSIS — R29.898 SHOULDER WEAKNESS: ICD-10-CM

## 2022-02-14 DIAGNOSIS — G89.29 CHRONIC LEFT SHOULDER PAIN: Primary | ICD-10-CM

## 2022-02-14 PROCEDURE — 99214 OFFICE O/P EST MOD 30 MIN: CPT | Performed by: ORTHOPAEDIC SURGERY

## 2022-02-14 PROCEDURE — 73030 X-RAY EXAM OF SHOULDER: CPT | Performed by: ORTHOPAEDIC SURGERY

## 2022-02-14 NOTE — PROGRESS NOTES
"Subjective:     Patient ID: Aide Henry is a 41 y.o. female.    Chief Complaint:  Left shoulder pain, new exacerbation  Prior left shoulder rotator cuff repair, biceps tenodesis-9/10/2018    History of Present Illness  Aide Henry returns to clinic today for evaluation of left shoulder pain.    The patient has been experiencing left shoulder pain for approximately 4 to 6 months. She localizes her pain to the anterior and posterior aspects of her left shoulder. Has difficulty with reaching behind. Rates her pain as an 8 or 9 out of 10 to the point of tears. She denies any specific trigger to her pain. Patient said it hurts all of the time. Sometimes when it flairs up, she is unable to use the limb at all. Denies any tingling or numbness in her left upper extremity. She denies any radiation of pain below her elbow. The patient has tried anti-inflammatories, ice and heat therapy, with minimal relief. She has good days and bad days. The pain comes and goes.    The patient has a history of carpal tunnel surgery performed recently. She has stitches. She has had prior shoulder surgery. She has a history of lupus. She takes Imuran with good results. Patient has Sjogren's syndrome, and Peripheral Ulcerative Keratitis on her right eye.     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   • Sexual activity: Defer      Past Medical History:   Diagnosis Date   • Anxiety    • Bacterial vaginosis    • Ashton esophagus    • Bulging lumbar " disc     lower back per patient   • Depression with anxiety    • Fibromyalgia    • Fracture, finger    • Gastroparesis    • GERD (gastroesophageal reflux disease)    • Hemorrhoids    • History of anemia    • Hypertension    • IBS (irritable bowel syndrome)    • Lateral epicondylitis 2013    RHUEMATOLOGIST   • Lupus (HCC)    • MRSA (methicillin resistant staph aureus) culture positive  ESTIMATE    GROIN AREA    • Overactive bladder    • Sjogren's syndrome (HCC)    • Urinary tract infection      Past Surgical History:   Procedure Laterality Date   • ADENOIDECTOMY     •  SECTION     • COLONOSCOPY     • D & C HYSTEROSCOPY N/A 2021    Procedure: DILATATION AND CURETTAGE HYSTEROSCOPY;  Surgeon: Rancho Mayberry MD;  Location: [facility];  Service: Obstetrics/Gynecology;  Laterality: N/A;   • D & C HYSTEROSCOPY ENDOMETRIAL ABLATION N/A 2021    Procedure: DILATATION AND CURETTAGE HYSTEROSCOPY,  NOVASURE ENDOMETRIAL ABLATION;  Surgeon: Rancho Mayberry MD;  Location: [facility];  Service: Obstetrics/Gynecology;  Laterality: N/A;   • ENDOSCOPY N/A 5/10/2019    Procedure: ESOPHAGOGASTRODUODENOSCOPY with biopsies;  Surgeon: Shanon Vazquez MD;  Location: [facility];  Service: Gastroenterology   • ENDOSCOPY N/A 2021    Procedure: ESOPHAGOGASTRODUODENOSCOPY with biopsies;  Surgeon: Romain Rice MD;  Location: [facility];  Service: Gastroenterology;  Laterality: N/A;  barretts  gastritis   • FINGER GANGLION CYST EXCISION Right     middle finger   • MOUTH SURGERY     • OTHER SURGICAL HISTORY      reversal of tubal   • RHINOPLASTY     • SHOULDER ARTHROSCOPY Left 9/10/2018    Procedure: SHOULDER ARTHROSCOPY, rotator cuff repair; extensive debridement, open biceps tenodesis;  Surgeon: Joo Rivers MD;  Location: [facility];  Service: Orthopedics   • TONSILLECTOMY     • TUBAL ABDOMINAL LIGATION     • TYMPANOSTOMY TUBE PLACEMENT     • WISDOM TOOTH EXTRACTION  "Bilateral        Family History   Problem Relation Age of Onset   • Lung cancer Father    • Heart disease Paternal Grandmother    • Diabetes Maternal Aunt    • Breast cancer Maternal Aunt    • Diabetes Maternal Grandmother    • COPD Mother    • Colon cancer Neg Hx    • Colon polyps Neg Hx    • Malig Hyperthermia Neg Hx          Review of Systems        Objective:  Vitals:    02/14/22 1058   Weight: 95.3 kg (210 lb)   Height: 165.1 cm (65\")         02/14/22  1058   Weight: 95.3 kg (210 lb)     Body mass index is 34.95 kg/m².  Physical Exam    Vital signs reviewed.   General: No acute distress, alert and oriented  Eyes: conjunctiva clear; pupils equally round and reactive  ENT: external ears and nose atraumatic; oropharynx clear  CV: no peripheral edema  Resp: normal respiratory effort  Skin: no rashes or wounds; normal turgor  Psych: mood and affect appropriate; recent and remote memory intact              Ortho Exam       Left shoulder-    Incisions are well healed.  FF- Active- 95 degrees  Passive- 175 degrees  Strength- 4-/5    ER- Active- 40 degrees  Passive- 45 degrees  Strength- 4/5    IR - L4  Belly Press Strength - 4+/5    Bear hug sign- Negative  Empty can test- Significantly positive    Neer's sign- Mildly positive  Mason- Mildly positive    Cross arm test- Mildly positive  Tenderness over AC joint- Negative    Brisk cap refill to all digits, palpable 2+ radial pulse left wrist.   Positive deltoid firing in all three components.    Imaging:  Left Shoulder X-Ray  Indication: Pain  AP, scapular Y, and axillary lateral views    Findings:  No fracture  No bony lesion  Normal soft tissues  No evidence of significant humeral head elevation, blunting of greater tuberosity profile likely secondary to prior rotator cuff repair, biceps tenodesis site noted in the proximal humeral shaft.  Glenohumeral joint space appears to be relatively well-maintained.  Compared to prior office x-rays    Assessment:        1. " Chronic left shoulder pain    2. Rotator cuff tendonitis, left    3. Shoulder weakness           Plan:          1. Discussed treatment options at length with patient at today's visit.  2. At this point in time, given her significant pain and limitation in function and motion, she wished to proceed with MRI arthrogram of her left shoulder. Order placed today.  3. Follow up to review results and discuss further treatment options.  4. Recommended to continue home work on exercise, strengthening and motion as much as tolerated.      Aide Henry was in agreement with plan and had all questions answered.     Orders:  Orders Placed This Encounter   Procedures   • XR Shoulder 2+ View Left   • MRI shoulder left arthrogram   • FL Arthrogram Shoulder Left       Medications:  No orders of the defined types were placed in this encounter.      Followup:  Return for review of MRI results.    Diagnoses and all orders for this visit:    1. Chronic left shoulder pain (Primary)  -     XR Shoulder 2+ View Left    2. Rotator cuff tendonitis, left  -     Cancel: FL Contrast Injection CT / MRI; Future  -     MRI shoulder left arthrogram; Future  -     FL Arthrogram Shoulder Left; Future    3. Shoulder weakness  -     Cancel: FL Contrast Injection CT / MRI; Future  -     MRI shoulder left arthrogram; Future  -     FL Arthrogram Shoulder Left; Future          Dictated utilizing Dragon dictation     Transcribed from ambient dictation for Joo Rivers MD by Marisol Healy.  02/15/22   10:23 EST    Patient verbalized consent to the visit recording.  I have personally performed the services described in this document as transcribed by the above individual, and it is both accurate and complete.  Joo Rivers MD  2/24/2022  22:35 EST

## 2022-03-09 ENCOUNTER — HOSPITAL ENCOUNTER (OUTPATIENT)
Dept: MRI IMAGING | Facility: HOSPITAL | Age: 42
Discharge: HOME | End: 2022-03-09

## 2022-03-09 ENCOUNTER — HOSPITAL ENCOUNTER (OUTPATIENT)
Dept: GENERAL RADIOLOGY | Facility: HOSPITAL | Age: 42
Discharge: HOME | End: 2022-03-09

## 2022-03-09 DIAGNOSIS — M75.82 ROTATOR CUFF TENDONITIS, LEFT: ICD-10-CM

## 2022-03-09 DIAGNOSIS — R29.898 SHOULDER WEAKNESS: ICD-10-CM

## 2022-03-09 PROCEDURE — 0 LIDOCAINE 1 % SOLUTION: Performed by: ORTHOPAEDIC SURGERY

## 2022-03-09 PROCEDURE — 73222 MRI JOINT UPR EXTREM W/DYE: CPT

## 2022-03-09 PROCEDURE — 25010000002 IOPAMIDOL 61 % SOLUTION: Performed by: ORTHOPAEDIC SURGERY

## 2022-03-09 PROCEDURE — 0 GADOBENATE DIMEGLUMINE 529 MG/ML SOLUTION: Performed by: ORTHOPAEDIC SURGERY

## 2022-03-09 PROCEDURE — 73040 CONTRAST X-RAY OF SHOULDER: CPT

## 2022-03-09 PROCEDURE — A9577 INJ MULTIHANCE: HCPCS | Performed by: ORTHOPAEDIC SURGERY

## 2022-03-09 RX ORDER — LIDOCAINE HYDROCHLORIDE 10 MG/ML
5 INJECTION, SOLUTION INFILTRATION; PERINEURAL ONCE
Status: COMPLETED | OUTPATIENT
Start: 2022-03-09 | End: 2022-03-09

## 2022-03-09 RX ADMIN — GADOBENATE DIMEGLUMINE 1 ML: 529 INJECTION, SOLUTION INTRAVENOUS at 08:50

## 2022-03-09 RX ADMIN — IOPAMIDOL 5 ML: 612 INJECTION, SOLUTION INTRAVENOUS at 08:40

## 2022-03-09 RX ADMIN — LIDOCAINE HYDROCHLORIDE 5 ML: 10 INJECTION, SOLUTION INFILTRATION; PERINEURAL at 08:40

## 2022-03-17 ENCOUNTER — TELEPHONE (OUTPATIENT)
Dept: ORTHOPEDIC SURGERY | Facility: CLINIC | Age: 42
End: 2022-03-17

## 2022-03-17 NOTE — TELEPHONE ENCOUNTER
Provider: DR MICHEAL CULLEN  Caller: ISABEL BAIN  Relationship to Patient: SELF    Phone Number: [phone]  Reason for Call: PATIENT HAD MRI LEFT SHOULDER 03/09/22 AND WOULD LIKE TO DISCUSS RESULTS PRIOR TO SX

## 2022-03-23 ENCOUNTER — TELEPHONE (OUTPATIENT)
Dept: GASTROENTEROLOGY | Facility: CLINIC | Age: 42
End: 2022-03-23

## 2022-03-23 NOTE — TELEPHONE ENCOUNTER
Deana sent notification Robert savage requesting change in medication    Will send to provider  LOV:  08/11/21 with MR

## 2022-04-01 ENCOUNTER — HOSPITAL ENCOUNTER (EMERGENCY)
Facility: HOSPITAL | Age: 42
Discharge: PSYCHIATRIC OR SUBSTANCE USE FACILITY | End: 2022-04-01
Attending: EMERGENCY MEDICINE | Admitting: EMERGENCY MEDICINE

## 2022-04-01 VITALS
DIASTOLIC BLOOD PRESSURE: 109 MMHG | HEART RATE: 112 BPM | BODY MASS INDEX: 36.7 KG/M2 | WEIGHT: 215 LBS | OXYGEN SATURATION: 99 % | HEIGHT: 64 IN | TEMPERATURE: 97.7 F | RESPIRATION RATE: 18 BRPM | SYSTOLIC BLOOD PRESSURE: 140 MMHG

## 2022-04-01 DIAGNOSIS — F10.920 ALCOHOLIC INTOXICATION WITHOUT COMPLICATION: ICD-10-CM

## 2022-04-01 DIAGNOSIS — R45.851 SUICIDAL IDEATION: Primary | ICD-10-CM

## 2022-04-01 LAB
ALBUMIN SERPL-MCNC: 4.3 G/DL (ref 3.5–5.2)
ALBUMIN/GLOB SERPL: 1.2 G/DL
ALP SERPL-CCNC: 81 U/L (ref 39–117)
ALT SERPL W P-5'-P-CCNC: 111 U/L (ref 1–33)
AMPHET+METHAMPHET UR QL: NEGATIVE
AMPHETAMINES UR QL: NEGATIVE
ANION GAP SERPL CALCULATED.3IONS-SCNC: 14.6 MMOL/L (ref 5–15)
APAP SERPL-MCNC: <5 MCG/ML (ref 0–30)
AST SERPL-CCNC: 188 U/L (ref 1–32)
BARBITURATES UR QL SCN: NEGATIVE
BASOPHILS # BLD AUTO: 0.01 10*3/MM3 (ref 0–0.2)
BASOPHILS NFR BLD AUTO: 0.2 % (ref 0–1.5)
BENZODIAZ UR QL SCN: NEGATIVE
BILIRUB SERPL-MCNC: 0.2 MG/DL (ref 0–1.2)
BUN SERPL-MCNC: 7 MG/DL (ref 6–20)
BUN/CREAT SERPL: 8.3 (ref 7–25)
BUPRENORPHINE SERPL-MCNC: NEGATIVE NG/ML
CALCIUM SPEC-SCNC: 8.2 MG/DL (ref 8.6–10.5)
CANNABINOIDS SERPL QL: NEGATIVE
CHLORIDE SERPL-SCNC: 100 MMOL/L (ref 98–107)
CO2 SERPL-SCNC: 23.4 MMOL/L (ref 22–29)
COCAINE UR QL: NEGATIVE
CREAT SERPL-MCNC: 0.84 MG/DL (ref 0.57–1)
DEPRECATED RDW RBC AUTO: 54.1 FL (ref 37–54)
EGFRCR SERPLBLD CKD-EPI 2021: 90.8 ML/MIN/1.73
EOSINOPHIL # BLD AUTO: 0.07 10*3/MM3 (ref 0–0.4)
EOSINOPHIL NFR BLD AUTO: 1.3 % (ref 0.3–6.2)
ERYTHROCYTE [DISTWIDTH] IN BLOOD BY AUTOMATED COUNT: 17.3 % (ref 12.3–15.4)
ETHANOL BLD-MCNC: 295 MG/DL (ref 0–10)
ETHANOL BLD-MCNC: 322 MG/DL (ref 0–10)
ETHANOL BLD-MCNC: 426 MG/DL (ref 0–10)
ETHANOL UR QL: 0.29 %
ETHANOL UR QL: 0.32 %
ETHANOL UR QL: 0.43 %
GLOBULIN UR ELPH-MCNC: 3.5 GM/DL
GLUCOSE SERPL-MCNC: 102 MG/DL (ref 65–99)
HCT VFR BLD AUTO: 43.2 % (ref 34–46.6)
HGB BLD-MCNC: 13.5 G/DL (ref 12–15.9)
IMM GRANULOCYTES # BLD AUTO: 0.02 10*3/MM3 (ref 0–0.05)
IMM GRANULOCYTES NFR BLD AUTO: 0.4 % (ref 0–0.5)
LYMPHOCYTES # BLD AUTO: 1.67 10*3/MM3 (ref 0.7–3.1)
LYMPHOCYTES NFR BLD AUTO: 32.1 % (ref 19.6–45.3)
MCH RBC QN AUTO: 26.8 PG (ref 26.6–33)
MCHC RBC AUTO-ENTMCNC: 31.3 G/DL (ref 31.5–35.7)
MCV RBC AUTO: 85.7 FL (ref 79–97)
METHADONE UR QL SCN: NEGATIVE
MONOCYTES # BLD AUTO: 0.45 10*3/MM3 (ref 0.1–0.9)
MONOCYTES NFR BLD AUTO: 8.7 % (ref 5–12)
NEUTROPHILS NFR BLD AUTO: 2.98 10*3/MM3 (ref 1.7–7)
NEUTROPHILS NFR BLD AUTO: 57.3 % (ref 42.7–76)
OPIATES UR QL: NEGATIVE
OXYCODONE UR QL SCN: NEGATIVE
PCP UR QL SCN: NEGATIVE
PLATELET # BLD AUTO: 142 10*3/MM3 (ref 140–450)
PMV BLD AUTO: 9.6 FL (ref 6–12)
POTASSIUM SERPL-SCNC: 3.6 MMOL/L (ref 3.5–5.2)
PROPOXYPH UR QL: NEGATIVE
PROT SERPL-MCNC: 7.8 G/DL (ref 6–8.5)
RBC # BLD AUTO: 5.04 10*6/MM3 (ref 3.77–5.28)
SALICYLATES SERPL-MCNC: <3 MG/DL
SARS-COV-2 RNA PNL SPEC NAA+PROBE: NOT DETECTED
SODIUM SERPL-SCNC: 138 MMOL/L (ref 136–145)
TRICYCLICS UR QL SCN: NEGATIVE
WBC NRBC COR # BLD: 5.2 10*3/MM3 (ref 3.4–10.8)

## 2022-04-01 PROCEDURE — 80179 DRUG ASSAY SALICYLATE: CPT | Performed by: EMERGENCY MEDICINE

## 2022-04-01 PROCEDURE — 80143 DRUG ASSAY ACETAMINOPHEN: CPT | Performed by: EMERGENCY MEDICINE

## 2022-04-01 PROCEDURE — C9803 HOPD COVID-19 SPEC COLLECT: HCPCS

## 2022-04-01 PROCEDURE — 85025 COMPLETE CBC W/AUTO DIFF WBC: CPT | Performed by: EMERGENCY MEDICINE

## 2022-04-01 PROCEDURE — 87635 SARS-COV-2 COVID-19 AMP PRB: CPT | Performed by: EMERGENCY MEDICINE

## 2022-04-01 PROCEDURE — 80306 DRUG TEST PRSMV INSTRMNT: CPT | Performed by: EMERGENCY MEDICINE

## 2022-04-01 PROCEDURE — 36415 COLL VENOUS BLD VENIPUNCTURE: CPT

## 2022-04-01 PROCEDURE — 82077 ASSAY SPEC XCP UR&BREATH IA: CPT | Performed by: EMERGENCY MEDICINE

## 2022-04-01 PROCEDURE — 99285 EMERGENCY DEPT VISIT HI MDM: CPT | Performed by: EMERGENCY MEDICINE

## 2022-04-01 PROCEDURE — 99283 EMERGENCY DEPT VISIT LOW MDM: CPT

## 2022-04-01 PROCEDURE — 80053 COMPREHEN METABOLIC PANEL: CPT | Performed by: EMERGENCY MEDICINE

## 2022-04-01 RX ORDER — TRAZODONE HYDROCHLORIDE 50 MG/1
50 TABLET ORAL NIGHTLY
Status: DISCONTINUED | OUTPATIENT
Start: 2022-04-01 | End: 2022-04-01 | Stop reason: HOSPADM

## 2022-04-01 RX ADMIN — TRAZODONE HYDROCHLORIDE 50 MG: 50 TABLET ORAL at 10:26

## 2022-04-01 NOTE — ED NOTES
Patient is not sure if she has taken her BP meds today.  Explained that we will check her BP again after she has had something to help her sleep because we do not want to drop her BP too much or she won't be able to go to the [facility]. Pt agrees.

## 2022-04-01 NOTE — ED NOTES
Dr Sanders aware pt has been accepted by Dr Jackson at the [facility] but her alcohol level has to be .300 or below.  Will check another BAL at 1300.
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
## 2022-04-01 NOTE — ED NOTES
Pt requested something to help her sleep, dr villela denied due to pt needing to be fully awake for Nirmala olivo, explained this to pt and pt became very excited and states she wants to be admitted and then became demanding again and stated that the nirmala needs to hurry

## 2022-04-01 NOTE — ED NOTES
Patient updated on the plan.  She wants to go now or wants to go home.  Patient was able to be calmed a bit by talking with her.  She wants something to help her sleep.  Dr Sanders notified and is going to talk with her.

## 2022-04-01 NOTE — ED PROVIDER NOTES
"Subjective   Aide Henry is a 41-year-old female who presents secondary to suicidal ideation.  Patient has multiple medical problems including lupus and depression with anxiety.  Patient is specifically distraught over the diagnosis of lupus.  Patient states she wants to \"go to sleep and not wake up\".  She states \"I just do not want to be alive\". \"  I do not want to be here anymore\".  Thus patient elected to come to the ED for evaluation.    Patient states she started smoking 2 days ago.  She drinks 1/5 of vodka daily.  She has been drinking this morning.      History provided by:  Patient      Review of Systems   Musculoskeletal: Positive for myalgias.   Skin: Positive for rash.       Past Medical History:   Diagnosis Date   • Anxiety    • Bacterial vaginosis    • Ashton esophagus    • Bulging lumbar disc     lower back per patient   • Depression with anxiety    • Fibromyalgia    • Fracture, finger    • Gastroparesis    • GERD (gastroesophageal reflux disease)    • Hemorrhoids    • History of anemia    • Hypertension    • IBS (irritable bowel syndrome)    • Lateral epicondylitis 2013    RHUEMATOLOGIST   • Lupus (HCC)    • MRSA (methicillin resistant staph aureus) culture positive  ESTIMATE    GROIN AREA    • Overactive bladder    • Sjogren's syndrome (HCC)    • Urinary tract infection        Allergies   Allergen Reactions   • Fluconazole Hives       Past Surgical History:   Procedure Laterality Date   • ADENOIDECTOMY     •  SECTION     • COLONOSCOPY     • D & C HYSTEROSCOPY N/A 2021    Procedure: DILATATION AND CURETTAGE HYSTEROSCOPY;  Surgeon: Rancho Mayberry MD;  Location: [facility] OR;  Service: Obstetrics/Gynecology;  Laterality: N/A;   • D & C HYSTEROSCOPY ENDOMETRIAL ABLATION N/A 2021    Procedure: DILATATION AND CURETTAGE HYSTEROSCOPY,  NOVASURE ENDOMETRIAL ABLATION;  Surgeon: Rancho Mayberry MD;  Location: [facility] OR;  Service: Obstetrics/Gynecology;  " Laterality: N/A;   • ENDOSCOPY N/A 5/10/2019    Procedure: ESOPHAGOGASTRODUODENOSCOPY with biopsies;  Surgeon: Shanon Vazquez MD;  Location: [facility] OR;  Service: Gastroenterology   • ENDOSCOPY N/A 2021    Procedure: ESOPHAGOGASTRODUODENOSCOPY with biopsies;  Surgeon: Romain Rice MD;  Location:  [facility] OR;  Service: Gastroenterology;  Laterality: N/A;  barretts  gastritis   • FINGER GANGLION CYST EXCISION Right     middle finger   • MOUTH SURGERY     • OTHER SURGICAL HISTORY      reversal of tubal   • RHINOPLASTY     • SHOULDER ARTHROSCOPY Left 9/10/2018    Procedure: SHOULDER ARTHROSCOPY, rotator cuff repair; extensive debridement, open biceps tenodesis;  Surgeon: Joo Rivers MD;  Location: [facility] OR;  Service: Orthopedics   • TONSILLECTOMY     • TUBAL ABDOMINAL LIGATION     • TYMPANOSTOMY TUBE PLACEMENT     • WISDOM TOOTH EXTRACTION Bilateral        Family History   Problem Relation Age of Onset   • Lung cancer Father    • Heart disease Paternal Grandmother    • Diabetes Maternal Aunt    • Breast cancer Maternal Aunt    • Diabetes Maternal Grandmother    • COPD Mother    • Colon cancer Neg Hx    • Colon polyps Neg Hx    • Malig Hyperthermia Neg Hx        Social History     Socioeconomic History   • Marital status:    Tobacco Use   • Smoking status: Former Smoker     Packs/day: 0.25     Years: 20.00     Pack years: 5.00     Types: Cigarettes     Quit date:      Years since quittin.2   • Smokeless tobacco: Never Used   Vaping Use   • Vaping Use: Former   Substance and Sexual Activity   • Alcohol use: Yes     Comment: pt states every day 1/5 vodka, last drink this am on way to hospital   • Drug use: Yes     Types: Cocaine(coke)     Comment: states occ marijuana   • Sexual activity: Defer           Objective   Physical Exam  Vitals and nursing note reviewed.   Constitutional:       General: She is not in acute distress.     Appearance: Normal appearance. She is  well-developed. She is obese. She is not ill-appearing, toxic-appearing or diaphoretic.      Comments: 41-year-old white female sitting in bed.  Patient is a bit overweight.  She otherwise appears in good physical health.  Patient rocking back and forth.  She is tearful at times.  She is obviously in emotional distress.  She reports ongoing thoughts of suicide.   HENT:      Head: Normocephalic and atraumatic.      Right Ear: Tympanic membrane, ear canal and external ear normal.      Left Ear: Tympanic membrane, ear canal and external ear normal.      Nose: Nose normal.      Mouth/Throat:      Mouth: Mucous membranes are moist.      Pharynx: Oropharynx is clear.   Eyes:      Extraocular Movements: Extraocular movements intact.      Conjunctiva/sclera: Conjunctivae normal.      Pupils: Pupils are equal, round, and reactive to light.   Cardiovascular:      Rate and Rhythm: Regular rhythm. Tachycardia present.      Pulses: Normal pulses.      Heart sounds: Normal heart sounds. No murmur heard.    No friction rub. No gallop.   Pulmonary:      Effort: Pulmonary effort is normal.      Breath sounds: Normal breath sounds.   Abdominal:      General: Bowel sounds are normal. There is no distension.      Palpations: Abdomen is soft. There is no mass.      Tenderness: There is no abdominal tenderness. There is no guarding or rebound.      Hernia: No hernia is present.   Musculoskeletal:         General: Normal range of motion.      Cervical back: Normal range of motion and neck supple.   Skin:     General: Skin is warm and dry.      Capillary Refill: Capillary refill takes less than 2 seconds.      Findings: Rash present.          Neurological:      General: No focal deficit present.      Mental Status: She is alert and oriented to person, place, and time.      Deep Tendon Reflexes: Reflexes are normal and symmetric.   Psychiatric:         Attention and Perception: Attention and perception normal.         Mood and Affect: Mood  is anxious. Affect is tearful.         Behavior: Behavior is agitated.         Thought Content: Thought content includes suicidal ideation.         Judgment: Judgment is impulsive.      Comments: Patient is speaking very loudly.  Her speech is rapid.         Procedures           ED Course  ED Course as of 04/01/22 1605   Fri Apr 01, 2022   0832 Patient having suicidal thoughts but no specific plan.  Placing on 72-hour hold.  Obtaining routine labs. [SS]   0916 ALT (SGPT)(!): 111 [SS]   0916 AST (SGOT)(!): 188 [SS]   0916 Ethanol(!): 426 [SS]   0916 CBC unremarkable.  CMP notable for elevation of ALT and SAT.  Patient's alcohol level 426.  Urine drug screen negative.  Acetaminophen and salicylate levels undetectable. [SS]   0918 Patient obviously quite intoxicated. [SS]   0956 Patient has been accepted by the [facility].  However they want patient's alcohol level under 300.  Assuming patient metabolizes at 30mg/h this will occur approximately 1 PM.  Thus will recheck alcohol level at that time. [SS]   1010 Patient requesting trazodone to help her sleep.  This is a normal medication for her.  Trazodone half-life is 3 to 6 hours.  Thus will give patient a dose. [SS]   1343 Repeat alcohol level 332.  Still too high for transfer to the [facility].  Will recheck again in 1 hour. [SS]   1428 Ethanol(!): 322 [SS]   1431 Obtaining repeat EtOH level [SS]   1502 Repeat alcohol level now 295.  Transferring the patient to the [facility]. [SS]      ED Course User Index  [SS] Clifford Sanders MD      Labs Reviewed   COMPREHENSIVE METABOLIC PANEL - Abnormal; Notable for the following components:       Result Value    Glucose 102 (*)     Calcium 8.2 (*)     ALT (SGPT) 111 (*)     AST (SGOT) 188 (*)     All other components within normal limits    Narrative:     GFR Normal >60  Chronic Kidney Disease <60  Kidney Failure <15     ETHANOL - Abnormal; Notable for the following components:    Ethanol 426 (*)     All other components within normal  limits   CBC WITH AUTO DIFFERENTIAL - Abnormal; Notable for the following components:    MCHC 31.3 (*)     RDW 17.3 (*)     RDW-SD 54.1 (*)     All other components within normal limits   ETHANOL - Abnormal; Notable for the following components:    Ethanol 322 (*)     All other components within normal limits   ETHANOL - Abnormal; Notable for the following components:    Ethanol 295 (*)     All other components within normal limits   COVID-19,[facility] SWAB NO TRANSPORT MEDIA 2 HR TAT - Normal    Narrative:     Fact sheet for providers: https://www.fda.gov/media/096662/download     Fact sheet for patients: https://www.fda.gov/media/843201/download    Test performed by PCR.    Consider negative results in combination with clinical observations, patient history, and epidemiological information.   ACETAMINOPHEN LEVEL - Normal   URINE DRUG SCREEN - Normal    Narrative:     Urine drug screen results are to be used for medical purposes only.  They are not to be used for legal purposes such as employment testing.  Negative results do not necessarily mean the complete absence of a subtance, but rather that the result is less than the cutoff for that substance.  Positive results are unconfirmed and considered Preliminary Positive.  [facility] does not automatically confirm Postitive Unconfirmed results.  The physician may request (order) an Unconfirmed Positive result to be sent out for confirmation.      Negative Thresholds for Drugs Screened:    THC screen, urine                          50 ng/ml  Phenycyclidine (PCP), urine                25 ng/ml  Cocaine screen, urine                     150 ng/ml  Methamphetamine, urine                    500 ng/ml  Opiate screen, urine                      100 ng/ml  Amphetamine screen, urine                 500 ng/ml  Benzodiazepine screen, urine              150 ng/ml  Tricyclic Antidepressants screen, urine   300 ng/ml  Methadone screen, urine                    200 ng/ml  Barbiturates screen, urine                200 ng/ml  Oxycodone screen, urine                   100 ng/ml  Propoxyphene screen, urine                300 ng/ml  Buprenorphine screen, urine                10 ng/ml   SALICYLATE LEVEL - Normal   COVID PRE-OP / PRE-PROCEDURE SCREENING ORDER (NO ISOLATION)    Narrative:     The following orders were created for panel order COVID PRE-OP / PRE-PROCEDURE SCREENING ORDER (NO ISOLATION) - Swab, Nasal Cavity.  Procedure                               Abnormality         Status                     ---------                               -----------         ------                     COVID-19,[facility]...[[id number]]  Normal              Final result                 Please view results for these tests on the individual orders.   CBC AND DIFFERENTIAL    Narrative:     The following orders were created for panel order CBC & Differential.  Procedure                               Abnormality         Status                     ---------                               -----------         ------                     CBC Auto Differential[[id number]]        Abnormal            Final result                 Please view results for these tests on the individual orders.                                         MDM    Final diagnoses:   Suicidal ideation   Alcoholic intoxication without complication (HCC)       ED Disposition  ED Disposition     ED Disposition   Transfer to Another Facility     Condition   --    Comment   The broke             No follow-up provider specified.       Medication List      No changes were made to your prescriptions during this visit.          Clifford Sanders MD  04/01/22 [id number]

## 2022-04-11 ENCOUNTER — TELEPHONE (OUTPATIENT)
Dept: ORTHOPEDIC SURGERY | Facility: CLINIC | Age: 42
End: 2022-04-11

## 2022-04-11 NOTE — TELEPHONE ENCOUNTER
Provider: DR MICHEAL CULLEN  Caller: ISABEL BAIN  Relationship to Patient: SELF     Phone Number: [phone]  Reason for Call: PATIENT HAD MRI LEFT SHOULDER 03/09/22 AND WOULD LIKE TO DISCUSS RESULTS PRIOR TO SX

## 2022-04-25 ENCOUNTER — OFFICE VISIT (OUTPATIENT)
Dept: ORTHOPEDIC SURGERY | Facility: CLINIC | Age: 42
End: 2022-04-25

## 2022-04-25 VITALS — WEIGHT: 215 LBS | HEIGHT: 64 IN | BODY MASS INDEX: 36.7 KG/M2

## 2022-04-25 DIAGNOSIS — M75.122 COMPLETE TEAR OF LEFT ROTATOR CUFF, UNSPECIFIED WHETHER TRAUMATIC: Primary | ICD-10-CM

## 2022-04-25 DIAGNOSIS — R29.898 SHOULDER WEAKNESS: ICD-10-CM

## 2022-04-25 PROCEDURE — 99214 OFFICE O/P EST MOD 30 MIN: CPT | Performed by: ORTHOPAEDIC SURGERY

## 2022-04-25 NOTE — PROGRESS NOTES
Subjective:     Patient ID: Aide Henry is a 41 y.o. female.    Chief Complaint:  Follow-up left shoulder pain, follow-up MRI results  Prior rotator cuff repair and biceps tenodesis-9/10/2018    History of Present Illness  Aide Henry returns to clinic today for evaluation of left shoulder pain.    The patient reports significant continued pain, primarily to the anterolateral aspect of her left shoulder. Her pain is exacerbated with overhead activities, especially at night. She rates her pain 7 to 8 out of 10 and sharp in nature. She notes occasional aching pain. The patient denies any numbness, tingling, or significant radiation. She reports minimal improvement with anti-inflammatory medications and activity modification.          Social History     Occupational History   • Not on file   Tobacco Use   • Smoking status: Former Smoker     Packs/day: 0.25     Years: 20.00     Pack years: 5.00     Types: Cigarettes     Quit date:      Years since quittin.3   • Smokeless tobacco: Never Used   Vaping Use   • Vaping Use: Former   Substance and Sexual Activity   • Alcohol use: Yes     Comment: pt states every day 1/5 vodka, last drink this am on way to hospital   • Drug use: Yes     Types: Cocaine(coke)     Comment: states occ marijuana   • Sexual activity: Defer      Past Medical History:   Diagnosis Date   • Anxiety    • Bacterial vaginosis    • Ashton esophagus    • Bulging lumbar disc     lower back per patient   • Depression with anxiety    • Fibromyalgia    • Fracture, finger    • Gastroparesis    • GERD (gastroesophageal reflux disease)    • Hemorrhoids    • History of anemia    • Hypertension    • IBS (irritable bowel syndrome)    • Lateral epicondylitis 2013    RHUEMATOLOGIST   • Lupus ([facility])    • MRSA (methicillin resistant staph aureus) culture positive  ESTIMATE    GROIN AREA    • Overactive bladder    • Sjogren's syndrome ([facility])    • Urinary tract infection      Past Surgical History:  "  Procedure Laterality Date   • ADENOIDECTOMY     •  SECTION     • COLONOSCOPY     • D & C HYSTEROSCOPY N/A 2021    Procedure: DILATATION AND CURETTAGE HYSTEROSCOPY;  Surgeon: Rancho Mayberry MD;  Location: [facility] OR;  Service: Obstetrics/Gynecology;  Laterality: N/A;   • D & C HYSTEROSCOPY ENDOMETRIAL ABLATION N/A 2021    Procedure: DILATATION AND CURETTAGE HYSTEROSCOPY,  NOVASURE ENDOMETRIAL ABLATION;  Surgeon: Rancho Mayberry MD;  Location: [facility] OR;  Service: Obstetrics/Gynecology;  Laterality: N/A;   • ENDOSCOPY N/A 5/10/2019    Procedure: ESOPHAGOGASTRODUODENOSCOPY with biopsies;  Surgeon: Shanon Vazquez MD;  Location: [facility] OR;  Service: Gastroenterology   • ENDOSCOPY N/A 2021    Procedure: ESOPHAGOGASTRODUODENOSCOPY with biopsies;  Surgeon: Romain Rice MD;  Location: [facility] OR;  Service: Gastroenterology;  Laterality: N/A;  barretts  gastritis   • FINGER GANGLION CYST EXCISION Right     middle finger   • MOUTH SURGERY     • OTHER SURGICAL HISTORY      reversal of tubal   • RHINOPLASTY     • SHOULDER ARTHROSCOPY Left 9/10/2018    Procedure: SHOULDER ARTHROSCOPY, rotator cuff repair; extensive debridement, open biceps tenodesis;  Surgeon: Joo Rivers MD;  Location: [facility];  Service: Orthopedics   • TONSILLECTOMY     • TUBAL ABDOMINAL LIGATION     • TYMPANOSTOMY TUBE PLACEMENT     • WISDOM TOOTH EXTRACTION Bilateral        Family History   Problem Relation Age of Onset   • Lung cancer Father    • Heart disease Paternal Grandmother    • Diabetes Maternal Aunt    • Breast cancer Maternal Aunt    • Diabetes Maternal Grandmother    • COPD Mother    • Colon cancer Neg Hx    • Colon polyps Neg Hx    • Malig Hyperthermia Neg Hx          Review of Systems        Objective:  Vitals:    22 104   Weight: 97.5 kg (215 lb)   Height: 162.6 cm (64\")         22  104   Weight: 97.5 kg (215 lb)     Body mass index is 36.9 " kg/m².  Physical Exam    Vital signs reviewed.   General: No acute distress, alert and oriented  Eyes: conjunctiva clear; pupils equally round and reactive  ENT: external ears and nose atraumatic; oropharynx clear  CV: no peripheral edema  Resp: normal respiratory effort  Skin: no rashes or wounds; normal turgor  Psych: mood and affect appropriate; recent and remote memory intact          Ortho Exam     Left shoulder-  Forward flexion- Active- 155 degrees  Passive- 165 degrees  Strength- 4 minus out of 5  External Rotation- Active- 35 degrees  Passive 45 degrees  Strength- 4+ out of 5  Belly press test strength- 4+/5  Drop arm test- Negative  Ext rotation lag sign- Negative  Neer's sign- Positive  Mason- Positive    Brisk cap refill to all digits, palpable 2+ radial pulse  Positive sensation to light touch palmar, dorsal aspects of small and index fingers and anatomic snuffbox left hand  Imaging:    MRI Shoulder Arthrogram LT     INDICATION:    Anterior and superior intermittent left shoulder pain beginning 6 months after left shoulder surgery 2018 and continuously worsening. No specific injury.     TECHNIQUE:   MRI Arthrogram of the left shoulder 8 cc dilute MultiHance intra-articular contrast.     COMPARISON:   Conventional left shoulder arthrogram 3/9/2022  MRI left shoulder 7/27/2018     FINDINGS:   Rotator cuff and biceps tendon:  Postsurgical changes are noted from prior rotator cuff repair with a bone anchor in the anterior/central aspect of the greater tuberosity. Intra-articular contrast extends into the subacromial/subdeltoid bursa through a thin full-thickness tear of the  posterior preinsertional supraspinatus tendon, measuring approximately 3-4 mm in width. No significant tendon retraction. Infraspinatus tendon appears intact with only mild tendinopathy. Teres minor and subscapularis tendons are intact. Postsurgical  changes from long head biceps tenodesis are noted.     Glenoid labrum and  Glenohumeral joint:   There is no displaced labral tear. Glenohumeral articular cartilage appears intact. No loose bodies.     AC joint:   Mild degenerative change of the acromioclavicular joint. Presumed postsurgical changes from prior subacromial decompression. No subacromial spur. No evidence of significant mass effect on the supraspinatus outlet.     Bones and Musculature:   Bone marrow signal is within normal limits. Visualized musculature is unremarkable.     IMPRESSION:     1. Prior rotator cuff repair with recurrent full-thickness tear of the posterior preinsertional supraspinatus tendon, measuring approximately 3-4 mm in width. No significant tendon retraction.  2. Mild infraspinatus tendinopathy without tear.  3. Stable postsurgical changes from long head biceps tenodesis and presumed subacromial decompression.  4. Mild residual arthrosis of the AC joint.  5. No acute labral pathology.           Signer Name: Jena Azar MD   Signed: 3/10/2022 1:49 PM   Workstation Name: [id number]   [facility]      Reviewed outside MR arthrogram left shoulder include review of images as well as radiology report indicates recurrent full-thickness tear of the posterior portion supraspinatus with no significant tendon retraction appreciated with mild tendinopathy of the infraspinatus as well.  Prior biceps tenodesis noted.      Assessment:        1. Complete tear of left rotator cuff, unspecified whether traumatic    2. Shoulder weakness           Plan:        1. Discussed treatment options at length with patient at today's visit. Given the full thickness recurrent tear of her rotator cuff, as well as the pain she is experiencing and limitation with her activities, we discussed options and she wished to proceed with surgical treatment at this point in time.    2. Plan will be for left shoulder arthroscopy, rotator cuff debridement versus repair, subacromial decompression. I reviewed risks benefits and  alternatives the procedure with risks including not limited to neurovascular damage, bleeding, infection, chronic pain, re-tear rotator cuff, failure of healing rotator cuff, loss of motion, weakness, stiffness, instability, biceps sag, DVT, pulmonary embolus, death, stroke, complex regional pain syndrome, myocardial infarction, need for additional procedures. She understood all these had all questions answered.  Patient verbally consented to proceed with surgery.  No guarantees were given regarding results of surgery.  We will have patient medically optimized by primary care physician and proceed with surgery at next available date.    3. The patient denies a history of blood clots, diabetes, or cardiac issues other than hypertension.      Aide Henry was in agreement with plan and had all questions answered.     Orders:  No orders of the defined types were placed in this encounter.      Medications:  No orders of the defined types were placed in this encounter.      Followup:  No follow-ups on file.    Diagnoses and all orders for this visit:    1. Complete tear of left rotator cuff, unspecified whether traumatic (Primary)    2. Shoulder weakness          Dictated utilizing Dragon dictation   Transcribed from ambient dictation for Joo Rivers MD by Qing Shah.  04/25/22   15:55 EDT    Patient verbalized consent to the visit recording.

## 2022-04-26 RX ORDER — PREGABALIN 150 MG/1
150 CAPSULE ORAL ONCE
Status: CANCELLED | OUTPATIENT
Start: 2022-04-26 | End: 2022-04-26

## 2022-04-26 RX ORDER — MELOXICAM 7.5 MG/1
15 TABLET ORAL ONCE
Status: CANCELLED | OUTPATIENT
Start: 2022-04-26 | End: 2022-04-26

## 2022-04-26 RX ORDER — ACETAMINOPHEN 325 MG/1
1000 TABLET ORAL ONCE
Status: CANCELLED | OUTPATIENT
Start: 2022-04-26 | End: 2022-04-26

## 2022-05-03 ENCOUNTER — TRANSCRIBE ORDERS (OUTPATIENT)
Dept: ADMINISTRATIVE | Facility: HOSPITAL | Age: 42
End: 2022-05-03

## 2022-05-03 ENCOUNTER — LAB (OUTPATIENT)
Dept: LAB | Facility: HOSPITAL | Age: 42
End: 2022-05-03

## 2022-05-03 DIAGNOSIS — Z79.899 ENCOUNTER FOR LONG-TERM (CURRENT) USE OF OTHER MEDICATIONS: Primary | ICD-10-CM

## 2022-05-03 DIAGNOSIS — Z79.899 ENCOUNTER FOR LONG-TERM (CURRENT) USE OF OTHER MEDICATIONS: ICD-10-CM

## 2022-05-03 LAB
ALBUMIN SERPL-MCNC: 4.5 G/DL (ref 3.5–5.2)
ALBUMIN/GLOB SERPL: 1.6 G/DL
ALP SERPL-CCNC: 65 U/L (ref 39–117)
ALT SERPL W P-5'-P-CCNC: 83 U/L (ref 1–33)
ANION GAP SERPL CALCULATED.3IONS-SCNC: 11 MMOL/L (ref 5–15)
AST SERPL-CCNC: 54 U/L (ref 1–32)
BASOPHILS # BLD AUTO: 0.05 10*3/MM3 (ref 0–0.2)
BASOPHILS NFR BLD AUTO: 1.1 % (ref 0–1.5)
BILIRUB SERPL-MCNC: 0.2 MG/DL (ref 0–1.2)
BUN SERPL-MCNC: 9 MG/DL (ref 6–20)
BUN/CREAT SERPL: 12.9 (ref 7–25)
CALCIUM SPEC-SCNC: 9.2 MG/DL (ref 8.6–10.5)
CHLORIDE SERPL-SCNC: 104 MMOL/L (ref 98–107)
CO2 SERPL-SCNC: 21 MMOL/L (ref 22–29)
CREAT SERPL-MCNC: 0.7 MG/DL (ref 0.57–1)
DEPRECATED RDW RBC AUTO: 52.9 FL (ref 37–54)
EGFRCR SERPLBLD CKD-EPI 2021: 113 ML/MIN/1.73
EOSINOPHIL # BLD AUTO: 0.13 10*3/MM3 (ref 0–0.4)
EOSINOPHIL NFR BLD AUTO: 2.8 % (ref 0.3–6.2)
ERYTHROCYTE [DISTWIDTH] IN BLOOD BY AUTOMATED COUNT: 17.1 % (ref 12.3–15.4)
GLOBULIN UR ELPH-MCNC: 2.9 GM/DL
GLUCOSE SERPL-MCNC: 91 MG/DL (ref 65–99)
HCT VFR BLD AUTO: 40.9 % (ref 34–46.6)
HGB BLD-MCNC: 13.4 G/DL (ref 12–15.9)
IMM GRANULOCYTES # BLD AUTO: 0.05 10*3/MM3 (ref 0–0.05)
IMM GRANULOCYTES NFR BLD AUTO: 1.1 % (ref 0–0.5)
LYMPHOCYTES # BLD AUTO: 0.71 10*3/MM3 (ref 0.7–3.1)
LYMPHOCYTES NFR BLD AUTO: 15.4 % (ref 19.6–45.3)
MCH RBC QN AUTO: 27.6 PG (ref 26.6–33)
MCHC RBC AUTO-ENTMCNC: 32.8 G/DL (ref 31.5–35.7)
MCV RBC AUTO: 84.2 FL (ref 79–97)
MONOCYTES # BLD AUTO: 0.54 10*3/MM3 (ref 0.1–0.9)
MONOCYTES NFR BLD AUTO: 11.7 % (ref 5–12)
NEUTROPHILS NFR BLD AUTO: 3.12 10*3/MM3 (ref 1.7–7)
NEUTROPHILS NFR BLD AUTO: 67.9 % (ref 42.7–76)
NRBC BLD AUTO-RTO: 0 /100 WBC (ref 0–0.2)
PLATELET # BLD AUTO: 299 10*3/MM3 (ref 140–450)
PMV BLD AUTO: 10 FL (ref 6–12)
POTASSIUM SERPL-SCNC: 4.4 MMOL/L (ref 3.5–5.2)
PROT SERPL-MCNC: 7.4 G/DL (ref 6–8.5)
RBC # BLD AUTO: 4.86 10*6/MM3 (ref 3.77–5.28)
SODIUM SERPL-SCNC: 136 MMOL/L (ref 136–145)
WBC NRBC COR # BLD: 4.6 10*3/MM3 (ref 3.4–10.8)

## 2022-05-03 PROCEDURE — 85025 COMPLETE CBC W/AUTO DIFF WBC: CPT

## 2022-05-03 PROCEDURE — 36415 COLL VENOUS BLD VENIPUNCTURE: CPT

## 2022-05-03 PROCEDURE — 80053 COMPREHEN METABOLIC PANEL: CPT

## 2022-05-09 ENCOUNTER — TELEPHONE (OUTPATIENT)
Dept: ORTHOPEDIC SURGERY | Facility: CLINIC | Age: 42
End: 2022-05-09
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
## 2022-05-12 ENCOUNTER — OFFICE VISIT (OUTPATIENT)
Dept: ORTHOPEDIC SURGERY | Facility: CLINIC | Age: 42
End: 2022-05-12

## 2022-05-12 VITALS — BODY MASS INDEX: 35.85 KG/M2 | HEIGHT: 64 IN | WEIGHT: 210 LBS

## 2022-05-12 DIAGNOSIS — M25.562 MECHANICAL KNEE PAIN, LEFT: Primary | ICD-10-CM

## 2022-05-12 DIAGNOSIS — M25.562 LEFT KNEE PAIN, UNSPECIFIED CHRONICITY: ICD-10-CM

## 2022-05-12 PROCEDURE — 99214 OFFICE O/P EST MOD 30 MIN: CPT | Performed by: NURSE PRACTITIONER

## 2022-05-12 PROCEDURE — 73562 X-RAY EXAM OF KNEE 3: CPT | Performed by: NURSE PRACTITIONER

## 2022-05-12 RX ORDER — METHION/INOS/CHOL BT/B COM/LIV 110MG-86MG
CAPSULE ORAL
COMMUNITY
Start: 2022-04-09 | End: 2022-05-18

## 2022-05-12 RX ORDER — LEVOTHYROXINE SODIUM 0.05 MG/1
50 TABLET ORAL DAILY
COMMUNITY
Start: 2022-04-12 | End: 2023-02-13 | Stop reason: SDUPTHER

## 2022-05-12 RX ORDER — MIRTAZAPINE 15 MG/1
15 TABLET, ORALLY DISINTEGRATING ORAL NIGHTLY
COMMUNITY
Start: 2022-04-22 | End: 2022-09-21

## 2022-05-12 RX ORDER — PILOCARPINE HYDROCHLORIDE 5 MG/1
5 TABLET, FILM COATED ORAL 3 TIMES DAILY PRN
COMMUNITY
Start: 2022-04-13 | End: 2023-02-13 | Stop reason: SDUPTHER

## 2022-05-12 RX ORDER — RIZATRIPTAN BENZOATE 10 MG/1
10 TABLET ORAL ONCE AS NEEDED
COMMUNITY
Start: 2022-03-11 | End: 2022-09-21

## 2022-05-12 NOTE — PROGRESS NOTES
Subjective:     Patient ID: Aide Henry is a 41 y.o. female.    Chief Complaint:  Left knee pain, new patient to examiner   History of Present Illness  Aide Henry   Presents to clinic for evaluation of her left knee.  Injury occurred approximately 3 months ago hit the medial aspect of her knee after a fall began experiencing swelling, bruising present at that time as noted the knee feeling as if is getting give out on her.  Localizes pain to the medial joint line.  She did feel a pop upon injury.  She has continued to experience decreased sensation along the medial joint line as well as swelling in the knee.  Pain present with planting and twisting, standing, seated activities and walking long distances.  She is experiencing comfort with side-to-side motion does feel as if the knee is getting give out on her.  Rates discomfort a 3-4 out of a 10 mainly aching in nature.  Denies any  recent x-ray denies any prior MRI or CT.  She does not have x-ray images available for viewing.  She has tried rest, ice alternating with heat and with NSAIDs as needed.  She is getting ready to Dr. Rivers in a few weeks.  Denies any bracing.  She is continued exercise on treadmill walking flat surfaces able to tolerate.  Denies other concerns present at this time.       Social History     Occupational History   • Not on file   Tobacco Use   • Smoking status: Former Smoker     Packs/day: 0.25     Years: 20.00     Pack years: 5.00     Types: Cigarettes     Quit date: 2020     Years since quittin.3   • Smokeless tobacco: Never Used   Vaping Use   • Vaping Use: Former   Substance and Sexual Activity   • Alcohol use: Yes     Comment: pt states every day 1/5 vodka, last drink this am on way to hospital   • Drug use: Yes     Types: Cocaine(coke)     Comment: states occ marijuana   • Sexual activity: Defer      Past Medical History:   Diagnosis Date   • Anxiety    • Bacterial vaginosis    • [place] esophagus    • Bulging lumbar disc      lower back per patient   • Depression with anxiety    • Fibromyalgia    • Fracture, finger    • Gastroparesis    • GERD (gastroesophageal reflux disease)    • Hemorrhoids    • History of anemia    • Hypertension    • IBS (irritable bowel syndrome)    • Lateral epicondylitis 2013    RHUEMATOLOGIST   • Lupus (HCC)    • MRSA (methicillin resistant staph aureus) culture positive  ESTIMATE    GROIN AREA    • Overactive bladder    • Sjogren's syndrome (HCC)    • Urinary tract infection      Past Surgical History:   Procedure Laterality Date   • ADENOIDECTOMY     •  SECTION     • COLONOSCOPY     • D & C HYSTEROSCOPY N/A 2021    Procedure: DILATATION AND CURETTAGE HYSTEROSCOPY;  Surgeon: Rancho Mayberry MD;  Location: [facility] OR;  Service: Obstetrics/Gynecology;  Laterality: N/A;   • D & C HYSTEROSCOPY ENDOMETRIAL ABLATION N/A 2021    Procedure: DILATATION AND CURETTAGE HYSTEROSCOPY,  NOVASURE ENDOMETRIAL ABLATION;  Surgeon: Rancho Mayberry MD;  Location: [facility] OR;  Service: Obstetrics/Gynecology;  Laterality: N/A;   • ENDOSCOPY N/A 5/10/2019    Procedure: ESOPHAGOGASTRODUODENOSCOPY with biopsies;  Surgeon: Shanon Vazquez MD;  Location: [facility] OR;  Service: Gastroenterology   • ENDOSCOPY N/A 2021    Procedure: ESOPHAGOGASTRODUODENOSCOPY with biopsies;  Surgeon: Romain Rice MD;  Location: [facility] OR;  Service: Gastroenterology;  Laterality: N/A;  barretts  gastritis   • FINGER GANGLION CYST EXCISION Right     middle finger   • MOUTH SURGERY     • OTHER SURGICAL HISTORY      reversal of tubal   • RHINOPLASTY     • SHOULDER ARTHROSCOPY Left 9/10/2018    Procedure: SHOULDER ARTHROSCOPY, rotator cuff repair; extensive debridement, open biceps tenodesis;  Surgeon: Joo Rivers MD;  Location: [facility] OR;  Service: Orthopedics   • TONSILLECTOMY     • TUBAL ABDOMINAL LIGATION     • TYMPANOSTOMY TUBE PLACEMENT     • WISDOM TOOTH EXTRACTION Bilateral   "      Family History   Problem Relation Age of Onset   • Lung cancer Father    • Heart disease Paternal Grandmother    • Diabetes Maternal Aunt    • Breast cancer Maternal Aunt    • Diabetes Maternal Grandmother    • COPD Mother    • Colon cancer Neg Hx    • Colon polyps Neg Hx    • Malig Hyperthermia Neg Hx                Objective:  Physical Exam    Vital signs reviewed.   General: No acute distress.  Eyes: conjunctiva clear; pupils equally round and reactive  ENT: external ears and nose atraumatic; oropharynx clear  CV: no peripheral edema  Resp: normal respiratory effort  Skin: no rashes or wounds; normal turgor  Psych: mood and affect appropriate; recent and remote memory intact    Vitals:    05/12/22 1457   Weight: 95.3 kg (210 lb)   Height: 162.6 cm (64\")         05/12/22  1457   Weight: 95.3 kg (210 lb)     Body mass index is 36.05 kg/m².      Left Knee Exam     Tenderness   The patient is experiencing tenderness in the medial joint line.    Range of Motion   Extension: 0   Flexion: 130     Tests   Craig:  Medial - positive Lateral - negative  Varus: negative Valgus: negative  Lachman:  Anterior - 1+    Posterior - negative  Drawer:  Anterior - 1+     Posterior - negative  Patellar apprehension: positive    Other   Erythema: absent  Sensation: normal  Pulse: present    Comments:  Mild-moderate swelling  Positive active patellar compression                  Imaging:  Left Knee X-Ray  Indication: Pain    AP, Lateral, and [place] views    Findings:  No fracture  No bony lesion  Normal soft tissues  Medial compartment narrowing    No prior studies were available for comparison.    Assessment:        1. Mechanical knee pain, left    2. Left knee pain, unspecified chronicity           Plan:  1.  Discussed plan of care with patient.  Discussed treatment options including corticosteroid injection, MRI, bracing.  At this time wishes to hold off on any steroid injections.  Discussed MRI and proceeding with imaging " after she is recovered from arthroscopy left shoulder.  We will plan to see her back in clinic after completion of testing discuss results and further plan of care.  Discussed to continue with strengthening exercises left lower extremity including quads, hamstring and calf muscles.  All questions answered.  Orders:  Orders Placed This Encounter   Procedures   • XR Knee 3+ View With [place] Left   • MRI Knee Left Without Contrast     No orders of the defined types were placed in this encounter.          I ordered and reviewed the RAFAEL today.   Dragon dictation utilized

## 2022-05-12 NOTE — PROGRESS NOTES
Presents to clinic for evaluation of her left knee. Injury occurred approximately 3 months ago hit the medial aspect of her knee after a fall began experiencing swelling, bruising present at that time as noted the knee feeling as if is getting give out on her. Localizes pain to the medial joint line. She did feel a pop upon injury. She has continued to experience decreased sensation along the medial joint line as well as swelling in the knee. Pain present with planting and twisting, standing, seated activities and walking long distances. She is experiencing comfort with side-to-side motion does feel as if the knee is getting give out on her. Rates discomfort a 3-4 out of a 10 mainly aching in nature. Denies any recent x-ray denies any prior MRI or CT. She does not have x-ray images available for viewing. She has tried rest, ice alternating with heat and with NSAIDs as needed. She is getting ready to Dr. Mir in a few weeks. Denies any bracing. She is continued exercise on treadmill walking flat surfaces able to tolerate. Denies other concerns present at this time.    Plan:  1. Discussed plan of care with patient. Discussed treatment options including corticosteroid injection, MRI, bracing. At this time wishes to hold off on any steroid injections. Discussed MRI and proceeding with imaging after she is recovered from arthroscopy left shoulder. We will plan to see her back in clinic after completion of testing discuss results and further plan of care. Discussed to continue with strengthening exercises left lower extremity including quads, hamstring and calf muscles. All questions answered.

## 2022-05-18 ENCOUNTER — PRE-ADMISSION TESTING (OUTPATIENT)
Dept: PREADMISSION TESTING | Facility: HOSPITAL | Age: 42
End: 2022-05-18

## 2022-05-18 VITALS
BODY MASS INDEX: 35.9 KG/M2 | WEIGHT: 210.3 LBS | HEART RATE: 102 BPM | OXYGEN SATURATION: 98 % | SYSTOLIC BLOOD PRESSURE: 129 MMHG | DIASTOLIC BLOOD PRESSURE: 86 MMHG | RESPIRATION RATE: 16 BRPM | HEIGHT: 64 IN

## 2022-05-18 DIAGNOSIS — M75.122 COMPLETE TEAR OF LEFT ROTATOR CUFF, UNSPECIFIED WHETHER TRAUMATIC: ICD-10-CM

## 2022-05-18 LAB
APTT PPP: 26.3 SECONDS (ref 24.3–38.1)
GLUCOSE SERPL-MCNC: 139 MG/DL (ref 65–99)
HBA1C MFR BLD: 5.7 % (ref 4.8–5.6)
INR PPP: 0.95 (ref 0.9–1.1)
PROTHROMBIN TIME: 12.9 SECONDS (ref 12.1–15)

## 2022-05-18 PROCEDURE — 85610 PROTHROMBIN TIME: CPT | Performed by: ORTHOPAEDIC SURGERY

## 2022-05-18 PROCEDURE — 85730 THROMBOPLASTIN TIME PARTIAL: CPT | Performed by: ORTHOPAEDIC SURGERY

## 2022-05-18 PROCEDURE — 36415 COLL VENOUS BLD VENIPUNCTURE: CPT

## 2022-05-18 PROCEDURE — 83036 HEMOGLOBIN GLYCOSYLATED A1C: CPT | Performed by: ORTHOPAEDIC SURGERY

## 2022-05-18 PROCEDURE — 82947 ASSAY GLUCOSE BLOOD QUANT: CPT | Performed by: ORTHOPAEDIC SURGERY

## 2022-05-18 PROCEDURE — 93005 ELECTROCARDIOGRAM TRACING: CPT

## 2022-05-18 PROCEDURE — 93010 ELECTROCARDIOGRAM REPORT: CPT | Performed by: INTERNAL MEDICINE

## 2022-05-18 NOTE — DISCHARGE INSTRUCTIONS
PRE-ADMISSION TESTING INSTRUCTIONS FOR ADULTS    Take these medications the morning of surgery with a small sip of water:  amlodipine, buspar, cymbalta, levothyroxine, and omeprazole      No aspirin, advil, aleve, ibuprofen, naproxen, diet pills, decongestants, or herbal/vitamins for a week prior to surgery.    General Instructions:    DO NOT EAT SOLID FOOD AFTER MIDNIGHT THE NIGHT BEFORE SURGERY. No gum, mints, or hard candy after midnight the night before surgery.  You may drink clear liquids the day of surgery up until 2 hours before your arrival time.  Clear liquids are liquids you can see through. Nothing RED in color.    Plain water    Sports drinks  Sodas     Gelatin (Jell-O)  Fruit juices without pulp such as white grape juice and apple juice  Popsicles that contain no fruit or yogurt  Tea or coffee (no cream or milk added)    It is beneficial for you to have a clear drink that contains carbohydrates just before you leave your house and before your fasting time begins.  We suggest a 20 ounce bottle of Gatorade or Powerade for non-diabetic patients or a 20 ounce bottle of G2 or Powerade Zero for diabetic patients.     Patients who avoid smoking, chewing tobacco and alcohol for 4 weeks prior to surgery have a reduced risk of post-operative complications.  If at all possible, quit smoking as many days before surgery as you can.    Do not smoke, use chewing tobacco or drink alcohol the day of surgery    Bring your C-PAP/ BI-PAP machine if you use one.  Wear clean comfortable clothes and socks.  Do not wear contact lenses, lotion, deodorant, or make-up.  Bring a case for your glasses if applicable. You may brush your teeth the morning of surgery.  You may wear dentures/partials, do not put adhesive/glue on them.    Leave all other jewelry and valuables at home.      Preventing a Surgical Site Infection:    Shower the night before and on the morning of surgery using the chlorhexidine soap you were given.  Use a  clean washcloth with the soap.  Place clean sheets on your bed after showering the night before surgery. Do not use the CHG soap on your hair, face, or private areas. Wash your body gently for five (5) minutes. Do not scrub your skin.  Dry with a clean towel and dress in clean clothing.    Do not shave the surgical area for 10 days-2 weeks prior to surgery  because the razor can irritate skin and make it easier to develop an infection.  Make sure you, your family, and all healthcare providers clean their hands with soap and water or an alcohol based hand  before caring for you or your wound.      Day of surgery:    Your surgeon’s office will advise you of your arrival time for the day of surgery.    Upon arrival, a Pre-op nurse and Anesthesia provider will review your health history, obtain vital signs, and answer questions you may have.  The only belongings needed at this time will be your home medications and if applicable your C-PAP/BI-PAP machine.  If you are staying overnight your family can leave the rest of your belongings in the car and bring them to your room later.  A Pre-op nurse will start an IV and you may receive medication in preparation for surgery, including something to help you relax.  Your family will be able to see you in the Pre-op area.  While you are in surgery your family should notify the waiting room  if they leave the waiting room area and provide a contact phone number.    IF you have any questions, you can call the Pre-Admission Department at [phone] or your surgeon's office.  Notify your surgeon if  you become sick, have a fever, productive cough, or cannot be here the day of surgery    Please be aware that surgery does come with discomfort.  We want to make every effort to control your discomfort so please discuss any uncontrolled symptoms with your nurse.   Your doctor will most likely have prescribed pain medications.      If you are going home after  surgery, you will receive individualized written care instructions before being discharged.  A responsible adult (over the age of 18) must drive you to and from the hospital on the day of your surgery and stay with you for 24 hours after anesthesia.    If you are staying overnight following surgery, you will be transported to your hospital room following the recovery period.  [facility] has all private rooms.    You may receive a survey regarding the care you received. Your feedback is very important and will be used to collect the necessary data to help us to continue to provide excellent care.     Deductibles and co-payments are collected on the day of service. Please be prepared to pay the required co-pay, deductible or deposit on the day of service as defined by your plan.

## 2022-05-18 NOTE — PAT
Pt here for PAT visit.  Pre-op tests completed, chg soap given, and instructions reviewed.  Instructed clears until 2 hrs prior to arrival time, voiced understanding. Seeing PCP for clearance. COVID test 5/25

## 2022-05-19 LAB — QT INTERVAL: 345 MS

## 2022-05-22 ENCOUNTER — ANESTHESIA EVENT (OUTPATIENT)
Dept: PERIOP | Facility: HOSPITAL | Age: 42
End: 2022-05-22

## 2022-05-25 ENCOUNTER — LAB (OUTPATIENT)
Dept: LAB | Facility: HOSPITAL | Age: 42
End: 2022-05-25

## 2022-05-25 DIAGNOSIS — M75.122 COMPLETE TEAR OF LEFT ROTATOR CUFF, UNSPECIFIED WHETHER TRAUMATIC: ICD-10-CM

## 2022-05-25 LAB — SARS-COV-2 RNA PNL SPEC NAA+PROBE: NOT DETECTED

## 2022-05-25 PROCEDURE — 87635 SARS-COV-2 COVID-19 AMP PRB: CPT | Performed by: ORTHOPAEDIC SURGERY

## 2022-05-27 ENCOUNTER — ANESTHESIA (OUTPATIENT)
Dept: PERIOP | Facility: HOSPITAL | Age: 42
End: 2022-05-27

## 2022-05-27 ENCOUNTER — HOSPITAL ENCOUNTER (OUTPATIENT)
Facility: HOSPITAL | Age: 42
Setting detail: HOSPITAL OUTPATIENT SURGERY
Discharge: HOME | End: 2022-05-27
Attending: ORTHOPAEDIC SURGERY | Admitting: ORTHOPAEDIC SURGERY

## 2022-05-27 VITALS
DIASTOLIC BLOOD PRESSURE: 86 MMHG | HEART RATE: 110 BPM | OXYGEN SATURATION: 98 % | TEMPERATURE: 98.7 F | BODY MASS INDEX: 35.39 KG/M2 | RESPIRATION RATE: 16 BRPM | WEIGHT: 206.2 LBS | SYSTOLIC BLOOD PRESSURE: 128 MMHG

## 2022-05-27 DIAGNOSIS — M75.122 COMPLETE TEAR OF LEFT ROTATOR CUFF, UNSPECIFIED WHETHER TRAUMATIC: ICD-10-CM

## 2022-05-27 LAB — HCG SERPL QL: NEGATIVE

## 2022-05-27 PROCEDURE — 25010000002 VANCOMYCIN 1.5 G RECONSTITUTED SOLUTION 1 EACH VIAL: Performed by: ORTHOPAEDIC SURGERY

## 2022-05-27 PROCEDURE — C1713 ANCHOR/SCREW BN/BN,TIS/BN: HCPCS | Performed by: ORTHOPAEDIC SURGERY

## 2022-05-27 PROCEDURE — C1763 CONN TISS, NON-HUMAN: HCPCS | Performed by: ORTHOPAEDIC SURGERY

## 2022-05-27 PROCEDURE — 25010000002 PROPOFOL 10 MG/ML EMULSION: Performed by: REGISTERED NURSE

## 2022-05-27 PROCEDURE — 25010000002 EPINEPHRINE PER 0.1 MG: Performed by: ORTHOPAEDIC SURGERY

## 2022-05-27 PROCEDURE — 29827 SHO ARTHRS SRG RT8TR CUF RPR: CPT | Performed by: ORTHOPAEDIC SURGERY

## 2022-05-27 PROCEDURE — 29827 SHO ARTHRS SRG RT8TR CUF RPR: CPT | Performed by: SPECIALIST/TECHNOLOGIST, OTHER

## 2022-05-27 PROCEDURE — 25010000002 ONDANSETRON PER 1 MG: Performed by: NURSE ANESTHETIST, CERTIFIED REGISTERED

## 2022-05-27 PROCEDURE — 25010000002 PHENYLEPHRINE 10 MG/ML SOLUTION: Performed by: REGISTERED NURSE

## 2022-05-27 PROCEDURE — 25010000002 SUCCINYLCHOLINE PER 20 MG: Performed by: REGISTERED NURSE

## 2022-05-27 PROCEDURE — 25010000002 MIDAZOLAM PER 1MG: Performed by: NURSE ANESTHETIST, CERTIFIED REGISTERED

## 2022-05-27 PROCEDURE — 25010000002 DEXAMETHASONE PER 1 MG: Performed by: NURSE ANESTHETIST, CERTIFIED REGISTERED

## 2022-05-27 PROCEDURE — 29823 SHO ARTHRS SRG XTNSV DBRDMT: CPT | Performed by: SPECIALIST/TECHNOLOGIST, OTHER

## 2022-05-27 PROCEDURE — 84703 CHORIONIC GONADOTROPIN ASSAY: CPT | Performed by: ORTHOPAEDIC SURGERY

## 2022-05-27 PROCEDURE — 29823 SHO ARTHRS SRG XTNSV DBRDMT: CPT | Performed by: ORTHOPAEDIC SURGERY

## 2022-05-27 DEVICE — ULTRATAPE 2MM BLUE 38 PKG OF 6: Type: IMPLANTABLE DEVICE | Site: SHOULDER | Status: FUNCTIONAL

## 2022-05-27 DEVICE — ANCHR TNDN REGENETEN TISS/SFT EA/8: Type: IMPLANTABLE DEVICE | Site: SHOULDER | Status: FUNCTIONAL

## 2022-05-27 DEVICE — IMPLANTABLE DEVICE
Type: IMPLANTABLE DEVICE | Site: SHOULDER | Status: FUNCTIONAL
Brand: BIOINDUCTIVE IMPLANT WITH ARTHROSCOPIC DELIVERY SYSTEM - MEDIUM

## 2022-05-27 DEVICE — ULTRATAPE SUTURE COBRAID BLUE, 6                                    PER BOX
Type: IMPLANTABLE DEVICE | Site: SHOULDER | Status: FUNCTIONAL
Brand: ULTRATAPE

## 2022-05-27 DEVICE — HEALICOIL PK 5.5 MM SUTURE ANCHOR                                    WITH THREE ULTRABRAID NO.2 SUTURES                                    BLUE, BLUE-COBRAID, COBRAID-BLACK STERILE
Type: IMPLANTABLE DEVICE | Site: SHOULDER | Status: FUNCTIONAL
Brand: HEALICOIL

## 2022-05-27 DEVICE — MULTIFIX S-ULTRA 5.5MM KNOTLESS ANCHOR
Type: IMPLANTABLE DEVICE | Site: SHOULDER | Status: FUNCTIONAL
Brand: MULTIFIX

## 2022-05-27 DEVICE — BONE ANCHORS 3 WITH ARTHROSCOPIC DELIVERY SYSTEM ADVANCED
Type: IMPLANTABLE DEVICE | Site: SHOULDER | Status: FUNCTIONAL
Brand: BONE ANCHORS WITH ARTHROSCOPIC DELIVERY SYSTEM - ADVANCED

## 2022-05-27 RX ORDER — SODIUM CHLORIDE 0.9 % (FLUSH) 0.9 %
10 SYRINGE (ML) INJECTION AS NEEDED
Status: DISCONTINUED | OUTPATIENT
Start: 2022-05-27 | End: 2022-05-27 | Stop reason: HOSPADM

## 2022-05-27 RX ORDER — SUCCINYLCHOLINE CHLORIDE 20 MG/ML
INJECTION INTRAMUSCULAR; INTRAVENOUS AS NEEDED
Status: DISCONTINUED | OUTPATIENT
Start: 2022-05-27 | End: 2022-05-27 | Stop reason: SURG

## 2022-05-27 RX ORDER — ACETAMINOPHEN 500 MG
1000 TABLET ORAL ONCE
Status: COMPLETED | OUTPATIENT
Start: 2022-05-27 | End: 2022-05-27

## 2022-05-27 RX ORDER — SODIUM CHLORIDE 9 MG/ML
INJECTION, SOLUTION INTRAVENOUS AS NEEDED
Status: DISCONTINUED | OUTPATIENT
Start: 2022-05-27 | End: 2022-05-27 | Stop reason: HOSPADM

## 2022-05-27 RX ORDER — PHENYLEPHRINE HYDROCHLORIDE 10 MG/ML
INJECTION INTRAVENOUS AS NEEDED
Status: DISCONTINUED | OUTPATIENT
Start: 2022-05-27 | End: 2022-05-27 | Stop reason: SURG

## 2022-05-27 RX ORDER — SODIUM CHLORIDE 0.9 % (FLUSH) 0.9 %
10 SYRINGE (ML) INJECTION EVERY 12 HOURS SCHEDULED
Status: DISCONTINUED | OUTPATIENT
Start: 2022-05-27 | End: 2022-05-27 | Stop reason: HOSPADM

## 2022-05-27 RX ORDER — DEXAMETHASONE SODIUM PHOSPHATE 4 MG/ML
8 INJECTION, SOLUTION INTRA-ARTICULAR; INTRALESIONAL; INTRAMUSCULAR; INTRAVENOUS; SOFT TISSUE ONCE AS NEEDED
Status: COMPLETED | OUTPATIENT
Start: 2022-05-27 | End: 2022-05-27

## 2022-05-27 RX ORDER — ROCURONIUM BROMIDE 10 MG/ML
INJECTION, SOLUTION INTRAVENOUS AS NEEDED
Status: DISCONTINUED | OUTPATIENT
Start: 2022-05-27 | End: 2022-05-27 | Stop reason: SURG

## 2022-05-27 RX ORDER — OXYCODONE HYDROCHLORIDE AND ACETAMINOPHEN 5; 325 MG/1; MG/1
1 TABLET ORAL EVERY 4 HOURS PRN
Qty: 42 TABLET | Refills: 0 | Status: SHIPPED | OUTPATIENT
Start: 2022-05-27 | End: 2022-06-02 | Stop reason: SDUPTHER

## 2022-05-27 RX ORDER — SENNA PLUS 8.6 MG/1
1 TABLET ORAL NIGHTLY
Qty: 20 TABLET | Refills: 0 | Status: SHIPPED | OUTPATIENT
Start: 2022-05-27 | End: 2022-08-05

## 2022-05-27 RX ORDER — ESMOLOL HYDROCHLORIDE 10 MG/ML
INJECTION INTRAVENOUS AS NEEDED
Status: DISCONTINUED | OUTPATIENT
Start: 2022-05-27 | End: 2022-05-27 | Stop reason: SURG

## 2022-05-27 RX ORDER — LIDOCAINE HYDROCHLORIDE 10 MG/ML
0.5 INJECTION, SOLUTION EPIDURAL; INFILTRATION; INTRACAUDAL; PERINEURAL ONCE AS NEEDED
Status: DISCONTINUED | OUTPATIENT
Start: 2022-05-27 | End: 2022-05-27 | Stop reason: HOSPADM

## 2022-05-27 RX ORDER — ONDANSETRON 4 MG/1
4 TABLET, FILM COATED ORAL EVERY 8 HOURS PRN
Qty: 30 TABLET | Refills: 0 | Status: SHIPPED | OUTPATIENT
Start: 2022-05-27 | End: 2022-08-05

## 2022-05-27 RX ORDER — SODIUM CHLORIDE 9 MG/ML
40 INJECTION, SOLUTION INTRAVENOUS AS NEEDED
Status: DISCONTINUED | OUTPATIENT
Start: 2022-05-27 | End: 2022-05-27 | Stop reason: HOSPADM

## 2022-05-27 RX ORDER — OXYCODONE AND ACETAMINOPHEN 7.5; 325 MG/1; MG/1
1 TABLET ORAL ONCE AS NEEDED
Status: DISCONTINUED | OUTPATIENT
Start: 2022-05-27 | End: 2022-05-27 | Stop reason: HOSPADM

## 2022-05-27 RX ORDER — ONDANSETRON 2 MG/ML
4 INJECTION INTRAMUSCULAR; INTRAVENOUS ONCE AS NEEDED
Status: COMPLETED | OUTPATIENT
Start: 2022-05-27 | End: 2022-05-27

## 2022-05-27 RX ORDER — PROPOFOL 10 MG/ML
VIAL (ML) INTRAVENOUS AS NEEDED
Status: DISCONTINUED | OUTPATIENT
Start: 2022-05-27 | End: 2022-05-27 | Stop reason: SURG

## 2022-05-27 RX ORDER — LIDOCAINE HYDROCHLORIDE AND EPINEPHRINE 10; 10 MG/ML; UG/ML
INJECTION, SOLUTION INFILTRATION; PERINEURAL AS NEEDED
Status: DISCONTINUED | OUTPATIENT
Start: 2022-05-27 | End: 2022-05-27 | Stop reason: HOSPADM

## 2022-05-27 RX ORDER — FAMOTIDINE 10 MG/ML
20 INJECTION, SOLUTION INTRAVENOUS
Status: COMPLETED | OUTPATIENT
Start: 2022-05-27 | End: 2022-05-27

## 2022-05-27 RX ORDER — SODIUM CHLORIDE, SODIUM LACTATE, POTASSIUM CHLORIDE, CALCIUM CHLORIDE 600; 310; 30; 20 MG/100ML; MG/100ML; MG/100ML; MG/100ML
9 INJECTION, SOLUTION INTRAVENOUS CONTINUOUS PRN
Status: DISCONTINUED | OUTPATIENT
Start: 2022-05-27 | End: 2022-05-27 | Stop reason: HOSPADM

## 2022-05-27 RX ORDER — KETAMINE HYDROCHLORIDE 100 MG/ML
INJECTION INTRAMUSCULAR; INTRAVENOUS AS NEEDED
Status: DISCONTINUED | OUTPATIENT
Start: 2022-05-27 | End: 2022-05-27 | Stop reason: SURG

## 2022-05-27 RX ORDER — FENTANYL CITRATE 50 UG/ML
25 INJECTION, SOLUTION INTRAMUSCULAR; INTRAVENOUS
Status: DISCONTINUED | OUTPATIENT
Start: 2022-05-27 | End: 2022-05-27 | Stop reason: HOSPADM

## 2022-05-27 RX ORDER — ASPIRIN 81 MG/1
81 TABLET ORAL DAILY
Qty: 14 TABLET | Refills: 0 | Status: SHIPPED | OUTPATIENT
Start: 2022-05-27 | End: 2022-09-21

## 2022-05-27 RX ORDER — DEXMEDETOMIDINE HYDROCHLORIDE 100 UG/ML
INJECTION, SOLUTION INTRAVENOUS AS NEEDED
Status: DISCONTINUED | OUTPATIENT
Start: 2022-05-27 | End: 2022-05-27 | Stop reason: SURG

## 2022-05-27 RX ORDER — MIDAZOLAM HYDROCHLORIDE 2 MG/2ML
0.5 INJECTION, SOLUTION INTRAMUSCULAR; INTRAVENOUS
Status: COMPLETED | OUTPATIENT
Start: 2022-05-27 | End: 2022-05-27

## 2022-05-27 RX ORDER — EPHEDRINE SULFATE 50 MG/ML
INJECTION, SOLUTION INTRAVENOUS AS NEEDED
Status: DISCONTINUED | OUTPATIENT
Start: 2022-05-27 | End: 2022-05-27 | Stop reason: SURG

## 2022-05-27 RX ORDER — MELOXICAM 7.5 MG/1
15 TABLET ORAL ONCE
Status: DISCONTINUED | OUTPATIENT
Start: 2022-05-27 | End: 2022-05-27 | Stop reason: HOSPADM

## 2022-05-27 RX ORDER — BUPIVACAINE HYDROCHLORIDE AND EPINEPHRINE 5; 5 MG/ML; UG/ML
INJECTION, SOLUTION EPIDURAL; INTRACAUDAL; PERINEURAL
Status: COMPLETED | OUTPATIENT
Start: 2022-05-27 | End: 2022-05-27

## 2022-05-27 RX ORDER — PREGABALIN 75 MG/1
150 CAPSULE ORAL ONCE
Status: COMPLETED | OUTPATIENT
Start: 2022-05-27 | End: 2022-05-27

## 2022-05-27 RX ADMIN — EPHEDRINE SULFATE 10 MG: 50 INJECTION, SOLUTION INTRAVENOUS at 12:53

## 2022-05-27 RX ADMIN — MIDAZOLAM HYDROCHLORIDE 0.5 MG: 1 INJECTION, SOLUTION INTRAMUSCULAR; INTRAVENOUS at 11:03

## 2022-05-27 RX ADMIN — DEXMEDETOMIDINE 10 MCG: 100 INJECTION, SOLUTION, CONCENTRATE INTRAVENOUS at 11:38

## 2022-05-27 RX ADMIN — KETAMINE HYDROCHLORIDE 20 MG: 100 INJECTION INTRAMUSCULAR; INTRAVENOUS at 11:39

## 2022-05-27 RX ADMIN — DEXAMETHASONE SODIUM PHOSPHATE 8 MG: 4 INJECTION, SOLUTION INTRAMUSCULAR; INTRAVENOUS at 09:32

## 2022-05-27 RX ADMIN — DEXMEDETOMIDINE 4 MCG: 100 INJECTION, SOLUTION, CONCENTRATE INTRAVENOUS at 13:46

## 2022-05-27 RX ADMIN — DEXMEDETOMIDINE 30 MCG: 100 INJECTION, SOLUTION, CONCENTRATE INTRAVENOUS at 11:16

## 2022-05-27 RX ADMIN — ACETAMINOPHEN 1000 MG: 500 TABLET ORAL at 09:29

## 2022-05-27 RX ADMIN — SODIUM CHLORIDE, POTASSIUM CHLORIDE, SODIUM LACTATE AND CALCIUM CHLORIDE 9 ML/HR: 600; 310; 30; 20 INJECTION, SOLUTION INTRAVENOUS at 09:27

## 2022-05-27 RX ADMIN — ROCURONIUM BROMIDE 5 MG: 10 INJECTION INTRAVENOUS at 11:40

## 2022-05-27 RX ADMIN — SUCCINYLCHOLINE CHLORIDE 200 MG: 20 INJECTION, SOLUTION INTRAMUSCULAR; INTRAVENOUS at 11:41

## 2022-05-27 RX ADMIN — MIDAZOLAM HYDROCHLORIDE 1.5 MG: 1 INJECTION, SOLUTION INTRAMUSCULAR; INTRAVENOUS at 11:11

## 2022-05-27 RX ADMIN — FAMOTIDINE 20 MG: 10 INJECTION INTRAVENOUS at 09:32

## 2022-05-27 RX ADMIN — PHENYLEPHRINE HYDROCHLORIDE 50 MCG: 10 INJECTION INTRAVENOUS at 13:11

## 2022-05-27 RX ADMIN — PREGABALIN 150 MG: 75 CAPSULE ORAL at 09:29

## 2022-05-27 RX ADMIN — ONDANSETRON 4 MG: 2 INJECTION INTRAMUSCULAR; INTRAVENOUS at 09:32

## 2022-05-27 RX ADMIN — BUPIVACAINE HYDROCHLORIDE AND EPINEPHRINE BITARTRATE 20 ML: 5; .005 INJECTION, SOLUTION EPIDURAL; INTRACAUDAL; PERINEURAL at 11:16

## 2022-05-27 RX ADMIN — DEXMEDETOMIDINE 10 MCG: 100 INJECTION, SOLUTION, CONCENTRATE INTRAVENOUS at 11:50

## 2022-05-27 RX ADMIN — ESMOLOL HYDROCHLORIDE 50 MG: 100 INJECTION, SOLUTION INTRAVENOUS at 11:40

## 2022-05-27 RX ADMIN — VANCOMYCIN HYDROCHLORIDE 1500 MG: 1.5 INJECTION, POWDER, LYOPHILIZED, FOR SOLUTION INTRAVENOUS at 10:28

## 2022-05-27 RX ADMIN — PROPOFOL 150 MG: 10 INJECTION, EMULSION INTRAVENOUS at 11:40

## 2022-05-27 RX ADMIN — PHENYLEPHRINE HYDROCHLORIDE 50 MCG: 10 INJECTION INTRAVENOUS at 13:10

## 2022-05-27 RX ADMIN — DEXMEDETOMIDINE 10 MCG: 100 INJECTION, SOLUTION, CONCENTRATE INTRAVENOUS at 12:42

## 2022-05-27 RX ADMIN — KETAMINE HYDROCHLORIDE 20 MG: 100 INJECTION INTRAMUSCULAR; INTRAVENOUS at 12:07

## 2022-05-27 RX ADMIN — KETAMINE HYDROCHLORIDE 10 MG: 100 INJECTION INTRAMUSCULAR; INTRAVENOUS at 11:40

## 2022-05-27 NOTE — ANESTHESIA PROCEDURE NOTES
Airway  Urgency: elective    Date/Time: 5/27/2022 11:42 AM  End Time:5/27/2022 11:42 AM  Airway not difficult    General Information and Staff    Patient location during procedure: OR  SRNA: Stevie Win SRNA  Indications and Patient Condition  Indications for airway management: airway protection    Preoxygenated: yes  Mask difficulty assessment: 0 - not attempted    Final Airway Details  Final airway type: endotracheal airway      Successful airway: ETT  Cuffed: yes   Successful intubation technique: direct laryngoscopy  Facilitating devices/methods: anterior pressure/BURP  Endotracheal tube insertion site: oral  Blade: Garcia  Blade size: 2  ETT size (mm): 7.5  Cormack-Lehane Classification: grade IIb - view of arytenoids or posterior of glottis only  Placement verified by: chest auscultation and capnometry   Measured from: lips  Number of attempts at approach: 1  Assessment: lips, teeth, and gum same as pre-op and atraumatic intubation

## 2022-05-27 NOTE — ANESTHESIA PROCEDURE NOTES
Peripheral Block    Pre-sedation assessment completed: 11/3/2022 11:58 AM    Patient reassessed immediately prior to procedure    Patient location during procedure: pre-op  Start time: 5/27/2022 11:10 AM  Stop time: 5/27/2022 11:16 AM  Reason for block: at surgeon's request and post-op pain management  Performed by  CRNA/CAA: Tyrese Michaels, MELIDANA: Stevie Win SRNA  Preanesthetic Checklist  Completed: patient identified, IV checked, site marked, risks and benefits discussed, surgical consent, monitors and equipment checked, pre-op evaluation and timeout performed  Prep:  Pt Position: supine  Sterile barriers:cap, gloves, mask and washed/disinfected hands  Prep: ChloraPrep  Patient monitoring: blood pressure monitoring, continuous pulse oximetry and EKG  Procedure    Sedation: yes  Performed under: local infiltration  Guidance:ultrasound guided and nerve stimulator    ULTRASOUND INTERPRETATION. Using ultrasound guidance a 21 G gauge needle was placed in close proximity to the nerve, at which point, under ultrasound guidance anesthetic was injected in the area of the nerve and spread of the anesthesia was seen on ultrasound in close proximity thereto.  There were no abnormalities seen on ultrasound; a digital image was taken; and the patient tolerated the procedure with no complications. Images:still images obtained, printed/placed on chart    Laterality:left  Block Type:interscalene  Injection Technique:single-shot  Needle Type:echogenic  Needle Gauge:21 G  Resistance on Injection: none    Medications Used: bupivacaine 0.5%-EPINEPHrine PF (MARCAINE w/ EPI) injection, 20 mL  Med administered at 5/27/2022 11:16 AM      Medications  Comment:30 mcg of Precedex in PNB    Post Assessment  Injection Assessment: negative aspiration for heme, no paresthesia on injection and incremental injection  Patient Tolerance:comfortable throughout block  Complications:no

## 2022-05-27 NOTE — ANESTHESIA PREPROCEDURE EVALUATION
Anesthesia Evaluation     Patient summary reviewed and Nursing notes reviewed   no history of anesthetic complications:  NPO Solid Status: > 8 hours  NPO Liquid Status: > 2 hours           Airway   Mallampati: II  TM distance: >3 FB  Neck ROM: full  Possible difficult intubation and Small opening  Dental - normal exam     Pulmonary - normal exam   (+) a smoker (2020) Former,   Cardiovascular   Exercise tolerance: poor (<4 METS) (Limited due to pain)    Rhythm: regular  Rate: normal    (+) hypertension poorly controlled less than 2 medications,       Neuro/Psych  (+) headaches (Migraines-recent negative MRA),    GI/Hepatic/Renal/Endo    (+)  GERD well controlled,  thyroid problem hypothyroidism  Renal disease: interstitial cystitis.    ROS Comment: gastroparesis    Musculoskeletal     (+) back pain, chronic pain, myalgias (fibromyalgia), radiculopathy (occ) Left lower extremity and Right lower extremity  Abdominal   (+) obese,    Substance History - negative use  Alcohol use: history of.     OB/GYN          Other   arthritis (shoulders,fingers, knees), autoimmune disease Sjogren syndrome,      ROS/Med Hx Other: Water with synthroid this am                Anesthesia Plan    ASA 2     general with block   (Clearance on chart by PCP.)  intravenous induction     Anesthetic plan, all risks, benefits, and alternatives have been provided, discussed and informed consent has been obtained with: patient and mother.  Use of blood products discussed with patient and mother  Consented to blood products.       CODE STATUS:

## 2022-05-27 NOTE — ANESTHESIA POSTPROCEDURE EVALUATION
Patient: Aide Henry    Procedure Summary     Date: 05/27/22 Room / Location:  [facility] /  [facility]    Anesthesia Start: 1137 Anesthesia Stop: 1406    Procedure: SHOULDER ARTHROSCOPY WITH ROTATOR CUFF REPAIR COMPLEX, EXTENSIVE DEBRIDEMENT (Left Shoulder) Diagnosis:       Complete tear of left rotator cuff, unspecified whether traumatic      (Complete tear of left rotator cuff, unspecified whether traumatic [M75.122])    Surgeons: Joo Rivers MD Provider: Tyrese Michaels CRNA    Anesthesia Type: general with block ASA Status: 2          Anesthesia Type: general with block    Vitals  Vitals Value Taken Time   /76 05/27/22 1445   Temp 98.6 °F (37 °C) 05/27/22 1401   Pulse 114 05/27/22 1449   Resp 17 05/27/22 1445   SpO2 86 % 05/27/22 1448   Vitals shown include unvalidated device data.        Post Anesthesia Care and Evaluation    Patient location during evaluation: bedside  Patient participation: complete - patient participated  Level of consciousness: awake and alert  Pain score: 0  Pain management: adequate  Airway patency: patent  Anesthetic complications: No anesthetic complications  PONV Status: none  Cardiovascular status: acceptable  Respiratory status: acceptable  Hydration status: acceptable  No anesthesia care post op

## 2022-06-02 ENCOUNTER — OFFICE VISIT (OUTPATIENT)
Dept: ORTHOPEDIC SURGERY | Facility: CLINIC | Age: 42
End: 2022-06-02

## 2022-06-02 VITALS — WEIGHT: 206 LBS | BODY MASS INDEX: 35.17 KG/M2 | HEIGHT: 64 IN

## 2022-06-02 DIAGNOSIS — M75.122 COMPLETE TEAR OF LEFT ROTATOR CUFF, UNSPECIFIED WHETHER TRAUMATIC: ICD-10-CM

## 2022-06-02 DIAGNOSIS — Z98.890 STATUS POST ARTHROSCOPY OF SHOULDER: Primary | ICD-10-CM

## 2022-06-02 PROCEDURE — 99024 POSTOP FOLLOW-UP VISIT: CPT | Performed by: NURSE PRACTITIONER

## 2022-06-02 RX ORDER — OXYCODONE HYDROCHLORIDE AND ACETAMINOPHEN 5; 325 MG/1; MG/1
TABLET ORAL
Qty: 36 TABLET | Refills: 0 | Status: SHIPPED | OUTPATIENT
Start: 2022-06-02 | End: 2022-08-05

## 2022-06-02 NOTE — PROGRESS NOTES
CC: F/u s/p left shoulder arthroscopy with revision of rotator cuff repair with bio inductive implant, extensive debridement of subacromial bursitis, labral fraying, glenoid chondromalacia, humeral head chondromalacia, biceps tendon stump, DOS 2022    Interval History: Patient returns to clinic stating pain is doing fairly well, has been using sling as instructed, denies any numbness or tingling over left arm. No fevers, chills, or sweats, and no drainage from incisions noted.    Exam:   Left shoulder- incisions clean, dry, sutures in place   Positive sensation all distributions left hand and proximal lateral aspect arm   Cap refill < 3 seconds, radial pulse 2+   Positive deltoid firing   Flex/extend fingers/thumb/wrist with 4+/5 strength, positive thumbs up, okay sign, cross finger adduction and abduction against resistance     Impression: s/p left shoulder arthroscopy with revision of rotator cuff repair with bio inductive implant, extensive debridement of subacromial bursitis, labral fraying, glenoid chondromalacia, humeral head chondromalacia, biceps tendon stump     Plan:  1. D/c sutures today and replace with steri-strips- may shower, no submerging wounds x 4 weeks  2. F/u in 3 wks  3. Will start PT at 4 wk darian. Continue use of sling x 4 weeks. Work on finger and wrist ROM. May do gentle elbow ROM 2x/day while out of sling for showering or changing clothes.   4. All questions answered      New Medications Ordered This Visit   Medications   • oxyCODONE-acetaminophen (PERCOCET) 5-325 MG per tablet     Si-2 tablets every 4-6 hours, prn moderate-severe pain     Dispense:  36 tablet     Refill:  0       Orders Placed This Encounter   Procedures   • Ambulatory Referral to Physical Therapy POST OP     Referral Priority:   Routine     Referral Type:   Physical Therapy     Referral Reason:   Specialty Services Required     Requested Specialty:   Physical Therapy     Number of Visits Requested:   1

## 2022-06-07 ENCOUNTER — TRANSCRIBE ORDERS (OUTPATIENT)
Dept: ADMINISTRATIVE | Facility: HOSPITAL | Age: 42
End: 2022-06-07

## 2022-06-07 DIAGNOSIS — Z12.31 VISIT FOR SCREENING MAMMOGRAM: Primary | ICD-10-CM

## 2022-06-14 ENCOUNTER — HOSPITAL ENCOUNTER (OUTPATIENT)
Dept: MAMMOGRAPHY | Facility: HOSPITAL | Age: 42
Discharge: HOME | End: 2022-06-14
Admitting: OBSTETRICS & GYNECOLOGY

## 2022-06-14 DIAGNOSIS — Z12.31 VISIT FOR SCREENING MAMMOGRAM: ICD-10-CM

## 2022-06-14 PROCEDURE — 77063 BREAST TOMOSYNTHESIS BI: CPT

## 2022-06-14 PROCEDURE — 77067 SCR MAMMO BI INCL CAD: CPT

## 2022-06-24 ENCOUNTER — HOSPITAL ENCOUNTER (OUTPATIENT)
Dept: MRI IMAGING | Facility: HOSPITAL | Age: 42
End: 2022-06-24

## 2022-06-30 NOTE — PROGRESS NOTES
"Subjective:     Patient ID: Aide Henry is a 40 y.o. female.    Chief Complaint: Nondisplaced fracture of coronoid process of left ulna, follow-up.  Closed treatment, date of injury 7/1/2020    History of Present Illness  Aide Henry returns to clinic today for evaluation of her left elbow. She is no longer wearing the sling at this time. She continues to perform at-home exercises to increase range of motion. She feels that she is making progress. Localizes pain to the posterior and posterolateral aspects of the elbow, with some intermittent radiation into the arm.She rates the pain as 7/10, describes it as aching and sharp in nature. Has noted improvement with rest and keeping the elbow in a flexed position. Symptoms are exacerbated with extending the left arm.     Her initial injury occurred on 7/1/2020 when she slipped on her pool deck and landed directly on her left elbow.     Social History     Occupational History   • Not on file   Tobacco Use   • Smoking status: Former Smoker     Packs/day: 0.25     Years: 20.00     Pack years: 5.00     Types: Cigarettes   • Smokeless tobacco: Never Used   • Tobacco comment: Declines medication today. Not interested in patches. Has tried gum previously. Desires to continue weaning.    Substance and Sexual Activity   • Alcohol use: Yes     Comment: Alcoholic, Last drink 4-2-2018   • Drug use: Yes     Types: Cocaine, Hydrocodone     Comment: \"recovering addict\", last reports use June 2017   • Sexual activity: Defer      Past Medical History:   Diagnosis Date   • Anxiety    • Bacterial vaginosis    • Ashton esophagus    • Depression with anxiety    • Fibromyalgia    • Fracture, finger    • GERD (gastroesophageal reflux disease)    • Lateral epicondylitis 9/16/2013    RHUEMATOLOGIST   • Lupus (CMS/[facility])    • MRSA (methicillin resistant staph aureus) culture positive 2011 ESTIMATE    GROIN AREA    • Sjogren's syndrome (CMS/[facility])    • Urinary tract infection      Past " Labs were placed  "Surgical History:   Procedure Laterality Date   • ADENOIDECTOMY     •  SECTION     • COLONOSCOPY     • ENDOSCOPY N/A 5/10/2019    Procedure: ESOPHAGOGASTRODUODENOSCOPY with biopsies;  Surgeon: Shanon Vazquez MD;  Location: [facility] OR;  Service: Gastroenterology   • MOUTH SURGERY     • RHINOPLASTY     • SHOULDER ARTHROSCOPY Left 9/10/2018    Procedure: SHOULDER ARTHROSCOPY, rotator cuff repair; extensive debridement, open biceps tenodesis;  Surgeon: Joo Rivers MD;  Location: [facility] OR;  Service: Orthopedics   • TONSILLECTOMY     • TUBAL ABDOMINAL LIGATION     • TYMPANOSTOMY TUBE PLACEMENT     • WISDOM TOOTH EXTRACTION Bilateral        Family History   Problem Relation Age of Onset   • Cancer Father    • Heart disease Paternal Grandmother    • Diabetes Maternal Aunt    • Diabetes Maternal Grandmother    • Colon cancer Neg Hx    • Colon polyps Neg Hx          Review of Systems        Objective:  Vitals:    20   Weight: 90.7 kg (200 lb)   Height: 162.6 cm (64\")         20   Weight: 90.7 kg (200 lb)     Body mass index is 34.33 kg/m².    General: No acute distress.  Resp: normal respiratory effort  Skin: no rashes or wounds; normal turgor  Psych: mood and affect appropriate; recent and remote memory intact        Ortho Exam       Left Elbow-    ROM 5-115 degrees  Flexion- 4/5 strength  Extension- 4/5 strength  Finger flexion/extension - 4/5 strength  Supination/pronation - 4+/5 strength   Positive sensation to light touch all distributions  2+ radial pulse    Imaging:  Three-view x-rays left elbow from today's visit, AP, lateral, oblique views, ordered and reviewed by me, indication closed treatment coronoid process fracture, compared to prior x-rays.  Effusion does appear improved but is still present, minimal additional displacement coronoid process fracture but no evidence of subluxation or dislocation of the radiocapitellar or ulnohumeral joints, overall acceptable alignment of " elbow    Assessment:        1. Nondisplaced fracture of coronoid process of left ulna, initial encounter for closed fracture           Plan:          1. Discussed treatment options at length with patient at today's visit.  2. She has discontinued the sling.  3. She may continue to take anti-inflammatories as needed.  We did give her Medrol Dosepak today to try to help with additional swelling and inflammation  4. Follow-up with me in 6 weeks for repeat x-rays of the left elbow      Aide Henry was in agreement with plan and had all questions answered.     Orders:  Orders Placed This Encounter   Procedures   • XR Elbow 3+ View Left       Medications:  No orders of the defined types were placed in this encounter.      Followup:  No follow-ups on file.    Aide was seen today for follow-up and pain.    Diagnoses and all orders for this visit:    Nondisplaced fracture of coronoid process of left ulna, initial encounter for closed fracture  -     XR Elbow 3+ View Left           By signing my name here, I Lianne Wright attest that all documentation on 07/16/20 at 09:32 has been prepared under the direction and in the presence of Dr. Joo Rivers MD.    I, Dr. Joo Rivers, personally performed the services described in this documentation, as scribed by Lianne Wright, in my presence, and it is both accurate and complete.    Dictated utilizing Dragon dictation

## 2022-07-11 ENCOUNTER — TELEPHONE (OUTPATIENT)
Dept: ORTHOPEDIC SURGERY | Facility: CLINIC | Age: 42
End: 2022-07-11

## 2022-07-11 DIAGNOSIS — Z98.890 STATUS POST ARTHROSCOPY OF SHOULDER: Primary | ICD-10-CM

## 2022-07-12 ENCOUNTER — APPOINTMENT (OUTPATIENT)
Dept: PHYSICAL THERAPY | Facility: HOSPITAL | Age: 42
End: 2022-07-12

## 2022-07-13 ENCOUNTER — HOSPITAL ENCOUNTER (OUTPATIENT)
Dept: PHYSICAL THERAPY | Facility: HOSPITAL | Age: 42
Setting detail: THERAPIES SERIES
Discharge: HOME | End: 2022-07-13

## 2022-07-13 DIAGNOSIS — Z98.890 STATUS POST ARTHROSCOPY OF SHOULDER: Primary | ICD-10-CM

## 2022-07-13 PROCEDURE — 97161 PT EVAL LOW COMPLEX 20 MIN: CPT | Performed by: PHYSICAL THERAPIST

## 2022-07-13 NOTE — THERAPY EVALUATION
Outpatient Physical Therapy Ortho Initial Evaluation  TENISHA Wing     Patient Name: Aide Henry  : 1980  MRN: [id number]  Today's Date: 2022      Visit Date: 2022    Patient Active Problem List   Diagnosis   • Arthralgia of multiple joints   • Chronic back pain   • Chronic constipation   • Disorder of connective tissue ([facility])   • Depression with anxiety   • Fibromyalgia   • Gastroesophageal reflux disease without esophagitis   • Muscle pain   • Palpitations   • Seasonal allergic rhinitis   • Eczema   • Shoulder pain, left   • Substance abuse ([facility])   • Lateral epicondylitis   • Drug addiction syndrome ([facility])   • Gastroenteritis   • Alcohol dependence in remission ([facility])   • History of placement of ear tubes   • Status post arthroscopy of shoulder   • Subacromial impingement of left shoulder   • Biceps tendinitis of left upper extremity   • Complete tear of left rotator cuff   • Smoker   • Sjogren's syndrome with keratoconjunctivitis sicca ([facility])   • Pelvic pain   • Nondisplaced fracture of coronoid process of left ulna, initial encounter for closed fracture   • Left breast lump   • SARMAD (stress urinary incontinence, female)   • Rectocele   • Irritable bowel syndrome with both constipation and diarrhea   • Ashton's esophagus without dysplasia   • IC (interstitial cystitis)   • Overactive bladder   • Gastroparesis   • Chondromalacia of knee   • Lupus ([facility])   • Dysmenorrhea   • History of bilateral tubal ligation   • S/P endometrial ablation: 21   • Blood blister: R labium   • Vaginal discharge   • Carrier of ureaplasma urealyticum   • Mechanical knee pain, left        Past Medical History:   Diagnosis Date   • Anxiety    • Bacterial vaginosis    • Ashton esophagus    • Bulging lumbar disc     lower back per patient   • Depression with anxiety    • Fibromyalgia    • Fracture, finger    • Gastroparesis    • GERD (gastroesophageal reflux disease)    • Hemorrhoids    • History of anemia    •  Hypertension    • Hypothyroid    • IBS (irritable bowel syndrome)    • IC (interstitial cystitis)    • Lateral epicondylitis 2013    RHUEMATOLOGIST   • Lupus ([facility])    • Migraine    • MRSA (methicillin resistant staph aureus) culture positive  ESTIMATE    GROIN AREA    • Overactive bladder    • Sjogren's syndrome ([facility])    • Urinary tract infection         Past Surgical History:   Procedure Laterality Date   • ADENOIDECTOMY     • CARPAL TUNNEL RELEASE Bilateral    •  SECTION     • COLONOSCOPY     • D & C HYSTEROSCOPY N/A 2021    Procedure: DILATATION AND CURETTAGE HYSTEROSCOPY;  Surgeon: Rancho Mayberry MD;  Location: [facility] OR;  Service: Obstetrics/Gynecology;  Laterality: N/A;   • D & C HYSTEROSCOPY ENDOMETRIAL ABLATION N/A 2021    Procedure: DILATATION AND CURETTAGE HYSTEROSCOPY,  NOVASURE ENDOMETRIAL ABLATION;  Surgeon: Rancho Mayberry MD;  Location: [facility] OR;  Service: Obstetrics/Gynecology;  Laterality: N/A;   • ENDOSCOPY N/A 05/10/2019    Procedure: ESOPHAGOGASTRODUODENOSCOPY with biopsies;  Surgeon: Shanon Vazquez MD;  Location: [facility] OR;  Service: Gastroenterology   • ENDOSCOPY N/A 2021    Procedure: ESOPHAGOGASTRODUODENOSCOPY with biopsies;  Surgeon: Romain Rice MD;  Location: [facility] OR;  Service: Gastroenterology;  Laterality: N/A;  barretts  gastritis   • FINGER GANGLION CYST EXCISION Right     middle finger   • MOUTH SURGERY     • OTHER SURGICAL HISTORY      reversal of tubal   • RHINOPLASTY     • SHOULDER ARTHROSCOPY Left 09/10/2018    Procedure: SHOULDER ARTHROSCOPY, rotator cuff repair; extensive debridement, open biceps tenodesis;  Surgeon: Joo Rivers MD;  Location: [facility] OR;  Service: Orthopedics   • SHOULDER ARTHROSCOPY W/ ROTATOR CUFF REPAIR Left 2022    Procedure: SHOULDER ARTHROSCOPY WITH ROTATOR CUFF REPAIR COMPLEX, EXTENSIVE DEBRIDEMENT;  Surgeon: Joo Rivers MD;  Location: [facility] OR;  Service:  Orthopedics;  Laterality: Left;   • TONSILLECTOMY     • TUBAL ABDOMINAL LIGATION     • TYMPANOSTOMY TUBE PLACEMENT     • WISDOM TOOTH EXTRACTION Bilateral        Visit Dx:     ICD-10-CM ICD-9-CM   1. Status post arthroscopy of shoulder  Z98.890 V45.89          Patient History     Row Name 07/13/22 0650             History    Chief Complaint Difficulty with daily activities;Pain;Muscle weakness;Tightness  -      Type of Pain Shoulder pain  left  -GC      Date Current Problem(s) Began 05/27/22  -      Brief Description of Current Complaint Pt had previous left RCR several years ago. She began to experience pain and noticed increased difficulty with daily tasks. She followed up with Dr. Rivers who diagnosed her with a recurrent RCT and he performed a RCR with bioinductive patch on 5/27. Pt was in a sling for 4 weeks and is now coming in for therapy.  -      Patient/Caregiver Goals Relieve pain;Return to prior level of function;Improve mobility;Improve strength  -      Hand Dominance right-handed  -      Occupation/sports/leisure activities homemaker  -              Pain     Pain Location Shoulder  left  -GC      Pain at Present 0  no pain at rest  -GC      Pain at Best 0  -GC      Pain at Worst 3  -GC      Pain Frequency Intermittent  -GC      Pain Description Aching;Discomfort  -GC      What Performance Factors Make the Current Problem(s) WORSE? Pt only has pain if she moves her left shoulder too far  -      What Performance Factors Make the Current Problem(s) BETTER? no pain at rest  -GC      Difficulties with ADL's? Pt has some difficulty with lifting tasks and reaching behind her back and over her head  -              Daily Activities    Primary Language English  -      Are you able to read Yes  -GC      Are you able to write Yes  -GC      How does patient learn best? Listening;Reading  -      Teaching needs identified Home Exercise Program;Management of Condition  -      Patient is concerned  about/has problems with Difficulty with self care (i.e. bathing, dressing, toileting:;Bed Mobility;Performing home management (household chores, shopping, care of dependents);Performing job responsibilities/community activities (work, school,;Performing sports, recreation, and play activities;Reaching over head;Repetitive movements of the hand, arm, shoulder  -GC      Does patient have problems with the following? Anxiety;Depression;Panic Attack;Other (comment)  emotional problems  -GC      Barriers to learning None  -GC      Functional Status mobility issues preventing performance of daily activities  -GC      Pt Participated in POC and Goals Yes  -GC              Safety    Are you being hurt, hit, or frightened by anyone at home or in your life? No  -GC      Are you being neglected by a caregiver No  -GC            User Key  (r) = Recorded By, (t) = Taken By, (c) = Cosigned By    Initials Name Provider Type    GC Ruperto Kimball PT Physical Therapist                 PT Ortho     Row Name 07/13/22 0650       Posture/Observations    Posture/Observations Comments Surgical incisions are nicely healed. Mild deltoid and willy-scapular atrophy noted.  -GC       Left Upper Ext    Lt Shoulder Abduction AROM 86 degrees  -GC    Lt Shoulder Abduction PROM 165 degrees  -GC    Lt Shoulder Flexion AROM 132 degrees  -GC    Lt Shoulder Flexion PROM 170 degrees  -GC    Lt Shoulder External Rotation AROM 67 degrees  -GC    Lt Shoulder External Rotation PROM 90 degrees  -GC    Lt Shoulder Internal Rotation AROM 78 degrees  -GC    Lt Shoulder Internal Rotation PROM 90 degrees  -GC       MMT (Manual Muscle Testing)    General MMT Comments scaption strength is 3+/5  -GC       MMT Left Upper Ext    Lt Shoulder Flexion MMT, Gross Movement (4/5) good  -GC    Lt Shoulder Extension MMT, Gross Movement (4+/5) good plus  -GC    Lt Shoulder ABduction MMT, Gross Movement (4/5) good  -GC    Lt Shoulder Internal Rotation MMT, Gross Movement (4/5)  good  -GC    Lt Shoulder External Rotation MMT, Gross Movement (3+/5) fair plus  -GC    Lt Elbow Flexion MMT, Gross Movement (5/5) normal  -GC    Lt Elbow Extension MMT, Gross Movement (5/5) normal  -GC       Sensation    Light Touch No apparent deficits  -          User Key  (r) = Recorded By, (t) = Taken By, (c) = Cosigned By    Initials Name Provider Type    GC Ruperto Kimball, PT Physical Therapist                            Therapy Education  Given: HEP, Symptoms/condition management, Pain management  Program: New  How Provided: Verbal, Demonstration, Written  Provided to: Patient  Level of Understanding: Teach back education performed, Verbalized, Demonstrated      PT OP Goals     Row Name 07/13/22 0650          PT Short Term Goals    STG Date to Achieve 07/27/22  -     STG 1 Decrease left shoulder pain to 1-2/10 activity.  -     STG 2 Increaes AROM of left shoulder to 150 degrees FLEX, 130 degrees ABD, 75 degrees ER, and 85 degrees IR with testing.  -     STG 3 Increased left shoulder girdle strength to at least 4/5 all planes with testing.  -     STG 4 Pt will be independent with her HEP issued by this therapist.  -            Long Term Goals    LTG Date to Achieve 08/10/22  -     LTG 1 Decrease left shoulder pain to 0-1 activity.  -     LTG 2 Increase AROM of left shoulder to 170 degrees FLEX and ABD and 90 degrees ER and IR with testing.  -GC     LTG 3 Increase left shoulder girdle strength to at least 4+/5 all planes with testing.  -     LTG 4 Pt will be indpendent with all ADLs and have a Quick Dash score < 15.  -            Time Calculation    PT Goal Re-Cert Due Date 08/10/22  -           User Key  (r) = Recorded By, (t) = Taken By, (c) = Cosigned By    Initials Name Provider Type     Ruperto Kimball, PT Physical Therapist                 PT Assessment/Plan     Row Name 07/13/22 0650          PT Assessment    Functional Limitations Limitation in home management;Limitations in  community activities;Limitations in functional capacity and performance;Performance in leisure activities;Performance in self-care ADL  -GC     Impairments Range of motion;Pain;Muscle strength  -GC     Assessment Comments Pt presents approximately 7 weeks s/p left RCR. She c/o little or no pain rating it as a 3/10 if she tries to move her left arm too far. She has decreased AROM, but her PROM is essentially WFL. She has decreased shoulder girdle strength leading to limited daily function.  -GC     Rehab Potential Good  -GC     Patient/caregiver participated in establishment of treatment plan and goals Yes  -GC     Patient would benefit from skilled therapy intervention Yes  -GC            PT Plan    PT Frequency 1x/week;2x/week  -GC     Predicted Duration of Therapy Intervention (PT) 4 weeks  -GC     Planned CPT's? PT EVAL LOW COMPLEXITY: 63746;PT THER PROC EA 15 MIN: 89165;PT MANUAL THERAPY EA 15 MIN: 93912;PT HOT OR COLD PACK TREAT MCARE  -GC     PT Plan Comments P tis to continue her HEP 2x daily  -GC           User Key  (r) = Recorded By, (t) = Taken By, (c) = Cosigned By    Initials Name Provider Type    GC Ruperto Kimball, PT Physical Therapist                 Modalities     Row Name 07/13/22 0650             Moist Heat    MH Applied Yes  -GC      Location left shoulder with pt sitting  -GC      PT Moist Heat Minutes 10  -GC      MH Prior to Rx Yes  -GC            User Key  (r) = Recorded By, (t) = Taken By, (c) = Cosigned By    Initials Name Provider Type    Ruperto Russo, PT Physical Therapist               OP Exercises     Row Name 07/13/22 0650             Exercise 1    Exercise Name 1 Sidelying ER  -GC      Cueing 1 Verbal;Tactile  -GC      Reps 1 25  -GC              Exercise 2    Exercise Name 2 Prone EXT  -GC      Cueing 2 Verbal;Tactile  -GC      Reps 2 25  -GC              Exercise 3    Exercise Name 3 Prone Hor ABD 90  -GC      Cueing 3 Verbal;Tactile  -GC      Reps 3 25  -GC               Exercise 4    Exercise Name 4 Prone Hor   -GC      Cueing 4 Verbal;Tactile  -GC      Reps 4 25  -GC              Exercise 5    Exercise Name 5 Scaption Up  -GC      Cueing 5 Verbal;Tactile  -GC      Reps 5 25  -GC              Exercise 6    Exercise Name 6 Scaption down  -GC      Cueing 6 Verbal;Tactile  -GC      Reps 6 25  -GC            User Key  (r) = Recorded By, (t) = Taken By, (c) = Cosigned By    Initials Name Provider Type    Ruperto Russo PT Physical Therapist              Manual Rx (last 36 hours)     Manual Treatments     Row Name 07/13/22 0650             Manual Rx 1    Manual Rx 1 Location --  -GC      Manual Rx 1 Type --  -GC      Manual Rx 1 Duration --  -GC            User Key  (r) = Recorded By, (t) = Taken By, (c) = Cosigned By    Initials Name Provider Type    Ruperto Russo PT Physical Therapist                            Outcome Measure Options: Quick DASH  Quick DASH  Do heavy household chores (e.g., wash walls, wash floors): Mild Difficulty  Carry a shopping bag or briefcase: Mild Difficulty  Wash your back: Severe Difficulty  Use a knife to cut food: No Difficulty  Recreational activities in which you take some force or impact through your arm, should or hand (e.g. golf, hammering, tennis, etc.): Unable  During the past week, to what extent has your arm, shoulder, or hand problem interfered with your normal social activites with family, friends, neighbors or groups?: Slightly  During the past week, were you limited in your work or other regular daily activities as a result of your arm, shoulder or hand problem?: Slightly Limited  Arm, Shoulder, or hand pain: Mild  Tingling (pins and needles) in your arm, shoulder, or hand: Mild  During the past week, how much difficulty have you had sleeping because of the pain in your arm, shoulder or hand?: Mild Difficulty  Number of Questions Answered: 10  Quick DASH Score: 35         Time Calculation:     Start Time: 0650  Stop Time:  0748  Time Calculation (min): 58 min  Untimed Charges  PT Moist Heat Minutes: 10  Total Minutes  Untimed Charges Total Minutes: 10   Total Minutes: 10     Therapy Charges for Today     Code Description Service Date Service Provider Modifiers Qty    [id number] HC PT EVAL LOW COMPLEXITY 3 7/13/2022 Ruperto Kimball, PT GP 1          PT G-Codes  Outcome Measure Options: Quick DASH  Quick DASH Score: 35         Ruperto Kimball, PT  7/13/2022

## 2022-07-14 ENCOUNTER — OFFICE VISIT (OUTPATIENT)
Dept: ORTHOPEDIC SURGERY | Facility: CLINIC | Age: 42
End: 2022-07-14

## 2022-07-14 VITALS — HEIGHT: 64 IN | WEIGHT: 206 LBS | BODY MASS INDEX: 35.17 KG/M2

## 2022-07-14 DIAGNOSIS — Z98.890 STATUS POST ARTHROSCOPY OF SHOULDER: Primary | ICD-10-CM

## 2022-07-14 PROCEDURE — 99024 POSTOP FOLLOW-UP VISIT: CPT | Performed by: ORTHOPAEDIC SURGERY

## 2022-07-14 RX ORDER — AMLODIPINE BESYLATE 5 MG/1
5 TABLET ORAL DAILY
COMMUNITY
Start: 2022-07-01

## 2022-07-14 NOTE — PROGRESS NOTES
CC: F/u s/p left shoulder revision rotator cuff repair and subacromial decompression  DOS 5/27/2022    Interval History: Patient returns to clinic stating pain is doing fairly well, has been using sling as instructed, denies any numbness or tingling over left arm. No fevers, chills, or sweats, and no drainage from incisions noted. She has started into physical therapy.    The patient is doing well overall, and notes improvement in her symptoms. She has completed physical therapy, and is no longer wearing her sling. The patient reports occasional throbbing pain, but it does not last very long. She notes the patches do not bother her.    Exam:   Left shoulder- incisions healing well   Tolerates FF- 175,   ER- 45   Very mild click lateral subacromial space with mild scar tissue.   FF- Active- 155   Passive- 175   Strength- 4/5   ER- Active- 40   Strength- 4/5   Belly press test strength- 4/5   Positive sensation all distributions left hand and proximal lateral aspect arm, positive deltoid firing   Cap refill < 3 seconds, radial pulse 2+   Positive deltoid firing   Flex/extend fingers/thumb/wrist with 4+/5 strength, positive thumbs up, okay sign, cross finger adduction and abduction against resistance     REHAB:  Patient will be placed on standard rotator cuff repair protocol, we will begin physical therapy at week 4, sling x4 weeks    Impression: s/p left shoulder revision rotator cuff repair and subacromial decompression     Plan:  1. Continue PT for work on ROM and progressing into strengthening per protocol  2. Discontinue sling  3. Instructed on passive ROM exercises to be done multiple times daily at home  4. F/u in 4 weeks to evaluate motion and progress with PT  5. All questions answered      No orders of the defined types were placed in this encounter.      No orders of the defined types were placed in this encounter.        Transcribed from ambient dictation for Joo Rivers MD by Vinod Calderon.  07/14/22    11:10 EDT    Patient verbalized consent to the visit recording.  I have personally performed the services described in this document as transcribed by the above individual, and it is both accurate and complete.  Joo Rivers MD  7/24/2022  13:00 EDT

## 2022-07-20 ENCOUNTER — HOSPITAL ENCOUNTER (OUTPATIENT)
Dept: PHYSICAL THERAPY | Facility: HOSPITAL | Age: 42
Setting detail: THERAPIES SERIES
Discharge: HOME | End: 2022-07-20

## 2022-07-20 DIAGNOSIS — Z98.890 STATUS POST ARTHROSCOPY OF SHOULDER: Primary | ICD-10-CM

## 2022-07-20 PROCEDURE — 97110 THERAPEUTIC EXERCISES: CPT | Performed by: PHYSICAL THERAPIST

## 2022-07-20 PROCEDURE — 97140 MANUAL THERAPY 1/> REGIONS: CPT | Performed by: PHYSICAL THERAPIST

## 2022-07-20 NOTE — THERAPY TREATMENT NOTE
Outpatient Physical Therapy Ortho Treatment Note  TENISHA Wing     Patient Name: Aide Henry  : 1980  MRN: [id number]  Today's Date: 2022      Visit Date: 2022    Visit Dx:    ICD-10-CM ICD-9-CM   1. Status post arthroscopy of shoulder  Z98.890 V45.89       Patient Active Problem List   Diagnosis   • Arthralgia of multiple joints   • Chronic back pain   • Chronic constipation   • Disorder of connective tissue ([facility])   • Depression with anxiety   • Fibromyalgia   • Gastroesophageal reflux disease without esophagitis   • Muscle pain   • Palpitations   • Seasonal allergic rhinitis   • Eczema   • Shoulder pain, left   • Substance abuse ([facility])   • Lateral epicondylitis   • Drug addiction syndrome ([facility])   • Gastroenteritis   • Alcohol dependence in remission ([facility])   • History of placement of ear tubes   • Status post arthroscopy of shoulder   • Subacromial impingement of left shoulder   • Biceps tendinitis of left upper extremity   • Complete tear of left rotator cuff   • Smoker   • Sjogren's syndrome with keratoconjunctivitis sicca ([facility])   • Pelvic pain   • Nondisplaced fracture of coronoid process of left ulna, initial encounter for closed fracture   • Left breast lump   • SARMAD (stress urinary incontinence, female)   • Rectocele   • Irritable bowel syndrome with both constipation and diarrhea   • Ashton's esophagus without dysplasia   • IC (interstitial cystitis)   • Overactive bladder   • Gastroparesis   • Chondromalacia of knee   • Lupus ([facility])   • Dysmenorrhea   • History of bilateral tubal ligation   • S/P endometrial ablation: 21   • Blood blister: R labium   • Vaginal discharge   • Carrier of ureaplasma urealyticum   • Mechanical knee pain, left        Past Medical History:   Diagnosis Date   • Anxiety    • Bacterial vaginosis    • Sahton esophagus    • Bulging lumbar disc     lower back per patient   • Depression with anxiety    • Fibromyalgia    • Fracture, finger    • Gastroparesis     • GERD (gastroesophageal reflux disease)    • Hemorrhoids    • History of anemia    • Hypertension    • Hypothyroid    • IBS (irritable bowel syndrome)    • IC (interstitial cystitis)    • Lateral epicondylitis 2013    RHUEMATOLOGIST   • Lupus (HCC)    • Migraine    • MRSA (methicillin resistant staph aureus) culture positive  ESTIMATE    GROIN AREA    • Overactive bladder    • Sjogren's syndrome (HCC)    • Urinary tract infection         Past Surgical History:   Procedure Laterality Date   • ADENOIDECTOMY     • CARPAL TUNNEL RELEASE Bilateral    •  SECTION     • COLONOSCOPY     • D & C HYSTEROSCOPY N/A 2021    Procedure: DILATATION AND CURETTAGE HYSTEROSCOPY;  Surgeon: Rancho Mayberry MD;  Location: [facility] OR;  Service: Obstetrics/Gynecology;  Laterality: N/A;   • D & C HYSTEROSCOPY ENDOMETRIAL ABLATION N/A 2021    Procedure: DILATATION AND CURETTAGE HYSTEROSCOPY,  NOVASURE ENDOMETRIAL ABLATION;  Surgeon: Rancho Mayberry MD;  Location: [facility] OR;  Service: Obstetrics/Gynecology;  Laterality: N/A;   • ENDOSCOPY N/A 05/10/2019    Procedure: ESOPHAGOGASTRODUODENOSCOPY with biopsies;  Surgeon: Shanon Vazquez MD;  Location: [facility] OR;  Service: Gastroenterology   • ENDOSCOPY N/A 2021    Procedure: ESOPHAGOGASTRODUODENOSCOPY with biopsies;  Surgeon: Romain Rice MD;  Location: [facility] OR;  Service: Gastroenterology;  Laterality: N/A;  barretts  gastritis   • FINGER GANGLION CYST EXCISION Right     middle finger   • MOUTH SURGERY     • OTHER SURGICAL HISTORY      reversal of tubal   • RHINOPLASTY     • SHOULDER ARTHROSCOPY Left 09/10/2018    Procedure: SHOULDER ARTHROSCOPY, rotator cuff repair; extensive debridement, open biceps tenodesis;  Surgeon: Joo Rivers MD;  Location: [facility] OR;  Service: Orthopedics   • SHOULDER ARTHROSCOPY W/ ROTATOR CUFF REPAIR Left 2022    Procedure: SHOULDER ARTHROSCOPY WITH ROTATOR CUFF REPAIR COMPLEX,  EXTENSIVE DEBRIDEMENT;  Surgeon: Joo Rivers MD;  Location: [facility] OR;  Service: Orthopedics;  Laterality: Left;   • TONSILLECTOMY     • TUBAL ABDOMINAL LIGATION     • TYMPANOSTOMY TUBE PLACEMENT     • WISDOM TOOTH EXTRACTION Bilateral                         PT Assessment/Plan     Row Name 07/20/22 0830          PT Assessment    Assessment Comments Pt is doing well with increasing shoulder range and good tolerance to her exercise progression.  -GC            PT Plan    PT Plan Comments Pt is to continue her HEP daily. Will re-ck again next week.  -GC           User Key  (r) = Recorded By, (t) = Taken By, (c) = Cosigned By    Initials Name Provider Type    GC Ruperto Kimball, PT Physical Therapist                 Modalities     Row Name 07/20/22 0830             Moist Heat    MH Applied Yes  -GC      Location left shoulder with pt sitting  -GC      PT Moist Heat Minutes 10  -GC      MH Prior to Rx Yes  -GC              Functional Testing    Outcome Measure Options Quick DASH  -GC            User Key  (r) = Recorded By, (t) = Taken By, (c) = Cosigned By    Initials Name Provider Type    GC Ruperto Kimball, PT Physical Therapist               OP Exercises     Row Name 07/20/22 0830             Subjective Comments    Subjective Comments Pt states her shoulder is doing well, just a little sore. She says she is still having difficulty reaching behind her back.  -GC              Exercise 1    Exercise Name 1 Sidelying ER  -GC      Cueing 1 Verbal;Tactile  -GC      Reps 1 25  -GC      Time 1 1#  -GC              Exercise 2    Exercise Name 2 Prone EXT  -GC      Cueing 2 Verbal;Tactile  -GC      Reps 2 25  -GC      Time 2 1#  -GC              Exercise 3    Exercise Name 3 Prone Hor ABD 90  -GC      Cueing 3 Verbal;Tactile  -GC      Reps 3 25  -GC      Time 3 1#  -GC              Exercise 4    Exercise Name 4 Prone Hor   -GC      Cueing 4 Verbal;Tactile  -GC      Reps 4 25  -GC      Time 4 1#  -GC               Exercise 5    Exercise Name 5 Scaption Up  -GC      Cueing 5 Verbal;Tactile  -GC      Reps 5 25  -GC      Time 5 1#  -GC              Exercise 6    Exercise Name 6 Scaption down  -GC      Cueing 6 Verbal;Tactile  -GC      Reps 6 25  -GC      Time 6 1#  -GC              Exercise 7    Exercise Name 7 Shoulder IR vs theraband  -GC      Cueing 7 Verbal;Demo  -GC      Reps 7 25  -GC      Time 7 blue  -GC              Exercise 8    Exercise Name 8 Shoulder rows vs theraband  -GC      Cueing 8 Verbal;Demo  -GC      Reps 8 25  -GC      Time 8 blue  -GC            User Key  (r) = Recorded By, (t) = Taken By, (c) = Cosigned By    Initials Name Provider Type    GC Ruperto Kimball, PT Physical Therapist                         Manual Rx (last 36 hours)     Manual Treatments     Row Name 07/20/22 0830             Manual Rx 1    Manual Rx 1 Location Left Shoulder  -GC      Manual Rx 1 Type PROM - Flex, ABD, IR, ER  -GC      Manual Rx 1 Duration 15 min  -GC            User Key  (r) = Recorded By, (t) = Taken By, (c) = Cosigned By    Initials Name Provider Type     Ruperto Kimball, PT Physical Therapist                         Outcome Measure Options: Quick DASH         Time Calculation:   Start Time: 0830  Stop Time: 0921  Time Calculation (min): 51 min  Untimed Charges  PT Moist Heat Minutes: 10  Total Minutes  Untimed Charges Total Minutes: 10   Total Minutes: 10  Therapy Charges for Today     Code Description Service Date Service Provider Modifiers Qty    22084348040 HC PT THER PROC EA 15 MIN 7/20/2022 Ruperto Kimball, PT GP 1    86867496094 HC PT MANUAL THERAPY EA 15 MIN 7/20/2022 Ruperto Kimball, PT GP 1          PT G-Codes  Outcome Measure Options: Quick DASH         Ruperto Kimball PT  7/20/2022

## 2022-07-22 ENCOUNTER — HOSPITAL ENCOUNTER (OUTPATIENT)
Dept: MRI IMAGING | Facility: HOSPITAL | Age: 42
Discharge: HOME | End: 2022-07-22
Admitting: NURSE PRACTITIONER

## 2022-07-22 DIAGNOSIS — M25.562 MECHANICAL KNEE PAIN, LEFT: ICD-10-CM

## 2022-07-22 PROCEDURE — 73721 MRI JNT OF LWR EXTRE W/O DYE: CPT

## 2022-07-28 ENCOUNTER — HOSPITAL ENCOUNTER (OUTPATIENT)
Dept: PHYSICAL THERAPY | Facility: HOSPITAL | Age: 42
Setting detail: THERAPIES SERIES
Discharge: HOME | End: 2022-07-28

## 2022-07-28 DIAGNOSIS — Z98.890 STATUS POST ARTHROSCOPY OF SHOULDER: Primary | ICD-10-CM

## 2022-07-28 PROCEDURE — 97140 MANUAL THERAPY 1/> REGIONS: CPT | Performed by: PHYSICAL THERAPIST

## 2022-07-28 PROCEDURE — 97110 THERAPEUTIC EXERCISES: CPT | Performed by: PHYSICAL THERAPIST

## 2022-07-28 NOTE — THERAPY TREATMENT NOTE
Outpatient Physical Therapy Ortho Treatment Note  TENISHA Wing     Patient Name: Aide Henry  : 1980  MRN: [id number]  Today's Date: 2022      Visit Date: 2022    Visit Dx:    ICD-10-CM ICD-9-CM   1. Status post arthroscopy of shoulder  Z98.890 V45.89       Patient Active Problem List   Diagnosis   • Arthralgia of multiple joints   • Chronic back pain   • Chronic constipation   • Disorder of connective tissue ([facility])   • Depression with anxiety   • Fibromyalgia   • Gastroesophageal reflux disease without esophagitis   • Muscle pain   • Palpitations   • Seasonal allergic rhinitis   • Eczema   • Shoulder pain, left   • Substance abuse ([facility])   • Lateral epicondylitis   • Drug addiction syndrome ([facility])   • Gastroenteritis   • Alcohol dependence in remission ([facility])   • History of placement of ear tubes   • Status post arthroscopy of shoulder   • Subacromial impingement of left shoulder   • Biceps tendinitis of left upper extremity   • Complete tear of left rotator cuff   • Smoker   • Sjogren's syndrome with keratoconjunctivitis sicca ([facility])   • Pelvic pain   • Nondisplaced fracture of coronoid process of left ulna, initial encounter for closed fracture   • Left breast lump   • SARMAD (stress urinary incontinence, female)   • Rectocele   • Irritable bowel syndrome with both constipation and diarrhea   • Ashton's esophagus without dysplasia   • IC (interstitial cystitis)   • Overactive bladder   • Gastroparesis   • Chondromalacia of knee   • Lupus ([facility])   • Dysmenorrhea   • History of bilateral tubal ligation   • S/P endometrial ablation: 21   • Blood blister: R labium   • Vaginal discharge   • Carrier of ureaplasma urealyticum   • Mechanical knee pain, left        Past Medical History:   Diagnosis Date   • Anxiety    • Bacterial vaginosis    • Ashton esophagus    • Bulging lumbar disc     lower back per patient   • Depression with anxiety    • Fibromyalgia    • Fracture, finger    • Gastroparesis     • GERD (gastroesophageal reflux disease)    • Hemorrhoids    • History of anemia    • Hypertension    • Hypothyroid    • IBS (irritable bowel syndrome)    • IC (interstitial cystitis)    • Lateral epicondylitis 2013    RHUEMATOLOGIST   • Lupus ([facility])    • Migraine    • MRSA (methicillin resistant staph aureus) culture positive  ESTIMATE    GROIN AREA    • Overactive bladder    • Sjogren's syndrome ([facility])    • Urinary tract infection         Past Surgical History:   Procedure Laterality Date   • ADENOIDECTOMY     • CARPAL TUNNEL RELEASE Bilateral    •  SECTION     • COLONOSCOPY     • D & C HYSTEROSCOPY N/A 2021    Procedure: DILATATION AND CURETTAGE HYSTEROSCOPY;  Surgeon: Rancho Mayberry MD;  Location: [facility] OR;  Service: Obstetrics/Gynecology;  Laterality: N/A;   • D & C HYSTEROSCOPY ENDOMETRIAL ABLATION N/A 2021    Procedure: DILATATION AND CURETTAGE HYSTEROSCOPY,  NOVASURE ENDOMETRIAL ABLATION;  Surgeon: Rancho Mayberry MD;  Location: [facility] OR;  Service: Obstetrics/Gynecology;  Laterality: N/A;   • ENDOSCOPY N/A 05/10/2019    Procedure: ESOPHAGOGASTRODUODENOSCOPY with biopsies;  Surgeon: Shanon Vazquez MD;  Location: [facility] OR;  Service: Gastroenterology   • ENDOSCOPY N/A 2021    Procedure: ESOPHAGOGASTRODUODENOSCOPY with biopsies;  Surgeon: Romain Rice MD;  Location: [facility] OR;  Service: Gastroenterology;  Laterality: N/A;  barretts  gastritis   • FINGER GANGLION CYST EXCISION Right     middle finger   • MOUTH SURGERY     • OTHER SURGICAL HISTORY      reversal of tubal   • RHINOPLASTY     • SHOULDER ARTHROSCOPY Left 09/10/2018    Procedure: SHOULDER ARTHROSCOPY, rotator cuff repair; extensive debridement, open biceps tenodesis;  Surgeon: Joo Rivers MD;  Location: [facility] OR;  Service: Orthopedics   • SHOULDER ARTHROSCOPY W/ ROTATOR CUFF REPAIR Left 2022    Procedure: SHOULDER ARTHROSCOPY WITH ROTATOR CUFF REPAIR COMPLEX,  EXTENSIVE DEBRIDEMENT;  Surgeon: Joo Rivers MD;  [facility];  Service: Orthopedics;  Laterality: Left;   • TONSILLECTOMY     • TUBAL ABDOMINAL LIGATION     • TYMPANOSTOMY TUBE PLACEMENT     • WISDOM TOOTH EXTRACTION Bilateral                         PT Assessment/Plan     Row Name 07/28/22 1110          PT Assessment    Assessment Comments Pt tolerated her exercise progression well.  -GC            PT Plan    PT Plan Comments Pt is to continue her HEP daily.  -GC           User Key  (r) = Recorded By, (t) = Taken By, (c) = Cosigned By    Initials Name Provider Type    GC Ruperto Kimball, PT Physical Therapist                 Modalities     Row Name 07/28/22 1110             Moist Heat    MH Applied Yes  -GC      Location left shoulder with pt sitting  -GC      PT Moist Heat Minutes 10  -GC      MH Prior to Rx Yes  -GC              Functional Testing    Outcome Measure Options Quick DASH  -GC            User Key  (r) = Recorded By, (t) = Taken By, (c) = Cosigned By    Initials Name Provider Type    GC Ruperto Kimball, PT Physical Therapist               OP Exercises     Row Name 07/28/22 1110             Subjective Comments    Subjective Comments Pt states her shoulder is a little sore on top.  -GC              Exercise 1    Exercise Name 1 Sidelying ER  -GC      Cueing 1 Verbal;Tactile  -GC      Reps 1 25  -GC      Time 1 2#  -GC              Exercise 2    Exercise Name 2 Prone EXT  -GC      Cueing 2 Verbal;Tactile  -GC      Reps 2 25  -GC      Time 2 1#  -GC              Exercise 3    Exercise Name 3 Prone Hor ABD 90  -GC      Cueing 3 Verbal;Tactile  -GC      Reps 3 25  -GC      Time 3 2#  -GC              Exercise 4    Exercise Name 4 Prone Hor   -GC      Cueing 4 Verbal;Tactile  -GC      Reps 4 25  -GC      Time 4 2#  -GC              Exercise 5    Exercise Name 5 Scaption Up  -GC      Cueing 5 Verbal;Tactile  -GC      Reps 5 25  -GC      Time 5 2#  -GC              Exercise 6    Exercise  Name 6 Scaption down  -GC      Cueing 6 Verbal;Tactile  -GC      Reps 6 25  -GC      Time 6 2#  -GC              Exercise 7    Exercise Name 7 Shoulder IR vs theraband  -GC      Cueing 7 Verbal;Demo  -GC      Reps 7 25  -GC      Time 7 blue  -GC              Exercise 8    Exercise Name 8 Shoulder rows vs theraband  -GC      Cueing 8 Verbal;Demo  -GC      Reps 8 25  -GC      Time 8 blue  -GC            User Key  (r) = Recorded By, (t) = Taken By, (c) = Cosigned By    Initials Name Provider Type    GC Ruperto Kimball, PT Physical Therapist                         Manual Rx (last 36 hours)     Manual Treatments     Row Name 07/28/22 1110             Manual Rx 1    Manual Rx 1 Location Left Shoulder  -GC      Manual Rx 1 Type PROM - Flex, ABD, IR, ER  -GC      Manual Rx 1 Duration 15 min  -GC            User Key  (r) = Recorded By, (t) = Taken By, (c) = Cosigned By    Initials Name Provider Type     Ruperto Kimball, PT Physical Therapist                         Outcome Measure Options: Quick DASH         Time Calculation:   Start Time: 1110  Stop Time: 1156  Time Calculation (min): 46 min  Untimed Charges  PT Moist Heat Minutes: 10  Total Minutes  Untimed Charges Total Minutes: 10   Total Minutes: 10  Therapy Charges for Today     Code Description Service Date Service Provider Modifiers Qty    06916073436 HC PT THER PROC EA 15 MIN 7/28/2022 Ruperto Kimball, PT GP 1    72777918744 HC PT MANUAL THERAPY EA 15 MIN 7/28/2022 Ruperto Kimball, PT GP 1          PT G-Codes  Outcome Measure Options: Quick DASH         Ruperto Kimball PT  7/28/2022

## 2022-08-02 ENCOUNTER — TRANSCRIBE ORDERS (OUTPATIENT)
Dept: ADMINISTRATIVE | Facility: HOSPITAL | Age: 42
End: 2022-08-02

## 2022-08-02 ENCOUNTER — LAB (OUTPATIENT)
Dept: LAB | Facility: HOSPITAL | Age: 42
End: 2022-08-02

## 2022-08-02 DIAGNOSIS — K31.84 GASTROPARESIS: Primary | ICD-10-CM

## 2022-08-02 DIAGNOSIS — K31.84 GASTROPARESIS: ICD-10-CM

## 2022-08-02 LAB
ALBUMIN SERPL-MCNC: 4.2 G/DL (ref 3.5–5.2)
ALBUMIN/GLOB SERPL: 1.4 G/DL
ALP SERPL-CCNC: 74 U/L (ref 39–117)
ALT SERPL W P-5'-P-CCNC: 151 U/L (ref 1–33)
ANION GAP SERPL CALCULATED.3IONS-SCNC: 12.1 MMOL/L (ref 5–15)
AST SERPL-CCNC: 140 U/L (ref 1–32)
BILIRUB SERPL-MCNC: 0.3 MG/DL (ref 0–1.2)
BUN SERPL-MCNC: 8 MG/DL (ref 6–20)
BUN/CREAT SERPL: 11.4 (ref 7–25)
CALCIUM SPEC-SCNC: 9 MG/DL (ref 8.6–10.5)
CHLORIDE SERPL-SCNC: 105 MMOL/L (ref 98–107)
CO2 SERPL-SCNC: 18.9 MMOL/L (ref 22–29)
CREAT SERPL-MCNC: 0.7 MG/DL (ref 0.57–1)
CRP SERPL-MCNC: 0.6 MG/DL (ref 0–0.5)
EGFRCR SERPLBLD CKD-EPI 2021: 112.3 ML/MIN/1.73
ERYTHROCYTE [SEDIMENTATION RATE] IN BLOOD: 16 MM/HR (ref 0–20)
GLOBULIN UR ELPH-MCNC: 3 GM/DL
GLUCOSE SERPL-MCNC: 107 MG/DL (ref 65–99)
HBA1C MFR BLD: 5.6 % (ref 4.8–5.6)
LDH SERPL-CCNC: 389 U/L (ref 135–214)
POTASSIUM SERPL-SCNC: 3.9 MMOL/L (ref 3.5–5.2)
PROT SERPL-MCNC: 7.2 G/DL (ref 6–8.5)
SODIUM SERPL-SCNC: 136 MMOL/L (ref 136–145)

## 2022-08-02 PROCEDURE — 86334 IMMUNOFIX E-PHORESIS SERUM: CPT

## 2022-08-02 PROCEDURE — 82784 ASSAY IGA/IGD/IGG/IGM EACH: CPT

## 2022-08-02 PROCEDURE — 86341 ISLET CELL ANTIBODY: CPT

## 2022-08-02 PROCEDURE — 86255 FLUORESCENT ANTIBODY SCREEN: CPT

## 2022-08-02 PROCEDURE — 82552 ASSAY OF CPK IN BLOOD: CPT

## 2022-08-02 PROCEDURE — 83036 HEMOGLOBIN GLYCOSYLATED A1C: CPT

## 2022-08-02 PROCEDURE — 85652 RBC SED RATE AUTOMATED: CPT

## 2022-08-02 PROCEDURE — 86140 C-REACTIVE PROTEIN: CPT

## 2022-08-02 PROCEDURE — 80053 COMPREHEN METABOLIC PANEL: CPT

## 2022-08-02 PROCEDURE — 36415 COLL VENOUS BLD VENIPUNCTURE: CPT

## 2022-08-02 PROCEDURE — 83615 LACTATE (LD) (LDH) ENZYME: CPT

## 2022-08-02 PROCEDURE — 82550 ASSAY OF CK (CPK): CPT

## 2022-08-02 PROCEDURE — 85230 CLOT FACTOR VII PROCONVERTIN: CPT

## 2022-08-03 LAB
IGA SERPL-MCNC: 158 MG/DL (ref 87–352)
IGG SERPL-MCNC: 1304 MG/DL (ref 586–1602)
IGM SERPL-MCNC: 54 MG/DL (ref 26–217)
PROT PATTERN SERPL IFE-IMP: NORMAL

## 2022-08-04 LAB
FACT VII ACT/NOR PPP: 123 % (ref 51–186)
GAD65 AB SER IA-ACNC: <5 U/ML (ref 0–5)
HU AB SER QL IF: NEGATIVE
HU2 AB SER QL IF: NEGATIVE
PCA-1 AB SER QL IF: NEGATIVE

## 2022-08-05 ENCOUNTER — OFFICE VISIT (OUTPATIENT)
Dept: ORTHOPEDIC SURGERY | Facility: CLINIC | Age: 42
End: 2022-08-05

## 2022-08-05 VITALS — WEIGHT: 215 LBS | BODY MASS INDEX: 36.7 KG/M2 | HEIGHT: 64 IN

## 2022-08-05 DIAGNOSIS — M22.42 CHONDROMALACIA OF PATELLOFEMORAL JOINT, LEFT: Primary | ICD-10-CM

## 2022-08-05 DIAGNOSIS — M25.562 MECHANICAL KNEE PAIN, LEFT: ICD-10-CM

## 2022-08-05 LAB
CK BB CFR SERPL ELPH: 0 %
CK MACRO1 CFR SERPL: 0 %
CK MACRO2 CFR SERPL: 0 %
CK MB CFR SERPL ELPH: 0 % (ref 0–3)
CK MM CFR SERPL ELPH: 100 % (ref 97–100)
CK SERPL-CCNC: 161 U/L (ref 32–182)

## 2022-08-05 PROCEDURE — 99214 OFFICE O/P EST MOD 30 MIN: CPT | Performed by: NURSE PRACTITIONER

## 2022-08-05 PROCEDURE — 20610 DRAIN/INJ JOINT/BURSA W/O US: CPT | Performed by: NURSE PRACTITIONER

## 2022-08-05 RX ORDER — LIDOCAINE HYDROCHLORIDE 10 MG/ML
8 INJECTION, SOLUTION EPIDURAL; INFILTRATION; INTRACAUDAL; PERINEURAL
Status: COMPLETED | OUTPATIENT
Start: 2022-08-05 | End: 2022-08-05

## 2022-08-05 RX ORDER — HYDROXYCHLOROQUINE SULFATE 200 MG/1
TABLET, FILM COATED ORAL
COMMUNITY
Start: 2022-07-15 | End: 2023-02-13 | Stop reason: SDUPTHER

## 2022-08-05 RX ORDER — TRIAMCINOLONE ACETONIDE 40 MG/ML
80 INJECTION, SUSPENSION INTRA-ARTICULAR; INTRAMUSCULAR
Status: COMPLETED | OUTPATIENT
Start: 2022-08-05 | End: 2022-08-05

## 2022-08-05 RX ADMIN — TRIAMCINOLONE ACETONIDE 80 MG: 40 INJECTION, SUSPENSION INTRA-ARTICULAR; INTRAMUSCULAR at 15:57

## 2022-08-05 RX ADMIN — LIDOCAINE HYDROCHLORIDE 8 ML: 10 INJECTION, SOLUTION EPIDURAL; INFILTRATION; INTRACAUDAL; PERINEURAL at 15:57

## 2022-08-05 NOTE — PROGRESS NOTES
Subjective:     Patient ID: Aide Henry is a 42 y.o. female.    Chief Complaint:  Follow-up mechanical knee pain left  MRI completed presents  History of Present Illness  Aide Henry returns to clinic for follow-up of her left knee.  Completed MRI of the left knee and presents today to discuss results and further plan of care.  Continues to experience popping sensation with activities involving deep flexion with ascending to descending stairs along the anterior aspect of the knee.  She was initially seen in May 2022 for an acute injury that happened 3 months prior she hit the medial aspect of her knee after a fall began experiencing bruising and swelling.  She did note that the knee was giving out on her. Localizes pain to the medial joint line.  She did feel a pop upon injury.  She has continued to experience decreased sensation along the medial joint line as well as swelling in the knee.  Pain present with planting and twisting, standing, seated activities and walking long distances.  She is experiencing comfort with side-to-side motion does feel as if the knee is getting give out on her.  Rates discomfort a 3-4 out of a 10 mainly aching in nature.  Denies other concerns.    Social History     Occupational History   • Not on file   Tobacco Use   • Smoking status: Former Smoker     Packs/day: 0.25     Years: 20.00     Pack years: 5.00     Types: Cigarettes     Quit date:      Years since quittin.5   • Smokeless tobacco: Never Used   Vaping Use   • Vaping Use: Former   Substance and Sexual Activity   • Alcohol use: Not Currently   • Drug use: Not Currently     Types: Cocaine(coke)   • Sexual activity: Defer      Past Medical History:   Diagnosis Date   • Anxiety    • Bacterial vaginosis    • Ashton esophagus    • Bulging lumbar disc     lower back per patient   • Depression with anxiety    • Fibromyalgia    • Fracture, finger    • Gastroparesis    • GERD (gastroesophageal reflux disease)    •  Hemorrhoids    • History of anemia    • Hypertension    • Hypothyroid    • IBS (irritable bowel syndrome)    • IC (interstitial cystitis)    • Lateral epicondylitis 2013    RHUEMATOLOGIST   • Lupus ([facility])    • Migraine    • MRSA (methicillin resistant staph aureus) culture positive  ESTIMATE    GROIN AREA    • Overactive bladder    • Sjogren's syndrome ([facility])    • Urinary tract infection      Past Surgical History:   Procedure Laterality Date   • ADENOIDECTOMY     • CARPAL TUNNEL RELEASE Bilateral    •  SECTION     • COLONOSCOPY     • D & C HYSTEROSCOPY N/A 2021    Procedure: DILATATION AND CURETTAGE HYSTEROSCOPY;  Surgeon: Rancho Mayberry MD;  Location: [facility] OR;  Service: Obstetrics/Gynecology;  Laterality: N/A;   • D & C HYSTEROSCOPY ENDOMETRIAL ABLATION N/A 2021    Procedure: DILATATION AND CURETTAGE HYSTEROSCOPY,  NOVASURE ENDOMETRIAL ABLATION;  Surgeon: Rancho Mayberry MD;  Location: [facility] OR;  Service: Obstetrics/Gynecology;  Laterality: N/A;   • ENDOSCOPY N/A 05/10/2019    Procedure: ESOPHAGOGASTRODUODENOSCOPY with biopsies;  Surgeon: Shanon Vazquez MD;  Location: [facility] OR;  Service: Gastroenterology   • ENDOSCOPY N/A 2021    Procedure: ESOPHAGOGASTRODUODENOSCOPY with biopsies;  Surgeon: Romain Rice MD;  Location: [facility] OR;  Service: Gastroenterology;  Laterality: N/A;  barretts  gastritis   • FINGER GANGLION CYST EXCISION Right     middle finger   • MOUTH SURGERY     • OTHER SURGICAL HISTORY      reversal of tubal   • RHINOPLASTY     • SHOULDER ARTHROSCOPY Left 09/10/2018    Procedure: SHOULDER ARTHROSCOPY, rotator cuff repair; extensive debridement, open biceps tenodesis;  Surgeon: Joo Rivers MD;  Location: [facility] OR;  Service: Orthopedics   • SHOULDER ARTHROSCOPY W/ ROTATOR CUFF REPAIR Left 2022    Procedure: SHOULDER ARTHROSCOPY WITH ROTATOR CUFF REPAIR COMPLEX, EXTENSIVE DEBRIDEMENT;  Surgeon: Joo Rivers  "MD AGAPITO;  Location: [facility];  Service: Orthopedics;  Laterality: Left;   • TONSILLECTOMY     • TUBAL ABDOMINAL LIGATION     • TYMPANOSTOMY TUBE PLACEMENT     • WISDOM TOOTH EXTRACTION Bilateral        Family History   Problem Relation Age of Onset   • Lung cancer Father    • Heart disease Paternal Grandmother    • Diabetes Maternal Aunt    • Breast cancer Maternal Aunt    • Diabetes Maternal Grandmother    • COPD Mother    • Colon cancer Neg Hx    • Colon polyps Neg Hx    • Malig Hyperthermia Neg Hx                Objective:  Physical Exam    General: No acute distress.  Eyes: conjunctiva clear; pupils equally round and reactive  ENT: external ears and nose atraumatic; oropharynx clear  CV: no peripheral edema  Resp: normal respiratory effort  Skin: no rashes or wounds; normal turgor  Psych: mood and affect appropriate; recent and remote memory intact    Vitals:    08/05/22 1430   Weight: 97.5 kg (215 lb)   Height: 162.6 cm (64\")         08/05/22  1430   Weight: 97.5 kg (215 lb)     Body mass index is 36.9 kg/m².      Left Knee Exam     Tenderness   The patient is experiencing tenderness in the patella.    Range of Motion   Extension: 0   Flexion: 130     Tests   Craig:  Medial - negative Lateral - negative  Varus: negative Valgus: negative  Lachman:  Anterior - 1+    Posterior - negative  Drawer:  Anterior - negative     Posterior - negative  Patellar apprehension: positive    Other   Erythema: absent  Sensation: normal  Pulse: present  Swelling: mild  Effusion: no effusion present    Comments:  Positive crepitus throughout arc of motion  Positive active patellar compression test           Imaging:  MRI Knee Left Without Contrast    Result Date: 7/25/2022  1. 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 Lateral meniscus is intact. 2. Cruciate and collateral ligaments are intact. 3. Patellofemoral " chondromalacia, detailed above. 4. Small joint effusion and very small popliteal cyst. Signer Name: Jean Azar MD  Signed: 7/25/2022 9:49 AM  Workstation Name: [id number]  [facility]    Independently reviewed MRI left knee intrameniscal Marksberry degeneration of the posterior horn posterior body segment medial meniscus.  No evidence of discrete tear.  The lateral meniscus remains intact.  Cruciate collateral ligaments remain intact.  Patellofemoral chondromalacia low-grade along the median ridge of the patella.  Low to moderate grade chondromalacia central medial femoral trochlea with mild subarticular marrow edema.  No cartilage disease along the medial compartment no cartilage disease lateral compartment    Assessment:        1. Mechanical knee pain, left    2. Chondromalacia of patellofemoral joint, left           Plan:    Large Joint Arthrocentesis: L knee  Date/Time: 8/5/2022 3:57 PM  Consent given by: patient  Site marked: site marked  Timeout: Immediately prior to procedure a time out was called to verify the correct patient, procedure, equipment, support staff and site/side marked as required   Supporting Documentation  Indications: pain   Procedure Details  Location: knee - L knee  Preparation: Patient was prepped and draped in the usual sterile fashion  Needle size: 22 G  Approach: superior  Medications administered: 80 mg triamcinolone acetonide 40 MG/ML; 8 mL lidocaine PF 1% 1 %  Patient tolerance: patient tolerated the procedure well with no immediate complications          1. Discussed plan of care with patient.  Discussed degenerative change of the medial meniscus without evidence of tear.  Discussed treatment options for chondromalacia or the osteoarthritic changes of the patellofemoral joint including corticosteroid injection.  At this time she does wish to proceed with corticosteroid injection of the left knee.  Please see her back in clinic in 4 weeks to reevaluate.   Discussed application of ice at injection site this evening.  All questions answered.  Orders:  Orders Placed This Encounter   Procedures   • Large Joint Arthrocentesis: L knee     No orders of the defined types were placed in this encounter.    Dragon Dictation utilized.

## 2022-08-06 LAB — STRIA MUS IGG SER QL IF: NORMAL

## 2022-08-09 LAB — ISLET CELL512 AB SER-ACNC: <7.5 U/ML

## 2022-09-14 ENCOUNTER — TRANSCRIBE ORDERS (OUTPATIENT)
Dept: ADMINISTRATIVE | Facility: HOSPITAL | Age: 42
End: 2022-09-14

## 2022-09-14 DIAGNOSIS — I47.1 PAROXYSMAL SUPRAVENTRICULAR TACHYCARDIA: Primary | ICD-10-CM

## 2022-09-21 ENCOUNTER — OFFICE VISIT (OUTPATIENT)
Dept: ORTHOPEDIC SURGERY | Facility: CLINIC | Age: 42
End: 2022-09-21

## 2022-09-21 VITALS — HEIGHT: 64 IN | BODY MASS INDEX: 36.7 KG/M2 | WEIGHT: 215 LBS

## 2022-09-21 DIAGNOSIS — M22.42 CHONDROMALACIA OF PATELLOFEMORAL JOINT, LEFT: ICD-10-CM

## 2022-09-21 DIAGNOSIS — M94.261 CHONDROMALACIA, RIGHT KNEE: ICD-10-CM

## 2022-09-21 DIAGNOSIS — M94.269 CHONDROMALACIA OF KNEE, UNSPECIFIED LATERALITY: ICD-10-CM

## 2022-09-21 DIAGNOSIS — M25.561 MECHANICAL KNEE PAIN, RIGHT: ICD-10-CM

## 2022-09-21 DIAGNOSIS — M25.562 MECHANICAL KNEE PAIN, LEFT: Primary | ICD-10-CM

## 2022-09-21 PROCEDURE — 99214 OFFICE O/P EST MOD 30 MIN: CPT | Performed by: NURSE PRACTITIONER

## 2022-09-21 RX ORDER — TRAZODONE HYDROCHLORIDE 150 MG/1
150 TABLET ORAL NIGHTLY
COMMUNITY
Start: 2022-09-09

## 2022-09-21 RX ORDER — NARATRIPTAN 2.5 MG/1
TABLET ORAL
COMMUNITY
Start: 2022-09-20 | End: 2022-09-21

## 2022-09-21 RX ORDER — METOPROLOL SUCCINATE 25 MG/1
TABLET, EXTENDED RELEASE ORAL
COMMUNITY
Start: 2022-08-24

## 2022-09-22 ENCOUNTER — HOSPITAL ENCOUNTER (OUTPATIENT)
Dept: CARDIOLOGY | Facility: HOSPITAL | Age: 42
Discharge: HOME | End: 2022-09-22
Admitting: INTERNAL MEDICINE

## 2022-09-22 VITALS
WEIGHT: 215 LBS | HEIGHT: 64 IN | SYSTOLIC BLOOD PRESSURE: 140 MMHG | DIASTOLIC BLOOD PRESSURE: 90 MMHG | BODY MASS INDEX: 36.7 KG/M2

## 2022-09-22 DIAGNOSIS — I47.1 PAROXYSMAL SUPRAVENTRICULAR TACHYCARDIA: ICD-10-CM

## 2022-09-22 LAB
AORTIC DIMENSIONLESS INDEX: 0.7 (DI)
BH CV ECHO MEAS - ACS: 2 CM
BH CV ECHO MEAS - AO MAX PG: 5.7 MMHG
BH CV ECHO MEAS - AO MEAN PG: 3 MMHG
BH CV ECHO MEAS - AO ROOT DIAM: 2.9 CM
BH CV ECHO MEAS - AO V2 MAX: 119 CM/SEC
BH CV ECHO MEAS - AO V2 VTI: 19.1 CM
BH CV ECHO MEAS - AVA(I,D): 2.28 CM2
BH CV ECHO MEAS - EDV(CUBED): 103.8 ML
BH CV ECHO MEAS - EDV(MOD-SP2): 41 ML
BH CV ECHO MEAS - EDV(MOD-SP4): 62 ML
BH CV ECHO MEAS - EF(MOD-BP): 60.7 %
BH CV ECHO MEAS - EF(MOD-SP2): 51.2 %
BH CV ECHO MEAS - EF(MOD-SP4): 66.1 %
BH CV ECHO MEAS - ESV(CUBED): 23.6 ML
BH CV ECHO MEAS - ESV(MOD-SP2): 20 ML
BH CV ECHO MEAS - ESV(MOD-SP4): 21 ML
BH CV ECHO MEAS - FS: 39 %
BH CV ECHO MEAS - IVS/LVPW: 1 CM
BH CV ECHO MEAS - IVSD: 0.9 CM
BH CV ECHO MEAS - LAT PEAK E' VEL: 10.9 CM/SEC
BH CV ECHO MEAS - LV DIASTOLIC VOL/BSA (35-75): 30.7 CM2
BH CV ECHO MEAS - LV MASS(C)D: 142.7 GRAMS
BH CV ECHO MEAS - LV MAX PG: 3.2 MMHG
BH CV ECHO MEAS - LV MEAN PG: 2 MMHG
BH CV ECHO MEAS - LV SYSTOLIC VOL/BSA (12-30): 10.4 CM2
BH CV ECHO MEAS - LV V1 MAX: 90.1 CM/SEC
BH CV ECHO MEAS - LV V1 VTI: 14.3 CM
BH CV ECHO MEAS - LVIDD: 4.7 CM
BH CV ECHO MEAS - LVIDS: 2.9 CM
BH CV ECHO MEAS - LVOT AREA: 3 CM2
BH CV ECHO MEAS - LVOT DIAM: 1.97 CM
BH CV ECHO MEAS - LVPWD: 0.9 CM
BH CV ECHO MEAS - MED PEAK E' VEL: 7.5 CM/SEC
BH CV ECHO MEAS - MR MAX PG: 34.8 MMHG
BH CV ECHO MEAS - MR MAX VEL: 295.2 CM/SEC
BH CV ECHO MEAS - MV A MAX VEL: 94.3 CM/SEC
BH CV ECHO MEAS - MV DEC SLOPE: 594.8 CM/SEC2
BH CV ECHO MEAS - MV DEC TIME: 0.1 MSEC
BH CV ECHO MEAS - MV E MAX VEL: 69.2 CM/SEC
BH CV ECHO MEAS - MV E/A: 0.73
BH CV ECHO MEAS - MV MAX PG: 4 MMHG
BH CV ECHO MEAS - MV MEAN PG: 2.5 MMHG
BH CV ECHO MEAS - MV P1/2T: 41.9 MSEC
BH CV ECHO MEAS - MV V2 VTI: 13.5 CM
BH CV ECHO MEAS - MVA(P1/2T): 5.3 CM2
BH CV ECHO MEAS - MVA(VTI): 3.2 CM2
BH CV ECHO MEAS - PA V2 MAX: 107.5 CM/SEC
BH CV ECHO MEAS - QP/QS: 0.9
BH CV ECHO MEAS - RAP SYSTOLE: 3 MMHG
BH CV ECHO MEAS - RV MAX PG: 1.72 MMHG
BH CV ECHO MEAS - RV V1 MAX: 65.5 CM/SEC
BH CV ECHO MEAS - RV V1 VTI: 11.8 CM
BH CV ECHO MEAS - RVOT DIAM: 2.05 CM
BH CV ECHO MEAS - RVSP: 20 MMHG
BH CV ECHO MEAS - SI(MOD-SP2): 10.4 ML/M2
BH CV ECHO MEAS - SI(MOD-SP4): 20.3 ML/M2
BH CV ECHO MEAS - SV(LVOT): 43.6 ML
BH CV ECHO MEAS - SV(MOD-SP2): 21 ML
BH CV ECHO MEAS - SV(MOD-SP4): 41 ML
BH CV ECHO MEAS - SV(RVOT): 39.2 ML
BH CV ECHO MEAS - TR MAX PG: 17.1 MMHG
BH CV ECHO MEAS - TR MAX VEL: 206.9 CM/SEC
BH CV ECHO MEASUREMENTS AVERAGE E/E' RATIO: 7.52
BH CV XLRA - RV BASE: 2.9 CM
BH CV XLRA - RV LENGTH: 6 CM
BH CV XLRA - RV MID: 2.49 CM
BH CV XLRA - TDI S': 12.3 CM/SEC
LEFT ATRIUM VOLUME INDEX: 18.2 ML/M2
LV EF 2D ECHO EST: 60 %
MAXIMAL PREDICTED HEART RATE: 180 BPM
SINUS: 2.7 CM
STJ: 2.25 CM
STRESS TARGET HR: 153 BPM

## 2022-09-22 PROCEDURE — 93306 TTE W/DOPPLER COMPLETE: CPT | Performed by: INTERNAL MEDICINE

## 2022-09-22 PROCEDURE — 93306 TTE W/DOPPLER COMPLETE: CPT

## 2022-09-28 ENCOUNTER — OFFICE VISIT (OUTPATIENT)
Dept: ORTHOPEDIC SURGERY | Facility: CLINIC | Age: 42
End: 2022-09-28

## 2022-09-28 VITALS — HEIGHT: 64 IN | WEIGHT: 215 LBS | BODY MASS INDEX: 36.7 KG/M2

## 2022-09-28 DIAGNOSIS — Z98.890 STATUS POST ARTHROSCOPY OF SHOULDER: Primary | ICD-10-CM

## 2022-09-28 PROCEDURE — 99213 OFFICE O/P EST LOW 20 MIN: CPT | Performed by: ORTHOPAEDIC SURGERY

## 2022-09-28 RX ORDER — MELOXICAM 7.5 MG/1
7.5 TABLET ORAL DAILY
Qty: 30 TABLET | Refills: 0 | Status: SHIPPED | OUTPATIENT
Start: 2022-09-28 | End: 2022-11-09

## 2022-09-28 RX ORDER — METHYLPREDNISOLONE 4 MG/1
TABLET ORAL
Qty: 1 EACH | Refills: 0 | Status: SHIPPED | OUTPATIENT
Start: 2022-09-28 | End: 2022-11-09

## 2022-09-28 NOTE — PROGRESS NOTES
Subjective:     Patient ID: Aide Henry is a 42 y.o. female.    Chief Complaint:  F/u s/p left shoulder revision rotator cuff repair and subacromial decompression  DOS 2022  History of Present Illness  Aide Henry returns to clinic today for evaluation of left shoulder pain.    The patient states that overall she has done fairly well up until the last couple of weeks when she started noticing intermittent, increasing pain over the lateral and anterolateral aspect of her left shoulder. She rates the pain as a 3 to 4 out of 10,  and aching in nature. She has noted some pain over the posterior aspect of her elbow with resisted extension activities, and pain in her right shoulder as well. She denies any numbness or tingling. She denies any episode where she feels like she suddenly exacerbated her pain; however, she does feel like lifting her grandchild recently has caused some exacerbation of the underlying issues. She restarted her therapy exercises and has noted improvement with these. Her Ashton's esophagus is improved at this time. She generally does not take anti -inflammatories; although, she has been using them for 2 weeks due to a migraine.     Social History     Occupational History   • Not on file   Tobacco Use   • Smoking status: Former Smoker     Packs/day: 0.25     Years: 20.00     Pack years: 5.00     Types: Cigarettes     Quit date:      Years since quittin.7   • Smokeless tobacco: Never Used   Vaping Use   • Vaping Use: Former   Substance and Sexual Activity   • Alcohol use: Not Currently   • Drug use: Not Currently     Types: Cocaine(coke)   • Sexual activity: Defer      Past Medical History:   Diagnosis Date   • Anxiety    • Bacterial vaginosis    • Ashton esophagus    • Bulging lumbar disc     lower back per patient   • Depression with anxiety    • Fibromyalgia    • Fracture, finger    • Gastroparesis    • GERD (gastroesophageal reflux disease)    • Hemorrhoids    • History of  anemia    • Hypertension    • Hypothyroid    • IBS (irritable bowel syndrome)    • IC (interstitial cystitis)    • Lateral epicondylitis 2013    RHUEMATOLOGIST   • Lupus (HCC)    • Migraine    • MRSA (methicillin resistant staph aureus) culture positive  ESTIMATE    GROIN AREA    • Overactive bladder    • Sjogren's syndrome (HCC)    • Urinary tract infection      Past Surgical History:   Procedure Laterality Date   • ADENOIDECTOMY     • CARPAL TUNNEL RELEASE Bilateral    •  SECTION     • COLONOSCOPY     • D & C HYSTEROSCOPY N/A 2021    Procedure: DILATATION AND CURETTAGE HYSTEROSCOPY;  Surgeon: Rancho Mayberry MD;  Location: [facility] OR;  Service: Obstetrics/Gynecology;  Laterality: N/A;   • D & C HYSTEROSCOPY ENDOMETRIAL ABLATION N/A 2021    Procedure: DILATATION AND CURETTAGE HYSTEROSCOPY,  NOVASURE ENDOMETRIAL ABLATION;  Surgeon: Rancho Mayberry MD;  Location: [facility] OR;  Service: Obstetrics/Gynecology;  Laterality: N/A;   • ENDOSCOPY N/A 05/10/2019    Procedure: ESOPHAGOGASTRODUODENOSCOPY with biopsies;  Surgeon: Shanon Vazquez MD;  Location: [facility] OR;  Service: Gastroenterology   • ENDOSCOPY N/A 2021    Procedure: ESOPHAGOGASTRODUODENOSCOPY with biopsies;  Surgeon: Romain Rice MD;  Location: [facility] OR;  Service: Gastroenterology;  Laterality: N/A;  barretts  gastritis   • FINGER GANGLION CYST EXCISION Right     middle finger   • MOUTH SURGERY     • OTHER SURGICAL HISTORY      reversal of tubal   • RHINOPLASTY     • SHOULDER ARTHROSCOPY Left 09/10/2018    Procedure: SHOULDER ARTHROSCOPY, rotator cuff repair; extensive debridement, open biceps tenodesis;  Surgeon: Joo Rivers MD;  Location: [facility] OR;  Service: Orthopedics   • SHOULDER ARTHROSCOPY W/ ROTATOR CUFF REPAIR Left 2022    Procedure: SHOULDER ARTHROSCOPY WITH ROTATOR CUFF REPAIR COMPLEX, EXTENSIVE DEBRIDEMENT;  Surgeon: Joo Rivers MD;  Location: [facility] OR;   "Service: Orthopedics;  Laterality: Left;   • TONSILLECTOMY     • TUBAL ABDOMINAL LIGATION     • TYMPANOSTOMY TUBE PLACEMENT     • WISDOM TOOTH EXTRACTION Bilateral        Family History   Problem Relation Age of Onset   • Lung cancer Father    • Heart disease Paternal Grandmother    • Diabetes Maternal Aunt    • Breast cancer Maternal Aunt    • Diabetes Maternal Grandmother    • COPD Mother    • Colon cancer Neg Hx    • Colon polyps Neg Hx    • Malig Hyperthermia Neg Hx          Review of Systems        Objective:  Vitals:    09/28/22 1341   Weight: 97.5 kg (215 lb)   Height: 162.6 cm (64\")         09/28/22  1341   Weight: 97.5 kg (215 lb)     Body mass index is 36.9 kg/m².  General: No acute distress.  Resp: normal respiratory effort  Skin: no rashes or wounds; normal turgor  Psych: mood and affect appropriate; recent and remote memory intact          Ortho Exam       left shoulder-  Tenderness  located lateral  FF-   Active- 165 degrees    Strength- 4+/5  ER-     Active- 40 degrees    Strength- 4+/5  IR            Strength- 5/5  Bear hug sign-negative  Empty can test- positive  Drop arm test- negative  Ext rotation lag sign- negative  Neer's sign- positive  Mason- positive          Imaging:  None today  Assessment:        1. Status post arthroscopy of shoulder           Plan:          1. Discussed treatment options at length with patient at today's visit. At this point in time, we will try a Medrol dosepak followed by meloxicam. She does have Ashton's esophagus but states it has been healed and doing well at this point in time with no precautions given to her per her report from her physicians in regards to any NSAID use.  2.  I did tell her that I do want her to stay on a very short course of these in order to try not to exacerbate her GI system.  3. She will continue with home exercises, work on strength and range of motion.  4. Follow up: in 6 weeks. If her pain has not improved at that time, we may consider " imaging of elbow in particular. She was in agreement, and had all questions answered.      Aide Henry was in agreement with plan and had all questions answered.     Orders:  No orders of the defined types were placed in this encounter.      Medications:  New Medications Ordered This Visit   Medications   • meloxicam (MOBIC) 7.5 MG tablet     Sig: Take 1 tablet by mouth Daily.     Dispense:  30 tablet     Refill:  0   • methylPREDNISolone (MEDROL) 4 MG dose pack     Sig: Take as directed on package instructions.     Dispense:  1 each     Refill:  0       Followup:  Return in about 6 weeks (around 11/9/2022).    Diagnoses and all orders for this visit:    1. Status post arthroscopy of shoulder (Primary)    Other orders  -     meloxicam (MOBIC) 7.5 MG tablet; Take 1 tablet by mouth Daily.  Dispense: 30 tablet; Refill: 0  -     methylPREDNISolone (MEDROL) 4 MG dose pack; Take as directed on package instructions.  Dispense: 1 each; Refill: 0          Dictated utilizing Dragon dictation       Transcribed from ambient dictation for Joo Rivers MD by Destinee Munoz.  09/28/22   14:39 EDT    Patient verbalized consent to the visit recording.  I have personally performed the services described in this document as transcribed by the above individual, and it is both accurate and complete.

## 2022-11-09 ENCOUNTER — OFFICE VISIT (OUTPATIENT)
Dept: ORTHOPEDIC SURGERY | Facility: CLINIC | Age: 42
End: 2022-11-09

## 2022-11-09 VITALS — BODY MASS INDEX: 36.7 KG/M2 | WEIGHT: 215 LBS | HEIGHT: 64 IN

## 2022-11-09 DIAGNOSIS — M19.019 AC JOINT ARTHROPATHY: ICD-10-CM

## 2022-11-09 DIAGNOSIS — Z98.890 STATUS POST ARTHROSCOPY OF SHOULDER: Primary | ICD-10-CM

## 2022-11-09 PROCEDURE — 99213 OFFICE O/P EST LOW 20 MIN: CPT | Performed by: ORTHOPAEDIC SURGERY

## 2022-11-09 RX ORDER — GABAPENTIN 300 MG/1
CAPSULE ORAL
COMMUNITY
Start: 2022-10-07

## 2022-11-09 NOTE — PROGRESS NOTES
Subjective:     Patient ID: Aide Henry is a 42 y.o. female.    Chief Complaint:   F/u s/p left shoulder revision rotator cuff repair and subacromial decompression  DOS 2022  Plan last visit-Medrol Dosepak followed by meloxicam    History of Present Illness  Aide Henry returns to clinic today for evaluation of  left shoulder pain. Overall, she is doing reasonably well with her shoulder, but she is noticing some increasing levels of pain, particularly over the superior shoulder, localized primarily over the AC joint, worse with overhead and cross arm activities as well as extending her arm out to the side. She rates it as a 5 to 6 out of 10, aching in nature, occasional sharp pain noted. She does have some occasional pain over the lateral subacromial space as well as over the anterior shoulder, but these are minimal in relation to the others. She denies associated numbness or tingling. She is about to start a plasma treatment program for her severe gastroparesis, which according to the patient is related to some underlying autoimmune issues.       Social History     Occupational History   • Not on file   Tobacco Use   • Smoking status: Former     Packs/day: 0.25     Years: 20.00     Pack years: 5.00     Types: Cigarettes     Quit date:      Years since quittin.8   • Smokeless tobacco: Never   Vaping Use   • Vaping Use: Former   Substance and Sexual Activity   • Alcohol use: Not Currently   • Drug use: Not Currently     Types: Cocaine(coke)   • Sexual activity: Defer      Past Medical History:   Diagnosis Date   • Anxiety    • Bacterial vaginosis    • Ashton esophagus    • Bulging lumbar disc     lower back per patient   • Depression with anxiety    • Fibromyalgia    • Fracture, finger    • Gastroparesis    • GERD (gastroesophageal reflux disease)    • Hemorrhoids    • History of anemia    • Hypertension    • Hypothyroid    • IBS (irritable bowel syndrome)    • IC (interstitial cystitis)    •  Lateral epicondylitis 2013    RHUEMATOLOGIST   • Lupus (HCC)    • Migraine    • MRSA (methicillin resistant staph aureus) culture positive  ESTIMATE    GROIN AREA    • Overactive bladder    • Sjogren's syndrome (HCC)    • Urinary tract infection      Past Surgical History:   Procedure Laterality Date   • ADENOIDECTOMY     • CARPAL TUNNEL RELEASE Bilateral    •  SECTION     • COLONOSCOPY     • D & C HYSTEROSCOPY N/A 2021    Procedure: DILATATION AND CURETTAGE HYSTEROSCOPY;  Surgeon: Rancho Mayberry MD;  Location: [facility] OR;  Service: Obstetrics/Gynecology;  Laterality: N/A;   • D & C HYSTEROSCOPY ENDOMETRIAL ABLATION N/A 2021    Procedure: DILATATION AND CURETTAGE HYSTEROSCOPY,  NOVASURE ENDOMETRIAL ABLATION;  Surgeon: Rancho Mayberry MD;  Location: [facility] OR;  Service: Obstetrics/Gynecology;  Laterality: N/A;   • ENDOSCOPY N/A 05/10/2019    Procedure: ESOPHAGOGASTRODUODENOSCOPY with biopsies;  Surgeon: Shanon Vazquez MD;  Location: [facility] OR;  Service: Gastroenterology   • ENDOSCOPY N/A 2021    Procedure: ESOPHAGOGASTRODUODENOSCOPY with biopsies;  Surgeon: Romain Rice MD;  Location: [facility] OR;  Service: Gastroenterology;  Laterality: N/A;  barretts  gastritis   • FINGER GANGLION CYST EXCISION Right     middle finger   • MOUTH SURGERY     • OTHER SURGICAL HISTORY      reversal of tubal   • RHINOPLASTY     • SHOULDER ARTHROSCOPY Left 09/10/2018    Procedure: SHOULDER ARTHROSCOPY, rotator cuff repair; extensive debridement, open biceps tenodesis;  Surgeon: Joo Rivers MD;  Location: [facility] OR;  Service: Orthopedics   • SHOULDER ARTHROSCOPY W/ ROTATOR CUFF REPAIR Left 2022    Procedure: SHOULDER ARTHROSCOPY WITH ROTATOR CUFF REPAIR COMPLEX, EXTENSIVE DEBRIDEMENT;  Surgeon: Joo Rivers MD;  Location: [facility] OR;  Service: Orthopedics;  Laterality: Left;   • TONSILLECTOMY     • TUBAL ABDOMINAL LIGATION     • TYMPANOSTOMY TUBE  "PLACEMENT     • WISDOM TOOTH EXTRACTION Bilateral        Family History   Problem Relation Age of Onset   • Lung cancer Father    • Heart disease Paternal Grandmother    • Diabetes Maternal Aunt    • Breast cancer Maternal Aunt    • Diabetes Maternal Grandmother    • COPD Mother    • Colon cancer Neg Hx    • Colon polyps Neg Hx    • Malig Hyperthermia Neg Hx          Review of Systems        Objective:  Vitals:    11/09/22 1030   Weight: 97.5 kg (215 lb)   Height: 162.6 cm (64\")         11/09/22  1030   Weight: 97.5 kg (215 lb)     Body mass index is 36.9 kg/m².  General: No acute distress.  Resp: normal respiratory effort  Skin: no rashes or wounds; normal turgor  Psych: mood and affect appropriate; recent and remote memory intact          Ortho Exam       Left shoulder-  Maximal tenderness is over the AC joint  Mild soft tissue swelling noted in the region  No erythema or fluctuance  Active Forward Flexion- to 165 degrees  Strength- 4+/5  Active External Rotation- to 50 degrees  Strength- 4+/5  Internal Rotation- T12  Belly press test strength- 5/5  Neer's sign- Mildly positive  Mason- Mildly positive  Cross arm test- Positive  Tenderness over AC joint- Positive  Brisk cap refill to all digits, palpable 2+ radial pulse    Imaging:  None today  Assessment:        1. Status post arthroscopy of shoulder    2. AC joint arthropathy           Plan:        1. Discussed treatment options at length with patient at today's visit.    2. At this point in time, it seems like the majority of her symptoms are coming from her AC joint. We discussed options. Recommended AC joint injection under ultrasound at sports medicine office for diagnostic as well as therapeutic purposes.    3. Order entered for referral.    4. I will follow up with her on an as needed basis based on response to the injection and consistency of her symptoms.        Aide Henry was in agreement with plan and had all questions answered. "     Orders:  Orders Placed This Encounter   Procedures   • Ambulatory Referral to Sports Medicine       Medications:  No orders of the defined types were placed in this encounter.      Followup:  Return if symptoms worsen or fail to improve.    Diagnoses and all orders for this visit:    1. Status post arthroscopy of shoulder (Primary)    2. AC joint arthropathy  -     Ambulatory Referral to Sports Medicine      Transcribed from ambient dictation for Joo Rivers MD by Danielle Shi.  11/09/22   11:46 EST    Patient or patient representative verbalized consent to the visit recording.  I have personally performed the services described in this document as transcribed by the above individual, and it is both accurate and complete.

## 2022-12-15 ENCOUNTER — OFFICE VISIT (OUTPATIENT)
Dept: SPORTS MEDICINE | Facility: CLINIC | Age: 42
End: 2022-12-15

## 2022-12-15 VITALS
BODY MASS INDEX: 36.7 KG/M2 | DIASTOLIC BLOOD PRESSURE: 76 MMHG | SYSTOLIC BLOOD PRESSURE: 124 MMHG | HEART RATE: 99 BPM | HEIGHT: 64 IN | OXYGEN SATURATION: 99 % | WEIGHT: 215 LBS | RESPIRATION RATE: 16 BRPM | TEMPERATURE: 98 F

## 2022-12-15 DIAGNOSIS — M25.512 ARTHRALGIA OF LEFT ACROMIOCLAVICULAR JOINT: Primary | ICD-10-CM

## 2022-12-15 PROCEDURE — 20606 DRAIN/INJ JOINT/BURSA W/US: CPT | Performed by: FAMILY MEDICINE

## 2022-12-15 RX ORDER — TRIAMCINOLONE ACETONIDE 40 MG/ML
20 INJECTION, SUSPENSION INTRA-ARTICULAR; INTRAMUSCULAR
Status: DISCONTINUED | OUTPATIENT
Start: 2022-12-15 | End: 2022-12-15 | Stop reason: HOSPADM

## 2022-12-15 RX ADMIN — TRIAMCINOLONE ACETONIDE 20 MG: 40 INJECTION, SUSPENSION INTRA-ARTICULAR; INTRAMUSCULAR at 10:52

## 2022-12-15 NOTE — PROGRESS NOTES
"- Medium Joint Arthrocentesis: L acromioclavicular on 12/15/2022 10:52 AM  Indications: pain  Details: 22 G (3.5\") needle, ultrasound-guided anterolateral approach  Medications: 20 mg triamcinolone acetonide 40 MG/ML  Outcome: tolerated well, no immediate complications  Procedure, treatment alternatives, risks and benefits explained, specific risks discussed. Consent was given by the patient. Immediately prior to procedure a time out was called to verify the correct patient, procedure, equipment, support staff and site/side marked as required. Patient was prepped and draped in the usual sterile fashion.           "

## 2022-12-19 ENCOUNTER — PATIENT ROUNDING (BHMG ONLY) (OUTPATIENT)
Dept: SPORTS MEDICINE | Facility: CLINIC | Age: 42
End: 2022-12-19

## 2022-12-19 NOTE — PROGRESS NOTES
December 19, 2022    A [employer] Message has been sent to the patient for PATIENT ROUNDING with [facility]

## 2023-02-13 ENCOUNTER — OFFICE VISIT (OUTPATIENT)
Dept: ORTHOPEDIC SURGERY | Facility: CLINIC | Age: 43
End: 2023-02-13
Payer: COMMERCIAL

## 2023-02-13 VITALS
HEIGHT: 64 IN | SYSTOLIC BLOOD PRESSURE: 108 MMHG | DIASTOLIC BLOOD PRESSURE: 78 MMHG | BODY MASS INDEX: 33.8 KG/M2 | WEIGHT: 198 LBS | HEART RATE: 120 BPM

## 2023-02-13 DIAGNOSIS — M19.019 AC JOINT ARTHROPATHY: ICD-10-CM

## 2023-02-13 DIAGNOSIS — G89.29 CHRONIC LEFT SHOULDER PAIN: ICD-10-CM

## 2023-02-13 DIAGNOSIS — M25.512 CHRONIC LEFT SHOULDER PAIN: ICD-10-CM

## 2023-02-13 DIAGNOSIS — Z98.890 STATUS POST ARTHROSCOPY OF SHOULDER: Primary | ICD-10-CM

## 2023-02-13 PROCEDURE — 99213 OFFICE O/P EST LOW 20 MIN: CPT | Performed by: ORTHOPAEDIC SURGERY

## 2023-02-13 PROCEDURE — 73030 X-RAY EXAM OF SHOULDER: CPT | Performed by: ORTHOPAEDIC SURGERY

## 2023-02-13 RX ORDER — OMEPRAZOLE 40 MG/1
1 CAPSULE, DELAYED RELEASE ORAL 2 TIMES DAILY
COMMUNITY
Start: 2022-11-14

## 2023-02-13 RX ORDER — ERGOCALCIFEROL 1.25 MG/1
1 CAPSULE ORAL WEEKLY
COMMUNITY
Start: 2022-12-30

## 2023-02-13 RX ORDER — HYDROXYCHLOROQUINE SULFATE 200 MG/1
200 TABLET, FILM COATED ORAL DAILY
COMMUNITY
Start: 2023-01-26

## 2023-02-13 RX ORDER — ERGOCALCIFEROL 1.25 MG/1
50000 CAPSULE ORAL
COMMUNITY
Start: 2022-12-30 | End: 2023-02-13 | Stop reason: SDUPTHER

## 2023-02-13 RX ORDER — HUMAN IMMUNOGLOBULIN G 5 G/50ML
LIQUID INTRAVENOUS
COMMUNITY
Start: 2023-01-19

## 2023-02-13 RX ORDER — CEVIMELINE HYDROCHLORIDE 30 MG/1
CAPSULE ORAL
COMMUNITY
Start: 2023-01-31

## 2023-02-13 RX ORDER — SALICYLIC ACID 6 G/100G
AEROSOL, FOAM TOPICAL
COMMUNITY
Start: 2022-10-23

## 2023-02-13 RX ORDER — LEVOTHYROXINE SODIUM 0.05 MG/1
50 TABLET ORAL DAILY
COMMUNITY
Start: 2023-01-02

## 2023-02-13 NOTE — PROGRESS NOTES
Subjective:     Patient ID: Aide Henry is a 42 y.o. female.    Chief Complaint:  Bilateral shoulder pain, new issue with right shoulder  Prior left shoulder revision rotator cuff repair and subacromial decompression-5/27/2022    History of Present Illness  Aide Henry returns to the clinic for evaluation of bilateral shoulder pain, left worse than right. She has noted over the last 6 to 8 weeks increasing levels of pain, particularly over her lateral and posterolateral aspect of her left shoulder. We did send her for an AC joint injection recently under ultrasound over at [place] and she states that she is indeterminate whether she got much relief out of that or not. She denies any numbness or tingling. She rates the pain as a 7 to 8 out of 10. She states it is intermittent in nature. When it does happen, it feels like her shoulder is locking on her like she has a time moving and that does happen multiple times per week. She denies any fevers, chills, or sweats. No issues with her incision. She is also starting to have some increasing right shoulder pain anterolaterally that she rates as a 5 to 6 out of 10.     Social History     Occupational History   • Not on file   Tobacco Use   • Smoking status: Former     Packs/day: 0.25     Years: 20.00     Pack years: 5.00     Types: Cigarettes     Quit date: 2020     Years since quitting: 3.1     Passive exposure: Never   • Smokeless tobacco: Never   Vaping Use   • Vaping Use: Former   Substance and Sexual Activity   • Alcohol use: Not Currently   • Drug use: Not Currently     Types: Cocaine(coke)   • Sexual activity: Defer      Past Medical History:   Diagnosis Date   • Anxiety    • Bacterial vaginosis    • Ashton esophagus    • Bulging lumbar disc     lower back per patient   • Depression with anxiety    • Fibromyalgia    • Fracture, finger    • Gastroparesis    • GERD (gastroesophageal reflux disease)    • Hemorrhoids    • History of anemia    • Hypertension     • Hypothyroid    • IBS (irritable bowel syndrome)    • IC (interstitial cystitis)    • Lateral epicondylitis 2013    RHUEMATOLOGIST   • Lupus (HCC)    • Migraine    • MRSA (methicillin resistant staph aureus) culture positive  ESTIMATE    GROIN AREA    • Overactive bladder    • Sjogren's syndrome (HCC)    • Urinary tract infection      Past Surgical History:   Procedure Laterality Date   • ADENOIDECTOMY     • CARPAL TUNNEL RELEASE Bilateral    •  SECTION     • COLONOSCOPY     • D & C HYSTEROSCOPY N/A 2021    Procedure: DILATATION AND CURETTAGE HYSTEROSCOPY;  Surgeon: Rancho Mayberry MD;  Location: [facility] OR;  Service: Obstetrics/Gynecology;  Laterality: N/A;   • D & C HYSTEROSCOPY ENDOMETRIAL ABLATION N/A 2021    Procedure: DILATATION AND CURETTAGE HYSTEROSCOPY,  NOVASURE ENDOMETRIAL ABLATION;  Surgeon: Rancho Mayberry MD;  Location: [facility] OR;  Service: Obstetrics/Gynecology;  Laterality: N/A;   • ENDOSCOPY N/A 05/10/2019    Procedure: ESOPHAGOGASTRODUODENOSCOPY with biopsies;  Surgeon: Shanon Vazquez MD;  Location: [facility] OR;  Service: Gastroenterology   • ENDOSCOPY N/A 2021    Procedure: ESOPHAGOGASTRODUODENOSCOPY with biopsies;  Surgeon: Romain Rice MD;  Location: [facility] OR;  Service: Gastroenterology;  Laterality: N/A;  barretts  gastritis   • FINGER GANGLION CYST EXCISION Right     middle finger   • MOUTH SURGERY     • OTHER SURGICAL HISTORY      reversal of tubal   • RHINOPLASTY     • SHOULDER ARTHROSCOPY Left 09/10/2018    Procedure: SHOULDER ARTHROSCOPY, rotator cuff repair; extensive debridement, open biceps tenodesis;  Surgeon: Joo Rivers MD;  Location: [facility] OR;  Service: Orthopedics   • SHOULDER ARTHROSCOPY W/ ROTATOR CUFF REPAIR Left 2022    Procedure: SHOULDER ARTHROSCOPY WITH ROTATOR CUFF REPAIR COMPLEX, EXTENSIVE DEBRIDEMENT;  Surgeon: Joo Rivers MD;  Location: [facility] OR;  Service: Orthopedics;   "Laterality: Left;   • TONSILLECTOMY     • TUBAL ABDOMINAL LIGATION     • TYMPANOSTOMY TUBE PLACEMENT     • WISDOM TOOTH EXTRACTION Bilateral        Family History   Problem Relation Age of Onset   • Lung cancer Father    • Heart disease Paternal Grandmother    • Diabetes Maternal Aunt    • Breast cancer Maternal Aunt    • Diabetes Maternal Grandmother    • COPD Mother    • Colon cancer Neg Hx    • Colon polyps Neg Hx    • Malig Hyperthermia Neg Hx          Review of Systems        Objective:  Vitals:    02/13/23 1038   BP: 108/78   BP Location: Right arm   Pulse: 120   Weight: 89.8 kg (198 lb)   Height: 162.6 cm (64\")         02/13/23  1038   Weight: 89.8 kg (198 lb)     Body mass index is 33.99 kg/m².  Physical Exam    Vital signs reviewed.   General: No acute distress, alert and oriented  Eyes: conjunctiva clear; pupils equally round and reactive  ENT: external ears and nose atraumatic; oropharynx clear  CV: no peripheral edema  Resp: normal respiratory effort  Skin: no rashes or wounds; normal turgor  Psych: mood and affect appropriate; recent and remote memory intact          Ortho Exam       Left shoulder:     Prior portal incisions are well healed.   Maximal tenderness lateral and posterolateral subacromial space.   Moderate tenderness into the posterior supraspinatus, infraspinatus muscle bellies as well as extending just superior and inferior to the scapular spine all the way over to the rhomboids.   Active forward flexion is to 155 degrees.   Flexion strength- 4/5.   Active external rotation is to 40 degrees.   Rotation strength- 4+/5.   IR T12.   Strength- 4+/5.   Empty can test- Positive.   Drop arm test- Negative.   Ext rotation lag sign- Negative.   Neer's sign test- Positive.  Hawkin's-Delphia test- Positive.   Cross arm test- Positive.   Tenderness over acromioclavicular joint- Moderate.   Yergason's test- Negative.   Speeds test- Negative.   Warners's test- Negative.   Brisk cap refill to all digits, " palpable 2+ radial pulse.   Positive sensation to light touch palmar, dorsal aspects of small and index fingers and anatomic snuffbox left hand, symmetric to the right.     Right shoulder:    Active forward flexion is 175 degrees.   Strength- 4+/5.   Active external rotation is 55 degrees.   Strength- 4+/5.   IR T12.   Strength- 4+/5.   Drop arm test- Negative.   Ext rotation lag sign- Negative.   Neer's sign- Mildly positive.   Hawkin's-Kenndy test- Mildly positive.   Cross arm test- Negative.   Tenderness over AC joint- Mild.   Yergason's test- Negative.   Speeds test- Negative.   Storey''s test- Negative.   Brisk cap refill to all digits, palpable 2+ radial pulse.   Positive sensation to light touch in all distributions, left hand symmetric to the right.     Imaging:  Bilateral Shoulder X-Ray  Indication: Pain  AP, scapular Y, and axillary lateral views    Findings:  No fracture  No bony lesion  Normal soft tissues  Humeral head well aligned with no significant humeral head elevation, mild blunting of tuberosity profile on the left consistent with prior rotator cuff repair  Compared to prior office x-rays    Assessment:        1. Status post arthroscopy of shoulder    2. AC joint arthropathy    3. Chronic left shoulder pain           Plan:        1. I discussed treatment options at length with patient at today's visit.  2. Given the fact that her left is significantly worse than her right at this point in time and has had significant recurrence of pain on that side, we discussed treatment options. We will proceed with MR arthrogram given the prolonged nature of her pain, failure to respond to conservative treatment including injections, and her objective weakness as well as clinical findings on today's exam. I do recommend arthrogram given prior history of surgery.  3. She is to continue to work on range of motion and strength in the meantime as tolerated.  4. She will follow up after MRI results are reviewed  and discuss for further treatment options.        Aide Henry was in agreement with plan and had all questions answered.     Orders:  Orders Placed This Encounter   Procedures   • XR Shoulder 2+ View Bilateral   • [place] Contrast Injection CT / MRI   • MRI shoulder left arthrogram       Medications:  No orders of the defined types were placed in this encounter.      Followup:  Return for review of MRI results.    Diagnoses and all orders for this visit:    1. Status post arthroscopy of shoulder (Primary)  -     XR Shoulder 2+ View Bilateral  -     [place] Contrast Injection CT / MRI; Future  -     MRI shoulder left arthrogram; Future    2. AC joint arthropathy  -     XR Shoulder 2+ View Bilateral  -     [place] Contrast Injection CT / MRI; Future  -     MRI shoulder left arthrogram; Future    3. Chronic left shoulder pain  -     FL Contrast Injection CT / MRI; Future  -     MRI shoulder left arthrogram; Future          Transcribed from ambient dictation for Joo Rivers MD by Roxanne Mayorga.  02/13/23   12:45 EST

## 2023-02-20 PROBLEM — N89.8 VAGINAL DISCHARGE: Status: RESOLVED | Noted: 2022-02-01 | Resolved: 2023-02-20

## 2023-03-01 NOTE — PROGRESS NOTES
"Chief Complaint   Patient presents with   • Abscess       History of Present Illness    Aide Henry is being seen for a hospital follow up report regarding buttocks abcess. She was hospitalized at [facility] ER for and I and D of abcess. Started 2 weeks ago, while in long term. She is in alcohol and drug recovery, but is not attending meetings.Reports last use 3 months ago. Abcess began as an itchy spot, that grew to \"soft ball size. The ER packed it, but she removed the dressing 9-. She is currenly on Bactrim and Keflex. She reports some yellow drainage still, but it is 75% better. Denies fever.    Review of Systems   Constitutional: Negative.  Negative for fever.   HENT: Negative.    Eyes: Negative.    Respiratory: Negative.    Cardiovascular: Negative.    Gastrointestinal: Negative.    Endocrine: Negative.    Genitourinary: Negative.    Skin: Positive for rash and wound.   Allergic/Immunologic: Negative.    Neurological: Negative.    Hematological: Negative.    Psychiatric/Behavioral: Negative.        Physical Exam   Constitutional: She appears well-developed.   Cardiovascular: Normal rate and regular rhythm.    Skin:   Left buttock with 2-4mm I and D site with 3 cm of surrounding erythema. Scant Serosanguinous drainage. Papular Satellite lesions noted.   Psychiatric: Her mood appears anxious.   Vitals reviewed.      PLAN:     Diagnosis Plan   1. Wound abscess, initial encounter  Anaerobic culture    Culture, Routine   2. Drug addiction syndrome  Ambulatory Referral to Psychiatry     Follow up after wound culture  " Patient is not pregnant (male or female)

## 2023-03-09 ENCOUNTER — HOSPITAL ENCOUNTER (OUTPATIENT)
Dept: MRI IMAGING | Facility: HOSPITAL | Age: 43
Discharge: HOME | End: 2023-03-09
Payer: COMMERCIAL

## 2023-03-09 ENCOUNTER — HOSPITAL ENCOUNTER (OUTPATIENT)
Dept: GENERAL RADIOLOGY | Facility: HOSPITAL | Age: 43
Discharge: HOME | End: 2023-03-09
Payer: COMMERCIAL

## 2023-03-09 DIAGNOSIS — G89.29 CHRONIC LEFT SHOULDER PAIN: ICD-10-CM

## 2023-03-09 DIAGNOSIS — M19.019 AC JOINT ARTHROPATHY: ICD-10-CM

## 2023-03-09 DIAGNOSIS — M25.512 CHRONIC LEFT SHOULDER PAIN: ICD-10-CM

## 2023-03-09 DIAGNOSIS — Z98.890 STATUS POST ARTHROSCOPY OF SHOULDER: ICD-10-CM

## 2023-03-09 PROCEDURE — 73222 MRI JOINT UPR EXTREM W/DYE: CPT

## 2023-03-09 PROCEDURE — 0 GADOBENATE DIMEGLUMINE 529 MG/ML SOLUTION: Performed by: ORTHOPAEDIC SURGERY

## 2023-03-09 PROCEDURE — A9577 INJ MULTIHANCE: HCPCS | Performed by: ORTHOPAEDIC SURGERY

## 2023-03-09 PROCEDURE — 0 LIDOCAINE 1 % SOLUTION: Performed by: ORTHOPAEDIC SURGERY

## 2023-03-09 PROCEDURE — 25510000001 IOPAMIDOL 61 % SOLUTION: Performed by: ORTHOPAEDIC SURGERY

## 2023-03-09 PROCEDURE — 73040 CONTRAST X-RAY OF SHOULDER: CPT

## 2023-03-09 RX ORDER — LIDOCAINE HYDROCHLORIDE 10 MG/ML
5 INJECTION, SOLUTION INFILTRATION; PERINEURAL ONCE
Status: COMPLETED | OUTPATIENT
Start: 2023-03-09 | End: 2023-03-09

## 2023-03-09 RX ADMIN — IOPAMIDOL 12 ML: 612 INJECTION, SOLUTION INTRAVENOUS at 10:20

## 2023-03-09 RX ADMIN — GADOBENATE DIMEGLUMINE 1 ML: 529 INJECTION, SOLUTION INTRAVENOUS at 11:26

## 2023-03-09 RX ADMIN — LIDOCAINE HYDROCHLORIDE 5 ML: 10 INJECTION, SOLUTION INFILTRATION; PERINEURAL at 10:20

## 2023-03-14 ENCOUNTER — TELEPHONE (OUTPATIENT)
Dept: ORTHOPEDIC SURGERY | Facility: CLINIC | Age: 43
End: 2023-03-14
Payer: COMMERCIAL

## 2023-03-14 NOTE — TELEPHONE ENCOUNTER
----- Message from Joo Rivers MD sent at 3/13/2023  8:07 AM EDT -----  Please schedule patient for follow up to review study results-ideally need to see her in the next 2 to 3 weeks

## 2023-04-12 ENCOUNTER — OFFICE VISIT (OUTPATIENT)
Dept: ORTHOPEDIC SURGERY | Facility: CLINIC | Age: 43
End: 2023-04-12
Payer: COMMERCIAL

## 2023-04-12 VITALS — HEIGHT: 64 IN | BODY MASS INDEX: 33.46 KG/M2 | WEIGHT: 196 LBS

## 2023-04-12 DIAGNOSIS — M19.019 AC JOINT ARTHROPATHY: ICD-10-CM

## 2023-04-12 DIAGNOSIS — Z98.890 STATUS POST ARTHROSCOPY OF SHOULDER: Primary | ICD-10-CM

## 2023-04-12 DIAGNOSIS — M75.122 COMPLETE TEAR OF LEFT ROTATOR CUFF, UNSPECIFIED WHETHER TRAUMATIC: ICD-10-CM

## 2023-04-12 DIAGNOSIS — M75.42 SUBACROMIAL IMPINGEMENT OF LEFT SHOULDER: ICD-10-CM

## 2023-04-12 NOTE — PROGRESS NOTES
Subjective:     Patient ID: Aide Henry is a 42 y.o. female.    Chief Complaint:  Follow-up left shoulder pain, follow-up MR arthrogram  Prior left shoulder revision rotator cuff repair and subacromial decompression-5/27/2022  History of Present Illness  Aide Henry returns to the clinic for evaluation of left shoulder pain, MR arthrogram. She is still experiencing significant pain and limitations to the anterolateral and superior aspect of her left shoulder. Her pain is moderate to severe in intensity, exacerbated with lifting, pushing, and pulling activities, particularly overhead and out to her side, as well as with cross arm activities. She denies any numbness or tingling. She is continuing on autoimmune medications as well at this point. She is here today for follow-up on her magnetic resonance image as well.         Social History     Occupational History   • Not on file   Tobacco Use   • Smoking status: Former     Packs/day: 0.25     Years: 20.00     Pack years: 5.00     Types: Cigarettes     Quit date: 2020     Years since quitting: 3.2     Passive exposure: Never   • Smokeless tobacco: Never   Vaping Use   • Vaping Use: Former   Substance and Sexual Activity   • Alcohol use: Not Currently   • Drug use: Not Currently     Types: Cocaine(coke)   • Sexual activity: Defer      Past Medical History:   Diagnosis Date   • Anxiety    • Bacterial vaginosis    • Ashton esophagus    • Bulging lumbar disc     lower back per patient   • Depression with anxiety    • Fibromyalgia    • Fracture, finger    • Gastroparesis    • GERD (gastroesophageal reflux disease)    • Hemorrhoids    • History of anemia    • Hypertension    • Hypothyroid    • IBS (irritable bowel syndrome)    • IC (interstitial cystitis)    • Lateral epicondylitis 09/16/2013    RHUEMATOLOGIST   • Lupus    • Migraine    • MRSA (methicillin resistant staph aureus) culture positive 2011 ESTIMATE    GROIN AREA    • Overactive bladder    • Sjogren's  syndrome    • Urinary tract infection      Past Surgical History:   Procedure Laterality Date   • ADENOIDECTOMY     • CARPAL TUNNEL RELEASE Bilateral    •  SECTION     • COLONOSCOPY     • D & C HYSTEROSCOPY N/A 2021    Procedure: DILATATION AND CURETTAGE HYSTEROSCOPY;  Surgeon: Rancho Mayberry MD;  Location: [facility] OR;  Service: Obstetrics/Gynecology;  Laterality: N/A;   • D & C HYSTEROSCOPY ENDOMETRIAL ABLATION N/A 2021    Procedure: DILATATION AND CURETTAGE HYSTEROSCOPY,  NOVASURE ENDOMETRIAL ABLATION;  Surgeon: Rancho Mayberry MD;  Location: [facility] OR;  Service: Obstetrics/Gynecology;  Laterality: N/A;   • ENDOSCOPY N/A 05/10/2019    Procedure: ESOPHAGOGASTRODUODENOSCOPY with biopsies;  Surgeon: Shanon Vazquez MD;  Location: [facility];  Service: Gastroenterology   • ENDOSCOPY N/A 2021    Procedure: ESOPHAGOGASTRODUODENOSCOPY with biopsies;  Surgeon: Romain Rice MD;  Location: [facility] OR;  Service: Gastroenterology;  Laterality: N/A;  barretts  gastritis   • FINGER GANGLION CYST EXCISION Right     middle finger   • MOUTH SURGERY     • OTHER SURGICAL HISTORY      reversal of tubal   • RHINOPLASTY     • SHOULDER ARTHROSCOPY Left 09/10/2018    Procedure: SHOULDER ARTHROSCOPY, rotator cuff repair; extensive debridement, open biceps tenodesis;  Surgeon: Joo Rivers MD;  Location: [facility];  Service: Orthopedics   • SHOULDER ARTHROSCOPY W/ ROTATOR CUFF REPAIR Left 2022    Procedure: SHOULDER ARTHROSCOPY WITH ROTATOR CUFF REPAIR COMPLEX, EXTENSIVE DEBRIDEMENT;  Surgeon: Joo Rivers MD;  Location: [facility];  Service: Orthopedics;  Laterality: Left;   • TONSILLECTOMY     • TUBAL ABDOMINAL LIGATION     • TYMPANOSTOMY TUBE PLACEMENT     • WISDOM TOOTH EXTRACTION Bilateral        Family History   Problem Relation Age of Onset   • Lung cancer Father    • Heart disease Paternal Grandmother    • Diabetes Maternal Aunt    • Breast cancer  "Maternal Aunt    • Diabetes Maternal Grandmother    • COPD Mother    • Colon cancer Neg Hx    • Colon polyps Neg Hx    • Malig Hyperthermia Neg Hx          Review of Systems        Objective:  Vitals:    04/12/23 0832   Weight: 88.9 kg (196 lb)   Height: 162.6 cm (64\")         04/12/23  0832   Weight: 88.9 kg (196 lb)     Body mass index is 33.64 kg/m².  Physical Exam    Vital signs reviewed.   General: No acute distress, alert and oriented  Eyes: conjunctiva clear; pupils equally round and reactive  ENT: external ears and nose atraumatic; oropharynx clear  CV: no peripheral edema  Resp: normal respiratory effort  Skin: no rashes or wounds; normal turgor  Psych: mood and affect appropriate; recent and remote memory intact          Ortho Exam       Left shoulder:     Active forward flexion is to 90 degrees.   Strength- 4/5.   Active external rotation is to 20 degrees.   Strength- 4/5.   Internal rotation strength- 4+/5.   Bear hug sign- Moderately positive.   Drop arm test- Positive.   External rotation lag sign- Positive.   Neer's sign- Positive.   Hawkin's-Magdiel test- Positive.   Cross arm test- Positive.   Tenderness over acromioclavicular joint- Positive.   Manson's test- Positive  Brisk cap refill to all digits, palpable radial pulse.  Positive sensation to light touch palmar, dorsal aspects of small and index fingers and anatomic snuffbox left hand as well as proximal lateral aspect of the left arm, symmetric to the right.  Positive deltoid firing in all three components.    Imaging:    MRI Shoulder Left Arthrogram    Result Date: 3/10/2023  Surgical changes of rotator cuff repair with full-thickness partial width tear/retear of the distal posterior supraspinatus extending to the anterior infraspinatus junctional fibers. The tear measures up to 2 cm in width with retraction to the subacromial space. There are residual intact anterior supraspinatus fibers, and the majority of the infraspinatus tendon is " intact with only mild tendinopathy. Mild glenohumeral degenerative change with superior and posterosuperior labral tearing extending from 10:00 to 12:00. There is mild chondrosis of the inferior humeral articular surface without evidence of full-thickness glenohumeral cartilage loss. Moderate AC joint degenerative change. Signer Name: Flavio Romero MD  Signed: 3/10/2023 7:54 PM  Workstation Name: [id number]  [facility]    Review of outside MR arthrogram left shoulder including review of images as well as radiology report indicates full-thickness partial width 3 tear of the distal posterior supraspinatus tendon extending into the anterior infraspinatus with intact anterior supraspinatus fibers.  Mild degenerative changes to the superior and posterior labrum as well as chondral thinning of the glenohumeral joint.    Assessment:        1. Status post arthroscopy of shoulder    2. Complete tear of left rotator cuff, unspecified whether traumatic    3. AC joint arthropathy    4. Subacromial impingement of left shoulder           Plan:        1. Discussed treatment options at length with patient at today's visit.  2. At this point in time, given the full thickness nature of her rotator cuff tear as well as persistent pain and limitations with function, we discussed options. She wished to proceed with revision rotator cuff repair at this time. I did discuss with her that my concerns are the fact that she has had multiple repairs and retears and I am concerned about the quality of her soft tissues that are available as well as any negative effects and maybe coming from her autoimmune medications, which unfortunately she has to continue taking at this point in time due to other issues. She understood the limitations. I would not recommend shoulder arthroplasty at this point in time given the fact that her glenohumeral joint still looks reasonably good and she is very young at age 42-year-old. Given  her limitations, she would like to proceed with surgical treatment.  3. Plan will be for left shoulder arthroscopy, rotator cuff debridement versus repair, subacromial decompression, distal clavicle excision and all associated procedures.  I reviewed risks benefits and alternatives the procedure with risks including not limited to neurovascular damage, bleeding, infection, chronic pain, re-tear rotator cuff, failure of healing rotator cuff, loss of motion, weakness, stiffness, instability, biceps sag, DVT, pulmonary embolus, death, stroke, complex regional pain syndrome, myocardial infarction, need for additional procedures. She understood all these had all questions answered.  Patient verbally consented to proceed with surgery.  No guarantees were given regarding results of surgery.  We will have patient medically optimized by primary care physician and proceed with surgery at next available date.  4. She denies any history of blood clots, heart issues, or being diabetic. The patient is currently taking Plaquenil, although she says she can hold on taking it for the procedure. I advised her to call and make sure it is ok for her to hold taking the Plaquenil.         Aide Henry was in agreement with plan and had all questions answered.     Orders:  No orders of the defined types were placed in this encounter.      Medications:  No orders of the defined types were placed in this encounter.      Followup:  No follow-ups on file.    Diagnoses and all orders for this visit:    1. Status post arthroscopy of shoulder (Primary)    2. Complete tear of left rotator cuff, unspecified whether traumatic    3. AC joint arthropathy    4. Subacromial impingement of left shoulder          Transcribed from ambient dictation for Joo Rivers MD by Roxanne Mayorga.  04/12/23   11:10 EDT

## 2023-04-17 RX ORDER — ACETAMINOPHEN 325 MG/1
1000 TABLET ORAL ONCE
OUTPATIENT
Start: 2023-04-17 | End: 2023-04-17

## 2023-04-17 RX ORDER — PREGABALIN 150 MG/1
150 CAPSULE ORAL ONCE
OUTPATIENT
Start: 2023-04-17 | End: 2023-04-17

## 2023-04-17 RX ORDER — MELOXICAM 7.5 MG/1
15 TABLET ORAL ONCE
OUTPATIENT
Start: 2023-04-17 | End: 2023-04-17

## 2023-05-24 PROBLEM — M19.019 AC JOINT ARTHROPATHY: Status: ACTIVE | Noted: 2023-05-24

## 2023-06-14 ENCOUNTER — PRE-ADMISSION TESTING (OUTPATIENT)
Dept: PREADMISSION TESTING | Facility: HOSPITAL | Age: 43
End: 2023-06-14
Payer: COMMERCIAL

## 2023-06-14 VITALS
BODY MASS INDEX: 32.86 KG/M2 | RESPIRATION RATE: 16 BRPM | SYSTOLIC BLOOD PRESSURE: 104 MMHG | WEIGHT: 192.46 LBS | HEART RATE: 93 BPM | HEIGHT: 64 IN | OXYGEN SATURATION: 98 % | DIASTOLIC BLOOD PRESSURE: 83 MMHG

## 2023-06-14 DIAGNOSIS — M19.019 AC JOINT ARTHROPATHY: ICD-10-CM

## 2023-06-14 DIAGNOSIS — M75.122 COMPLETE TEAR OF LEFT ROTATOR CUFF, UNSPECIFIED WHETHER TRAUMATIC: ICD-10-CM

## 2023-06-14 DIAGNOSIS — M75.42 SUBACROMIAL IMPINGEMENT OF LEFT SHOULDER: ICD-10-CM

## 2023-06-14 LAB
ANION GAP SERPL CALCULATED.3IONS-SCNC: 8.4 MMOL/L (ref 5–15)
APTT PPP: 23.5 SECONDS (ref 24.3–38.1)
BASOPHILS # BLD AUTO: 0.02 10*3/MM3 (ref 0–0.2)
BASOPHILS NFR BLD AUTO: 0.5 % (ref 0–1.5)
BUN SERPL-MCNC: 8 MG/DL (ref 6–20)
BUN/CREAT SERPL: 11.6 (ref 7–25)
CALCIUM SPEC-SCNC: 9 MG/DL (ref 8.6–10.5)
CHLORIDE SERPL-SCNC: 101 MMOL/L (ref 98–107)
CO2 SERPL-SCNC: 23.6 MMOL/L (ref 22–29)
CREAT SERPL-MCNC: 0.69 MG/DL (ref 0.57–1)
DEPRECATED RDW RBC AUTO: 48.9 FL (ref 37–54)
EGFRCR SERPLBLD CKD-EPI 2021: 112.7 ML/MIN/1.73
EOSINOPHIL # BLD AUTO: 0.09 10*3/MM3 (ref 0–0.4)
EOSINOPHIL NFR BLD AUTO: 2.1 % (ref 0.3–6.2)
ERYTHROCYTE [DISTWIDTH] IN BLOOD BY AUTOMATED COUNT: 14.6 % (ref 12.3–15.4)
GLUCOSE SERPL-MCNC: 96 MG/DL (ref 65–99)
HCT VFR BLD AUTO: 38.9 % (ref 34–46.6)
HGB BLD-MCNC: 12.4 G/DL (ref 12–15.9)
IMM GRANULOCYTES # BLD AUTO: 0.02 10*3/MM3 (ref 0–0.05)
IMM GRANULOCYTES NFR BLD AUTO: 0.5 % (ref 0–0.5)
INR PPP: 0.87 (ref 0.9–1.1)
LYMPHOCYTES # BLD AUTO: 1.5 10*3/MM3 (ref 0.7–3.1)
LYMPHOCYTES NFR BLD AUTO: 35.3 % (ref 19.6–45.3)
MCH RBC QN AUTO: 28.9 PG (ref 26.6–33)
MCHC RBC AUTO-ENTMCNC: 31.9 G/DL (ref 31.5–35.7)
MCV RBC AUTO: 90.7 FL (ref 79–97)
MONOCYTES # BLD AUTO: 0.44 10*3/MM3 (ref 0.1–0.9)
MONOCYTES NFR BLD AUTO: 10.4 % (ref 5–12)
NEUTROPHILS NFR BLD AUTO: 2.18 10*3/MM3 (ref 1.7–7)
NEUTROPHILS NFR BLD AUTO: 51.2 % (ref 42.7–76)
PLATELET # BLD AUTO: 273 10*3/MM3 (ref 140–450)
PMV BLD AUTO: 9.1 FL (ref 6–12)
POTASSIUM SERPL-SCNC: 4.1 MMOL/L (ref 3.5–5.2)
PROTHROMBIN TIME: 11.9 SECONDS (ref 12.1–15)
QT INTERVAL: 368 MS
RBC # BLD AUTO: 4.29 10*6/MM3 (ref 3.77–5.28)
SODIUM SERPL-SCNC: 133 MMOL/L (ref 136–145)
WBC NRBC COR # BLD: 4.25 10*3/MM3 (ref 3.4–10.8)

## 2023-06-14 PROCEDURE — 36415 COLL VENOUS BLD VENIPUNCTURE: CPT

## 2023-06-14 PROCEDURE — 85730 THROMBOPLASTIN TIME PARTIAL: CPT | Performed by: ORTHOPAEDIC SURGERY

## 2023-06-14 PROCEDURE — 93005 ELECTROCARDIOGRAM TRACING: CPT

## 2023-06-14 PROCEDURE — 85610 PROTHROMBIN TIME: CPT | Performed by: ORTHOPAEDIC SURGERY

## 2023-06-14 PROCEDURE — 80048 BASIC METABOLIC PNL TOTAL CA: CPT | Performed by: ORTHOPAEDIC SURGERY

## 2023-06-14 PROCEDURE — 85025 COMPLETE CBC W/AUTO DIFF WBC: CPT | Performed by: ORTHOPAEDIC SURGERY

## 2023-06-14 RX ORDER — L.ACID,CASEI/B.ANIMAL/S.THERMO 16B CELL
1 CAPSULE ORAL DAILY
COMMUNITY

## 2023-06-14 NOTE — PAT
Pt here for PAT visit.  Pre-op tests completed, chg soap given, and instructions reviewed.  Instructed clears until 2 hrs prior to arrival time, voiced understanding. Instructed no smoking the dos

## 2023-06-14 NOTE — DISCHARGE INSTRUCTIONS
PRE-ADMISSION TESTING INSTRUCTIONS FOR ADULTS    Take these medications the morning of surgery with a small sip of water: Amlodipine, Cymbalta, Levothyroxine, Metoprolol, Prilosec       Do not take any insulin or diabetes medications the morning of surgery.      No aspirin, advil, aleve, ibuprofen, naproxen, diet pills, decongestants, or herbal/vitamins for a week prior to surgery.       Tylenol/Acetaminophen is okay to take if needed.    General Instructions:    DO NOT EAT SOLID FOOD AFTER MIDNIGHT THE NIGHT BEFORE SURGERY. No gum, mints, or hard candy after midnight the night before surgery.  You may drink clear liquids the day of surgery up until 2 hours before your arrival time.  Clear liquids are liquids you can see through. Nothing RED in color.    Plain water    Sports drinks      Gelatin (Jell-O)  Fruit juices without pulp such as white grape juice and apple juice  Popsicles that contain no fruit or yogurt  Tea or coffee (no cream or milk added)    It is beneficial for you to have a clear drink that contains carbohydrates 2 hours before your arrival time.  We suggest a 20 ounce bottle of Gatorade or Powerade for non-diabetic patients or a 20 ounce bottle of Gatorade Zero or Powerade Zero for diabetic patients.     Patients who avoid smoking, chewing tobacco and alcohol for 4 weeks prior to surgery have a reduced risk of post-operative complications.  If at all possible, quit smoking as many days before surgery as you can.    Do not smoke, use chewing tobacco or drink alcohol the day of surgery    Bring your C-PAP/ BI-PAP machine if you use one.  Wear clean comfortable clothes.  Do not wear contact lenses, lotion, deodorant, or make-up.  Bring a case for your glasses if applicable. You may brush your teeth the morning of surgery.  You may wear dentures/partials, do not put adhesive/glue on them.  Leave all other jewelry and valuables at home.      Preventing a Surgical Site Infection:    Shower the night  before and on the morning of surgery using the chlorhexidine soap you were given.  Use a clean washcloth with the soap.  Place clean sheets on your bed after showering the night before surgery. Do not use the CHG soap on your hair, face, or private areas. Wash your body gently for five (5) minutes. Do not scrub your skin.  Dry with a clean towel and dress in clean clothing.  Do not shave the surgical area for 10 days-2 weeks prior to surgery  because the razor can irritate skin and make it easier to develop an infection.  Make sure you, your family, and all healthcare providers clean their hands with soap and water or an alcohol based hand  before caring for you or your wound.      Day of surgery:    Your surgeon’s office will advise you of your arrival time for the day of surgery.    Upon arrival, a Pre-op nurse and Anesthesia provider will review your health history, obtain vital signs, and answer questions you may have. The anesthesia provider will also discuss the type of anesthesia that will be needed for your procedure, which may include general anesthesia. The only belongings needed at this time will be your home medications and if applicable your C-PAP/BI-PAP machine.  If you are staying overnight your family can leave the rest of your belongings in the car and bring them to your room later.  A Pre-op nurse will start an IV and you may receive medication in preparation for surgery, including something to help you relax.  Your family will be able to see you in the Pre-op area.  While you are in surgery your family should notify the waiting room  if they leave the waiting room area and provide a contact phone number.    IF you have any questions, you can call the Pre-Admission Department at [phone] or your surgeon's office.  Notify your surgeon if  you become sick, have a fever, productive cough, or cannot be here the day of surgery    Please be aware that surgery does come with  discomfort.  We want to make every effort to control your discomfort so please discuss any uncontrolled symptoms with your nurse.   Your doctor will most likely have prescribed pain medications.      If you are going home after surgery, you will receive individualized written care instructions before being discharged.  A responsible adult (over the age of 18) must drive you to and from the hospital on the day of your surgery and stay with you for 24 hours after anesthesia.    If you are staying overnight following surgery, you will be transported to your hospital room following the recovery period.  [facility] has all private rooms.    You may receive a survey regarding the care you received. Your feedback is very important and will be used to collect the necessary data to help us to continue to provide excellent care.     Deductibles and co-payments are collected on the day of service. Please be prepared to pay the required co-pay, deductible or deposit on the day of service as defined by your plan.

## 2023-06-15 ENCOUNTER — HOSPITAL ENCOUNTER (OUTPATIENT)
Dept: MAMMOGRAPHY | Facility: HOSPITAL | Age: 43
Discharge: HOME | End: 2023-06-15
Admitting: OBSTETRICS & GYNECOLOGY
Payer: COMMERCIAL

## 2023-06-15 DIAGNOSIS — Z12.31 SCREENING MAMMOGRAM FOR BREAST CANCER: ICD-10-CM

## 2023-06-15 PROCEDURE — 77067 SCR MAMMO BI INCL CAD: CPT

## 2023-06-15 PROCEDURE — 77063 BREAST TOMOSYNTHESIS BI: CPT

## 2023-07-28 ENCOUNTER — HOSPITAL ENCOUNTER (OUTPATIENT)
Dept: PHYSICAL THERAPY | Facility: HOSPITAL | Age: 43
Setting detail: THERAPIES SERIES
Discharge: HOME | End: 2023-07-28
Payer: COMMERCIAL

## 2023-07-28 DIAGNOSIS — Z98.890 S/P LEFT ROTATOR CUFF REPAIR: Primary | ICD-10-CM

## 2023-07-28 PROCEDURE — 97140 MANUAL THERAPY 1/> REGIONS: CPT | Performed by: PHYSICAL THERAPIST

## 2023-07-28 PROCEDURE — 97110 THERAPEUTIC EXERCISES: CPT | Performed by: PHYSICAL THERAPIST

## 2023-07-28 NOTE — THERAPY TREATMENT NOTE
Outpatient Physical Therapy Ortho Treatment Note  T[facility]IS[facility]     Patient Name: Aide Henry  : 1980  MRN: [id number]  Today's Date: 2023      Visit Date: 2023    Visit Dx:    ICD-10-CM ICD-9-CM   1. S/P left rotator cuff repair  Z98.890 V45.89       Patient Active Problem List   Diagnosis    Arthralgia of multiple joints    Chronic back pain    Chronic constipation    Disorder of connective tissue    Depression with anxiety    Fibromyalgia    Gastroesophageal reflux disease without esophagitis    Muscle pain    Palpitations    Seasonal allergic rhinitis    Eczema    Shoulder pain, left    Substance abuse    Lateral epicondylitis    Drug addiction syndrome    Gastroenteritis    Alcohol dependence in remission    History of placement of ear tubes    Status post arthroscopy of shoulder    Subacromial impingement of left shoulder    Biceps tendinitis of left upper extremity    Complete tear of left rotator cuff    Smoker    Sjogren's syndrome with keratoconjunctivitis sicca    Pelvic pain    Nondisplaced fracture of coronoid process of left ulna, initial encounter for closed fracture    Left breast lump    SARMAD (stress urinary incontinence, female)    Rectocele    Irritable bowel syndrome with both constipation and diarrhea    Ashton's esophagus without dysplasia    IC (interstitial cystitis)    Overactive bladder    Gastroparesis    Chondromalacia of patellofemoral joint, left    Lupus    Dysmenorrhea    History of bilateral tubal ligation    S/P endometrial ablation: 21    Blood blister: R labium    Carrier of ureaplasma urealyticum    Mechanical knee pain, left    AC joint arthropathy        Past Medical History:   Diagnosis Date    Anxiety     Bacterial vaginosis     Ashton esophagus     Bulging lumbar disc     lower back per patient    Depression with anxiety     Fibromyalgia     Fracture, finger     Gastroparesis     GERD (gastroesophageal reflux disease)     Hemorrhoids     History  of anemia     Hypertension     Hypothyroid     IBS (irritable bowel syndrome)     IC (interstitial cystitis)     Lateral epicondylitis 2013    RHUEMATOLOGIST    Lupus     Migraine     MRSA (methicillin resistant staph aureus) culture positive 2011 ESTIMATE    GROIN AREA     Overactive bladder     Sjogren's syndrome     Urinary tract infection         Past Surgical History:   Procedure Laterality Date    ADENOIDECTOMY      CARPAL TUNNEL RELEASE Bilateral      SECTION      COLONOSCOPY      D & C HYSTEROSCOPY N/A 2021    Procedure: DILATATION AND CURETTAGE HYSTEROSCOPY;  Surgeon: Rancho Mayberry MD;  Location: [facility] OR;  Service: Obstetrics/Gynecology;  Laterality: N/A;    D & C HYSTEROSCOPY ENDOMETRIAL ABLATION N/A 2021    Procedure: DILATATION AND CURETTAGE HYSTEROSCOPY,  NOVASURE ENDOMETRIAL ABLATION;  Surgeon: Rancho Mayberry MD;  Location: [facility] OR;  Service: Obstetrics/Gynecology;  Laterality: N/A;    ENDOSCOPY N/A 05/10/2019    Procedure: ESOPHAGOGASTRODUODENOSCOPY with biopsies;  Surgeon: Shanon Vazquez MD;  Location: [facility] OR;  Service: Gastroenterology    ENDOSCOPY N/A 2021    Procedure: ESOPHAGOGASTRODUODENOSCOPY with biopsies;  Surgeon: Romain Rice MD;  Location: [facility] OR;  Service: Gastroenterology;  Laterality: N/A;  barretts  gastritis    FINGER GANGLION CYST EXCISION Right     middle finger    MOUTH SURGERY      OTHER SURGICAL HISTORY      reversal of tubal    RHINOPLASTY      SHOULDER ARTHROSCOPY Left 09/10/2018    Procedure: SHOULDER ARTHROSCOPY, rotator cuff repair; extensive debridement, open biceps tenodesis;  Surgeon: Joo Rivers MD;  Location: [facility] OR;  Service: Orthopedics    SHOULDER ARTHROSCOPY W/ ROTATOR CUFF REPAIR Left 2022    Procedure: SHOULDER ARTHROSCOPY WITH ROTATOR CUFF REPAIR COMPLEX, EXTENSIVE DEBRIDEMENT;  Surgeon: Joo Rivers MD;  Location: [facility] OR;  Service: Orthopedics;   Laterality: Left;    SHOULDER ARTHROSCOPY W/ ROTATOR CUFF REPAIR Left 6/23/2023    Procedure: SHOULDER ARTHROSCOPY WITH ROTATOR CUFF REPAIR, distal clavicle excision;  Surgeon: Joo Rivres MD;  Location: [facility] OR;  Service: Orthopedics;  Laterality: Left;    TONSILLECTOMY      TUBAL ABDOMINAL LIGATION      TYMPANOSTOMY TUBE PLACEMENT      WISDOM TOOTH EXTRACTION Bilateral                             Modalities       Row Name 07/28/23 0830             Subjective Comments    Subjective Comments Pt states her shoulder is little sore.  -GC         Moist Heat    MH Applied Yes  -GC      Location left shoulder with pt sitting  -GC      PT Moist Heat Minutes 10  -GC      MH Prior to Rx Yes  -GC         Functional Testing    Outcome Measure Options Quick DASH  -GC                User Key  (r) = Recorded By, (t) = Taken By, (c) = Cosigned By      Initials Name Provider Type    GC Ruperto Kimball, PT Physical Therapist                   OP Exercises       Row Name 07/28/23 0830             Subjective Comments    Subjective Comments Pt states her shoulder is little sore.  -GC         Exercise 1    Exercise Name 1 Pulley-FLEX  -GC      Cueing 1 Verbal;Tactile;Demo  -GC      Time 1 3 min  -GC         Exercise 2    Exercise Name 2 Pulley-scaption  -GC      Cueing 2 Verbal;Tactile;Demo  -GC      Time 2 3 min  -GC         Exercise 3    Exercise Name 3 Cane stretch-FLEX  -GC      Cueing 3 Verbal;Tactile;Demo  -GC      Reps 3 15  -GC      Time 3 5 secs  -GC         Exercise 4    Exercise Name 4 Cane stretch-ABD  -GC      Cueing 4 Verbal;Tactile;Demo  -GC      Reps 4 15  -GC      Time 4 5 secs  -GC         Exercise 5    Exercise Name 5 Cane stretch-ER  -GC      Cueing 5 Verbal;Tactile;Demo  -GC      Reps 5 15  -GC      Time 5 5 secs  -GC                User Key  (r) = Recorded By, (t) = Taken By, (c) = Cosigned By      Initials Name Provider Type    GC Ruperto Kimball, PT Physical Therapist                                           Outcome Measure Options: Quick DASH         Time Calculation:   Start Time: 0830  Stop Time: 0911  Time Calculation (min): 41 min  Untimed Charges  PT Moist Heat Minutes: 10  Total Minutes  Untimed Charges Total Minutes: 10   Total Minutes: 10  Therapy Charges for Today       Code Description Service Date Service Provider Modifiers Qty    [id number]  PT THER PROC EA 15 MIN 7/28/2023 Ruperto Kimball, PT GP 1    [id number] HC PT MANUAL THERAPY EA 15 MIN 7/28/2023 Ruperto Kimball, PT GP 1            PT G-Codes  Outcome Measure Options: Quick JUAN DANIEL Kimball, PT  7/28/2023

## 2023-07-31 ENCOUNTER — APPOINTMENT (OUTPATIENT)
Dept: PHYSICAL THERAPY | Facility: HOSPITAL | Age: 43
End: 2023-07-31
Payer: COMMERCIAL

## 2023-08-18 ENCOUNTER — OFFICE VISIT (OUTPATIENT)
Dept: ORTHOPEDIC SURGERY | Facility: CLINIC | Age: 43
End: 2023-08-18
Payer: COMMERCIAL

## 2023-08-18 VITALS
HEIGHT: 64 IN | BODY MASS INDEX: 33.29 KG/M2 | SYSTOLIC BLOOD PRESSURE: 139 MMHG | DIASTOLIC BLOOD PRESSURE: 100 MMHG | HEART RATE: 112 BPM | WEIGHT: 195 LBS
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
## 2023-08-18 DIAGNOSIS — Z98.890 STATUS POST ARTHROSCOPY OF SHOULDER: Primary | ICD-10-CM

## 2023-08-18 DIAGNOSIS — M75.51 SUBACROMIAL BURSITIS OF RIGHT SHOULDER JOINT: ICD-10-CM

## 2023-08-18 DIAGNOSIS — M75.81 ROTATOR CUFF TENDINITIS, RIGHT: ICD-10-CM

## 2023-08-18 RX ORDER — CYCLOBENZAPRINE HCL 10 MG
10 TABLET ORAL NIGHTLY PRN
Qty: 45 TABLET | Refills: 0 | Status: SHIPPED | OUTPATIENT
Start: 2023-08-18

## 2023-08-18 RX ORDER — PREDNISONE 10 MG/1
TABLET ORAL
Qty: 39 TABLET | Refills: 0 | Status: SHIPPED | OUTPATIENT
Start: 2023-08-18

## 2023-08-18 NOTE — PROGRESS NOTES
Subjective:     Patient ID: Aide Henry is a 43 y.o. female.    Chief Complaint:  Follow-up status post left shoulder arthroscopic rotator cuff repair with bio inductive implant, limited debridement of glenoid, humeral head, labrum, subacromial bursa, distal clavicle excision - 06/23/2023  New recent fall - new issue to examiner  Right shoulder pain - new issue to examiner   History of Present Illness  Aide Cornellturns to clinic for reevaluation of her left upper extremity.  She fell down her steps after slipping on wet surface 7/30/2023.  Has not yet been back to physical therapy.  She fell on her back is experience pain along the scapular spine and right shoulder as well as the left shoulder ever since.  Maximal tenderness present the left shoulder along the posterior acromion that does radiate inferiorly down the posterior wrist.  Denies any numbness or tingling radiating down the upper extremity she does have prior history of carpal tunnel release surgery and does experience some numbness and tingling into the hands unrelated to the upper extremities.  She is continued with active range of motion left upper extremity but is experiencing pain when rotating from side to side at night and with certain motions at her shoulder.  Denies any other concerns present.     Social History     Occupational History    Not on file   Tobacco Use    Smoking status: Every Day     Packs/day: 0.25     Years: 20.00     Pack years: 5.00     Types: Cigarettes     Passive exposure: Never    Smokeless tobacco: Never    Tobacco comments:     Smokes every now and then    Vaping Use    Vaping Use: Some days   Substance and Sexual Activity    Alcohol use: Not Currently     Comment: sober since 4/2023    Drug use: Not Currently     Types: Cocaine(coke)     Comment: sober since 4/2023    Sexual activity: Defer      Past Medical History:   Diagnosis Date    Anxiety     Bacterial vaginosis     [place]hton esophagus     Bulging lumbar disc      lower back per patient    Depression with anxiety     Fibromyalgia     Fracture, finger     Gastroparesis     GERD (gastroesophageal reflux disease)     Hemorrhoids     History of anemia     Hypertension     Hypothyroid     IBS (irritable bowel syndrome)     IC (interstitial cystitis)     Lateral epicondylitis 2013    RHUEMATOLOGIST    Lupus     Migraine     MRSA (methicillin resistant staph aureus) culture positive  ESTIMATE    GROIN AREA     Overactive bladder     Sjogren's syndrome     Urinary tract infection      Past Surgical History:   Procedure Laterality Date    ADENOIDECTOMY      CARPAL TUNNEL RELEASE Bilateral      SECTION      COLONOSCOPY      D & C HYSTEROSCOPY N/A 2021    Procedure: DILATATION AND CURETTAGE HYSTEROSCOPY;  Surgeon: Rancho Mayberry MD;  Location: [facility];  Service: Obstetrics/Gynecology;  Laterality: N/A;    D & C HYSTEROSCOPY ENDOMETRIAL ABLATION N/A 2021    Procedure: DILATATION AND CURETTAGE HYSTEROSCOPY,  NOVASURE ENDOMETRIAL ABLATION;  Surgeon: Rancho Mayberry MD;  Location: [facility];  Service: Obstetrics/Gynecology;  Laterality: N/A;    ENDOSCOPY N/A 05/10/2019    Procedure: ESOPHAGOGASTRODUODENOSCOPY with biopsies;  Surgeon: Shanon Vazquez MD;  Location: [facility];  Service: Gastroenterology    ENDOSCOPY N/A 2021    Procedure: ESOPHAGOGASTRODUODENOSCOPY with biopsies;  Surgeon: Romain Rice MD;  Location: [facility];  Service: Gastroenterology;  Laterality: N/A;  barretts  gastritis    FINGER GANGLION CYST EXCISION Right     middle finger    MOUTH SURGERY      OTHER SURGICAL HISTORY      reversal of tubal    RHINOPLASTY      SHOULDER ARTHROSCOPY Left 09/10/2018    Procedure: SHOULDER ARTHROSCOPY, rotator cuff repair; extensive debridement, open biceps tenodesis;  Surgeon: Joo Rivers MD;  Location: [facility];  Service: Orthopedics    SHOULDER ARTHROSCOPY W/ ROTATOR CUFF REPAIR Left 2022  "   Procedure: SHOULDER ARTHROSCOPY WITH ROTATOR CUFF REPAIR COMPLEX, EXTENSIVE DEBRIDEMENT;  Surgeon: Joo Rivers MD;  Location:  [facility];  Service: Orthopedics;  Laterality: Left;    SHOULDER ARTHROSCOPY W/ ROTATOR CUFF REPAIR Left 6/23/2023    Procedure: SHOULDER ARTHROSCOPY WITH ROTATOR CUFF REPAIR, distal clavicle excision;  Surgeon: Joo Rivers MD;  Location:  [facility];  Service: Orthopedics;  Laterality: Left;    TONSILLECTOMY      TUBAL ABDOMINAL LIGATION      TYMPANOSTOMY TUBE PLACEMENT      WISDOM TOOTH EXTRACTION Bilateral        Family History   Problem Relation Age of Onset    Lung cancer Father     Heart disease Paternal Grandmother     Diabetes Maternal Aunt     Breast cancer Maternal Aunt     Diabetes Maternal Grandmother     COPD Mother     Colon cancer Neg Hx     Colon polyps Neg Hx     Malig Hyperthermia Neg Hx                Objective:  Physical Exam    Vital signs reviewed.   General: No acute distress.  Eyes: conjunctiva clear; pupils equally round and reactive  ENT: external ears and nose atraumatic; oropharynx clear  CV: no peripheral edema  Resp: normal respiratory effort  Skin: no rashes or wounds; normal turgor  Psych: mood and affect appropriate; recent and remote memory intact    Vitals:    08/18/23 0841   BP: 139/100   Pulse: 112   Weight: 88.5 kg (195 lb)   Height: 162.6 cm (64\")         08/18/23  0841   Weight: 88.5 kg (195 lb)     Body mass index is 33.47 kg/mý.      Ortho Exam     Left shoulder examined  Active forward flexion 160 degrees  Active external rotation 40 degrees  Internal and external rotation strength 4- out of 5  Positive sensation light touch on distributions left upper extremity  Supraspinatus strength 4- out of 5  Bicep strength 4- out of 5    Right shoulder examined  Active forward flexion 165 degrees  External rotation 40 degrees  Internal and external rotation strength 4 out of 5  Bicep strength 4-out of 5  Supraspinatus strength 4- out of " 5  Positive tenderness lateral acromion, scapular spine, positive tenderness paraspinal muscles  Positive sensation light touch all distributions right upper extremity  Positive Mason, positive Neer's  Positive speeds exam positive empty can  Negative Bullitt's    Imaging:  Bilateral Shoulder X-Ray  Indication: Pain  AP Internal and External Rotation views    Findings:  No fracture  No bony lesion  Normal soft tissues  Osteophyte humeral head left shoulder, AC arthritis bilaterally     Prior studies were available for comparison-bilateral shoulder    Assessment:        1. Subacromial bursitis of right shoulder joint    2. Status post arthroscopy of shoulder    3. Accidental fall, initial encounter    4. Rotator cuff tendinitis, right           Plan:  Discussed plan of care with patient.  We will proceed with oral steroid taper as well as Flexeril at night.  Did caution increased drowsiness with the Flexeril at night.  She has taken several years ago.  We will proceed with referral to physical therapy for range of motion strengthening left upper extremity still within the postop period.  We will also further evaluated for the right shoulder as well.  We will proceed with MRI right shoulder to evaluate rotator cuff tear.  I will see her back in clinic after completion of the testing to discuss results and further plan of care.  All questions answered today's visit.  Orders:  Orders Placed This Encounter   Procedures    XR Shoulder 2+ View Left    XR Shoulder 2+ View Right    MRI Shoulder Right Without Contrast     New Medications Ordered This Visit   Medications    predniSONE (DELTASONE) 10 MG tablet     Si mg daily x 3 days, 40 mg daily x 3 days, 20 mg daily x 3 days, 10 mg daily x 3 days     Dispense:  39 tablet     Refill:  0    cyclobenzaprine (FLEXERIL) 10 MG tablet     Sig: Take 1 tablet by mouth At Night As Needed for Muscle Spasms.     Dispense:  45 tablet     Refill:  0           I ordered and reviewed  the RAFAEL herrera.       Dragon dictation utilized

## 2023-09-06 ENCOUNTER — HOSPITAL ENCOUNTER (OUTPATIENT)
Dept: PHYSICAL THERAPY | Facility: HOSPITAL | Age: 43
Setting detail: THERAPIES SERIES
Discharge: HOME | End: 2023-09-06
Payer: COMMERCIAL

## 2023-09-06 DIAGNOSIS — M75.51 SUBACROMIAL BURSITIS OF RIGHT SHOULDER JOINT: ICD-10-CM

## 2023-09-06 DIAGNOSIS — M75.81 ROTATOR CUFF TENDINITIS, RIGHT: ICD-10-CM

## 2023-09-06 DIAGNOSIS — Z98.890 STATUS POST ARTHROSCOPY OF SHOULDER: Primary | ICD-10-CM

## 2023-09-06 PROCEDURE — 97161 PT EVAL LOW COMPLEX 20 MIN: CPT | Performed by: PHYSICAL THERAPIST

## 2023-09-07 ENCOUNTER — LAB (OUTPATIENT)
Dept: LAB | Facility: HOSPITAL | Age: 43
End: 2023-09-07
Payer: COMMERCIAL

## 2023-09-07 ENCOUNTER — TRANSCRIBE ORDERS (OUTPATIENT)
Dept: ADMINISTRATIVE | Facility: HOSPITAL | Age: 43
End: 2023-09-07
Payer: COMMERCIAL

## 2023-09-07 DIAGNOSIS — K31.84 GASTROPARESIS: Primary | ICD-10-CM

## 2023-09-07 DIAGNOSIS — K31.84 GASTROPARESIS: ICD-10-CM

## 2023-09-07 LAB
ALBUMIN SERPL-MCNC: 3.9 G/DL (ref 3.5–5.2)
ALBUMIN/GLOB SERPL: 1.1 G/DL
ALP SERPL-CCNC: 53 U/L (ref 39–117)
ALT SERPL W P-5'-P-CCNC: 35 U/L (ref 1–33)
ANION GAP SERPL CALCULATED.3IONS-SCNC: 10 MMOL/L (ref 5–15)
AST SERPL-CCNC: 34 U/L (ref 1–32)
BASOPHILS # BLD AUTO: 0.04 10*3/MM3 (ref 0–0.2)
BASOPHILS NFR BLD AUTO: 0.9 % (ref 0–1.5)
BILIRUB SERPL-MCNC: <0.2 MG/DL (ref 0–1.2)
BUN SERPL-MCNC: 6 MG/DL (ref 6–20)
BUN/CREAT SERPL: 9.2 (ref 7–25)
CALCIUM SPEC-SCNC: 8.5 MG/DL (ref 8.6–10.5)
CHLORIDE SERPL-SCNC: 105 MMOL/L (ref 98–107)
CO2 SERPL-SCNC: 22 MMOL/L (ref 22–29)
CREAT SERPL-MCNC: 0.65 MG/DL (ref 0.57–1)
CRP SERPL-MCNC: <0.3 MG/DL (ref 0–0.5)
DEPRECATED RDW RBC AUTO: 46.3 FL (ref 37–54)
EGFRCR SERPLBLD CKD-EPI 2021: 113.6 ML/MIN/1.73
EOSINOPHIL # BLD AUTO: 0.06 10*3/MM3 (ref 0–0.4)
EOSINOPHIL NFR BLD AUTO: 1.4 % (ref 0.3–6.2)
ERYTHROCYTE [DISTWIDTH] IN BLOOD BY AUTOMATED COUNT: 14.4 % (ref 12.3–15.4)
ERYTHROCYTE [SEDIMENTATION RATE] IN BLOOD: 25 MM/HR (ref 0–20)
FIBRINOGEN PPP-MCNC: 309 MG/DL (ref 219–464)
GLOBULIN UR ELPH-MCNC: 3.6 GM/DL
GLUCOSE SERPL-MCNC: 86 MG/DL (ref 65–99)
HBA1C MFR BLD: 5.3 % (ref 4.8–5.6)
HCT VFR BLD AUTO: 37.9 % (ref 34–46.6)
HGB BLD-MCNC: 12.7 G/DL (ref 12–15.9)
IMM GRANULOCYTES # BLD AUTO: 0.05 10*3/MM3 (ref 0–0.05)
IMM GRANULOCYTES NFR BLD AUTO: 1.2 % (ref 0–0.5)
LYMPHOCYTES # BLD AUTO: 1.35 10*3/MM3 (ref 0.7–3.1)
LYMPHOCYTES NFR BLD AUTO: 31.2 % (ref 19.6–45.3)
MCH RBC QN AUTO: 29.9 PG (ref 26.6–33)
MCHC RBC AUTO-ENTMCNC: 33.5 G/DL (ref 31.5–35.7)
MCV RBC AUTO: 89.2 FL (ref 79–97)
MONOCYTES # BLD AUTO: 0.49 10*3/MM3 (ref 0.1–0.9)
MONOCYTES NFR BLD AUTO: 11.3 % (ref 5–12)
NEUTROPHILS NFR BLD AUTO: 2.34 10*3/MM3 (ref 1.7–7)
NEUTROPHILS NFR BLD AUTO: 54 % (ref 42.7–76)
NRBC BLD AUTO-RTO: 0 /100 WBC (ref 0–0.2)
PLATELET # BLD AUTO: 268 10*3/MM3 (ref 140–450)
PMV BLD AUTO: 10.4 FL (ref 6–12)
POTASSIUM SERPL-SCNC: 4 MMOL/L (ref 3.5–5.2)
PREALB SERPL-MCNC: 18 MG/DL (ref 20–40)
PROT SERPL-MCNC: 7.5 G/DL (ref 6–8.5)
RBC # BLD AUTO: 4.25 10*6/MM3 (ref 3.77–5.28)
SODIUM SERPL-SCNC: 137 MMOL/L (ref 136–145)
WBC NRBC COR # BLD: 4.33 10*3/MM3 (ref 3.4–10.8)

## 2023-09-07 PROCEDURE — 36415 COLL VENOUS BLD VENIPUNCTURE: CPT

## 2023-09-07 PROCEDURE — 85652 RBC SED RATE AUTOMATED: CPT

## 2023-09-07 PROCEDURE — 84134 ASSAY OF PREALBUMIN: CPT

## 2023-09-07 PROCEDURE — 85025 COMPLETE CBC W/AUTO DIFF WBC: CPT

## 2023-09-07 PROCEDURE — 87081 CULTURE SCREEN ONLY: CPT

## 2023-09-07 PROCEDURE — 83520 IMMUNOASSAY QUANT NOS NONAB: CPT

## 2023-09-07 PROCEDURE — 85384 FIBRINOGEN ACTIVITY: CPT

## 2023-09-07 PROCEDURE — 83036 HEMOGLOBIN GLYCOSYLATED A1C: CPT

## 2023-09-07 PROCEDURE — 80053 COMPREHEN METABOLIC PANEL: CPT

## 2023-09-07 PROCEDURE — 86140 C-REACTIVE PROTEIN: CPT

## 2023-09-07 NOTE — THERAPY EVALUATION
Outpatient Physical Therapy Ortho Initial Evaluation  TENISHA Wing     Patient Name: Aide Henry  : 1980  MRN: [id number]  Today's Date: 2023      Visit Date: 2023    Patient Active Problem List   Diagnosis    Arthralgia of multiple joints    Chronic back pain    Chronic constipation    Disorder of connective tissue    Depression with anxiety    Fibromyalgia    Gastroesophageal reflux disease without esophagitis    Muscle pain    Palpitations    Seasonal allergic rhinitis    Eczema    Shoulder pain, left    Substance abuse    Lateral epicondylitis    Drug addiction syndrome    Gastroenteritis    Alcohol dependence in remission    History of placement of ear tubes    Status post arthroscopy of shoulder    Subacromial impingement of left shoulder    Biceps tendinitis of left upper extremity    Complete tear of left rotator cuff    Smoker    Sjogren's syndrome with keratoconjunctivitis sicca    Pelvic pain    Nondisplaced fracture of coronoid process of left ulna, initial encounter for closed fracture    Left breast lump    SARMAD (stress urinary incontinence, female)    Rectocele    Irritable bowel syndrome with both constipation and diarrhea    Ashton's esophagus without dysplasia    IC (interstitial cystitis)    Overactive bladder    Gastroparesis    Chondromalacia of patellofemoral joint, left    Lupus    Dysmenorrhea    History of bilateral tubal ligation    S/P endometrial ablation: 21    Blood blister: R labium    Carrier of ureaplasma urealyticum    Mechanical knee pain, left    AC joint arthropathy    Subacromial bursitis of right shoulder joint    Rotator cuff tendinitis, right        Past Medical History:   Diagnosis Date    Anxiety     Bacterial vaginosis     Ashton esophagus     Bulging lumbar disc     lower back per patient    Depression with anxiety     Fibromyalgia     Fracture, finger     Gastroparesis     GERD (gastroesophageal reflux disease)     Hemorrhoids     History of  anemia     Hypertension     Hypothyroid     IBS (irritable bowel syndrome)     IC (interstitial cystitis)     Lateral epicondylitis 2013    RHUEMATOLOGIST    Lupus     Migraine     MRSA (methicillin resistant staph aureus) culture positive 2011 ESTIMATE    GROIN AREA     Overactive bladder     Sjogren's syndrome     Urinary tract infection         Past Surgical History:   Procedure Laterality Date    ADENOIDECTOMY      CARPAL TUNNEL RELEASE Bilateral      SECTION      COLONOSCOPY      D & C HYSTEROSCOPY N/A 2021    Procedure: DILATATION AND CURETTAGE HYSTEROSCOPY;  Surgeon: Rancho Mayberry MD;  Location: [facility] OR;  Service: Obstetrics/Gynecology;  Laterality: N/A;    D & C HYSTEROSCOPY ENDOMETRIAL ABLATION N/A 2021    Procedure: DILATATION AND CURETTAGE HYSTEROSCOPY,  NOVASURE ENDOMETRIAL ABLATION;  Surgeon: Rancho Mayberry MD;  Location: [facility] OR;  Service: Obstetrics/Gynecology;  Laterality: N/A;    ENDOSCOPY N/A 05/10/2019    Procedure: ESOPHAGOGASTRODUODENOSCOPY with biopsies;  Surgeon: Shanon Vazquez MD;  Location: [facility] OR;  Service: Gastroenterology    ENDOSCOPY N/A 2021    Procedure: ESOPHAGOGASTRODUODENOSCOPY with biopsies;  Surgeon: Romain Rice MD;  Location: [facility] OR;  Service: Gastroenterology;  Laterality: N/A;  barretts  gastritis    FINGER GANGLION CYST EXCISION Right     middle finger    MOUTH SURGERY      OTHER SURGICAL HISTORY      reversal of tubal    RHINOPLASTY      SHOULDER ARTHROSCOPY Left 09/10/2018    Procedure: SHOULDER ARTHROSCOPY, rotator cuff repair; extensive debridement, open biceps tenodesis;  Surgeon: Joo Rivers MD;  Location: [facility] OR;  Service: Orthopedics    SHOULDER ARTHROSCOPY W/ ROTATOR CUFF REPAIR Left 2022    Procedure: SHOULDER ARTHROSCOPY WITH ROTATOR CUFF REPAIR COMPLEX, EXTENSIVE DEBRIDEMENT;  Surgeon: Joo Rivers MD;  Location: [facility] OR;  Service: Orthopedics;   Laterality: Left;    SHOULDER ARTHROSCOPY W/ ROTATOR CUFF REPAIR Left 6/23/2023    Procedure: SHOULDER ARTHROSCOPY WITH ROTATOR CUFF REPAIR, distal clavicle excision;  Surgeon: Joo Rivers MD;  Location: [facility];  Service: Orthopedics;  Laterality: Left;    TONSILLECTOMY      TUBAL ABDOMINAL LIGATION      TYMPANOSTOMY TUBE PLACEMENT      WISDOM TOOTH EXTRACTION Bilateral        Visit Dx:     ICD-10-CM ICD-9-CM   1. Status post arthroscopy of shoulder  Z98.890 V45.89   2. Subacromial bursitis of right shoulder joint  M75.51 726.19   3. Rotator cuff tendinitis, right  M75.81 726.10          Patient History       Row Name 09/06/23 1000 09/04/23 0956          History    Chief Complaint Difficulty with daily activities;Muscle weakness;Pain  -GC Balance Problems;Difficulty Walking;Difficulty with daily activities;Falls/history of falls;Fatigue/poor endurance;Headache;Joint stiffness;Joint swelling;Muscle tenderness;Muscle weakness;Numbness;Pain;Swelling;Tinglings;Ulcer, wound or other skin conditions  -GC (r) patient (t)     Type of Pain Shoulder pain  bilateral  -GC Shoulder pain  -GC (r) patient (t)     Brief Description of Current Complaint Pt is well known to this clinician as she was seen in July of this year following a left shoulder arthroscopy. She started therapy and was seen several times, but then had some ohter unrelated health issues and was unable to continue her therapy. She is now recovered from those issues and has in the mean time developed some right shoulder pain as well. She was seen by TORSTEN Patel who has referred her back to therapy and has scheduled her for an MRI of the right shoulder.  -GC Left shoulder pain  -GC (r) patient (t)     Patient/Caregiver Goals Relieve pain;Return to work;Improve mobility;Improve strength  -GC Relieve pain;Return to work;Improve mobility;Improve strength  -GC (r) patient (t)     Hand Dominance right-handed  -GC right-handed  -GC (r) patient (t)      Occupation/sports/leisure activities None  -GC None  -GC (r) patient (t)     Patient seeing anyone else for problem(s)? --  -GC Dr avalos and elham  -GC (r) patient (t)     What clinical tests have you had for this problem? X-ray;MRI;Other 2 (comment)  -GC X-ray;MRI;Other 2 (comment)  -GC (r) patient (t)     Other Clinical Tests -- Rotator cuff repair  -GC (r) patient (t)     Are you or can you be pregnant No  -GC No  -GC (r) patient (t)        Pain     Pain Location Shoulder  bilateral  -GC --     Pain at Present 5  -GC --     Pain at Best 0  no pain at rest  -GC --     Pain at Worst 7  -GC --     Pain Frequency Intermittent  -GC --     Pain Description Aching;Discomfort;Sore;Tender  -GC --     What Performance Factors Make the Current Problem(s) WORSE? Pt c/o pain with reaching and lifting tasks  -GC --     What Performance Factors Make the Current Problem(s) BETTER? Pt feels best at rest  -GC --     Difficulties with ADL's? Pt has difficulty with lifting and reaching  -GC --        Fall Risk Assessment    Any falls in the past year: Yes  -GC Yes  -GC (r) patient (t)     Factors that contributed to the fall: Lost balance  -GC Lost balance  -GC (r) patient (t)        Services    Prior Rehab/Home Health Experiences Yes  -GC Yes  -GC (r) patient (t)     Are you currently receiving Home Health services Yes  -GC Yes  -GC (r) patient (t)     Do you plan to receive Home Health services in the near future Yes  -GC Yes  -GC (r) patient (t)        Daily Activities    Primary Language English  -GC English  -GC (r) patient (t)     Are you able to read Yes  -GC Yes  -GC (r) patient (t)     Are you able to write Yes  -GC Yes  -GC (r) patient (t)     How does patient learn best? Listening;Reading;Demonstration  -GC Listening;Reading;Demonstration  -GC (r) patient (t)     Teaching needs identified Home Exercise Program;Management of Condition  -GC --     Patient is concerned about/has problems with Difficulty with self care (i.e.  bathing, dressing, toileting:;Flexibility;Grasping objects lifting;Performing home management (household chores, shopping, care of dependents);Performing job responsibilities/community activities (work, school,;Reaching over head;Repetitive movements of the hand, arm, shoulder  -GC --     Does patient have problems with the following? Depression;Anxiety;Panic Attack;Other (comment)  emotional problems  -GC --     Barriers to learning None  -GC --     Functional Status bathing;dressing;grooming;mobility issues preventing performance of daily activities  -GC --     Pt Participated in POC and Goals Yes  -GC --        Safety    Are you being hurt, hit, or frightened by anyone at home or in your life? No  -GC No  -GC (r) patient (t)     Are you being neglected by a caregiver No  -GC No  -GC (r) patient (t)     Have you had any of the following issues with Depression;Anxiety;Panic Attacks  -GC Depression;Anxiety;Panic Attacks  -GC (r) patient (t)               User Key  (r) = Recorded By, (t) = Taken By, (c) = Cosigned By      Initials Name Provider Type    GC Ruperto Kimball, PT Physical Therapist    patient Aide eHnry --                     PT Ortho       Row Name 09/06/23 1000       Shoulder Girdle Accessory Motions    Posterior glide of humerus Right:;Left:;WNL  -GC    Anterior glide of humerus Right:;Left:;WNL  -GC    Inferior glide of humerus Right:;Left:;WNL  -GC       Shoulder Impingement/Rotator Cuff Special Tests    Mason-Magdiel Test (RC Lesion vs. Bursitis) Right:;Positive  -GC    Neer Impingement Test (RC Lesion vs. Bursitis) Right:;Positive  -GC    Drop Arm Test (Full Thickness RC Lesion) Right:;Negative  -GC       Shoulder Laxity/Instability Special Tests    Load and Shift Test Right:;Negative  -GC    Sulcus Sign, 0 Degrees Right:;Negative  -GC    Anterior Apprehension/Relocation Test, at 90 Degrees Right:;Negative  -GC       Biceps/Labral Special Tests    [place]'s Test (Labral Test) Right:;Negative   -GC       Shoulder Girdle Palpation    Subacromial Space Bilateral:;Tender  -GC    Supraspinatus Insertion Bilateral:;Tender  -GC    Greater Tubercule Bilateral:;Tender  -GC       Right Upper Ext    Rt Shoulder Abduction AROM 172 degrees  -GC    Rt Shoulder Flexion AROM 151 degrees  -GC    Rt Shoulder External Rotation AROM 90 degrees  -GC    Rt Shoulder Internal Rotation AROM 90 degrees  -GC       Left Upper Ext    Lt Shoulder Abduction AROM 168 degrees  -GC    Lt Shoulder Flexion AROM 136 degrees  -GC    Lt Shoulder External Rotation AROM 73 degrees  -GC    Lt Shoulder Internal Rotation AROM 78 degrees  -GC       MMT (Manual Muscle Testing)    General MMT Comments scaption strength is 4/5 bilaterally  -GC       MMT Right Upper Ext    Rt Shoulder Flexion MMT, Gross Movement (4+/5) good plus  -GC    Rt Shoulder Extension MMT, Gross Movement (5/5) normal  -GC    Rt Shoulder ABduction MMT, Gross Movement (4/5) good  -GC    Rt Shoulder ADduction MMT, Gross Movement (5/5) normal  -GC    Rt Shoulder Internal Rotation MMT, Gross Movement (4+/5) good plus  -GC    Rt Shoulder External Rotation MMT, Gross Movement (4/5) good  -GC       MMT Left Upper Ext    Lt Shoulder Flexion MMT, Gross Movement (4/5) good  -GC    Lt Shoulder Extension MMT, Gross Movement (5/5) normal  -GC    Lt Shoulder ABduction MMT, Gross Movement (4/5) good  -GC    Lt Shoulder ADduction MMT, Gross Movement (5/5) normal  -GC    Lt Shoulder Internal Rotation MMT, Gross Movement (4/5) good  -GC    Lt Shoulder External Rotation MMT, Gross Movement (4/5) good  -GC       Sensation    Light Touch No apparent deficits  -GC              User Key  (r) = Recorded By, (t) = Taken By, (c) = Cosigned By      Initials Name Provider Type    Ruperto Russo, PT Physical Therapist                                Therapy Education  Given: HEP, Symptoms/condition management  Program: New  How Provided: Verbal, Demonstration, Written  Provided to: Patient  Level of  Understanding: Teach back education performed, Verbalized, Demonstrated      PT OP Goals       Row Name 09/06/23 1000          PT Short Term Goals    STG Date to Achieve 09/27/23  -     STG 1 Decrease bilateral shoulder pain to 3-4/10 with activity.  -GC     STG 2 Increase left shoulder AROM to 160 degrees FLEX, 85 degrees ER, and 85 degrees IR with testing.  -GC     STG 3 Increase bilateral shoulder girdle strength to at least 4+/5 all planes with testing.  -     STG 4 Pt will be independent with her HEP issued by this therapist.  -GC        Long Term Goals    LTG Date to Achieve 10/18/23  -GC     LTG 1 Decrease bilateral shoulder pain to 0-1/10 with activity.  -GC     LTG 2 Increase bilateral shoulder girdle strength to 5/5 all planes with testing.  -     LTG 3 Pt will be independent with all ADLs and have a Quick Dash score < 15.  -        Time Calculation    PT Goal Re-Cert Due Date 10/04/23  -               User Key  (r) = Recorded By, (t) = Taken By, (c) = Cosigned By      Initials Name Provider Type     Ruperto Kimball, PT Physical Therapist                     PT Assessment/Plan       Row Name 09/06/23 1000          PT Assessment    Functional Limitations Limitation in home management;Limitations in community activities;Limitations in functional capacity and performance;Performance in leisure activities;Performance in self-care ADL  -     Impairments Range of motion;Pain;Muscle strength  -     Assessment Comments Pt presents approximately 4 months s/p left shoulder arthroscopy and with a several week history of right shoulder pain. She rates her pain up to 7/10 with activity that involves reaching and lifting tasks. She has decreased shoulder ROM, decreased shoulder girdle strength, and decreased function secondary to the above.  -     Rehab Potential Good  -     Patient/caregiver participated in establishment of treatment plan and goals Yes  -     Patient would benefit from skilled  therapy intervention Yes  -GC        PT Plan    PT Frequency 1x/week;2x/week  -GC     Predicted Duration of Therapy Intervention (PT) 4 weeks  -GC     Planned CPT's? PT EVAL LOW COMPLEXITY: 56160;PT THER PROC EA 15 MIN: 09276;PT HOT OR COLD PACK TREAT MCARE;PT MANUAL THERAPY EA 15 MIN: 68334  -GC     PT Plan Comments Pt is to continue her HEP 2x daily.  -GC               User Key  (r) = Recorded By, (t) = Taken By, (c) = Cosigned By      Initials Name Provider Type    GC Ruperto Kimball, PT Physical Therapist                     Modalities       Row Name 09/06/23 1000             Moist Heat    MH Applied Yes  -GC      Location bilateral shoulders with pt supine and roll under knees  -GC      PT Moist Heat Minutes 10  -GC      MH Prior to Rx Yes  -GC                User Key  (r) = Recorded By, (t) = Taken By, (c) = Cosigned By      Initials Name Provider Type    GC Ruperto Kimball, PT Physical Therapist                   OP Exercises       Row Name 09/06/23 1000             Exercise 1    Exercise Name 1 bilateral scaption up  -GC      Cueing 1 Verbal;Tactile;Demo  -GC      Reps 1 25  -GC      Time 1 1#  -GC         Exercise 2    Exercise Name 2 bilateral scaption down  -GC      Cueing 2 Verbal;Tactile;Demo  -GC      Reps 2 25  -GC      Time 2 1# right, no wt left  -GC         Exercise 3    Exercise Name 3 bilateral shoulder ER vs theraband  -GC      Cueing 3 Verbal;Tactile;Demo  -GC      Reps 3 25  -GC      Time 3 green  -GC         Exercise 4    Exercise Name 4 bilateral shoulder IR vs theraband  -GC      Cueing 4 Verbal;Tactile;Demo  -GC      Reps 4 25  -GC      Time 4 green  -GC         Exercise 5    Exercise Name 5 bilateral shoulder EXT vs theraband  -GC      Cueing 5 Verbal;Tactile;Demo  -GC      Reps 5 25  -GC      Time 5 green  -GC         Exercise 6    Exercise Name 6 bilateral shoulder Rows vs theraband  -GC      Cueing 6 Verbal;Tactile;Demo  -GC      Reps 6 25  -GC      Time 6 green  -GC                User  Key  (r) = Recorded By, (t) = Taken By, (c) = Cosigned By      Initials Name Provider Type     Ruperto Kimball, PT Physical Therapist                                  Outcome Measure Options: Quick DASH  Quick DASH  Open a tight or new jar.: Mild Difficulty  Do heavy household chores (e.g., wash walls, wash floors): Unable  Carry a shopping bag or briefcase: Mild Difficulty  Wash your back: Unable  Recreational activities in which you take some force or impact through your arm, should or hand (e.g. golf, hammering, tennis, etc.): Mild Difficulty  During the past week, to what extent has your arm, shoulder, or hand problem interfered with your normal social activites with family, friends, neighbors or groups?: Quite a bit  During the past week, were you limited in your work or other regular daily activities as a result of your arm, shoulder or hand problem?: Very limited  Arm, Shoulder, or hand pain: Moderate  Tingling (pins and needles) in your arm, shoulder, or hand: Mild  During the past week, how much difficulty have you had sleeping because of the pain in your arm, shoulder or hand?: Moderate Difficiculty  Number of Questions Answered: 10  Quick DASH Score: 55         Time Calculation:     Start Time: 1000  Stop Time: 1100  Time Calculation (min): 60 min  Untimed Charges  PT Moist Heat Minutes: 10  Total Minutes  Untimed Charges Total Minutes: 10   Total Minutes: 10     Therapy Charges for Today       Code Description Service Date Service Provider Modifiers Qty    83629232021 HC PT EVAL LOW COMPLEXITY 3 9/6/2023 Ruperto Kimball, PT GP 1            PT G-Codes  Outcome Measure Options: Quick DASH  Quick DASH Score: 55         Ruperto Kimball PT  9/7/2023

## 2023-09-08 LAB — MRSA SPEC QL CULT: NORMAL

## 2023-09-11 ENCOUNTER — HOSPITAL ENCOUNTER (OUTPATIENT)
Dept: MRI IMAGING | Facility: HOSPITAL | Age: 43
Discharge: HOME | End: 2023-09-11
Admitting: NURSE PRACTITIONER
Payer: COMMERCIAL

## 2023-09-11 DIAGNOSIS — M75.81 ROTATOR CUFF TENDINITIS, RIGHT: ICD-10-CM

## 2023-09-11 DIAGNOSIS — M75.51 SUBACROMIAL BURSITIS OF RIGHT SHOULDER JOINT: ICD-10-CM

## 2023-09-11 PROCEDURE — 73221 MRI JOINT UPR EXTREM W/O DYE: CPT

## 2023-09-13 ENCOUNTER — HOSPITAL ENCOUNTER (OUTPATIENT)
Dept: PHYSICAL THERAPY | Facility: HOSPITAL | Age: 43
Setting detail: THERAPIES SERIES
Discharge: HOME | End: 2023-09-13
Payer: COMMERCIAL

## 2023-09-13 DIAGNOSIS — M75.81 ROTATOR CUFF TENDINITIS, RIGHT: ICD-10-CM

## 2023-09-13 DIAGNOSIS — Z98.890 STATUS POST ARTHROSCOPY OF SHOULDER: Primary | ICD-10-CM

## 2023-09-13 DIAGNOSIS — M75.51 SUBACROMIAL BURSITIS OF RIGHT SHOULDER JOINT: ICD-10-CM

## 2023-09-13 DIAGNOSIS — Z98.890 S/P LEFT ROTATOR CUFF REPAIR: ICD-10-CM

## 2023-09-13 LAB — FACT VIII AG ACT/NOR PPP IA: 177 %

## 2023-09-13 PROCEDURE — 97110 THERAPEUTIC EXERCISES: CPT | Performed by: PHYSICAL THERAPIST

## 2023-09-13 NOTE — THERAPY TREATMENT NOTE
Outpatient Physical Therapy Ortho Treatment Note  TENISHA Wing     Patient Name: Aide Henry  : 1980  MRN: [id number]  Today's Date: 2023      Visit Date: 2023    Visit Dx:    ICD-10-CM ICD-9-CM   1. Status post arthroscopy of shoulder  Z98.890 V45.89   2. Subacromial bursitis of right shoulder joint  M75.51 726.19   3. Rotator cuff tendinitis, right  M75.81 726.10   4. S/P left rotator cuff repair  Z98.890 V45.89       Patient Active Problem List   Diagnosis    Arthralgia of multiple joints    Chronic back pain    Chronic constipation    Disorder of connective tissue    Depression with anxiety    Fibromyalgia    Gastroesophageal reflux disease without esophagitis    Muscle pain    Palpitations    Seasonal allergic rhinitis    Eczema    Shoulder pain, left    Substance abuse    Lateral epicondylitis    Drug addiction syndrome    Gastroenteritis    Alcohol dependence in remission    History of placement of ear tubes    Status post arthroscopy of shoulder    Subacromial impingement of left shoulder    Biceps tendinitis of left upper extremity    Complete tear of left rotator cuff    Smoker    Sjogren's syndrome with keratoconjunctivitis sicca    Pelvic pain    Nondisplaced fracture of coronoid process of left ulna, initial encounter for closed fracture    Left breast lump    SARMAD (stress urinary incontinence, female)    Rectocele    Irritable bowel syndrome with both constipation and diarrhea    Ashton's esophagus without dysplasia    IC (interstitial cystitis)    Overactive bladder    Gastroparesis    Chondromalacia of patellofemoral joint, left    Lupus    Dysmenorrhea    History of bilateral tubal ligation    S/P endometrial ablation: 21    Blood blister: R labium    Carrier of ureaplasma urealyticum    Mechanical knee pain, left    AC joint arthropathy    Subacromial bursitis of right shoulder joint    Rotator cuff tendinitis, right        Past Medical History:   Diagnosis Date     Anxiety     Bacterial vaginosis     Ashton esophagus     Bulging lumbar disc     lower back per patient    Depression with anxiety     Fibromyalgia     Fracture, finger     Gastroparesis     GERD (gastroesophageal reflux disease)     Hemorrhoids     History of anemia     Hypertension     Hypothyroid     IBS (irritable bowel syndrome)     IC (interstitial cystitis)     Lateral epicondylitis 2013    RHUEMATOLOGIST    Lupus     Migraine     MRSA (methicillin resistant staph aureus) culture positive  ESTIMATE    GROIN AREA     Overactive bladder     Sjogren's syndrome     Urinary tract infection         Past Surgical History:   Procedure Laterality Date    ADENOIDECTOMY      CARPAL TUNNEL RELEASE Bilateral      SECTION      COLONOSCOPY      D & C HYSTEROSCOPY N/A 2021    Procedure: DILATATION AND CURETTAGE HYSTEROSCOPY;  Surgeon: Rancho Mayberry MD;  Location: [facility] OR;  Service: Obstetrics/Gynecology;  Laterality: N/A;    D & C HYSTEROSCOPY ENDOMETRIAL ABLATION N/A 2021    Procedure: DILATATION AND CURETTAGE HYSTEROSCOPY,  NOVASURE ENDOMETRIAL ABLATION;  Surgeon: Rancho Mayberry MD;  Location: [facility] OR;  Service: Obstetrics/Gynecology;  Laterality: N/A;    ENDOSCOPY N/A 05/10/2019    Procedure: ESOPHAGOGASTRODUODENOSCOPY with biopsies;  Surgeon: Shanon Vazquez MD;  Location: [facility] OR;  Service: Gastroenterology    ENDOSCOPY N/A 2021    Procedure: ESOPHAGOGASTRODUODENOSCOPY with biopsies;  Surgeon: Romain Rice MD;  Location: [facility] OR;  Service: Gastroenterology;  Laterality: N/A;  barretts  gastritis    FINGER GANGLION CYST EXCISION Right     middle finger    MOUTH SURGERY      OTHER SURGICAL HISTORY      reversal of tubal    RHINOPLASTY      SHOULDER ARTHROSCOPY Left 09/10/2018    Procedure: SHOULDER ARTHROSCOPY, rotator cuff repair; extensive debridement, open biceps tenodesis;  Surgeon: Joo Rivers MD;  Location: [facility] OR;   Service: Orthopedics    SHOULDER ARTHROSCOPY W/ ROTATOR CUFF REPAIR Left 5/27/2022    Procedure: SHOULDER ARTHROSCOPY WITH ROTATOR CUFF REPAIR COMPLEX, EXTENSIVE DEBRIDEMENT;  Surgeon: Joo Rivers MD;  Location:  [facility] OR;  Service: Orthopedics;  Laterality: Left;    SHOULDER ARTHROSCOPY W/ ROTATOR CUFF REPAIR Left 6/23/2023    Procedure: SHOULDER ARTHROSCOPY WITH ROTATOR CUFF REPAIR, distal clavicle excision;  Surgeon: Joo Rivers MD;  Location:  [facility] OR;  Service: Orthopedics;  Laterality: Left;    TONSILLECTOMY      TUBAL ABDOMINAL LIGATION      TYMPANOSTOMY TUBE PLACEMENT      WISDOM TOOTH EXTRACTION Bilateral                         PT Assessment/Plan       Row Name 09/13/23 1110          PT Assessment    Assessment Comments Pt tolerated decreased resistance with her exercises well. Feel her increased soreness this morning was due to the resistance used with previous visit.  -GC        PT Plan    PT Plan Comments Pt is to continue her HEP daily.  -GC               User Key  (r) = Recorded By, (t) = Taken By, (c) = Cosigned By      Initials Name Provider Type    Ruperto Russo, PT Physical Therapist                       OP Exercises       Row Name 09/13/23 1110             Subjective    Subjective Comments Pt c/o increased shoulder and neck pain with her exercises.  -GC         Exercise 1    Exercise Name 1 bilateral scaption up  -GC      Cueing 1 Verbal;Tactile;Demo  -GC      Reps 1 20  -GC         Exercise 2    Exercise Name 2 bilateral scaption down  -GC      Cueing 2 Verbal;Tactile;Demo  -GC      Reps 2 20  -GC         Exercise 3    Exercise Name 3 bilateral shoulder ER vs theraband  -GC      Cueing 3 Verbal;Tactile;Demo  -GC      Reps 3 20  -GC      Time 3 yellow  -GC         Exercise 4    Exercise Name 4 bilateral shoulder IR vs theraband  -GC      Cueing 4 Verbal;Tactile;Demo  -GC      Reps 4 20  -GC      Time 4 yellow  -GC         Exercise 5    Exercise Name 5 bilateral shoulder EXT  vs theraband  -GC      Cueing 5 Verbal;Tactile;Demo  -GC      Reps 5 20  -GC      Time 5 yellow  -GC         Exercise 6    Exercise Name 6 bilateral shoulder Rows vs theraband  -GC      Cueing 6 Verbal;Tactile;Demo  -GC      Reps 6 20  -GC      Time 6 yellow  -GC                User Key  (r) = Recorded By, (t) = Taken By, (c) = Cosigned By      Initials Name Provider Type     Ruperto Kimball, PT Physical Therapist                                                    Time Calculation:   Start Time: 1110  Stop Time: 1135  Time Calculation (min): 25 min  Therapy Charges for Today       Code Description Service Date Service Provider Modifiers Qty    [id number]  PT THER PROC EA 15 MIN 9/13/2023 Ruperto Kimball, PT GP 1                      Ruperto Kimball PT  9/13/2023

## 2023-09-15 ENCOUNTER — OFFICE VISIT (OUTPATIENT)
Dept: ORTHOPEDIC SURGERY | Facility: CLINIC | Age: 43
End: 2023-09-15
Payer: COMMERCIAL

## 2023-09-15 VITALS — BODY MASS INDEX: 33.29 KG/M2 | HEIGHT: 64 IN | WEIGHT: 195 LBS

## 2023-09-15 DIAGNOSIS — M19.019 AC JOINT ARTHROPATHY: Primary | ICD-10-CM

## 2023-09-15 DIAGNOSIS — M75.51 SUBACROMIAL BURSITIS OF RIGHT SHOULDER JOINT: ICD-10-CM

## 2023-09-15 RX ADMIN — LIDOCAINE HYDROCHLORIDE 3 ML: 10 INJECTION, SOLUTION EPIDURAL; INFILTRATION; INTRACAUDAL; PERINEURAL at 15:04

## 2023-09-15 RX ADMIN — TRIAMCINOLONE ACETONIDE 40 MG: 40 INJECTION, SUSPENSION INTRA-ARTICULAR; INTRAMUSCULAR at 15:04

## 2023-09-15 NOTE — PROGRESS NOTES
Subjective:     Patient ID: Aide Henry is a 43 y.o. female.    Chief Complaint:  Follow-up right shoulder  MRI completed  History of Present Illness  Aide Henry returns to clinic for follow-up of her right shoulder.  She is experiencing pain along the lateral acromion as well as the top of the shoulder increased pain noted reaching across her body and reaching overhead.  She has completed the MRI and presents to discuss results and further plan of care.  She did suffer a new injury falling on wet surface she has returned to physical therapy for the left shoulder status post left shoulder arthroscopy with rotator cuff repair.  Denies any other concerns present.     Social History     Occupational History    Not on file   Tobacco Use    Smoking status: Every Day     Packs/day: 0.25     Years: 20.00     Pack years: 5.00     Types: Cigarettes     Passive exposure: Never    Smokeless tobacco: Never    Tobacco comments:     Smokes every now and then    Vaping Use    Vaping Use: Some days   Substance and Sexual Activity    Alcohol use: Not Currently     Comment: sober since 4/2023    Drug use: Not Currently     Types: Cocaine(coke)     Comment: sober since 4/2023    Sexual activity: Defer      Past Medical History:   Diagnosis Date    Anxiety     Bacterial vaginosis     Ashton esophagus     Bulging lumbar disc     lower back per patient    Depression with anxiety     Fibromyalgia     Fracture, finger     Gastroparesis     GERD (gastroesophageal reflux disease)     Hemorrhoids     History of anemia     Hypertension     Hypothyroid     IBS (irritable bowel syndrome)     IC (interstitial cystitis)     Lateral epicondylitis 09/16/2013    RHUEMATOLOGIST    Lupus     Migraine     MRSA (methicillin resistant staph aureus) culture positive 2011 ESTIMATE    GROIN AREA     Overactive bladder     Sjogren's syndrome     Urinary tract infection      Past Surgical History:   Procedure Laterality Date    ADENOIDECTOMY       CARPAL TUNNEL RELEASE Bilateral      SECTION      COLONOSCOPY      D & C HYSTEROSCOPY N/A 2021    Procedure: DILATATION AND CURETTAGE HYSTEROSCOPY;  Surgeon: Rancho Mayberry MD;  Location:  [facility];  Service: Obstetrics/Gynecology;  Laterality: N/A;    D & C HYSTEROSCOPY ENDOMETRIAL ABLATION N/A 2021    Procedure: DILATATION AND CURETTAGE HYSTEROSCOPY,  NOVASURE ENDOMETRIAL ABLATION;  Surgeon: Rancho Mayberry MD;  Location:  [facility];  Service: Obstetrics/Gynecology;  Laterality: N/A;    ENDOSCOPY N/A 05/10/2019    Procedure: ESOPHAGOGASTRODUODENOSCOPY with biopsies;  Surgeon: Shanon Vazquez MD;  Location:  [facility];  Service: Gastroenterology    ENDOSCOPY N/A 2021    Procedure: ESOPHAGOGASTRODUODENOSCOPY with biopsies;  Surgeon: Romain Rice MD;  Location:  [facility];  Service: Gastroenterology;  Laterality: N/A;  barretts  gastritis    FINGER GANGLION CYST EXCISION Right     middle finger    MOUTH SURGERY      OTHER SURGICAL HISTORY      reversal of tubal    RHINOPLASTY      SHOULDER ARTHROSCOPY Left 09/10/2018    Procedure: SHOULDER ARTHROSCOPY, rotator cuff repair; extensive debridement, open biceps tenodesis;  Surgeon: Joo Rivers MD;  Location:  [facility];  Service: Orthopedics    SHOULDER ARTHROSCOPY W/ ROTATOR CUFF REPAIR Left 2022    Procedure: SHOULDER ARTHROSCOPY WITH ROTATOR CUFF REPAIR COMPLEX, EXTENSIVE DEBRIDEMENT;  Surgeon: Joo Rivers MD;  Location:  [facility];  Service: Orthopedics;  Laterality: Left;    SHOULDER ARTHROSCOPY W/ ROTATOR CUFF REPAIR Left 2023    Procedure: SHOULDER ARTHROSCOPY WITH ROTATOR CUFF REPAIR, distal clavicle excision;  Surgeon: Joo Rivers MD;  Location: [facility] OR;  Service: Orthopedics;  Laterality: Left;    TONSILLECTOMY      TUBAL ABDOMINAL LIGATION      TYMPANOSTOMY TUBE PLACEMENT      WISDOM TOOTH EXTRACTION Bilateral        Family History   Problem Relation Age of Onset  "   Lung cancer Father     Heart disease Paternal Grandmother     Diabetes Maternal Aunt     Breast cancer Maternal Aunt     Diabetes Maternal Grandmother     COPD Mother     Colon cancer Neg Hx     Colon polyps Neg Hx     Malig Hyperthermia Neg Hx                Objective:  Physical Exam  General: No acute distress.  Eyes: conjunctiva clear; pupils equally round and reactive  ENT: external ears and nose atraumatic; oropharynx clear  CV: no peripheral edema  Resp: normal respiratory effort  Skin: no rashes or wounds; normal turgor  Psych: mood and affect appropriate; recent and remote memory intact    Vitals:    09/15/23 1431   Weight: 88.5 kg (195 lb)   Height: 162.6 cm (64\")         09/15/23  1431   Weight: 88.5 kg (195 lb)     Body mass index is 33.47 kg/m².      Ortho Exam     Left shoulder examined  Active forward flexion 160 degrees external rotation 40 degrees internal rotation L5  Internal/external rotation strength 4- out of 5  Positive Mason, positive Neer's  Supraspinatus strength 4- out of 5  Subscapularis and belly press exam strength 4 out of 5  Bicep strength 4- out of 5  Positive speeds exam  Negative Lee's  Positive crossarm exam  Positive bearhug sign  Positive sensation light touch all distributions right upper extremity    Imaging:    MRI Shoulder Right Without Contrast    Result Date: 9/11/2023   1. Mild supraspinatus and infraspinatus tendinopathy. No rotator cuff tear. Mild reactive enthesopathic marrow edema in the anterior aspect of the greater tuberosity. 2. Intact long head biceps tendon and glenoid labrum. 3. Mild AC joint arthrosis with evidence of an active inflammatory component. No AC joint separation. The findings can be seen in the setting of acute trauma or chronic overuse. This produces mild mass effect on the supraspinatus outlet. 4. Mild inflammation of the subacromial/subdeltoid bursa.    This report was finalized on 9/11/2023 10:45 PM by Dr. Jean Azar MD.  "     Independently reviewed MRI right shoulder mild supraspinatus and infraspinatus tendinopathy no evidence of rotator cuff tear.  Mild reactive enthesopathic marrow edema anterior aspect greater tuberosity, intact long head biceps tendon and glenoid labrum.  Mild AC joint arthrosis with evidence of active inflammatory component.  No evidence of AC joint separation  Assessment:        1. Subacromial bursitis of right shoulder joint    2. AC joint arthropathy           Plan:    - Medium Joint Arthrocentesis: R acromioclavicular on 9/15/2023 3:04 PM  Indications: pain  Details: 22 G needle, superior approach  Medications: 40 mg triamcinolone acetonide 40 MG/ML; 3 mL lidocaine PF 1% 1 %  Outcome: tolerated well, no immediate complications  Consent was given by the patient. Immediately prior to procedure a time out was called to verify the correct patient, procedure, equipment, support staff and site/side marked as required. Patient was prepped and draped in the usual sterile fashion.       Discussed plan of care with patient.  We did discuss treatment options, no evidence of rotator cuff tear nothing indicating surgery, discussed other options including including proceeding with corticosteroid injection right shoulder, AC joint which is where she is experiencing the majority of her pain at today's visit..  She does wish to proceed with injection AC joint right shoulder today's visit.  I will see her back in clinic in 4 weeks to reevaluate.  Continue with motion and strength with the right upper extremity similar to what she is completing with physical therapy at the left shoulder.  All questions at today's visit.  Orders:  Orders Placed This Encounter   Procedures    - Medium Joint Arthrocentesis: R acromioclavicular     No orders of the defined types were placed in this encounter.          Dragon dictation utilized  We encourage all our patients to maintain a healthy weight - a Body Mass Index of 20-25. This, along  with regular exercise and a balanced diet can lower your risk of many illnesses such as diabetes, heart disease, and stroke.

## 2023-09-17 RX ORDER — LIDOCAINE HYDROCHLORIDE 10 MG/ML
3 INJECTION, SOLUTION EPIDURAL; INFILTRATION; INTRACAUDAL; PERINEURAL
Status: COMPLETED | OUTPATIENT
Start: 2023-09-15 | End: 2023-09-15

## 2023-09-17 RX ORDER — TRIAMCINOLONE ACETONIDE 40 MG/ML
40 INJECTION, SUSPENSION INTRA-ARTICULAR; INTRAMUSCULAR
Status: COMPLETED | OUTPATIENT
Start: 2023-09-15 | End: 2023-09-15

## 2023-09-21 ENCOUNTER — TELEPHONE (OUTPATIENT)
Dept: ORTHOPEDIC SURGERY | Facility: CLINIC | Age: 43
End: 2023-09-21
Payer: COMMERCIAL

## 2023-09-21 NOTE — TELEPHONE ENCOUNTER
Caller: Aide Henry     Relationship: SELF    Best call back number: [phone]      What is your medical concern? RT SHOULDER PAIN    How long has this issue been going on? SINCE TODAY     Is your provider already aware of this issue? UNKNOWN     Have you been treated for this issue? PATIENT HAD SURGERY WITH DR. BURTON IN JUNE AND SHE HAD A GASTRO SCOPE PROCEDURE TODAY. PATIENT CURRENTLY EXPERIENCING LOT OF PAIN ON HER SHOULDER CAUSING HER DIFFICULTY TO BREATH. PATIENT SEEKING CLINICAL ADVISE.

## 2023-09-21 NOTE — TELEPHONE ENCOUNTER
Patient advised to contact her gastro doctor/the office whom preformed the procedure, did advised the patient she should seek urgent treatment as she reports she is having extreme pain and difficult breathing.    Patient aware and agreeable.

## 2023-09-25 ENCOUNTER — HOSPITAL ENCOUNTER (OUTPATIENT)
Dept: PHYSICAL THERAPY | Facility: HOSPITAL | Age: 43
Setting detail: THERAPIES SERIES
Discharge: HOME | End: 2023-09-25
Payer: COMMERCIAL

## 2023-09-25 DIAGNOSIS — Z98.890 STATUS POST ARTHROSCOPY OF SHOULDER: Primary | ICD-10-CM

## 2023-09-25 PROCEDURE — 97110 THERAPEUTIC EXERCISES: CPT | Performed by: PHYSICAL THERAPIST

## 2023-09-25 NOTE — THERAPY TREATMENT NOTE
Outpatient Physical Therapy Ortho Treatment Note  TENISHA Wing     Patient Name: Aide Henry  : 1980  MRN: [id number]  Today's Date: 2023      Visit Date: 2023    Visit Dx:    ICD-10-CM ICD-9-CM   1. Status post arthroscopy of shoulder  Z98.890 V45.89       Patient Active Problem List   Diagnosis    Arthralgia of multiple joints    Chronic back pain    Chronic constipation    Disorder of connective tissue    Depression with anxiety    Fibromyalgia    Gastroesophageal reflux disease without esophagitis    Muscle pain    Palpitations    Seasonal allergic rhinitis    Eczema    Shoulder pain, left    Substance abuse    Lateral epicondylitis    Drug addiction syndrome    Gastroenteritis    Alcohol dependence in remission    History of placement of ear tubes    Status post arthroscopy of shoulder    Subacromial impingement of left shoulder    Biceps tendinitis of left upper extremity    Complete tear of left rotator cuff    Smoker    Sjogren's syndrome with keratoconjunctivitis sicca    Pelvic pain    Nondisplaced fracture of coronoid process of left ulna, initial encounter for closed fracture    Left breast lump    SARMAD (stress urinary incontinence, female)    Rectocele    Irritable bowel syndrome with both constipation and diarrhea    Ashton's esophagus without dysplasia    IC (interstitial cystitis)    Overactive bladder    Gastroparesis    Chondromalacia of patellofemoral joint, left    Lupus    Dysmenorrhea    History of bilateral tubal ligation    S/P endometrial ablation: 21    Blood blister: R labium    Carrier of ureaplasma urealyticum    Mechanical knee pain, left    AC joint arthropathy    Subacromial bursitis of right shoulder joint    Rotator cuff tendinitis, right        Past Medical History:   Diagnosis Date    Anxiety     Bacterial vaginosis     Ashton esophagus     Bulging lumbar disc     lower back per patient    Depression with anxiety     Fibromyalgia     Fracture, finger      Gastroparesis     GERD (gastroesophageal reflux disease)     Hemorrhoids     History of anemia     Hypertension     Hypothyroid     IBS (irritable bowel syndrome)     IC (interstitial cystitis)     Lateral epicondylitis 2013    RHUEMATOLOGIST    Lupus     Migraine     MRSA (methicillin resistant staph aureus) culture positive  ESTIMATE    GROIN AREA     Overactive bladder     Sjogren's syndrome     Urinary tract infection         Past Surgical History:   Procedure Laterality Date    ADENOIDECTOMY      CARPAL TUNNEL RELEASE Bilateral      SECTION      COLONOSCOPY      D & C HYSTEROSCOPY N/A 2021    Procedure: DILATATION AND CURETTAGE HYSTEROSCOPY;  Surgeon: Rancho Mayebrry MD;  Location: [facility];  Service: Obstetrics/Gynecology;  Laterality: N/A;    D & C HYSTEROSCOPY ENDOMETRIAL ABLATION N/A 2021    Procedure: DILATATION AND CURETTAGE HYSTEROSCOPY,  NOVASURE ENDOMETRIAL ABLATION;  Surgeon: Rancho Mayberry MD;  Location: [facility];  Service: Obstetrics/Gynecology;  Laterality: N/A;    ENDOSCOPY N/A 05/10/2019    Procedure: ESOPHAGOGASTRODUODENOSCOPY with biopsies;  Surgeon: Shanon Vazquez MD;  Location: [facility];  Service: Gastroenterology    ENDOSCOPY N/A 2021    Procedure: ESOPHAGOGASTRODUODENOSCOPY with biopsies;  Surgeon: Romain Rice MD;  Location: [facility];  Service: Gastroenterology;  Laterality: N/A;  barretts  gastritis    FINGER GANGLION CYST EXCISION Right     middle finger    MOUTH SURGERY      OTHER SURGICAL HISTORY      reversal of tubal    RHINOPLASTY      SHOULDER ARTHROSCOPY Left 09/10/2018    Procedure: SHOULDER ARTHROSCOPY, rotator cuff repair; extensive debridement, open biceps tenodesis;  Surgeon: Joo Rivers MD;  Location: [facility];  Service: Orthopedics    SHOULDER ARTHROSCOPY W/ ROTATOR CUFF REPAIR Left 2022    Procedure: SHOULDER ARTHROSCOPY WITH ROTATOR CUFF REPAIR COMPLEX, EXTENSIVE  DEBRIDEMENT;  Surgeon: Joo Rivers MD;  Location:  [facility];  Service: Orthopedics;  Laterality: Left;    SHOULDER ARTHROSCOPY W/ ROTATOR CUFF REPAIR Left 6/23/2023    Procedure: SHOULDER ARTHROSCOPY WITH ROTATOR CUFF REPAIR, distal clavicle excision;  Surgeon: Joo Rivers MD;  Location:  [facility];  Service: Orthopedics;  Laterality: Left;    TONSILLECTOMY      TUBAL ABDOMINAL LIGATION      TYMPANOSTOMY TUBE PLACEMENT      WISDOM TOOTH EXTRACTION Bilateral                         PT Assessment/Plan       Row Name 09/25/23 0900          PT Assessment    Assessment Comments Pt tolerated her exercise progression well.  -GC        PT Plan    PT Plan Comments Pt is to continue her HEP daily.  -GC               User Key  (r) = Recorded By, (t) = Taken By, (c) = Cosigned By      Initials Name Provider Type    GC Ruperto Kimball, PT Physical Therapist                     Modalities       Row Name 09/25/23 0900             Subjective    Subjective Comments Pt feels like her shoulders are getting better.  -GC         Moist Heat    MH Applied Yes  -GC      Location bilateral shoulders with pt supine and roll under knees  -GC      PT Moist Heat Minutes 10  -GC      MH Prior to Rx Yes  -GC         Functional Testing    Outcome Measure Options Quick DASH  -GC                User Key  (r) = Recorded By, (t) = Taken By, (c) = Cosigned By      Initials Name Provider Type    GC Ruperto Kimball, PT Physical Therapist                   OP Exercises       Row Name 09/25/23 0900             Subjective    Subjective Comments Pt feels like her shoulders are getting better.  -GC         Exercise 1    Exercise Name 1 bilateral scaption up  -GC      Cueing 1 Verbal;Tactile;Demo  -GC      Reps 1 25  -GC      Time 1 1#  -GC         Exercise 2    Exercise Name 2 bilateral scaption down  -GC      Cueing 2 Verbal;Tactile;Demo  -GC      Reps 2 25  -GC      Time 2 1#  -GC         Exercise 3    Exercise Name 3 bilateral shoulder ER vs  theraband  -GC      Cueing 3 Verbal;Tactile;Demo  -GC      Reps 3 25  -GC      Time 3 green  -GC         Exercise 4    Exercise Name 4 bilateral shoulder IR vs theraband  -GC      Cueing 4 Verbal;Tactile;Demo  -GC      Reps 4 25  -GC      Time 4 green  -GC         Exercise 5    Exercise Name 5 bilateral shoulder EXT vs theraband  -GC      Cueing 5 Verbal;Tactile;Demo  -GC      Reps 5 25  -GC      Time 5 green  -GC         Exercise 6    Exercise Name 6 bilateral shoulder Rows vs theraband  -GC      Cueing 6 Verbal;Tactile;Demo  -GC      Reps 6 25  -GC      Time 6 green  -GC                User Key  (r) = Recorded By, (t) = Taken By, (c) = Cosigned By      Initials Name Provider Type     Ruperto Kimball, PT Physical Therapist                                          Outcome Measure Options: Quick DASH         Time Calculation:   Start Time: 0900  Stop Time: 0931  Time Calculation (min): 31 min  Untimed Charges  PT Moist Heat Minutes: 10  Total Minutes  Untimed Charges Total Minutes: 10   Total Minutes: 10  Therapy Charges for Today       Code Description Service Date Service Provider Modifiers Qty    88744721904 HC PT THER PROC EA 15 MIN 9/25/2023 Ruperto Kimball, PT GP 1            PT G-Codes  Outcome Measure Options: Shelton Kimball PT  9/25/2023

## 2023-10-16 ENCOUNTER — HOSPITAL ENCOUNTER (OUTPATIENT)
Dept: PHYSICAL THERAPY | Facility: HOSPITAL | Age: 43
Setting detail: THERAPIES SERIES
Discharge: HOME | End: 2023-10-16
Payer: COMMERCIAL

## 2023-10-16 DIAGNOSIS — Z98.890 STATUS POST ARTHROSCOPY OF SHOULDER: Primary | ICD-10-CM

## 2023-10-16 NOTE — THERAPY RE-EVALUATION
Outpatient Physical Therapy Ortho Re-Evaluation  TENISHA Wing     Patient Name: Aide Henry  : 1980  MRN: [id number]  Today's Date: 10/16/2023      Visit Date: 10/16/2023    Patient Active Problem List   Diagnosis    Arthralgia of multiple joints    Chronic back pain    Chronic constipation    Disorder of connective tissue    Depression with anxiety    Fibromyalgia    Gastroesophageal reflux disease without esophagitis    Muscle pain    Palpitations    Seasonal allergic rhinitis    Eczema    Shoulder pain, left    Substance abuse    Lateral epicondylitis    Drug addiction syndrome    Gastroenteritis    Alcohol dependence in remission    History of placement of ear tubes    Status post arthroscopy of shoulder    Subacromial impingement of left shoulder    Biceps tendinitis of left upper extremity    Complete tear of left rotator cuff    Smoker    Sjogren's syndrome with keratoconjunctivitis sicca    Pelvic pain    Nondisplaced fracture of coronoid process of left ulna, initial encounter for closed fracture    Left breast lump    SARMAD (stress urinary incontinence, female)    Rectocele    Irritable bowel syndrome with both constipation and diarrhea    Ashton's esophagus without dysplasia    IC (interstitial cystitis)    Overactive bladder    Gastroparesis    Chondromalacia of patellofemoral joint, left    Lupus    Dysmenorrhea    History of bilateral tubal ligation    S/P endometrial ablation: 21    Blood blister: R labium    Carrier of ureaplasma urealyticum    Mechanical knee pain, left    AC joint arthropathy    Subacromial bursitis of right shoulder joint    Rotator cuff tendinitis, right        Past Medical History:   Diagnosis Date    Anxiety     Bacterial vaginosis     Ashton esophagus     Bulging lumbar disc     lower back per patient    Depression with anxiety     Fibromyalgia     Fracture, finger     Gastroparesis     GERD (gastroesophageal reflux disease)     Hemorrhoids     History of  anemia     Hypertension     Hypothyroid     IBS (irritable bowel syndrome)     IC (interstitial cystitis)     Lateral epicondylitis 2013    RHUEMATOLOGIST    Lupus     Migraine     MRSA (methicillin resistant staph aureus) culture positive 2011 ESTIMATE    GROIN AREA     Overactive bladder     Sjogren's syndrome     Urinary tract infection         Past Surgical History:   Procedure Laterality Date    ADENOIDECTOMY      CARPAL TUNNEL RELEASE Bilateral      SECTION      COLONOSCOPY      D & C HYSTEROSCOPY N/A 2021    Procedure: DILATATION AND CURETTAGE HYSTEROSCOPY;  Surgeon: Rancho Mayberry MD;  Location: [facility] OR;  Service: Obstetrics/Gynecology;  Laterality: N/A;    D & C HYSTEROSCOPY ENDOMETRIAL ABLATION N/A 2021    Procedure: DILATATION AND CURETTAGE HYSTEROSCOPY,  NOVASURE ENDOMETRIAL ABLATION;  Surgeon: Rancho Mayberry MD;  Location: [facility] OR;  Service: Obstetrics/Gynecology;  Laterality: N/A;    ENDOSCOPY N/A 05/10/2019    Procedure: ESOPHAGOGASTRODUODENOSCOPY with biopsies;  Surgeon: Shanon Vazquez MD;  Location: [facility] OR;  Service: Gastroenterology    ENDOSCOPY N/A 2021    Procedure: ESOPHAGOGASTRODUODENOSCOPY with biopsies;  Surgeon: Romain Rice MD;  Location: [facility] OR;  Service: Gastroenterology;  Laterality: N/A;  barretts  gastritis    FINGER GANGLION CYST EXCISION Right     middle finger    MOUTH SURGERY      OTHER SURGICAL HISTORY      reversal of tubal    RHINOPLASTY      SHOULDER ARTHROSCOPY Left 09/10/2018    Procedure: SHOULDER ARTHROSCOPY, rotator cuff repair; extensive debridement, open biceps tenodesis;  Surgeon: Joo Rivers MD;  Location: [facility] OR;  Service: Orthopedics    SHOULDER ARTHROSCOPY W/ ROTATOR CUFF REPAIR Left 2022    Procedure: SHOULDER ARTHROSCOPY WITH ROTATOR CUFF REPAIR COMPLEX, EXTENSIVE DEBRIDEMENT;  Surgeon: Joo Rivers MD;  Location: [facility] OR;  Service: Orthopedics;   Laterality: Left;    SHOULDER ARTHROSCOPY W/ ROTATOR CUFF REPAIR Left 6/23/2023    Procedure: SHOULDER ARTHROSCOPY WITH ROTATOR CUFF REPAIR, distal clavicle excision;  Surgeon: Joo Rivers MD;  Location: [facility] OR;  Service: Orthopedics;  Laterality: Left;    TONSILLECTOMY      TUBAL ABDOMINAL LIGATION      TYMPANOSTOMY TUBE PLACEMENT      WISDOM TOOTH EXTRACTION Bilateral        Visit Dx:     ICD-10-CM ICD-9-CM   1. Status post arthroscopy of shoulder  Z98.890 V45.89              PT Ortho       Row Name 10/16/23 1300       Left Upper Ext    Lt Shoulder Abduction AROM 171 egrees  -GC    Lt Shoulder Flexion AROM 162 degrees  -GC    Lt Shoulder External Rotation AROM 85 degrees  -GC    Lt Shoulder Internal Rotation AROM 84 degrees  -GC       MMT (Manual Muscle Testing)    General MMT Comments scaption strength is 4+/5  -GC       MMT Right Upper Ext    Rt Shoulder Flexion MMT, Gross Movement (4+/5) good plus  -GC    Rt Shoulder Extension MMT, Gross Movement (5/5) normal  -GC    Rt Shoulder ABduction MMT, Gross Movement (5/5) normal  -GC    Rt Shoulder ADduction MMT, Gross Movement (5/5) normal  -GC    Rt Shoulder Internal Rotation MMT, Gross Movement (5/5) normal  -GC    Rt Shoulder External Rotation MMT, Gross Movement (4+/5) good plus  -GC              User Key  (r) = Recorded By, (t) = Taken By, (c) = Cosigned By      Initials Name Provider Type    GC Ruperto Kimball, PT Physical Therapist                                       PT OP Goals       Row Name 10/16/23 1300          PT Short Term Goals    STG Date to Achieve 09/27/23  -     STG 1 Decrease bilateral shoulder pain to 3-4/10 with activity.  -     STG 1 Progress Met  -     STG 2 Increase left shoulder AROM to 160 degrees FLEX, 85 degrees ER, and 85 degrees IR with testing.  -     STG 2 Progress Met  -     STG 3 Increase bilateral shoulder girdle strength to at least 4+/5 all planes with testing.  -     STG 3 Progress Met  -     STG 4 Pt  will be independent with her HEP issued by this therapist.  -     STG 4 Progress Met  -        Long Term Goals    LTG Date to Achieve 10/18/23  -     LTG 1 Decrease bilateral shoulder pain to 0-1/10 with activity.  -     LTG 1 Progress Partially Met  -GC     LTG 2 Increase bilateral shoulder girdle strength to 5/5 all planes with testing.  -     LTG 2 Progress Partially Met  -GC     LTG 3 Pt will be independent with all ADLs and have a Quick Dash score < 15.  -     LTG 3 Progress Partially Met  -GC               User Key  (r) = Recorded By, (t) = Taken By, (c) = Cosigned By      Initials Name Provider Type     Ruperto Kimball, PT Physical Therapist                     PT Assessment/Plan       Row Name 10/16/23 1300          PT Assessment    Assessment Comments Pt is doing well with increased shoulder ROM and strength noted. Feel she can continue to make gains with her HEP only.  -        PT Plan    PT Plan Comments Pt is to continue her HEP daily.  -               User Key  (r) = Recorded By, (t) = Taken By, (c) = Cosigned By      Initials Name Provider Type     Ruperto Kimball, PT Physical Therapist                     Modalities       Row Name 10/16/23 1300             Subjective    Subjective Comments Pt c/o intermittent shoulder pain today.  -GC         Subjective Pain    Pre-Treatment Pain Level 3  -GC         Moist Heat    MH Applied Yes  -GC      Location bilateral shoulders with pt supine and roll under knees  -GC      PT Moist Heat Minutes 10  -GC      MH Prior to Rx Yes  -GC         Functional Testing    Outcome Measure Options Quick DASH  -                User Key  (r) = Recorded By, (t) = Taken By, (c) = Cosigned By      Initials Name Provider Type     Ruperto Kimball, PT Physical Therapist                   OP Exercises       Row Name 10/16/23 1300             Subjective    Subjective Comments Pt c/o intermittent shoulder pain today.  -GC         Subjective Pain    Pre-Treatment  Pain Level 3  -GC                User Key  (r) = Recorded By, (t) = Taken By, (c) = Cosigned By      Initials Name Provider Type    Ruperto Russo, PT Physical Therapist                                  Outcome Measure Options: Quick DASH         Time Calculation:     Untimed Charges  PT Moist Heat Minutes: 10  Total Minutes  Untimed Charges Total Minutes: 10   Total Minutes: 10         PT G-Codes  Outcome Measure Options: Quick JUAN DANIEL Kimball PT  10/16/2023

## 2023-11-08 ENCOUNTER — OFFICE VISIT (OUTPATIENT)
Dept: OBSTETRICS AND GYNECOLOGY | Facility: CLINIC | Age: 43
End: 2023-11-08
Payer: COMMERCIAL

## 2023-11-08 VITALS
DIASTOLIC BLOOD PRESSURE: 78 MMHG | BODY MASS INDEX: 34.15 KG/M2 | HEIGHT: 64 IN | SYSTOLIC BLOOD PRESSURE: 132 MMHG | WEIGHT: 200 LBS

## 2023-11-08 DIAGNOSIS — N39.3 SUI (STRESS URINARY INCONTINENCE, FEMALE): ICD-10-CM

## 2023-11-08 DIAGNOSIS — Z01.419 ROUTINE GYNECOLOGICAL EXAMINATION: ICD-10-CM

## 2023-11-08 DIAGNOSIS — Z01.419 PAP SMEAR, AS PART OF ROUTINE GYNECOLOGICAL EXAMINATION: Primary | ICD-10-CM

## 2023-11-08 DIAGNOSIS — Z01.419 WELL WOMAN EXAM: ICD-10-CM

## 2023-11-08 DIAGNOSIS — K31.84 GASTROPARESIS: ICD-10-CM

## 2023-11-08 DIAGNOSIS — J30.2 SEASONAL ALLERGIC RHINITIS, UNSPECIFIED TRIGGER: ICD-10-CM

## 2023-11-08 DIAGNOSIS — Z72.0 VAPES NICOTINE CONTAINING SUBSTANCE: ICD-10-CM

## 2023-11-08 DIAGNOSIS — Z98.890 S/P ENDOMETRIAL ABLATION: ICD-10-CM

## 2023-11-08 DIAGNOSIS — M35.01 SJOGREN'S SYNDROME WITH KERATOCONJUNCTIVITIS SICCA: ICD-10-CM

## 2023-11-08 DIAGNOSIS — Z11.51 SPECIAL SCREENING EXAMINATION FOR HUMAN PAPILLOMAVIRUS (HPV): ICD-10-CM

## 2023-11-08 LAB
BILIRUB BLD-MCNC: NEGATIVE MG/DL
CLARITY, POC: CLEAR
COLOR UR: YELLOW
GLUCOSE UR STRIP-MCNC: NEGATIVE MG/DL
KETONES UR QL: NEGATIVE
LEUKOCYTE EST, POC: NEGATIVE
NITRITE UR-MCNC: NEGATIVE MG/ML
PH UR: 5 [PH] (ref 5–8)
PROT UR STRIP-MCNC: NEGATIVE MG/DL
RBC # UR STRIP: NEGATIVE /UL
SP GR UR: 1.02 (ref 1–1.03)
UROBILINOGEN UR QL: NORMAL

## 2023-11-08 RX ORDER — LUBIPROSTONE 24 UG/1
24 CAPSULE ORAL
COMMUNITY
Start: 2023-09-20 | End: 2024-09-19

## 2023-11-08 RX ORDER — IBUPROFEN 200 MG
200 TABLET ORAL EVERY 6 HOURS PRN
COMMUNITY

## 2023-11-08 RX ORDER — BACILLUS COAGULANS/INULIN 1B-250 MG
1 CAPSULE ORAL DAILY
COMMUNITY

## 2023-11-08 NOTE — PROGRESS NOTES
GYN Annual Exam     CC- Here for annual exam   Chief Complaint   Patient presents with    Gynecologic Exam          Aide Henry is a 43 y.o.  female who presents for annual well woman exam. No LMP recorded. Patient has had an ablation.    Problems in addition to need for annual: none    HPI: History of Present Illness    PMHX:    * No active hospital problems. *  ; otherwise none    OB History          4    Para   4    Term   4       0    AB   0    Living   4         SAB        IAB        Ectopic        Molar        Multiple        Live Births                      Past Medical History:   Diagnosis Date    Anxiety     Bacterial vaginosis     Ashton esophagus     Bulging lumbar disc     lower back per patient    Depression with anxiety     Fibromyalgia     Fracture, finger     Gastroparesis     GERD (gastroesophageal reflux disease)     Hemorrhoids     History of anemia     Hypertension     Hypothyroid     IBS (irritable bowel syndrome)     IC (interstitial cystitis)     Lateral epicondylitis 2013    RHUEMATOLOGIST    Lupus     Migraine     MRSA (methicillin resistant staph aureus) culture positive  ESTIMATE    GROIN AREA     Overactive bladder     Sjogren's syndrome     Urinary tract infection        Past Surgical History:   Procedure Laterality Date    ADENOIDECTOMY      CARPAL TUNNEL RELEASE Bilateral      SECTION      COLONOSCOPY      D & C HYSTEROSCOPY N/A 2021    Procedure: DILATATION AND CURETTAGE HYSTEROSCOPY;  Surgeon: Rancho Mayberry MD;  Location: [facility] OR;  Service: Obstetrics/Gynecology;  Laterality: N/A;    D & C HYSTEROSCOPY ENDOMETRIAL ABLATION N/A 2021    Procedure: DILATATION AND CURETTAGE HYSTEROSCOPY,  NOVASURE ENDOMETRIAL ABLATION;  Surgeon: Rancho Mayberry MD;  Location: [facility] OR;  Service: Obstetrics/Gynecology;  Laterality: N/A;    ENDOSCOPY N/A 05/10/2019    Procedure: ESOPHAGOGASTRODUODENOSCOPY with biopsies;   Surgeon: Shanon Vazquez MD;  Location: [facility] OR;  Service: Gastroenterology    ENDOSCOPY N/A 04/14/2021    Procedure: ESOPHAGOGASTRODUODENOSCOPY with biopsies;  Surgeon: Romain Rice MD;  Location:  [facility] OR;  Service: Gastroenterology;  Laterality: N/A;  barretts  gastritis    FINGER GANGLION CYST EXCISION Right     middle finger    MOUTH SURGERY      OTHER SURGICAL HISTORY      reversal of tubal    RHINOPLASTY      SHOULDER ARTHROSCOPY Left 09/10/2018    Procedure: SHOULDER ARTHROSCOPY, rotator cuff repair; extensive debridement, open biceps tenodesis;  Surgeon: Joo Rivers MD;  Location: [facility] OR;  Service: Orthopedics    SHOULDER ARTHROSCOPY W/ ROTATOR CUFF REPAIR Left 5/27/2022    Procedure: SHOULDER ARTHROSCOPY WITH ROTATOR CUFF REPAIR COMPLEX, EXTENSIVE DEBRIDEMENT;  Surgeon: Joo Rivers MD;  Location:  [facility] OR;  Service: Orthopedics;  Laterality: Left;    SHOULDER ARTHROSCOPY W/ ROTATOR CUFF REPAIR Left 6/23/2023    Procedure: SHOULDER ARTHROSCOPY WITH ROTATOR CUFF REPAIR, distal clavicle excision;  Surgeon: Joo Rivers MD;  Location: [facility] OR;  Service: Orthopedics;  Laterality: Left;    TONSILLECTOMY      TUBAL ABDOMINAL LIGATION      TYMPANOSTOMY TUBE PLACEMENT      WISDOM TOOTH EXTRACTION Bilateral          Current Outpatient Medications:     Glycerin-Hypromellose- (ARTIFICIAL TEARS) 0.2-0.2-1 % solution ophthalmic solution, Eyes Both, Q2H, 0 Refill(s), Disp: , Rfl:     lubiprostone (AMITIZA) 24 MCG capsule, Take 1 capsule by mouth., Disp: , Rfl:     Metoprolol Tartrate (LOPRESSOR PO), 10 mg., Disp: , Rfl:     Rimegepant Sulfate (NURTEC) 75 MG tablet dispersible tablet, 1 tablet., Disp: , Rfl:     amLODIPine (NORVASC) 5 MG tablet, Take 1 tablet by mouth Daily., Disp: , Rfl:     Bacillus Coagulans-Inulin (Probiotic) 1-250 BILLION-MG capsule, Take 1 capsule by mouth Daily., Disp: , Rfl:     cevimeline (EVOXAC) 30 MG capsule, Take 1 capsule by mouth 3  (Three) Times a Day. Normally takes 2 times a day, Disp: , Rfl:     cyclobenzaprine (FLEXERIL) 10 MG tablet, Take 1 tablet by mouth At Night As Needed for Muscle Spasms., Disp: 45 tablet, Rfl: 0    DULoxetine (CYMBALTA) 60 MG capsule, Take 1 capsule by mouth Daily., Disp: , Rfl:     gabapentin (NEURONTIN) 300 MG capsule, Take 1 capsule by mouth 2 (Two) Times a Day., Disp: , Rfl:     hydroxychloroquine (PLAQUENIL) 200 MG tablet, Take 1 tablet by mouth Every Night., Disp: , Rfl:     ibuprofen (ADVIL,MOTRIN) 200 MG tablet, Take 1 tablet by mouth Every 6 (Six) Hours As Needed., Disp: , Rfl:     levothyroxine (SYNTHROID, LEVOTHROID) 50 MCG tablet, Take 1 tablet by mouth Daily., Disp: , Rfl:     linaclotide (LINZESS) 72 MCG capsule capsule, 1 capsule., Disp: , Rfl:     metoprolol succinate XL (TOPROL-XL) 25 MG 24 hr tablet, 1 tablet Daily., Disp: , Rfl:     omeprazole (priLOSEC) 40 MG capsule, Take 1 capsule by mouth 2 (Two) Times a Day., Disp: , Rfl:     predniSONE (DELTASONE) 10 MG tablet, 60 mg daily x 3 days, 40 mg daily x 3 days, 20 mg daily x 3 days, 10 mg daily x 3 days, Disp: 39 tablet, Rfl: 0    Privigen 5 GM/50ML solution infusion, Infuse  into a venous catheter 1 (One) Time Per Week., Disp: , Rfl:     Probiotic Product (Risaquad-2) capsule capsule, Take 1 capsule by mouth Daily., Disp: , Rfl:     Rimegepant Sulfate (NURTEC) 75 MG tablet dispersible tablet, Take 1 tablet by mouth Daily As Needed., Disp: , Rfl:     Salicylic Acid 6 % foam, WASH ARMS AND ABDOMEN ONCE DAILY, Disp: , Rfl:     traZODone (DESYREL) 150 MG tablet, Take 1 tablet by mouth Every Night., Disp: , Rfl:     vitamin D (ERGOCALCIFEROL) 1.25 MG (83684 UT) capsule capsule, Take 1 capsule by mouth 1 (One) Time Per Week., Disp: , Rfl:     Allergies   Allergen Reactions    Fluconazole Hives    Metoclopramide GI Intolerance     Other reaction(s): Other (see comments)    Constipation       Social History     Tobacco Use    Smoking status: Every Day      "Packs/day: 0.25     Years: 20.00     Additional pack years: 0.00     Total pack years: 5.00     Types: Cigarettes     Passive exposure: Never    Smokeless tobacco: Never    Tobacco comments:     Smokes every now and then    Vaping Use    Vaping Use: Some days   Substance Use Topics    Alcohol use: Not Currently     Comment: sober since 4/2023    Drug use: Not Currently     Types: Cocaine(coke)     Comment: sober since 4/2023       Aide Henry  reports that she has been smoking cigarettes. She has a 5.00 pack-year smoking history. She has never been exposed to tobacco smoke. She has never used smokeless tobacco..       Family History   Problem Relation Age of Onset    Lung cancer Father     Heart disease Paternal Grandmother     Diabetes Maternal Aunt     Breast cancer Maternal Aunt     Diabetes Maternal Grandmother     COPD Mother     Colon cancer Neg Hx     Colon polyps Neg Hx     Malig Hyperthermia Neg Hx        Review of Systems    Patient reports that she is currently experiencing any symptoms of urinary incontinence. Pt has apt w/ urogyn at the end of the month.      TESTED FOR CHLAMYDIA?    EXAM:  /78   Ht 162.6 cm (64\")   Wt 90.7 kg (200 lb)   BMI 34.33 kg/m²     Labs:   Lab Results (last 24 hours)       Procedure Component Value Units Date/Time    POC Urinalysis Dipstick [[id number]]  (Normal) Collected: 11/08/23 1427    Specimen: Urine Updated: 11/08/23 1427     Color Yellow     Clarity, UA Clear     Glucose, UA Negative mg/dL      Bilirubin Negative     Ketones, UA Negative     Specific Gravity  1.025     Blood, UA Negative     pH, Urine 5.0     Protein, POC Negative mg/dL      Urobilinogen, UA Normal     Leukocytes Negative     Nitrite, UA Negative            Physical Exam  Vitals and nursing note reviewed. Exam conducted with a chaperone present.   Constitutional:       General: She is not in acute distress.     Appearance: She is well-developed. She is not diaphoretic.   HENT:      Head: " Normocephalic and atraumatic.      Nose: Nose normal.   Eyes:      Extraocular Movements: Extraocular movements intact.   Cardiovascular:      Rate and Rhythm: Normal rate.   Pulmonary:      Effort: Pulmonary effort is normal.   Chest:   Breasts:     Breasts are symmetrical.      Right: Normal. No mass, nipple discharge, skin change or tenderness.      Left: Normal. No mass, nipple discharge, skin change or tenderness.   Abdominal:      General: There is no distension.      Palpations: Abdomen is soft. There is no mass.      Tenderness: There is no abdominal tenderness. There is no guarding.   Genitourinary:     General: Normal vulva.      Pubic Area: No rash.       Vagina: Normal. No vaginal discharge.      Cervix: Normal.      Uterus: Normal.       Adnexa: Right adnexa normal and left adnexa normal.   Musculoskeletal:         General: No tenderness or deformity. Normal range of motion.      Cervical back: Normal range of motion.   Lymphadenopathy:      Upper Body:      Right upper body: No axillary adenopathy.      Left upper body: No axillary adenopathy.   Skin:     General: Skin is warm and dry.      Coloration: Skin is not pale.      Findings: No erythema or rash.   Neurological:      Mental Status: She is alert and oriented to person, place, and time.   Psychiatric:         Behavior: Behavior normal.         Thought Content: Thought content normal.         Judgment: Judgment normal.            As part of wellness and prevention, the following topics were discussed with the patient: healthy weight, substance abuse/misuse, mental health, encouraging self breast exam, and other counseling and guidance done:  Nutrition, physical activity, healthy weight, injury prevention, misuse of tobacco, alcohol and drugs, sexual behavior and STDs, contraception, dental health, mental health, immunizations breast cancer screening and exams.    Assessment     1) GYN annual well woman exam.   2) PAP done today? Yes  3) problems  addressed: none    MAMMOGRAM UP TO DATE IF AGE APPROPRIATE?  Yes  (if no, pt encouraged to schedule; risks of undiagnosed breast cancer reviewed with pt if she does not have a mammogram)           Plan       Follow up prn or one year.    Pt instructed to call for results of any testing done today and that failure to call if she has not heard from us could result in inadequate treatment.  Pt verbalized her understanding.     Diagnoses and all orders for this visit:    1. Pap smear, as part of routine gynecological examination (Primary)  -     IGP, Apt HPV,rfx 16 / 18,45    2. Seasonal allergic rhinitis, unspecified trigger    3. Sjogren's syndrome with keratoconjunctivitis sicca    4. Gastroparesis    5. Routine gynecological examination  -     POC Urinalysis Dipstick  -     IGP, Apt HPV,rfx 16 / 18,45    6. Special screening examination for human papillomavirus (HPV)  -     IGP, Apt HPV,rfx 16 / 18,45    7. S/P endometrial ablation: 12/17/21    8. SARMAD (stress urinary incontinence, female)    9. Well woman exam    10. Vapes nicotine containing substance        RTO Return in about 1 year (around 11/8/2024) for Annual physical.        Rancho Mayberry MD  11/08/23  14:51 EST

## 2023-11-10 ENCOUNTER — HOSPITAL ENCOUNTER (OUTPATIENT)
Dept: PHYSICAL THERAPY | Facility: HOSPITAL | Age: 43
Setting detail: THERAPIES SERIES
Discharge: HOME | End: 2023-11-10
Payer: COMMERCIAL

## 2023-11-10 DIAGNOSIS — Z98.890 S/P LEFT ROTATOR CUFF REPAIR: ICD-10-CM

## 2023-11-10 DIAGNOSIS — M75.51 SUBACROMIAL BURSITIS OF RIGHT SHOULDER JOINT: ICD-10-CM

## 2023-11-10 DIAGNOSIS — M75.81 ROTATOR CUFF TENDINITIS, RIGHT: ICD-10-CM

## 2023-11-10 DIAGNOSIS — Z98.890 STATUS POST ARTHROSCOPY OF SHOULDER: Primary | ICD-10-CM

## 2023-11-10 PROCEDURE — 97110 THERAPEUTIC EXERCISES: CPT | Performed by: PHYSICAL THERAPIST

## 2023-11-10 NOTE — THERAPY TREATMENT NOTE
Outpatient Physical Therapy Ortho Treatment Note  TENISHA Wing     Patient Name: Aide Henry  : 1980  MRN: [id number]  Today's Date: 11/10/2023      Visit Date: 11/10/2023    Visit Dx:    ICD-10-CM ICD-9-CM   1. Status post arthroscopy of shoulder  Z98.890 V45.89   2. Subacromial bursitis of right shoulder joint  M75.51 726.19   3. Rotator cuff tendinitis, right  M75.81 726.10   4. S/P left rotator cuff repair  Z98.890 V45.89       Patient Active Problem List   Diagnosis    Arthralgia of multiple joints    Chronic back pain    Chronic constipation    Disorder of connective tissue    Depression with anxiety    Fibromyalgia    Gastroesophageal reflux disease without esophagitis    Muscle pain    Palpitations    Seasonal allergic rhinitis    Eczema    Shoulder pain, left    Substance abuse    Lateral epicondylitis    Drug addiction syndrome    Gastroenteritis    Alcohol dependence in remission    History of placement of ear tubes    Status post arthroscopy of shoulder    Subacromial impingement of left shoulder    Biceps tendinitis of left upper extremity    Complete tear of left rotator cuff    Smoker    Sjogren's syndrome with keratoconjunctivitis sicca    Pelvic pain    Nondisplaced fracture of coronoid process of left ulna, initial encounter for closed fracture    Left breast lump    SARMAD (stress urinary incontinence, female)    Rectocele    Irritable bowel syndrome with both constipation and diarrhea    Ashton's esophagus without dysplasia    IC (interstitial cystitis)    Overactive bladder    Gastroparesis    Chondromalacia of patellofemoral joint, left    Lupus    Dysmenorrhea    History of bilateral tubal ligation    S/P endometrial ablation: 21    Blood blister: R labium    Carrier of ureaplasma urealyticum    Mechanical knee pain, left    AC joint arthropathy    Subacromial bursitis of right shoulder joint    Rotator cuff tendinitis, right    Vapes nicotine containing substance        Past  Medical History:   Diagnosis Date    Anxiety     Bacterial vaginosis     Ashton esophagus     Bulging lumbar disc     lower back per patient    Depression with anxiety     Fibromyalgia     Fracture, finger     Gastroparesis     GERD (gastroesophageal reflux disease)     Hemorrhoids     History of anemia     Hypertension     Hypothyroid     IBS (irritable bowel syndrome)     IC (interstitial cystitis)     Lateral epicondylitis 2013    RHUEMATOLOGIST    Lupus     Migraine     MRSA (methicillin resistant staph aureus) culture positive  ESTIMATE    GROIN AREA     Overactive bladder     Sjogren's syndrome     Urinary tract infection         Past Surgical History:   Procedure Laterality Date    ADENOIDECTOMY      CARPAL TUNNEL RELEASE Bilateral      SECTION      COLONOSCOPY      D & C HYSTEROSCOPY N/A 2021    Procedure: DILATATION AND CURETTAGE HYSTEROSCOPY;  Surgeon: Rancho Mayberry MD;  Location: [facility] OR;  Service: Obstetrics/Gynecology;  Laterality: N/A;    D & C HYSTEROSCOPY ENDOMETRIAL ABLATION N/A 2021    Procedure: DILATATION AND CURETTAGE HYSTEROSCOPY,  NOVASURE ENDOMETRIAL ABLATION;  Surgeon: Rancho Mayberry MD;  Location: [facility] OR;  Service: Obstetrics/Gynecology;  Laterality: N/A;    ENDOSCOPY N/A 05/10/2019    Procedure: ESOPHAGOGASTRODUODENOSCOPY with biopsies;  Surgeon: Shanon Vazquez MD;  Location: [facility] OR;  Service: Gastroenterology    ENDOSCOPY N/A 2021    Procedure: ESOPHAGOGASTRODUODENOSCOPY with biopsies;  Surgeon: Romain Rice MD;  Location: [facility] OR;  Service: Gastroenterology;  Laterality: N/A;  barretts  gastritis    FINGER GANGLION CYST EXCISION Right     middle finger    MOUTH SURGERY      OTHER SURGICAL HISTORY      reversal of tubal    RHINOPLASTY      SHOULDER ARTHROSCOPY Left 09/10/2018    Procedure: SHOULDER ARTHROSCOPY, rotator cuff repair; extensive debridement, open biceps tenodesis;  Surgeon: Silvestre  Joo ALTMAN MD;  Location:  [facility] OR;  Service: Orthopedics    SHOULDER ARTHROSCOPY W/ ROTATOR CUFF REPAIR Left 5/27/2022    Procedure: SHOULDER ARTHROSCOPY WITH ROTATOR CUFF REPAIR COMPLEX, EXTENSIVE DEBRIDEMENT;  Surgeon: Joo Rivers MD;  Location:  [facility] OR;  Service: Orthopedics;  Laterality: Left;    SHOULDER ARTHROSCOPY W/ ROTATOR CUFF REPAIR Left 6/23/2023    Procedure: SHOULDER ARTHROSCOPY WITH ROTATOR CUFF REPAIR, distal clavicle excision;  Surgeon: Joo Rivers MD;  Location:  [facility] OR;  Service: Orthopedics;  Laterality: Left;    TONSILLECTOMY      TUBAL ABDOMINAL LIGATION      TYMPANOSTOMY TUBE PLACEMENT      WISDOM TOOTH EXTRACTION Bilateral         PT Ortho       Row Name 11/10/23 0800       Subjective    Subjective Comments Pt c/o increased bilateral shoulder pain left > right.  -GC       General ROM    GENERAL ROM COMMENTS Pt has AROM of bilateral shoulders to WFL all planes  -GC       MMT (Manual Muscle Testing)    General MMT Comments left scaption strength is 3+/5  -GC       MMT Right Upper Ext    Rt Shoulder Flexion MMT, Gross Movement (5/5) normal  -GC    Rt Shoulder Extension MMT, Gross Movement (5/5) normal  -GC    Rt Shoulder ABduction MMT, Gross Movement (5/5) normal  -GC    Rt Shoulder ADduction MMT, Gross Movement (5/5) normal  -GC    Rt Shoulder Internal Rotation MMT, Gross Movement (5/5) normal  -GC    Rt Shoulder External Rotation MMT, Gross Movement (4+/5) good plus  -GC       MMT Left Upper Ext    Lt Shoulder Flexion MMT, Gross Movement (4/5) good  -GC    Lt Shoulder Extension MMT, Gross Movement (5/5) normal  -GC    Lt Shoulder ABduction MMT, Gross Movement (4/5) good  -GC    Lt Shoulder ADduction MMT, Gross Movement (5/5) normal  -GC    Lt Shoulder Internal Rotation MMT, Gross Movement (4/5) good  -GC    Lt Shoulder External Rotation MMT, Gross Movement (3+/5) fair plus  -GC              User Key  (r) = Recorded By, (t) = Taken By, (c) = Cosigned By      Initials Name  Provider Type     Ruperto Kimball PT Physical Therapist                                 PT Assessment/Plan       Row Name 11/10/23 0800          PT Assessment    Assessment Comments Pt is to lacking proper rotatoro cuff strength.  -        PT Plan    PT Plan Comments Pt is to continue her HEP daily. She is to follow up with MD.  -GC               User Key  (r) = Recorded By, (t) = Taken By, (c) = Cosigned By      Initials Name Provider Type     Ruperto Kimball PT Physical Therapist                       OP Exercises       Row Name 11/10/23 0800             Subjective    Subjective Comments Pt c/o increased bilateral shoulder pain left > right.  -GC         Exercise 1    Exercise Name 1 sidelying ER left shoulder  -GC      Reps 1 25  -GC      Time 1 2#  -GC         Exercise 2    Exercise Name 2 Scaption Up-left  -GC      Reps 2 25  -GC      Time 2 2#  -GC         Exercise 3    Exercise Name 3 Scaption Down-left  -GC      Reps 3 25  -GC      Time 3 2#  -GC         Exercise 4    Exercise Name 4 Left shoulder ER vs theraband  -GC      Reps 4 25  -GC      Time 4 blue  -GC                User Key  (r) = Recorded By, (t) = Taken By, (c) = Cosigned By      Initials Name Provider Type     Ruperto Kimball, PT Physical Therapist                                                    Time Calculation:   Start Time: 0800  Stop Time: 0904  Time Calculation (min): 64 min  Therapy Charges for Today       Code Description Service Date Service Provider Modifiers Qty    [id number]  PT THER PROC EA 15 MIN 11/10/2023 Ruperto Kimball, PT GP 1                      Ruperto iKmball PT  11/10/2023

## 2023-12-11 ENCOUNTER — OFFICE VISIT (OUTPATIENT)
Dept: ORTHOPEDIC SURGERY | Facility: CLINIC | Age: 43
End: 2023-12-11
Payer: COMMERCIAL

## 2023-12-11 VITALS — HEIGHT: 64 IN | BODY MASS INDEX: 34.15 KG/M2 | WEIGHT: 200 LBS

## 2023-12-11 DIAGNOSIS — R29.898 SHOULDER WEAKNESS: ICD-10-CM

## 2023-12-11 DIAGNOSIS — Z98.890 STATUS POST ARTHROSCOPY OF SHOULDER: Primary | ICD-10-CM

## 2023-12-18 ENCOUNTER — OFFICE VISIT (OUTPATIENT)
Dept: ORTHOPEDIC SURGERY | Facility: CLINIC | Age: 43
End: 2023-12-18
Payer: COMMERCIAL

## 2023-12-18 VITALS
BODY MASS INDEX: 34.15 KG/M2 | HEIGHT: 64 IN | WEIGHT: 200 LBS | DIASTOLIC BLOOD PRESSURE: 84 MMHG | SYSTOLIC BLOOD PRESSURE: 114 MMHG | HEART RATE: 101 BPM

## 2023-12-18 DIAGNOSIS — M70.62 TROCHANTERIC BURSITIS OF LEFT HIP: Primary | ICD-10-CM

## 2023-12-18 RX ORDER — TRIAMCINOLONE ACETONIDE 40 MG/ML
80 INJECTION, SUSPENSION INTRA-ARTICULAR; INTRAMUSCULAR
Status: COMPLETED | OUTPATIENT
Start: 2023-12-18 | End: 2023-12-18

## 2023-12-18 RX ORDER — LIDOCAINE HYDROCHLORIDE 10 MG/ML
4 INJECTION, SOLUTION EPIDURAL; INFILTRATION; INTRACAUDAL; PERINEURAL
Status: COMPLETED | OUTPATIENT
Start: 2023-12-18 | End: 2023-12-18

## 2023-12-18 RX ADMIN — LIDOCAINE HYDROCHLORIDE 4 ML: 10 INJECTION, SOLUTION EPIDURAL; INFILTRATION; INTRACAUDAL; PERINEURAL at 13:40

## 2023-12-18 RX ADMIN — TRIAMCINOLONE ACETONIDE 80 MG: 40 INJECTION, SUSPENSION INTRA-ARTICULAR; INTRAMUSCULAR at 13:40

## 2023-12-18 NOTE — PROGRESS NOTES
Subjective:     Patient ID: Aide Henry is a 43 y.o. female.    Chief Complaint:    History of Present Illness  Aide Henry presents to clinic today for evaluation of longstanding history of lateral hip pain.  The patient is unsure exactly what she is done to her hip but she states that the pain is located directly laterally and is worse with activity and better with rest.  She states she is unable to sleep on that side and states if she sits with her leg crossed for prolonged period of time it also causes significant pain.  She denies posterior buttock pain or anterior groin pain.     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    Drug use: Not Currently     Types: Cocaine(coke)     Comment: sober since 4/2023    Sexual activity: Defer      Past Medical History:   Diagnosis Date    Anxiety     Bacterial vaginosis     Ashton esophagus     Bulging lumbar disc     lower back per patient    Depression with anxiety     Fibromyalgia     Fracture, finger     Gastroenteritis 12/12/2017    Gastroesophageal reflux disease without esophagitis 02/10/2016    Gastroparesis     GERD (gastroesophageal reflux disease)     Hemorrhoids     History of anemia     Hypertension     Hypothyroid     IBS (irritable bowel syndrome)     IC (interstitial cystitis)     Lateral epicondylitis 09/16/2013    RHUEMATOLOGIST    Lupus     Migraine     MRSA (methicillin resistant staph aureus) culture positive 2011 ESTIMATE    GROIN AREA     Overactive bladder     Sjogren's syndrome     Urinary tract infection      Past Surgical History:    Procedure Laterality Date    ADENOIDECTOMY      CARPAL TUNNEL RELEASE Bilateral      SECTION      COLONOSCOPY      D & C HYSTEROSCOPY N/A 2021    Procedure: DILATATION AND CURETTAGE HYSTEROSCOPY;  Surgeon: Rancho Mayberry MD;  Location:  [facility] OR;  Service: Obstetrics/Gynecology;  Laterality: N/A;    D & C HYSTEROSCOPY ENDOMETRIAL ABLATION N/A 2021    Procedure: DILATATION AND CURETTAGE HYSTEROSCOPY,  NOVASURE ENDOMETRIAL ABLATION;  Surgeon: Rancho Mayberry MD;  Location:  [facility] OR;  Service: Obstetrics/Gynecology;  Laterality: N/A;    ENDOSCOPY N/A 05/10/2019    Procedure: ESOPHAGOGASTRODUODENOSCOPY with biopsies;  Surgeon: Shanon Vazquez MD;  Location: [facility] OR;  Service: Gastroenterology    ENDOSCOPY N/A 2021    Procedure: ESOPHAGOGASTRODUODENOSCOPY with biopsies;  Surgeon: Romain Rice MD;  Location:  [facility] OR;  Service: Gastroenterology;  Laterality: N/A;  barretts  gastritis    FINGER GANGLION CYST EXCISION Right     middle finger    MOUTH SURGERY      OTHER SURGICAL HISTORY      reversal of tubal    RHINOPLASTY      SHOULDER ARTHROSCOPY Left 09/10/2018    Procedure: SHOULDER ARTHROSCOPY, rotator cuff repair; extensive debridement, open biceps tenodesis;  Surgeon: Joo Rivers MD;  Location: [facility] OR;  Service: Orthopedics    SHOULDER ARTHROSCOPY W/ ROTATOR CUFF REPAIR Left 2022    Procedure: SHOULDER ARTHROSCOPY WITH ROTATOR CUFF REPAIR COMPLEX, EXTENSIVE DEBRIDEMENT;  Surgeon: Joo Rivers MD;  Location: [facility] OR;  Service: Orthopedics;  Laterality: Left;    SHOULDER ARTHROSCOPY W/ ROTATOR CUFF REPAIR Left 2023    Procedure: SHOULDER ARTHROSCOPY WITH ROTATOR CUFF REPAIR, distal clavicle excision;  Surgeon: Joo Rivers MD;  Location: [facility] OR;  Service: Orthopedics;  Laterality: Left;    TONSILLECTOMY      TUBAL ABDOMINAL LIGATION      TYMPANOSTOMY TUBE PLACEMENT      WISDOM TOOTH EXTRACTION Bilateral   "      Family History   Problem Relation Age of Onset    Lung cancer Father     Heart disease Paternal Grandmother     Diabetes Maternal Aunt     Breast cancer Maternal Aunt     Diabetes Maternal Grandmother     COPD Mother     Colon cancer Neg Hx     Colon polyps Neg Hx     Malig Hyperthermia Neg Hx                  Objective:  Vitals:    12/18/23 1310   BP: 114/84   Pulse: 101   Weight: 90.7 kg (200 lb)   Height: 162.6 cm (64\")         12/18/23  1310   Weight: 90.7 kg (200 lb)     Body mass index is 34.33 kg/m².        Left Hip Exam     Tenderness   The patient is experiencing tenderness in the lateral and greater trochanter.    Range of Motion   Flexion:  110   External rotation:  40   Internal rotation: 10     Muscle Strength   Flexion: 5/5     Tests   ELO: negative  Fadir:  Negative FADIR test    Other   Erythema: absent  Scars: absent  Sensation: normal  Pulse: present               Imaging: 3 views of the left hip and pelvis were ordered and reviewed by myself in the office today  Indication: left hip pain  Findings: X-rays demonstrate no acute osseous abnormality and no signs of fracture dislocation or subluxation.  X-rays demonstrate no significant joint space narrowing, femoral head flattening, subchondral sclerosis, cystic changes, or periarticular osteophytes.  Comparative studies: None      Assessment:        1. Trochanteric bursitis of left hip           Plan:  - Large Joint Arthrocentesis: L greater trochanteric bursa on 12/18/2023 1:40 PM  Indications: pain  Details: 22 G (SPINAL) needle, lateral approach  Medications: 80 mg triamcinolone acetonide 40 MG/ML; 4 mL lidocaine PF 1% 1 %  Outcome: tolerated well, no immediate complications  Procedure, treatment alternatives, risks and benefits explained, specific risks discussed. Consent was given by the patient. Immediately prior to procedure a time out was called to verify the correct patient, procedure, equipment, support staff and site/side marked as " required. Patient was prepped and draped in the usual sterile fashion.               Discussed treatment options at length with patient at today's visit.  Discussed with her that she has developed left hip trochanteric bursitis. I discussed with the patient that the mainstays of conservative treatment for hip bursitis include physical therapy, nonsteroidal anti-inflammatories, injections, activity modification, and home exercises.  The patient states that they  would like to try traversal injection.    Follow up: 6 weeks without x-rays      Aide Henry was in agreement with plan and had all questions answered.     Medications:  No orders of the defined types were placed in this encounter.      Followup:  No follow-ups on file.    Diagnoses and all orders for this visit:    1. Trochanteric bursitis of left hip (Primary)  -     XR Hip With or Without Pelvis 2 - 3 View Left    Other orders  -     - Large Joint Arthrocentesis: L greater trochanteric bursa          Dictated utilizing Dragon dictation

## 2024-01-23 ENCOUNTER — OFFICE VISIT (OUTPATIENT)
Dept: ORTHOPEDIC SURGERY | Facility: CLINIC | Age: 44
End: 2024-01-23
Payer: COMMERCIAL

## 2024-01-23 VITALS
SYSTOLIC BLOOD PRESSURE: 133 MMHG | DIASTOLIC BLOOD PRESSURE: 97 MMHG | HEART RATE: 109 BPM | BODY MASS INDEX: 33.87 KG/M2 | WEIGHT: 198.4 LBS | HEIGHT: 64 IN

## 2024-01-23 DIAGNOSIS — M25.562 ACUTE PAIN OF LEFT KNEE: Primary | ICD-10-CM

## 2024-01-23 PROCEDURE — 73564 X-RAY EXAM KNEE 4 OR MORE: CPT | Performed by: INTERNAL MEDICINE

## 2024-01-23 PROCEDURE — 99213 OFFICE O/P EST LOW 20 MIN: CPT | Performed by: INTERNAL MEDICINE

## 2024-01-23 NOTE — PROGRESS NOTES
Subjective:     Patient ID: Aide Henry is a 43 y.o. female.    Chief Complaint:    History of Present Illness  Aide Henry presents to clinic today for evaluation of left posterior lateral knee pain.  The patient states she was going down the stairs on Saturday and her knee gave out and she fell.  She states that the pain was quite severe for the first 2 days and then she began a Medrol Dosepak which she had at home and the pain has since improved.  She states that it is not all the way better but is significantly better than it was over the weekend.  She states the pain is worse with full extension and deep flexion and weightbearing.  She denies any preceding knee pain.  She is scheduled to have some sort of gastric stimulator placed in early February and is unable to take NSAIDs.     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    Drug use: Not Currently     Types: Cocaine(coke)     Comment: sober since 4/2023    Sexual activity: Defer      Past Medical History:   Diagnosis Date    Anxiety     Bacterial vaginosis     Ashton esophagus     Bulging lumbar disc     lower back per patient    Depression with anxiety     Fibromyalgia     Fracture, finger     Gastroenteritis 12/12/2017    Gastroesophageal reflux disease without esophagitis 02/10/2016    Gastroparesis     GERD (gastroesophageal reflux disease)     Hemorrhoids     History of anemia     Hypertension     Hypothyroid     IBS (irritable bowel syndrome)     IC (interstitial cystitis)     Lateral epicondylitis 09/16/2013    RHUEMATOLOGIST    Lupus      Migraine     MRSA (methicillin resistant staph aureus) culture positive  ESTIMATE    GROIN AREA     Overactive bladder     Sjogren's syndrome     Urinary tract infection      Past Surgical History:   Procedure Laterality Date    ADENOIDECTOMY      CARPAL TUNNEL RELEASE Bilateral      SECTION      COLONOSCOPY      D & C HYSTEROSCOPY N/A 2021    Procedure: DILATATION AND CURETTAGE HYSTEROSCOPY;  Surgeon: Rancho Mayberry MD;  Location: [facility] OR;  Service: Obstetrics/Gynecology;  Laterality: N/A;    D & C HYSTEROSCOPY ENDOMETRIAL ABLATION N/A 2021    Procedure: DILATATION AND CURETTAGE HYSTEROSCOPY,  NOVASURE ENDOMETRIAL ABLATION;  Surgeon: Rancho Mayberry MD;  Location: [facility] OR;  Service: Obstetrics/Gynecology;  Laterality: N/A;    ENDOSCOPY N/A 05/10/2019    Procedure: ESOPHAGOGASTRODUODENOSCOPY with biopsies;  Surgeon: Shanon Vazquez MD;  Location: [facility] OR;  Service: Gastroenterology    ENDOSCOPY N/A 2021    Procedure: ESOPHAGOGASTRODUODENOSCOPY with biopsies;  Surgeon: Romain Rice MD;  Location: [facility] OR;  Service: Gastroenterology;  Laterality: N/A;  barretts  gastritis    FINGER GANGLION CYST EXCISION Right     middle finger    MOUTH SURGERY      OTHER SURGICAL HISTORY      reversal of tubal    RHINOPLASTY      SHOULDER ARTHROSCOPY Left 09/10/2018    Procedure: SHOULDER ARTHROSCOPY, rotator cuff repair; extensive debridement, open biceps tenodesis;  Surgeon: Joo Rivers MD;  Location: [facility] OR;  Service: Orthopedics    SHOULDER ARTHROSCOPY W/ ROTATOR CUFF REPAIR Left 2022    Procedure: SHOULDER ARTHROSCOPY WITH ROTATOR CUFF REPAIR COMPLEX, EXTENSIVE DEBRIDEMENT;  Surgeon: Joo Rivers MD;  Location: [facility] OR;  Service: Orthopedics;  Laterality: Left;    SHOULDER ARTHROSCOPY W/ ROTATOR CUFF REPAIR Left 2023    Procedure: SHOULDER ARTHROSCOPY WITH ROTATOR CUFF REPAIR, distal clavicle excision;  Surgeon: Silvestre  "Joo ALTMAN MD;  Location: [facility];  Service: Orthopedics;  Laterality: Left;    TONSILLECTOMY      TUBAL ABDOMINAL LIGATION      TYMPANOSTOMY TUBE PLACEMENT      WISDOM TOOTH EXTRACTION Bilateral        Family History   Problem Relation Age of Onset    Lung cancer Father     Heart disease Paternal Grandmother     Diabetes Maternal Aunt     Breast cancer Maternal Aunt     Diabetes Maternal Grandmother     COPD Mother     Colon cancer Neg Hx     Colon polyps Neg Hx     Malig Hyperthermia Neg Hx                  Objective:  Vitals:    24 0826   BP: 133/97   Pulse: 109   Weight: 90 kg (198 lb 6.4 oz)   Height: 162.6 cm (64\")         24  0826   Weight: 90 kg (198 lb 6.4 oz)     Body mass index is 34.06 kg/m².        Left Knee Exam     Tenderness   Left knee tenderness location: posterolaterally.    Range of Motion   Extension:  normal   Flexion:  normal     Tests   Varus: negative Valgus: negative  Lachman:  Anterior - negative    Posterior - negative  Drawer:  Anterior - negative     Posterior - negative    Other   Erythema: absent  Scars: absent  Sensation: normal  Pulse: present  Swelling: mild  Effusion: no effusion present               Imagin views of the left knee were ordered and reviewed by myself in the office today  Indication: left knee pain  Findings: X-rays demonstrate no acute osseous abnormality.  There is no signs of fracture dislocation or subluxation.  There is no appreciable joint space narrowing, subchondral sclerosis, cystic changes, or periarticular osteophytes   Comparative studies: None        Assessment:        1. Acute pain of left knee           Plan:          Discussed treatment options at length with patient at today's visit.  Discussed with her that I do not see any signs of instability or any signs of fracture on her x-rays.  I discussed with her that we have 3 plans of action at.  Option 1 would be to continue the steroid taper and see if the pain gets better.  Option 2 " would be to try an intra-articular knee injection.  Option 3 would be to send her for an MRI.  The patient states that she would like to continue the Medrol Dosepak and if the pain has not resolved come back and see me following her gastric surgery.  I discussed with her that the next course of action would be either an intra-articular injection or knee MRI.  She voiced understanding and agreement with that plan.  Follow up: As needed      Aide Henry was in agreement with plan and had all questions answered.     Medications:  No orders of the defined types were placed in this encounter.      Followup:  No follow-ups on file.    Diagnoses and all orders for this visit:    1. Acute pain of left knee (Primary)  -     XR Knee 4+ View Left          Dictated utilizing Dragon dictation

## 2024-02-06 ENCOUNTER — OFFICE VISIT (OUTPATIENT)
Dept: ORTHOPEDIC SURGERY | Facility: CLINIC | Age: 44
End: 2024-02-06
Payer: COMMERCIAL

## 2024-02-06 VITALS — BODY MASS INDEX: 33.8 KG/M2 | WEIGHT: 198 LBS | HEIGHT: 64 IN

## 2024-02-06 DIAGNOSIS — M25.562 ACUTE PAIN OF LEFT KNEE: Primary | ICD-10-CM

## 2024-02-06 PROCEDURE — 73562 X-RAY EXAM OF KNEE 3: CPT | Performed by: INTERNAL MEDICINE

## 2024-02-06 PROCEDURE — 99213 OFFICE O/P EST LOW 20 MIN: CPT | Performed by: INTERNAL MEDICINE

## 2024-02-06 PROCEDURE — 20610 DRAIN/INJ JOINT/BURSA W/O US: CPT | Performed by: INTERNAL MEDICINE

## 2024-02-06 RX ORDER — LIDOCAINE HYDROCHLORIDE 10 MG/ML
8 INJECTION, SOLUTION EPIDURAL; INFILTRATION; INTRACAUDAL; PERINEURAL
Status: COMPLETED | OUTPATIENT
Start: 2024-02-06 | End: 2024-02-06

## 2024-02-06 RX ORDER — TRIAMCINOLONE ACETONIDE 40 MG/ML
80 INJECTION, SUSPENSION INTRA-ARTICULAR; INTRAMUSCULAR
Status: COMPLETED | OUTPATIENT
Start: 2024-02-06 | End: 2024-02-06

## 2024-02-06 RX ADMIN — LIDOCAINE HYDROCHLORIDE 8 ML: 10 INJECTION, SOLUTION EPIDURAL; INFILTRATION; INTRACAUDAL; PERINEURAL at 08:30

## 2024-02-06 RX ADMIN — TRIAMCINOLONE ACETONIDE 80 MG: 40 INJECTION, SUSPENSION INTRA-ARTICULAR; INTRAMUSCULAR at 08:30

## 2024-02-06 NOTE — PROGRESS NOTES
Subjective:     Patient ID: Aide Henry is a 43 y.o. female.    Chief Complaint:    History of Present Illness  Aide Henry returns to clinic today for evaluation of left knee pain and instability and swelling.  The patient states that her knee gave out on her and the other day and she is now having a significantly difficult time walking.  She states that it has locking catching or popping on her most the pain is posterior laterally and posterior medially.  She ambulates with a significant limp and is unable to work right now at [employer] due to the pain.     Social History     Occupational History   • Not on file   Tobacco Use   • Smoking status: Every Day     Packs/day: 0.25     Years: 20.00     Additional pack years: 0.00     Total pack years: 5.00     Types: Cigarettes     Passive exposure: Never   • Smokeless tobacco: Never   • Tobacco comments:     Smokes every now and then    Vaping Use   • Vaping Use: Some days   Substance and Sexual Activity   • Alcohol use: Not Currently     Comment: sober since 4/2023   • Drug use: Not Currently     Types: Cocaine(coke)     Comment: sober since 4/2023   • Sexual activity: Defer      Past Medical History:   Diagnosis Date   • Anxiety    • Bacterial vaginosis    • Ashton esophagus    • Bulging lumbar disc     lower back per patient   • Depression with anxiety    • Fibromyalgia    • Fracture, finger    • Gastroenteritis 12/12/2017   • Gastroesophageal reflux disease without esophagitis 02/10/2016   • Gastroparesis    • GERD (gastroesophageal reflux disease)    • Hemorrhoids    • History of anemia    • Hypertension    • Hypothyroid    • IBS (irritable bowel syndrome)    • IC (interstitial cystitis)    • Lateral epicondylitis 09/16/2013    RHUEMATOLOGIST   • Lupus    • Migraine    • MRSA (methicillin resistant staph aureus) culture positive 2011 ESTIMATE    GROIN AREA    • Overactive bladder    • Sjogren's syndrome    • Urinary tract infection      Past Surgical History:    Procedure Laterality Date   • ADENOIDECTOMY     • CARPAL TUNNEL RELEASE Bilateral    •  SECTION     • COLONOSCOPY     • D & C HYSTEROSCOPY N/A 2021    Procedure: DILATATION AND CURETTAGE HYSTEROSCOPY;  Surgeon: Rancho Mayberry MD;  Location: [facility] OR;  Service: Obstetrics/Gynecology;  Laterality: N/A;   • D & C HYSTEROSCOPY ENDOMETRIAL ABLATION N/A 2021    Procedure: DILATATION AND CURETTAGE HYSTEROSCOPY,  NOVASURE ENDOMETRIAL ABLATION;  Surgeon: Rancho Mayberry MD;  Location: [facility] OR;  Service: Obstetrics/Gynecology;  Laterality: N/A;   • ENDOSCOPY N/A 05/10/2019    Procedure: ESOPHAGOGASTRODUODENOSCOPY with biopsies;  Surgeon: Shanon Vazquez MD;  Location: [facility] OR;  Service: Gastroenterology   • ENDOSCOPY N/A 2021    Procedure: ESOPHAGOGASTRODUODENOSCOPY with biopsies;  Surgeon: Romain Rice MD;  Location: [facility] OR;  Service: Gastroenterology;  Laterality: N/A;  barretts  gastritis   • FINGER GANGLION CYST EXCISION Right     middle finger   • MOUTH SURGERY     • OTHER SURGICAL HISTORY      reversal of tubal   • RHINOPLASTY     • SHOULDER ARTHROSCOPY Left 09/10/2018    Procedure: SHOULDER ARTHROSCOPY, rotator cuff repair; extensive debridement, open biceps tenodesis;  Surgeon: Joo Rivers MD;  Location: [facility] OR;  Service: Orthopedics   • SHOULDER ARTHROSCOPY W/ ROTATOR CUFF REPAIR Left 2022    Procedure: SHOULDER ARTHROSCOPY WITH ROTATOR CUFF REPAIR COMPLEX, EXTENSIVE DEBRIDEMENT;  Surgeon: Joo Rivers MD;  Location: [facility] OR;  Service: Orthopedics;  Laterality: Left;   • SHOULDER ARTHROSCOPY W/ ROTATOR CUFF REPAIR Left 2023    Procedure: SHOULDER ARTHROSCOPY WITH ROTATOR CUFF REPAIR, distal clavicle excision;  Surgeon: Joo Rivers MD;  Location: [facility] OR;  Service: Orthopedics;  Laterality: Left;   • TONSILLECTOMY     • TUBAL ABDOMINAL LIGATION     • TYMPANOSTOMY TUBE PLACEMENT     • WISDOM TOOTH  "EXTRACTION Bilateral        Family History   Problem Relation Age of Onset   • Lung cancer Father    • Heart disease Paternal Grandmother    • Diabetes Maternal Aunt    • Breast cancer Maternal Aunt    • Diabetes Maternal Grandmother    • COPD Mother    • Colon cancer Neg Hx    • Colon polyps Neg Hx    • Malig Hyperthermia Neg Hx                  Objective:  Vitals:    24 0804   Weight: 89.8 kg (198 lb)   Height: 162.6 cm (64\")         24  0804   Weight: 89.8 kg (198 lb)     Body mass index is 33.99 kg/m².        Left Knee Exam     Tenderness   The patient is experiencing tenderness in the lateral retinaculum, lateral joint line, medial hamstring and medial joint line.    Range of Motion   Extension:  5   Flexion:  100     Tests   Varus: negative Valgus: negative  Lachman:  Anterior - negative    Posterior - negative  Drawer:  Anterior - negative     Posterior - negative    Other   Erythema: absent  Scars: absent  Sensation: normal  Pulse: present  Swelling: mild  Effusion: effusion present             Imagin views of the left knee were ordered and reviewed by myself in the office today  Indication: left knee pain  Findings: X-rays demonstrate no acute osseous abnormality.  There is no signs of fracture dislocation or subluxation.  There is no appreciable joint space narrowing, subchondral sclerosis, cystic changes, or periarticular osteophytes   Comparative studies: X-rays at last visit      Assessment:        1. Acute pain of left knee           Plan:      Large Joint Arthrocentesis: L knee  Date/Time: 2024 8:30 AM  Consent given by: patient  Site marked: site marked  Timeout: Immediately prior to procedure a time out was called to verify the correct patient, procedure, equipment, support staff and site/side marked as required   Supporting Documentation  Indications: pain   Procedure Details  Location: knee - L knee  Preparation: Patient was prepped and draped in the usual sterile " fashion  Needle size: 18 G  Approach: superior  Medications administered: 8 mL lidocaine PF 1% 1 %; 80 mg triamcinolone acetonide 40 MG/ML  Aspirate amount: 12 mL  Aspirate: blood-tinged  Patient tolerance: patient tolerated the procedure well with no immediate complications        Discussed treatment options at length with patient at today's visit.  I discussed with the patient that I am concerned she has developed a meniscal tear as is evident with pain at terminal flexion and extension as well as mechanical symptoms consisting of catching locking or popping and positive medial lateral Craig.  Patient also has an effusion which was drained today.  Treatment with course of action is to drain the effusion and ambulate.  Surgery to refer the patient for MRI to evaluate for intra-articular pathology particularly meniscal pathology.  I like to see the patient back following the MRI to go over the results.  In the meantime recommend rest ice compression elevation and NSAIDs as needed for pain and swelling.  Follow up: Following MRI      Aide VIET Henry was in agreement with plan and had all questions answered.     Medications:  No orders of the defined types were placed in this encounter.      Followup:  No follow-ups on file.    Diagnoses and all orders for this visit:    1. Acute pain of left knee (Primary)          Dictated utilizing Dragon dictation

## 2024-02-13 ENCOUNTER — TELEPHONE (OUTPATIENT)
Dept: ORTHOPEDIC SURGERY | Facility: CLINIC | Age: 44
End: 2024-02-13
Payer: COMMERCIAL

## 2024-02-13 DIAGNOSIS — M25.562 ACUTE PAIN OF LEFT KNEE: Primary | ICD-10-CM

## 2024-03-05 ENCOUNTER — OFFICE VISIT (OUTPATIENT)
Dept: ORTHOPEDIC SURGERY | Facility: CLINIC | Age: 44
End: 2024-03-05
Payer: COMMERCIAL

## 2024-03-05 VITALS — HEIGHT: 64 IN | WEIGHT: 198 LBS | BODY MASS INDEX: 33.8 KG/M2

## 2024-03-05 DIAGNOSIS — M25.562 ACUTE PAIN OF LEFT KNEE: Primary | ICD-10-CM

## 2024-03-05 PROCEDURE — 20610 DRAIN/INJ JOINT/BURSA W/O US: CPT | Performed by: INTERNAL MEDICINE

## 2024-03-05 PROCEDURE — 99213 OFFICE O/P EST LOW 20 MIN: CPT | Performed by: INTERNAL MEDICINE

## 2024-03-05 NOTE — PROGRESS NOTES
Subjective:     Patient ID: Aide Henry is a 43 y.o. female.    Chief Complaint:    History of Present Illness  Aide Henry returns to clinic today for evaluation of left knee pain.  The patient saw me on 2/6/2024 and at that time she had failed conservative treatment of her knee pain so an MRI was ordered.  She states that she is not able to get the MRI due to the fact that she just had some sort of gastric stimulator placed.  She is hopeful to get her knee aspirated and reinjected today.  She states it is so swollen she can go to work and that she is unable to bend her knee.     Social History     Occupational History    Not on file   Tobacco Use    Smoking status: Every Day     Current packs/day: 0.25     Average packs/day: 0.3 packs/day for 20.0 years (5.0 ttl pk-yrs)     Types: Cigarettes     Passive exposure: Never    Smokeless tobacco: Never    Tobacco comments:     Smokes every now and then    Vaping Use    Vaping status: Some Days   Substance and Sexual Activity    Alcohol use: Not Currently     Comment: sober since 4/2023    Drug use: Not Currently     Types: Cocaine(coke)     Comment: sober since 4/2023    Sexual activity: Defer      Past Medical History:   Diagnosis Date    Anxiety     Bacterial vaginosis     Ashton esophagus     Bulging lumbar disc     lower back per patient    Depression with anxiety     Fibromyalgia     Fracture, finger     Gastroenteritis 12/12/2017    Gastroesophageal reflux disease without esophagitis 02/10/2016    Gastroparesis     GERD (gastroesophageal reflux disease)     Hemorrhoids     History of anemia     Hypertension     Hypothyroid     IBS (irritable bowel syndrome)     IC (interstitial cystitis)     Lateral epicondylitis 09/16/2013    RHUEMATOLOGIST    Lupus     Migraine     MRSA (methicillin resistant staph aureus) culture positive 2011 ESTIMATE    GROIN AREA     Overactive bladder     Sjogren's syndrome     Urinary tract infection      Past Surgical History:    Procedure Laterality Date    ADENOIDECTOMY      CARPAL TUNNEL RELEASE Bilateral      SECTION      COLONOSCOPY      D & C HYSTEROSCOPY N/A 2021    Procedure: DILATATION AND CURETTAGE HYSTEROSCOPY;  Surgeon: Rancho Mayberry MD;  Location:  [facility] OR;  Service: Obstetrics/Gynecology;  Laterality: N/A;    D & C HYSTEROSCOPY ENDOMETRIAL ABLATION N/A 2021    Procedure: DILATATION AND CURETTAGE HYSTEROSCOPY,  NOVASURE ENDOMETRIAL ABLATION;  Surgeon: Rancho Mayberry MD;  Location:  [facility] OR;  Service: Obstetrics/Gynecology;  Laterality: N/A;    ENDOSCOPY N/A 05/10/2019    Procedure: ESOPHAGOGASTRODUODENOSCOPY with biopsies;  Surgeon: Shanon Vazquez MD;  Location: [facility] OR;  Service: Gastroenterology    ENDOSCOPY N/A 2021    Procedure: ESOPHAGOGASTRODUODENOSCOPY with biopsies;  Surgeon: Romain Rice MD;  Location:  [facility] OR;  Service: Gastroenterology;  Laterality: N/A;  barretts  gastritis    FINGER GANGLION CYST EXCISION Right     middle finger    MOUTH SURGERY      OTHER SURGICAL HISTORY      reversal of tubal    RHINOPLASTY      SHOULDER ARTHROSCOPY Left 09/10/2018    Procedure: SHOULDER ARTHROSCOPY, rotator cuff repair; extensive debridement, open biceps tenodesis;  Surgeon: Joo Rivers MD;  Location: [facility] OR;  Service: Orthopedics    SHOULDER ARTHROSCOPY W/ ROTATOR CUFF REPAIR Left 2022    Procedure: SHOULDER ARTHROSCOPY WITH ROTATOR CUFF REPAIR COMPLEX, EXTENSIVE DEBRIDEMENT;  Surgeon: Joo Rivers MD;  Location: [facility] OR;  Service: Orthopedics;  Laterality: Left;    SHOULDER ARTHROSCOPY W/ ROTATOR CUFF REPAIR Left 2023    Procedure: SHOULDER ARTHROSCOPY WITH ROTATOR CUFF REPAIR, distal clavicle excision;  Surgeon: Joo Rivers MD;  Location: [facility] OR;  Service: Orthopedics;  Laterality: Left;    TONSILLECTOMY      TUBAL ABDOMINAL LIGATION      TYMPANOSTOMY TUBE PLACEMENT      WISDOM TOOTH EXTRACTION Bilateral   "      Family History   Problem Relation Age of Onset    Lung cancer Father     Heart disease Paternal Grandmother     Diabetes Maternal Aunt     Breast cancer Maternal Aunt     Diabetes Maternal Grandmother     COPD Mother     Colon cancer Neg Hx     Colon polyps Neg Hx     Malig Hyperthermia Neg Hx                  Objective:  Vitals:    03/05/24 1353   Weight: 89.8 kg (198 lb)   Height: 162.6 cm (64\")         03/05/24  1353   Weight: 89.8 kg (198 lb)     Body mass index is 33.99 kg/m².        Left Knee Exam     Muscle Strength   The patient has normal left knee strength.    Tenderness   Left knee tenderness location: global.    Range of Motion   Extension:  0   Flexion:  120     Tests   Varus: negative Valgus: negative  Lachman:  Anterior - negative    Posterior - negative  Drawer:  Anterior - negative     Posterior - negative    Other   Erythema: absent  Scars: absent  Sensation: normal  Pulse: present  Swelling: mild  Effusion: effusion present               Imaging: no new    Assessment:        1. Acute pain of left knee           Plan:    - Large Joint Arthrocentesis: L knee on 3/5/2024 2:20 PM  Indications: pain  Details: 18 G needle, superolateral approach  Aspirate: 6 mL serous  Outcome: tolerated well, no immediate complications  Procedure, treatment alternatives, risks and benefits explained, specific risks discussed. Consent was given by the patient. Immediately prior to procedure a time out was called to verify the correct patient, procedure, equipment, support staff and site/side marked as required. Patient was prepped and draped in the usual sterile fashion.                 Discussed treatment options at length with patient at today's visit.  Discussed with the patient that I do not feel a large knee effusion but she states that there is a lot of fluid in her knee and she has to get it out so she can go back to work.  I discussed with her that I was happy to try to aspirate her knee.  Knee aspirate " revealed 6 cc of normal-appearing joint fluid.  Upon attempt to aspirate more fluid the patient states take it out.  The needle was removed and aspiration was aborted.  Will attempt to get the patient's MRI done sooner rather than later and information was given the divided gastric stimulator and sent to MRI.  Meantime recommend rest ice compression elevation and nonsteroidal anti-inflammatories as needed for pain and swelling.  I will plan to keep the patient off work for the next few days until her knee pain improves.  Follow up: Following MRI      Aide Henry was in agreement with plan and had all questions answered.     Medications:  No orders of the defined types were placed in this encounter.      Followup:  No follow-ups on file.    Diagnoses and all orders for this visit:    1. Acute pain of left knee (Primary)    Other orders  -     - Large Joint Arthrocentesis: L knee          Dictated utilizing Dragon dictation

## 2024-03-06 ENCOUNTER — TELEPHONE (OUTPATIENT)
Dept: ORTHOPEDIC SURGERY | Facility: CLINIC | Age: 44
End: 2024-03-06
Payer: COMMERCIAL

## 2024-03-06 NOTE — TELEPHONE ENCOUNTER
Patient will reach out to her GI doctor tomorrow and call the office and advise if they can/will be able to turn off the gastric stimulator for her to proceed with the MRI of her knee. Patient offered and appt with Dr. Rivers for her shoulder and she declined at this time and will call back if she wishes to schedule.     Thanks.

## 2024-03-06 NOTE — TELEPHONE ENCOUNTER
Patient had a gastric stimulator placed 02.12.2024 and due to this the patient cannot have any MRI imaging of the lower body for at least 12 weeks and then the gastric physician would need to agree to turn the pump off for the MRI of the lower extremity (per the  guidelines MRI cannot be preformed on the upper extremities at all due to the implant).     Dr. Amador-Would you like to change the the MRI Left knee or wait the 12 weeks in hopes they can turn the pump off for the scan?    Dr. Rivers since they cannot do the MRI Arthrogram of the shoulder- how do you wish to proceed?          Per Dr. Rivers patient to follow up in the clinic with regards to her shoulder for the knee patient to contact her GI provider as to when/if the gastric stimulator can be turned off to proceed with the MRI of the knee.

## 2024-03-25 ENCOUNTER — HOSPITAL ENCOUNTER (OUTPATIENT)
Dept: MRI IMAGING | Facility: HOSPITAL | Age: 44
Discharge: HOME | End: 2024-03-25
Admitting: INTERNAL MEDICINE
Payer: COMMERCIAL

## 2024-03-25 DIAGNOSIS — M25.562 ACUTE PAIN OF LEFT KNEE: ICD-10-CM

## 2024-03-25 PROCEDURE — 73721 MRI JNT OF LWR EXTRE W/O DYE: CPT

## 2024-03-29 ENCOUNTER — OFFICE VISIT (OUTPATIENT)
Dept: ORTHOPEDIC SURGERY | Facility: CLINIC | Age: 44
End: 2024-03-29
Payer: COMMERCIAL

## 2024-03-29 VITALS — HEIGHT: 64 IN | WEIGHT: 198 LBS | BODY MASS INDEX: 33.8 KG/M2

## 2024-03-29 DIAGNOSIS — S83.207A TEARS OF MENISCUS AND ANTERIOR CRUCIATE LIGAMENT OF LEFT KNEE, INITIAL ENCOUNTER: Primary | ICD-10-CM

## 2024-03-29 DIAGNOSIS — S83.512A TEARS OF MENISCUS AND ANTERIOR CRUCIATE LIGAMENT OF LEFT KNEE, INITIAL ENCOUNTER: Primary | ICD-10-CM

## 2024-03-29 NOTE — PROGRESS NOTES
Subjective:     Patient ID: Aide Henry is a 43 y.o. female.    Chief Complaint:    History of Present Illness  Aide Henry returns to clinic today for evaluation of left knee pain after she fell after her knee gave out going up or down the stairs in January.  Unfortunately the patient was able to get an MRI for quite some time due to recent gastric stimulator placement.  She finally had the MRI and presents today for further evaluation and management.  She states that the knee still quite bothersome and feels unstable to her and she is fallen multiple more times since she last saw me.  She denies any numbness or tingling in her foot or any significant bruising at this point but she does state that her knee stays swollen.     Social History     Occupational History    Not on file   Tobacco Use    Smoking status: Every Day     Current packs/day: 0.25     Average packs/day: 0.3 packs/day for 20.0 years (5.0 ttl pk-yrs)     Types: Cigarettes     Passive exposure: Never    Smokeless tobacco: Never    Tobacco comments:     Smokes every now and then    Vaping Use    Vaping status: Some Days   Substance and Sexual Activity    Alcohol use: Not Currently     Comment: sober since 4/2023    Drug use: Not Currently     Types: Cocaine(coke)     Comment: sober since 4/2023    Sexual activity: Defer      Past Medical History:   Diagnosis Date    Anxiety     Bacterial vaginosis     Ashton esophagus     Bulging lumbar disc     lower back per patient    Depression with anxiety     Fibromyalgia     Fracture, finger     Gastroenteritis 12/12/2017    Gastroesophageal reflux disease without esophagitis 02/10/2016    Gastroparesis     GERD (gastroesophageal reflux disease)     Hemorrhoids     History of anemia     Hypertension     Hypothyroid     IBS (irritable bowel syndrome)     IC (interstitial cystitis)     Lateral epicondylitis 09/16/2013    RHUEMATOLOGIST    Lupus     Migraine     MRSA (methicillin resistant staph aureus)  culture positive  ESTIMATE    GROIN AREA     Overactive bladder     Sjogren's syndrome     Urinary tract infection      Past Surgical History:   Procedure Laterality Date    ADENOIDECTOMY      CARPAL TUNNEL RELEASE Bilateral      SECTION      COLONOSCOPY      D & C HYSTEROSCOPY N/A 2021    Procedure: DILATATION AND CURETTAGE HYSTEROSCOPY;  Surgeon: Rancho Mayberry MD;  Location: [facility] OR;  Service: Obstetrics/Gynecology;  Laterality: N/A;    D & C HYSTEROSCOPY ENDOMETRIAL ABLATION N/A 2021    Procedure: DILATATION AND CURETTAGE HYSTEROSCOPY,  NOVASURE ENDOMETRIAL ABLATION;  Surgeon: Rancho Mayberry MD;  Location: [facility] OR;  Service: Obstetrics/Gynecology;  Laterality: N/A;    ENDOSCOPY N/A 05/10/2019    Procedure: ESOPHAGOGASTRODUODENOSCOPY with biopsies;  Surgeon: Shanon Vazquez MD;  Location: [facility] OR;  Service: Gastroenterology    ENDOSCOPY N/A 2021    Procedure: ESOPHAGOGASTRODUODENOSCOPY with biopsies;  Surgeon: Romain Rice MD;  Location: [facility] OR;  Service: Gastroenterology;  Laterality: N/A;  barretts  gastritis    FINGER GANGLION CYST EXCISION Right     middle finger    MOUTH SURGERY      OTHER SURGICAL HISTORY      reversal of tubal    RHINOPLASTY      SHOULDER ARTHROSCOPY Left 09/10/2018    Procedure: SHOULDER ARTHROSCOPY, rotator cuff repair; extensive debridement, open biceps tenodesis;  Surgeon: Joo Rivers MD;  Location: [facility] OR;  Service: Orthopedics    SHOULDER ARTHROSCOPY W/ ROTATOR CUFF REPAIR Left 2022    Procedure: SHOULDER ARTHROSCOPY WITH ROTATOR CUFF REPAIR COMPLEX, EXTENSIVE DEBRIDEMENT;  Surgeon: Joo Rivers MD;  Location: [facility] OR;  Service: Orthopedics;  Laterality: Left;    SHOULDER ARTHROSCOPY W/ ROTATOR CUFF REPAIR Left 2023    Procedure: SHOULDER ARTHROSCOPY WITH ROTATOR CUFF REPAIR, distal clavicle excision;  Surgeon: Joo Rivers MD;  Location: [facility] OR;  Service:  "Orthopedics;  Laterality: Left;    TONSILLECTOMY      TUBAL ABDOMINAL LIGATION      TYMPANOSTOMY TUBE PLACEMENT      WISDOM TOOTH EXTRACTION Bilateral        Family History   Problem Relation Age of Onset    Lung cancer Father     Heart disease Paternal Grandmother     Diabetes Maternal Aunt     Breast cancer Maternal Aunt     Diabetes Maternal Grandmother     COPD Mother     Colon cancer Neg Hx     Colon polyps Neg Hx     Malig Hyperthermia Neg Hx                  Objective:  Vitals:    03/29/24 1010   Weight: 89.8 kg (198 lb)   Height: 162.6 cm (64\")         03/29/24  1010   Weight: 89.8 kg (198 lb)     Body mass index is 33.99 kg/m².        Left Knee Exam     Tenderness   The patient is experiencing tenderness in the medial retinaculum, MCL and medial joint line.    Range of Motion   Extension:  0   Flexion:  120     Tests   Craig:  Medial - positive   Varus: negative Valgus: negative  Lachman:  Anterior - positive    Posterior - negative  Drawer:  Anterior - positive     Posterior - negative    Other   Erythema: absent  Scars: absent  Sensation: normal  Pulse: present  Swelling: mild  Effusion: effusion present               Imaging: MRI of the left knee from 3/26/2024 was reviewed by self in the office today  Indication: Left knee pain  Findings: MRI demonstrates complete tear of the left ACL with associated bone bruising pattern small medial meniscal tear.  No significant degenerative changes appreciated.  Comparative studies: Plain radiographs    MRI Knee Left Without Contrast    Result Date: 3/26/2024  Impression: 1.Complete disruption of the anterior cruciate ligament involving the proximal attachment at the inner margin of the lateral femoral condyle. 2.Associated pivot shift type osseous contusion pattern is observed. There is osteochondral impaction injury along the medial anterior margin of the weightbearing surface of the medial femoral condyle with associated osteochondral injury. There is mild " depression measuring 3 mm. No displaced osteochondral fragment is seen. 3.A moderate joint effusion is noted. 4.Evidence for partial tear of the medial clear ligament indicating grade 1-2 partial injury. 5.Evidence for underlying partial meniscocapsular separation along the peripheral margin of the body segment of the medial meniscus. There is also a complex medial meniscal tear involving the body segment and posterior horn. 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 This anatomic variant may predispose to patellofemoral instability. There is lateral tilt of the patella. There are no signs of patellofemoral dislocation at this time. 7.Mild tricompartmental osteoarthritis most pronounced in the patellofemoral compartment. No significant full-thickness articular cartilage loss is seen. Electronically Signed: Jose Terrell MD  3/26/2024 1:47 PM EDT  Workstation ID: [id number]    Assessment:        1. Tears of meniscus and anterior cruciate ligament of left knee, initial encounter           Plan:          Discussed treatment options at length with patient at today's visit.  I discussed with the patient that she has sustained a medial meniscal tear and a complete ACL tear.  I would like to refer the patient to my partner Dr. Joo Rivers for definitive treatment.  I discussed with her that in the meantime recommend she continue to ambulate and try and stay as active as possible.  I like her to continue working on knee flexion extension exercises as well as quadricep strengthening.  I cautioned her about falls going forward.  I recommend rest ice compression elevation and NSAIDs as needed for pain and swelling.  Will get her into see Dr. Rivers soon as possible.  The patient was given a Dry-Eugenio hinged brace today.  Follow up: Dr. Rivers soon as possible for surgical consultation.      Aide Henry was in agreement with plan and had all questions answered.     Medications:  No orders of the defined types  were placed in this encounter.      Followup:  No follow-ups on file.    Diagnoses and all orders for this visit:    1. Tears of meniscus and anterior cruciate ligament of left knee, initial encounter (Primary)          Dictated utilizing Dragon dictation

## 2024-04-11 ENCOUNTER — OFFICE VISIT (OUTPATIENT)
Dept: ORTHOPEDIC SURGERY | Facility: CLINIC | Age: 44
End: 2024-04-11
Payer: COMMERCIAL

## 2024-04-11 VITALS
WEIGHT: 200 LBS | BODY MASS INDEX: 34.15 KG/M2 | SYSTOLIC BLOOD PRESSURE: 114 MMHG | HEIGHT: 64 IN | DIASTOLIC BLOOD PRESSURE: 80 MMHG | HEART RATE: 105 BPM

## 2024-04-11 DIAGNOSIS — S83.512A RUPTURE OF ANTERIOR CRUCIATE LIGAMENT OF LEFT KNEE, INITIAL ENCOUNTER: ICD-10-CM

## 2024-04-11 DIAGNOSIS — S83.207A TEARS OF MENISCUS AND ANTERIOR CRUCIATE LIGAMENT OF LEFT KNEE, INITIAL ENCOUNTER: Primary | ICD-10-CM

## 2024-04-11 DIAGNOSIS — S83.512A TEARS OF MENISCUS AND ANTERIOR CRUCIATE LIGAMENT OF LEFT KNEE, INITIAL ENCOUNTER: Primary | ICD-10-CM

## 2024-04-11 NOTE — PROGRESS NOTES
Subjective:     Patient ID: Aide eHnry is a 43 y.o. female.    Chief Complaint:  Left knee pain, new issue    History of Present Illness  Aide Henry returns to clinic today for evaluation of new issue with her left knee.    She recounts an incident approximately 2 months ago where she twisted her knee, which subsequently buckled and gave way. She experienced a significant popping sensation, accompanied by swelling and stiffness. Subsequently, she has experienced episodes of instability in her knee, particularly on uneven surfaces, leading to frequent instability. She characterizes moderate pain in the medial joint line of her knee, but denies any gilda locking or catching. The pain is described as intermittent sharp and moderate aching, with mild radiation on the medial aspect of the leg, but not below the mid tibia. She denies any new numbness, tingling, hip, or groin pain. Despite minimal improvement with a home exercise program and intermittent use of a brace, she has noticed an improvement in swelling with intermittent anti-inflammatory medications and ice application. She has been diligently working on regaining her motion independently. She denies any prior injury or issues with her left knee.     Social History     Occupational History    Not on file   Tobacco Use    Smoking status: Every Day     Current packs/day: 0.25     Average packs/day: 0.3 packs/day for 20.0 years (5.0 ttl pk-yrs)     Types: Cigarettes     Passive exposure: Never    Smokeless tobacco: Never    Tobacco comments:     Smokes every now and then    Vaping Use    Vaping status: Some Days   Substance and Sexual Activity    Alcohol use: Not Currently     Comment: sober since 4/2023    Drug use: Not Currently     Types: Cocaine(coke)     Comment: sober since 4/2023    Sexual activity: Defer      Past Medical History:   Diagnosis Date    Anxiety     Bacterial vaginosis     [place] esophagus     Bulging lumbar disc     lower back per  patient    Depression with anxiety     Fibromyalgia     Fracture, finger     Gastroenteritis 2017    Gastroesophageal reflux disease without esophagitis 02/10/2016    Gastroparesis     GERD (gastroesophageal reflux disease)     Hemorrhoids     History of anemia     Hypertension     Hypothyroid     IBS (irritable bowel syndrome)     IC (interstitial cystitis)     Lateral epicondylitis 2013    RHUEMATOLOGIST    Lupus     Migraine     MRSA (methicillin resistant staph aureus) culture positive  ESTIMATE    GROIN AREA     Overactive bladder     Sjogren's syndrome     Urinary tract infection      Past Surgical History:   Procedure Laterality Date    ADENOIDECTOMY      CARPAL TUNNEL RELEASE Bilateral      SECTION      COLONOSCOPY      D & C HYSTEROSCOPY N/A 2021    Procedure: DILATATION AND CURETTAGE HYSTEROSCOPY;  Surgeon: Rancho Mayberry MD;  Location: [facility] OR;  Service: Obstetrics/Gynecology;  Laterality: N/A;    D & C HYSTEROSCOPY ENDOMETRIAL ABLATION N/A 2021    Procedure: DILATATION AND CURETTAGE HYSTEROSCOPY,  NOVASURE ENDOMETRIAL ABLATION;  Surgeon: Rancho Mayberry MD;  Location: [facility] OR;  Service: Obstetrics/Gynecology;  Laterality: N/A;    ENDOSCOPY N/A 05/10/2019    Procedure: ESOPHAGOGASTRODUODENOSCOPY with biopsies;  Surgeon: Shanon Vazquez MD;  Location: [facility] OR;  Service: Gastroenterology    ENDOSCOPY N/A 2021    Procedure: ESOPHAGOGASTRODUODENOSCOPY with biopsies;  Surgeon: Romain Rice MD;  Location: [facility] OR;  Service: Gastroenterology;  Laterality: N/A;  barretts  gastritis    FINGER GANGLION CYST EXCISION Right     middle finger    MOUTH SURGERY      OTHER SURGICAL HISTORY      reversal of tubal    RHINOPLASTY      SHOULDER ARTHROSCOPY Left 09/10/2018    Procedure: SHOULDER ARTHROSCOPY, rotator cuff repair; extensive debridement, open biceps tenodesis;  Surgeon: Joo Rivers MD;  Location: [facility] OR;   "Service: Orthopedics    SHOULDER ARTHROSCOPY W/ ROTATOR CUFF REPAIR Left 5/27/2022    Procedure: SHOULDER ARTHROSCOPY WITH ROTATOR CUFF REPAIR COMPLEX, EXTENSIVE DEBRIDEMENT;  Surgeon: Joo Rivers MD;  Location:  [facility] OR;  Service: Orthopedics;  Laterality: Left;    SHOULDER ARTHROSCOPY W/ ROTATOR CUFF REPAIR Left 6/23/2023    Procedure: SHOULDER ARTHROSCOPY WITH ROTATOR CUFF REPAIR, distal clavicle excision;  Surgeon: Joo Rivers MD;  Location:  [facility] OR;  Service: Orthopedics;  Laterality: Left;    TONSILLECTOMY      TUBAL ABDOMINAL LIGATION      TYMPANOSTOMY TUBE PLACEMENT      WISDOM TOOTH EXTRACTION Bilateral        Family History   Problem Relation Age of Onset    Lung cancer Father     Heart disease Paternal Grandmother     Diabetes Maternal Aunt     Breast cancer Maternal Aunt     Diabetes Maternal Grandmother     COPD Mother     Colon cancer Neg Hx     Colon polyps Neg Hx     Malig Hyperthermia Neg Hx          Review of Systems        Objective:  Vitals:    04/11/24 1506   BP: 114/80   Pulse: 105   Weight: 90.7 kg (200 lb)   Height: 162.6 cm (64\")         04/11/24  1506   Weight: 90.7 kg (200 lb)     Body mass index is 34.33 kg/m².  Physical Exam    Vital signs reviewed.   General: No acute distress, alert and oriented  Eyes: conjunctiva clear; pupils equally round and reactive  ENT: external ears and nose atraumatic; oropharynx clear  CV: no peripheral edema  Resp: normal respiratory effort  Skin: no rashes or wounds; normal turgor  Psych: mood and affect appropriate; recent and remote memory intact        Ortho Exam       Left knee exam:  Active range of motion from 0 to 120 degrees, with 4+/5 strength on flexion and extension.   There is a grade 2B Lachman, 2+ anterior drawer with a soft endpoint, and a negative posterior drawer.   Dial test is negative.   There is maximal tenderness in the medial joint line, with positive Craig's and with pain and a mild click.   The active patellar " compression test is moderately positive.   The knee is stable to varus and valgus stress at 0 and 30 degrees.   There is no hip pain on log roll examination.   Negative straight leg raise is noted in the left lower extremity.   Brisk cap refill is observed in all digits.   The dorsalis pedis pulse is 2+.   Sensation to light touch is intact in all distributions of the left foot, symmetric to the right.    Imaging:  MRI Knee Left Without Contrast    Result Date: 3/26/2024  Impression: 1.Complete disruption of the anterior cruciate ligament involving the proximal attachment at the inner margin of the lateral femoral condyle. 2.Associated pivot shift type osseous contusion pattern is observed. There is osteochondral impaction injury along the medial anterior margin of the weightbearing surface of the medial femoral condyle with associated osteochondral injury. There is mild depression measuring 3 mm. No displaced osteochondral fragment is seen. 3.A moderate joint effusion is noted. 4.Evidence for partial tear of the medial clear ligament indicating grade 1-2 partial injury. 5.Evidence for underlying partial meniscocapsular separation along the peripheral margin of the body segment of the medial meniscus. There is also a complex medial meniscal tear involving the body segment and posterior horn. 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 This anatomic variant may predispose to patellofemoral instability. There is lateral tilt of the patella. There are no signs of patellofemoral dislocation at this time. 7.Mild tricompartmental osteoarthritis most pronounced in the patellofemoral compartment. No significant full-thickness articular cartilage loss is seen. Electronically Signed: Jose Terrell MD  3/26/2024 1:47 PM EDT  Workstation ID: [id number]    Review of outside MRI left knee including review of imaging as well as radiology report indicates ACL tear with pivot shift bone bruise phenomenon and osteochondral  impaction injury of the medial femoral condyle, grade 1 injury to the MCL with complex medial meniscal tear.  Mild tricompartmental chondromalacia.    Assessment:        1. Tears of meniscus and anterior cruciate ligament of left knee, initial encounter    2. Rupture of anterior cruciate ligament of left knee, initial encounter           Plan:      1. Discussed treatment options with the patient. At this point in time, given her significant instability as well as medial meniscal tear and ACL tear are noted on her MRI with failure of conservative treatment including home exercises and stabilization with a brace, we discussed options, she would like to proceed with surgical treatment at this time given her desire to continue participating in cutting and pivoting activities.     2. Plan will be for left knee ACL reconstruction with allograft, meniscal cartilage surgery as indicated. I reviewed details of procedure including pertinent anatomy with the patient as well as risks benefits and alternatives, with the risks including not limited to neurovascular damage, bleeding, infection, re-tear of graft, failure of healing of graft, loss of motion, weakness, stiffness, instability, chondrolysis, DVT, pulmonary embolus, death, stroke, complex regional pain syndrome, myocardial infarction, need for additional procedures. Patient understood all these had all questions answered before verbally consenting to proceed with surgery.  No guarantees were given regarding results of procedure.    3. Patient denies history of DVT or pulmonary embolus, denies cardiac history. She is not diabetic. All questions answered.      Aide Henry was in agreement with plan and had all questions answered.     Orders:  No orders of the defined types were placed in this encounter.      Medications:  No orders of the defined types were placed in this encounter.      Followup:  No follow-ups on file.    Diagnoses and all orders for this  visit:    1. Tears of meniscus and anterior cruciate ligament of left knee, initial encounter (Primary)    2. Rupture of anterior cruciate ligament of left knee, initial encounter          Dictated utilizing Dragon dictation         Transcribed from ambient dictation for Joo Rivers MD by Nida Donovan.   04/11/24   16:54 EDT    Patient or patient representative verbalized consent to the visit recording.  I have personally performed the services described in this document as transcribed by the above individual, and it is both accurate and complete.

## 2024-04-17 PROBLEM — S83.512A TEARS OF MENISCUS AND ACL OF LEFT KNEE: Status: ACTIVE | Noted: 2024-04-11

## 2024-04-17 PROBLEM — S83.207A TEARS OF MENISCUS AND ACL OF LEFT KNEE: Status: ACTIVE | Noted: 2024-04-11

## 2024-04-17 PROBLEM — S83.512A LEFT ANTERIOR CRUCIATE LIGAMENT TEAR: Status: ACTIVE | Noted: 2024-04-11

## 2024-04-17 RX ORDER — PREGABALIN 150 MG/1
150 CAPSULE ORAL ONCE
OUTPATIENT
Start: 2024-04-17 | End: 2024-04-17

## 2024-04-17 RX ORDER — ACETAMINOPHEN 325 MG/1
1000 TABLET ORAL ONCE
OUTPATIENT
Start: 2024-04-17 | End: 2024-04-17

## 2024-04-17 RX ORDER — MELOXICAM 7.5 MG/1
7.5 TABLET ORAL ONCE
OUTPATIENT
Start: 2024-04-17 | End: 2024-04-17

## 2024-05-02 ENCOUNTER — TELEPHONE (OUTPATIENT)
Dept: ORTHOPEDIC SURGERY | Facility: CLINIC | Age: 44
End: 2024-05-02
Payer: COMMERCIAL

## 2024-05-02 NOTE — TELEPHONE ENCOUNTER
Called patient, UMAIR. She No showed PAT and needs to reschedule with them. Also needs to see PCP for clearance; they were supposed to reach out to her to work her in as they had no appts available for me to schedule. Without PAT and PCP clearance her surgery will be cancelled.

## 2024-05-15 ENCOUNTER — PRE-ADMISSION TESTING (OUTPATIENT)
Dept: PREADMISSION TESTING | Facility: HOSPITAL | Age: 44
End: 2024-05-15
Payer: COMMERCIAL

## 2024-05-15 VITALS
HEART RATE: 100 BPM | WEIGHT: 195.1 LBS | OXYGEN SATURATION: 97 % | HEIGHT: 64 IN | RESPIRATION RATE: 18 BRPM | BODY MASS INDEX: 33.31 KG/M2 | DIASTOLIC BLOOD PRESSURE: 96 MMHG | SYSTOLIC BLOOD PRESSURE: 140 MMHG

## 2024-05-15 DIAGNOSIS — S83.207A TEARS OF MENISCUS AND ANTERIOR CRUCIATE LIGAMENT OF LEFT KNEE, INITIAL ENCOUNTER: ICD-10-CM

## 2024-05-15 DIAGNOSIS — S83.512A TEARS OF MENISCUS AND ANTERIOR CRUCIATE LIGAMENT OF LEFT KNEE, INITIAL ENCOUNTER: ICD-10-CM

## 2024-05-15 DIAGNOSIS — S83.512A RUPTURE OF ANTERIOR CRUCIATE LIGAMENT OF LEFT KNEE, INITIAL ENCOUNTER: ICD-10-CM

## 2024-05-15 LAB
ANION GAP SERPL CALCULATED.3IONS-SCNC: 9.2 MMOL/L (ref 5–15)
APTT PPP: 23 SECONDS (ref 24.3–38.1)
BASOPHILS # BLD AUTO: 0.03 10*3/MM3 (ref 0–0.2)
BASOPHILS NFR BLD AUTO: 1 % (ref 0–1.5)
BUN SERPL-MCNC: 6 MG/DL (ref 6–20)
BUN/CREAT SERPL: 9.5 (ref 7–25)
CALCIUM SPEC-SCNC: 8.7 MG/DL (ref 8.6–10.5)
CHLORIDE SERPL-SCNC: 104 MMOL/L (ref 98–107)
CO2 SERPL-SCNC: 25.8 MMOL/L (ref 22–29)
CREAT SERPL-MCNC: 0.63 MG/DL (ref 0.57–1)
DEPRECATED RDW RBC AUTO: 47.9 FL (ref 37–54)
EGFRCR SERPLBLD CKD-EPI 2021: 114.5 ML/MIN/1.73
EOSINOPHIL # BLD AUTO: 0.02 10*3/MM3 (ref 0–0.4)
EOSINOPHIL NFR BLD AUTO: 0.7 % (ref 0.3–6.2)
ERYTHROCYTE [DISTWIDTH] IN BLOOD BY AUTOMATED COUNT: 14.6 % (ref 12.3–15.4)
GLUCOSE SERPL-MCNC: 94 MG/DL (ref 65–99)
HBA1C MFR BLD: 5.44 % (ref 4.8–5.6)
HCT VFR BLD AUTO: 38.1 % (ref 34–46.6)
HGB BLD-MCNC: 12.6 G/DL (ref 12–15.9)
IMM GRANULOCYTES # BLD AUTO: 0.11 10*3/MM3 (ref 0–0.05)
IMM GRANULOCYTES NFR BLD AUTO: 3.8 % (ref 0–0.5)
INR PPP: 0.84 (ref 0.9–1.1)
LYMPHOCYTES # BLD AUTO: 0.83 10*3/MM3 (ref 0.7–3.1)
LYMPHOCYTES NFR BLD AUTO: 28.5 % (ref 19.6–45.3)
MCH RBC QN AUTO: 30.4 PG (ref 26.6–33)
MCHC RBC AUTO-ENTMCNC: 33.1 G/DL (ref 31.5–35.7)
MCV RBC AUTO: 91.8 FL (ref 79–97)
MONOCYTES # BLD AUTO: 0.58 10*3/MM3 (ref 0.1–0.9)
MONOCYTES NFR BLD AUTO: 19.9 % (ref 5–12)
NEUTROPHILS NFR BLD AUTO: 1.34 10*3/MM3 (ref 1.7–7)
NEUTROPHILS NFR BLD AUTO: 46.1 % (ref 42.7–76)
NRBC BLD AUTO-RTO: 0 /100 WBC (ref 0–0.2)
PLATELET # BLD AUTO: 175 10*3/MM3 (ref 140–450)
PMV BLD AUTO: 9.6 FL (ref 6–12)
POTASSIUM SERPL-SCNC: 3.9 MMOL/L (ref 3.5–5.2)
PROTHROMBIN TIME: 11.5 SECONDS (ref 12.1–15)
RBC # BLD AUTO: 4.15 10*6/MM3 (ref 3.77–5.28)
SODIUM SERPL-SCNC: 139 MMOL/L (ref 136–145)
WBC NRBC COR # BLD AUTO: 2.91 10*3/MM3 (ref 3.4–10.8)

## 2024-05-15 PROCEDURE — 93010 ELECTROCARDIOGRAM REPORT: CPT | Performed by: INTERNAL MEDICINE

## 2024-05-15 PROCEDURE — 80048 BASIC METABOLIC PNL TOTAL CA: CPT | Performed by: ORTHOPAEDIC SURGERY

## 2024-05-15 PROCEDURE — 93005 ELECTROCARDIOGRAM TRACING: CPT

## 2024-05-15 PROCEDURE — 36415 COLL VENOUS BLD VENIPUNCTURE: CPT

## 2024-05-15 PROCEDURE — 85610 PROTHROMBIN TIME: CPT | Performed by: ORTHOPAEDIC SURGERY

## 2024-05-15 PROCEDURE — 85025 COMPLETE CBC W/AUTO DIFF WBC: CPT | Performed by: ORTHOPAEDIC SURGERY

## 2024-05-15 PROCEDURE — 85730 THROMBOPLASTIN TIME PARTIAL: CPT | Performed by: ORTHOPAEDIC SURGERY

## 2024-05-15 PROCEDURE — 83036 HEMOGLOBIN GLYCOSYLATED A1C: CPT | Performed by: ORTHOPAEDIC SURGERY

## 2024-05-15 NOTE — DISCHARGE INSTRUCTIONS
PRE-ADMISSION TESTING INSTRUCTIONS FOR ADULTS    Take these medications the morning of surgery with a small sip of water: gabapentin, cymbalta (duloxetine), metoprolol, amlodipine, levothyroxine, and omeprazole      Do not take any insulin or diabetes medications the morning of surgery.      No aspirin, advil, aleve, ibuprofen, naproxen, diet pills, decongestants, or herbal/vitamins for a week prior to surgery.       Tylenol/Acetaminophen is okay to take if needed.    General Instructions:    DO NOT EAT SOLID FOOD AFTER MIDNIGHT THE NIGHT BEFORE SURGERY. No gum, mints, or hard candy after midnight the night before surgery.  You may drink clear liquids the day of surgery up until 2 hours before your arrival time.  Clear liquids are liquids you can see through. Nothing RED in color.    Plain water    Sports drinks      Gelatin (Jell-O)  Fruit juices without pulp such as white grape juice and apple juice  Popsicles that contain no fruit or yogurt  Tea or coffee (no cream or milk added)    It is beneficial for you to have a clear drink that contains carbohydrates 2 hours before your arrival time.  We suggest a 20 ounce bottle of Gatorade or Powerade for non-diabetic patients or a 20 ounce bottle of Gatorade Zero or Powerade Zero for diabetic patients.     Patients who avoid smoking, chewing tobacco and alcohol for 4 weeks prior to surgery have a reduced risk of post-operative complications.  If at all possible, quit smoking as many days before surgery as you can.    Do not smoke, use chewing tobacco or drink alcohol the day of surgery    Bring your C-PAP/ BI-PAP machine if you use one.  Wear clean comfortable clothes.  Do not wear contact lenses, lotion, deodorant, or make-up.  Bring a case for your glasses if applicable. You may brush your teeth the morning of surgery.  You may wear dentures/partials, do not put adhesive/glue on them.  Leave all other jewelry and valuables at home.      Preventing a Surgical Site  Infection:    Shower the night before and on the morning of surgery using the chlorhexidine soap you were given.  Use a clean washcloth with the soap.  Place clean sheets on your bed after showering the night before surgery. Do not use the CHG soap on your hair, face, or private areas. Wash your body gently for five (5) minutes. Do not scrub your skin.  Dry with a clean towel and dress in clean clothing.  Do not shave the surgical area for 10 days-2 weeks prior to surgery  because the razor can irritate skin and make it easier to develop an infection.  Make sure you, your family, and all healthcare providers clean their hands with soap and water or an alcohol based hand  before caring for you or your wound.      Day of surgery:    Your surgeon’s office will advise you of your arrival time for the day of surgery.    Upon arrival, a Pre-op nurse and Anesthesia provider will review your health history, obtain vital signs, and answer questions you may have. The anesthesia provider will also discuss the type of anesthesia that will be needed for your procedure, which may include general anesthesia. The only belongings needed at this time will be your home medications and if applicable your C-PAP/BI-PAP machine.  If you are staying overnight your family can leave the rest of your belongings in the car and bring them to your room later.  A Pre-op nurse will start an IV and you may receive medication in preparation for surgery, including something to help you relax.  Your family will be able to see you in the Pre-op area.  While you are in surgery your family should notify the waiting room  if they leave the waiting room area and provide a contact phone number.    IF you have any questions, you can call the Pre-Admission Department at [phone] or your surgeon's office.  Notify your surgeon if  you become sick, have a fever, productive cough, or cannot be here the day of surgery    Please be aware  that surgery does come with discomfort.  We want to make every effort to control your discomfort so please discuss any uncontrolled symptoms with your nurse.   Your doctor will most likely have prescribed pain medications.      If you are going home after surgery, you will receive individualized written care instructions before being discharged.  A responsible adult (over the age of 18) must drive you to and from the hospital on the day of your surgery and stay with you for 24 hours after anesthesia.    If you are staying overnight following surgery, you will be transported to your hospital room following the recovery period.  [facility] has all private rooms.    You may receive a survey regarding the care you received. Your feedback is very important and will be used to collect the necessary data to help us to continue to provide excellent care.     Deductibles and co-payments are collected on the day of service. Please be prepared to pay the required co-pay, deductible or deposit on the day of service as defined by your plan.

## 2024-05-16 LAB
QT INTERVAL: 361 MS
QTC INTERVAL: 436 MS

## 2024-05-24 ENCOUNTER — ANESTHESIA EVENT (OUTPATIENT)
Dept: PERIOP | Facility: HOSPITAL | Age: 44
End: 2024-05-24
Payer: COMMERCIAL

## 2024-05-28 ENCOUNTER — ANESTHESIA (OUTPATIENT)
Dept: PERIOP | Facility: HOSPITAL | Age: 44
End: 2024-05-28
Payer: COMMERCIAL

## 2024-05-28 ENCOUNTER — HOSPITAL ENCOUNTER (OUTPATIENT)
Facility: HOSPITAL | Age: 44
Setting detail: HOSPITAL OUTPATIENT SURGERY
Discharge: HOME | End: 2024-05-28
Attending: ORTHOPAEDIC SURGERY | Admitting: ORTHOPAEDIC SURGERY
Payer: COMMERCIAL

## 2024-05-28 VITALS
SYSTOLIC BLOOD PRESSURE: 107 MMHG | WEIGHT: 196 LBS | HEART RATE: 93 BPM | BODY MASS INDEX: 33.64 KG/M2 | TEMPERATURE: 97.6 F | RESPIRATION RATE: 16 BRPM | DIASTOLIC BLOOD PRESSURE: 62 MMHG | OXYGEN SATURATION: 94 %

## 2024-05-28 DIAGNOSIS — S83.512A TEARS OF MENISCUS AND ANTERIOR CRUCIATE LIGAMENT OF LEFT KNEE, INITIAL ENCOUNTER: ICD-10-CM

## 2024-05-28 DIAGNOSIS — S83.207A TEARS OF MENISCUS AND ANTERIOR CRUCIATE LIGAMENT OF LEFT KNEE, INITIAL ENCOUNTER: ICD-10-CM

## 2024-05-28 DIAGNOSIS — S83.512A RUPTURE OF ANTERIOR CRUCIATE LIGAMENT OF LEFT KNEE, INITIAL ENCOUNTER: ICD-10-CM

## 2024-05-28 LAB — HCG SERPL QL: NEGATIVE

## 2024-05-28 PROCEDURE — 25010000002 ONDANSETRON PER 1 MG: Performed by: NURSE ANESTHETIST, CERTIFIED REGISTERED

## 2024-05-28 PROCEDURE — 25010000002 HYDROMORPHONE 1 MG/ML SOLUTION: Performed by: ANESTHESIOLOGY

## 2024-05-28 PROCEDURE — 25010000002 PROPOFOL 200 MG/20ML EMULSION: Performed by: ANESTHESIOLOGY

## 2024-05-28 PROCEDURE — 25010000002 BUPIVACAINE (PF) 0.25 % SOLUTION: Performed by: ANESTHESIOLOGY

## 2024-05-28 PROCEDURE — C1713 ANCHOR/SCREW BN/BN,TIS/BN: HCPCS | Performed by: ORTHOPAEDIC SURGERY

## 2024-05-28 PROCEDURE — 25810000003 SODIUM CHLORIDE 0.9 % SOLUTION

## 2024-05-28 PROCEDURE — L1832 KO ADJ JNT POS R SUP PRE CST: HCPCS | Performed by: ORTHOPAEDIC SURGERY

## 2024-05-28 PROCEDURE — 25010000002 FENTANYL CITRATE (PF) 50 MCG/ML SOLUTION: Performed by: ANESTHESIOLOGY

## 2024-05-28 PROCEDURE — 25010000002 DIPHENHYDRAMINE PER 50 MG: Performed by: ANESTHESIOLOGY

## 2024-05-28 PROCEDURE — 25010000002 VANCOMYCIN HCL 1.25 G RECONSTITUTED SOLUTION 1 EACH VIAL: Performed by: ORTHOPAEDIC SURGERY

## 2024-05-28 PROCEDURE — 25810000003 LACTATED RINGERS PER 1000 ML: Performed by: NURSE ANESTHETIST, CERTIFIED REGISTERED

## 2024-05-28 PROCEDURE — 25010000002 DEXAMETHASONE PER 1 MG: Performed by: NURSE ANESTHETIST, CERTIFIED REGISTERED

## 2024-05-28 PROCEDURE — 25010000002 MIDAZOLAM PER 1MG: Performed by: NURSE ANESTHETIST, CERTIFIED REGISTERED

## 2024-05-28 PROCEDURE — 25010000002 KETOROLAC TROMETHAMINE PER 15 MG

## 2024-05-28 PROCEDURE — 29882 ARTHRS KNE SRG MNISC RPR M/L: CPT | Performed by: ORTHOPAEDIC SURGERY

## 2024-05-28 PROCEDURE — 25010000002 BUPIVACAINE (PF) 0.5 % SOLUTION

## 2024-05-28 PROCEDURE — 84703 CHORIONIC GONADOTROPIN ASSAY: CPT | Performed by: NURSE ANESTHETIST, CERTIFIED REGISTERED

## 2024-05-28 PROCEDURE — 29888 ARTHRS AID ACL RPR/AGMNTJ: CPT | Performed by: ORTHOPAEDIC SURGERY

## 2024-05-28 PROCEDURE — 25810000003 SODIUM CHLORIDE 0.9 % SOLUTION 250 ML FLEX CONT: Performed by: ORTHOPAEDIC SURGERY

## 2024-05-28 DEVICE — FAST-FIX 360 REVERSED CURVE                                    MENISCAL REPAIR SYSTEM
Type: IMPLANTABLE DEVICE | Site: KNEE | Status: FUNCTIONAL
Brand: FAST-FIX

## 2024-05-28 DEVICE — ULTRABRAID 2 COBRAID 38 LENGTH SINGL
Type: IMPLANTABLE DEVICE | Site: KNEE | Status: FUNCTIONAL
Brand: ULTRABRAID

## 2024-05-28 DEVICE — BIOSURE REGENSORB INTERFERENCE                                    SCREW 10 MM X 25MM
Type: IMPLANTABLE DEVICE | Site: KNEE | Status: FUNCTIONAL
Brand: BIOSURE

## 2024-05-28 DEVICE — FAST-FIX 360 CURVED MENISCAL                                    REPAIR SYSTEM
Type: IMPLANTABLE DEVICE | Site: KNEE | Status: FUNCTIONAL
Brand: FAST-FIX

## 2024-05-28 DEVICE — ULTRABRAID NO.2 WHITE SUTURE AND                                    NEEDLE ASSEMBLY, 38 INCH, 10 PER                                    BOX, STERILE
Type: IMPLANTABLE DEVICE | Site: KNEE | Status: FUNCTIONAL
Brand: ULTRABRAID

## 2024-05-28 DEVICE — ULTRABUTTON ADJUSTABLE FIXATION DEVICE
Type: IMPLANTABLE DEVICE | Site: KNEE | Status: FUNCTIONAL
Brand: ULTRABUTTON

## 2024-05-28 DEVICE — SPIKED WASHER 20MM: Type: IMPLANTABLE DEVICE | Site: KNEE | Status: FUNCTIONAL

## 2024-05-28 DEVICE — BLUE BRAIDED UHMWPE SIZE 2 LOOPED 20" GR51 NEEDLE S&N
Type: IMPLANTABLE DEVICE | Site: KNEE | Status: FUNCTIONAL
Brand: FORCE FIBER

## 2024-05-28 DEVICE — CORTICAL SCREW 4.5MM X 34MM: Type: IMPLANTABLE DEVICE | Site: KNEE | Status: FUNCTIONAL

## 2024-05-28 DEVICE — ALLOGRFT TNDN SEMITNDNSUS ASEPTIC SINGLE/STRND: Type: IMPLANTABLE DEVICE | Site: KNEE | Status: FUNCTIONAL

## 2024-05-28 DEVICE — ULTRATAPE SUTURE COBRAID BLUE, 6                                    PER BOX
Type: IMPLANTABLE DEVICE | Site: KNEE | Status: FUNCTIONAL
Brand: ULTRATAPE

## 2024-05-28 DEVICE — WHITE BLUE UHMWPE CO-BRAID SIZE 2 LOOPED 20" GR51 NEEDLE S&N
Type: IMPLANTABLE DEVICE | Site: KNEE | Status: FUNCTIONAL
Brand: FORCE FIBER

## 2024-05-28 DEVICE — IMPLANTABLE DEVICE: Type: IMPLANTABLE DEVICE | Site: KNEE | Status: FUNCTIONAL

## 2024-05-28 RX ORDER — ROCURONIUM BROMIDE 10 MG/ML
INJECTION, SOLUTION INTRAVENOUS AS NEEDED
Status: DISCONTINUED | OUTPATIENT
Start: 2024-05-28 | End: 2024-05-28 | Stop reason: SURG

## 2024-05-28 RX ORDER — MIDAZOLAM HYDROCHLORIDE 2 MG/2ML
1 INJECTION, SOLUTION INTRAMUSCULAR; INTRAVENOUS
Status: COMPLETED | OUTPATIENT
Start: 2024-05-28 | End: 2024-05-28

## 2024-05-28 RX ORDER — ACETAMINOPHEN 500 MG
1000 TABLET ORAL ONCE
Status: COMPLETED | OUTPATIENT
Start: 2024-05-28 | End: 2024-05-28

## 2024-05-28 RX ORDER — ONDANSETRON 2 MG/ML
4 INJECTION INTRAMUSCULAR; INTRAVENOUS ONCE AS NEEDED
Status: COMPLETED | OUTPATIENT
Start: 2024-05-28 | End: 2024-05-28

## 2024-05-28 RX ORDER — PREGABALIN 75 MG/1
75 CAPSULE ORAL 2 TIMES DAILY
Qty: 60 CAPSULE | Refills: 0 | Status: SHIPPED | OUTPATIENT
Start: 2024-05-28

## 2024-05-28 RX ORDER — SODIUM CHLORIDE 9 MG/ML
40 INJECTION, SOLUTION INTRAVENOUS AS NEEDED
Status: DISCONTINUED | OUTPATIENT
Start: 2024-05-28 | End: 2024-05-28 | Stop reason: HOSPADM

## 2024-05-28 RX ORDER — SENNOSIDES A AND B 8.6 MG/1
1 TABLET, FILM COATED ORAL NIGHTLY
Qty: 20 TABLET | Refills: 0 | Status: SHIPPED | OUTPATIENT
Start: 2024-05-28

## 2024-05-28 RX ORDER — PREGABALIN 75 MG/1
150 CAPSULE ORAL ONCE
Status: COMPLETED | OUTPATIENT
Start: 2024-05-28 | End: 2024-05-28

## 2024-05-28 RX ORDER — MAGNESIUM HYDROXIDE 1200 MG/15ML
LIQUID ORAL AS NEEDED
Status: DISCONTINUED | OUTPATIENT
Start: 2024-05-28 | End: 2024-05-28 | Stop reason: HOSPADM

## 2024-05-28 RX ORDER — FENTANYL CITRATE 50 UG/ML
50 INJECTION, SOLUTION INTRAMUSCULAR; INTRAVENOUS
Status: COMPLETED | OUTPATIENT
Start: 2024-05-28 | End: 2024-05-28

## 2024-05-28 RX ORDER — EPHEDRINE SULFATE 50 MG/ML
INJECTION INTRAVENOUS AS NEEDED
Status: DISCONTINUED | OUTPATIENT
Start: 2024-05-28 | End: 2024-05-28 | Stop reason: SURG

## 2024-05-28 RX ORDER — SODIUM CHLORIDE 0.9 % (FLUSH) 0.9 %
10 SYRINGE (ML) INJECTION AS NEEDED
Status: DISCONTINUED | OUTPATIENT
Start: 2024-05-28 | End: 2024-05-28 | Stop reason: HOSPADM

## 2024-05-28 RX ORDER — FAMOTIDINE 10 MG/ML
20 INJECTION, SOLUTION INTRAVENOUS
Status: COMPLETED | OUTPATIENT
Start: 2024-05-28 | End: 2024-05-28

## 2024-05-28 RX ORDER — BUPIVACAINE HYDROCHLORIDE 2.5 MG/ML
INJECTION, SOLUTION EPIDURAL; INFILTRATION; INTRACAUDAL
Status: COMPLETED | OUTPATIENT
Start: 2024-05-28 | End: 2024-05-28

## 2024-05-28 RX ORDER — DIPHENHYDRAMINE HYDROCHLORIDE 50 MG/ML
INJECTION INTRAMUSCULAR; INTRAVENOUS AS NEEDED
Status: DISCONTINUED | OUTPATIENT
Start: 2024-05-28 | End: 2024-05-28 | Stop reason: SURG

## 2024-05-28 RX ORDER — MINERAL OIL
OIL (ML) MISCELLANEOUS AS NEEDED
Status: DISCONTINUED | OUTPATIENT
Start: 2024-05-28 | End: 2024-05-28 | Stop reason: HOSPADM

## 2024-05-28 RX ORDER — DEXMEDETOMIDINE HYDROCHLORIDE 100 UG/ML
INJECTION, SOLUTION INTRAVENOUS AS NEEDED
Status: DISCONTINUED | OUTPATIENT
Start: 2024-05-28 | End: 2024-05-28 | Stop reason: SURG

## 2024-05-28 RX ORDER — LIDOCAINE HYDROCHLORIDE 20 MG/ML
INJECTION, SOLUTION INFILTRATION; PERINEURAL AS NEEDED
Status: DISCONTINUED | OUTPATIENT
Start: 2024-05-28 | End: 2024-05-28 | Stop reason: SURG

## 2024-05-28 RX ORDER — SODIUM CHLORIDE, SODIUM LACTATE, POTASSIUM CHLORIDE, CALCIUM CHLORIDE 600; 310; 30; 20 MG/100ML; MG/100ML; MG/100ML; MG/100ML
9 INJECTION, SOLUTION INTRAVENOUS CONTINUOUS PRN
Status: DISCONTINUED | OUTPATIENT
Start: 2024-05-28 | End: 2024-05-28 | Stop reason: HOSPADM

## 2024-05-28 RX ORDER — SODIUM CHLORIDE, SODIUM LACTATE, POTASSIUM CHLORIDE, CALCIUM CHLORIDE 600; 310; 30; 20 MG/100ML; MG/100ML; MG/100ML; MG/100ML
100 INJECTION, SOLUTION INTRAVENOUS ONCE
Status: DISCONTINUED | OUTPATIENT
Start: 2024-05-28 | End: 2024-05-28 | Stop reason: HOSPADM

## 2024-05-28 RX ORDER — ONDANSETRON 4 MG/1
4 TABLET, FILM COATED ORAL EVERY 8 HOURS PRN
Qty: 30 TABLET | Refills: 0 | Status: SHIPPED | OUTPATIENT
Start: 2024-05-28

## 2024-05-28 RX ORDER — DEXAMETHASONE SODIUM PHOSPHATE 4 MG/ML
4 INJECTION, SOLUTION INTRA-ARTICULAR; INTRALESIONAL; INTRAMUSCULAR; INTRAVENOUS; SOFT TISSUE ONCE AS NEEDED
Status: COMPLETED | OUTPATIENT
Start: 2024-05-28 | End: 2024-05-28

## 2024-05-28 RX ORDER — KETOROLAC TROMETHAMINE 30 MG/ML
30 INJECTION, SOLUTION INTRAMUSCULAR; INTRAVENOUS ONCE
Status: COMPLETED | OUTPATIENT
Start: 2024-05-28 | End: 2024-05-28

## 2024-05-28 RX ORDER — DOXYCYCLINE HYCLATE 100 MG/1
100 CAPSULE ORAL 2 TIMES DAILY
Qty: 20 CAPSULE | Refills: 0 | Status: SHIPPED | OUTPATIENT
Start: 2024-05-28 | End: 2024-06-07

## 2024-05-28 RX ORDER — KETAMINE HYDROCHLORIDE 10 MG/ML
INJECTION, SOLUTION INTRAMUSCULAR; INTRAVENOUS AS NEEDED
Status: DISCONTINUED | OUTPATIENT
Start: 2024-05-28 | End: 2024-05-28 | Stop reason: SURG

## 2024-05-28 RX ORDER — SODIUM CHLORIDE 0.9 % (FLUSH) 0.9 %
10 SYRINGE (ML) INJECTION EVERY 12 HOURS SCHEDULED
Status: DISCONTINUED | OUTPATIENT
Start: 2024-05-28 | End: 2024-05-28 | Stop reason: HOSPADM

## 2024-05-28 RX ORDER — OXYCODONE AND ACETAMINOPHEN 7.5; 325 MG/1; MG/1
1 TABLET ORAL EVERY 4 HOURS PRN
Status: DISCONTINUED | OUTPATIENT
Start: 2024-05-28 | End: 2024-05-28 | Stop reason: HOSPADM

## 2024-05-28 RX ORDER — OXYCODONE HYDROCHLORIDE AND ACETAMINOPHEN 5; 325 MG/1; MG/1
1 TABLET ORAL EVERY 4 HOURS PRN
Qty: 42 TABLET | Refills: 0 | Status: SHIPPED | OUTPATIENT
Start: 2024-05-28

## 2024-05-28 RX ORDER — FENTANYL CITRATE 50 UG/ML
INJECTION, SOLUTION INTRAMUSCULAR; INTRAVENOUS AS NEEDED
Status: DISCONTINUED | OUTPATIENT
Start: 2024-05-28 | End: 2024-05-28 | Stop reason: SURG

## 2024-05-28 RX ORDER — MELOXICAM 7.5 MG/1
7.5 TABLET ORAL ONCE
Status: COMPLETED | OUTPATIENT
Start: 2024-05-28 | End: 2024-05-28

## 2024-05-28 RX ORDER — ONDANSETRON 2 MG/ML
4 INJECTION INTRAMUSCULAR; INTRAVENOUS ONCE AS NEEDED
Status: DISCONTINUED | OUTPATIENT
Start: 2024-05-28 | End: 2024-05-28 | Stop reason: HOSPADM

## 2024-05-28 RX ORDER — PROPOFOL 10 MG/ML
INJECTION, EMULSION INTRAVENOUS AS NEEDED
Status: DISCONTINUED | OUTPATIENT
Start: 2024-05-28 | End: 2024-05-28 | Stop reason: SURG

## 2024-05-28 RX ORDER — OXYCODONE HYDROCHLORIDE AND ACETAMINOPHEN 5; 325 MG/1; MG/1
1 TABLET ORAL ONCE AS NEEDED
Status: DISCONTINUED | OUTPATIENT
Start: 2024-05-28 | End: 2024-05-28 | Stop reason: HOSPADM

## 2024-05-28 RX ADMIN — ONDANSETRON 4 MG: 2 INJECTION INTRAMUSCULAR; INTRAVENOUS at 08:11

## 2024-05-28 RX ADMIN — MELOXICAM 7.5 MG: 7.5 TABLET ORAL at 07:52

## 2024-05-28 RX ADMIN — PREGABALIN 150 MG: 75 CAPSULE ORAL at 07:52

## 2024-05-28 RX ADMIN — HYDROMORPHONE HYDROCHLORIDE 0.5 MG: 1 INJECTION, SOLUTION INTRAMUSCULAR; INTRAVENOUS; SUBCUTANEOUS at 12:55

## 2024-05-28 RX ADMIN — ROCURONIUM BROMIDE 5 MG: 10 INJECTION, SOLUTION INTRAVENOUS at 09:39

## 2024-05-28 RX ADMIN — KETOROLAC TROMETHAMINE 30 MG: 30 INJECTION, SOLUTION INTRAMUSCULAR; INTRAVENOUS at 13:15

## 2024-05-28 RX ADMIN — LIDOCAINE HYDROCHLORIDE 60 MG: 20 INJECTION, SOLUTION INFILTRATION; PERINEURAL at 09:41

## 2024-05-28 RX ADMIN — ROCURONIUM BROMIDE 30 MG: 10 INJECTION, SOLUTION INTRAVENOUS at 09:41

## 2024-05-28 RX ADMIN — DIPHENHYDRAMINE HYDROCHLORIDE 12.5 MG: 50 INJECTION, SOLUTION INTRAMUSCULAR; INTRAVENOUS at 09:36

## 2024-05-28 RX ADMIN — FENTANYL CITRATE 50 MCG: 50 INJECTION, SOLUTION INTRAMUSCULAR; INTRAVENOUS at 11:40

## 2024-05-28 RX ADMIN — VANCOMYCIN HYDROCHLORIDE 1250 MG: 1.25 INJECTION, POWDER, LYOPHILIZED, FOR SOLUTION INTRAVENOUS at 08:13

## 2024-05-28 RX ADMIN — HYDROMORPHONE HYDROCHLORIDE 0.5 MG: 1 INJECTION, SOLUTION INTRAMUSCULAR; INTRAVENOUS; SUBCUTANEOUS at 12:42

## 2024-05-28 RX ADMIN — EPHEDRINE SULFATE 10 MG: 50 INJECTION INTRAVENOUS at 10:06

## 2024-05-28 RX ADMIN — FAMOTIDINE 20 MG: 10 INJECTION, SOLUTION INTRAVENOUS at 08:11

## 2024-05-28 RX ADMIN — DEXAMETHASONE SODIUM PHOSPHATE 4 MG: 4 INJECTION, SOLUTION INTRAMUSCULAR; INTRAVENOUS at 08:11

## 2024-05-28 RX ADMIN — OXYCODONE AND ACETAMINOPHEN 1 TABLET: 7.5; 325 TABLET ORAL at 12:28

## 2024-05-28 RX ADMIN — SODIUM CHLORIDE, POTASSIUM CHLORIDE, SODIUM LACTATE AND CALCIUM CHLORIDE 9 ML/HR: 600; 310; 30; 20 INJECTION, SOLUTION INTRAVENOUS at 08:11

## 2024-05-28 RX ADMIN — ACETAMINOPHEN 1000 MG: 500 TABLET ORAL at 07:52

## 2024-05-28 RX ADMIN — FENTANYL CITRATE 50 MCG: 50 INJECTION, SOLUTION INTRAMUSCULAR; INTRAVENOUS at 13:33

## 2024-05-28 RX ADMIN — DEXMEDETOMIDINE 20 MCG: 100 INJECTION, SOLUTION INTRAVENOUS at 09:22

## 2024-05-28 RX ADMIN — BUPIVACAINE HYDROCHLORIDE 8 ML/HR: 5 INJECTION, SOLUTION EPIDURAL; INTRACAUDAL; PERINEURAL at 12:49

## 2024-05-28 RX ADMIN — PROPOFOL 200 MG: 10 INJECTION, EMULSION INTRAVENOUS at 09:41

## 2024-05-28 RX ADMIN — FENTANYL CITRATE 50 MCG: 50 INJECTION, SOLUTION INTRAMUSCULAR; INTRAVENOUS at 09:38

## 2024-05-28 RX ADMIN — MIDAZOLAM HYDROCHLORIDE 1 MG: 1 INJECTION, SOLUTION INTRAMUSCULAR; INTRAVENOUS at 08:52

## 2024-05-28 RX ADMIN — BUPIVACAINE HYDROCHLORIDE 12 ML: 2.5 INJECTION, SOLUTION EPIDURAL; INFILTRATION; INTRACAUDAL; PERINEURAL at 09:22

## 2024-05-28 RX ADMIN — BUPIVACAINE HYDROCHLORIDE 20 ML: 2.5 INJECTION, SOLUTION EPIDURAL; INFILTRATION; INTRACAUDAL at 09:29

## 2024-05-28 RX ADMIN — FENTANYL CITRATE 50 MCG: 50 INJECTION, SOLUTION INTRAMUSCULAR; INTRAVENOUS at 13:02

## 2024-05-28 RX ADMIN — MIDAZOLAM HYDROCHLORIDE 1 MG: 1 INJECTION, SOLUTION INTRAMUSCULAR; INTRAVENOUS at 09:09

## 2024-05-28 RX ADMIN — KETAMINE HYDROCHLORIDE 30 MG: 10 INJECTION, SOLUTION INTRAMUSCULAR; INTRAVENOUS at 10:02

## 2024-05-28 RX ADMIN — HYDROMORPHONE HYDROCHLORIDE 1 MG: 1 INJECTION, SOLUTION INTRAMUSCULAR; INTRAVENOUS; SUBCUTANEOUS at 12:28

## 2024-05-28 NOTE — ANESTHESIA PROCEDURE NOTES
Airway  Urgency: elective    Date/Time: 5/28/2024 9:45 AM  Airway not difficult    General Information and Staff    Patient location during procedure: OR  Anesthesiologist: Hemalatha Stewart MD    Indications and Patient Condition  Indications for airway management: airway protection    Preoxygenated: yes  MILS maintained throughout  Mask difficulty assessment: 1 - vent by mask    Final Airway Details  Final airway type: endotracheal airway      Successful airway: ETT  Cuffed: yes   Successful intubation technique: direct laryngoscopy  Facilitating devices/methods: intubating stylet and anterior pressure/BURP  Endotracheal tube insertion site: oral  Blade: Garcia  Blade size: 3  ETT size (mm): 7.0  Cormack-Lehane Classification: grade IIa - partial view of glottis  Placement verified by: chest auscultation and capnometry   Cuff volume (mL): 6  Measured from: lips  ETT/EBT  to lips (cm): 22  Number of attempts at approach: 1  Assessment: lips, teeth, and gum same as pre-op and atraumatic intubation

## 2024-05-28 NOTE — ANESTHESIA PREPROCEDURE EVALUATION
Anesthesia Evaluation     Patient summary reviewed and Nursing notes reviewed   no history of anesthetic complications:   NPO Solid Status: > 8 hours  NPO Liquid Status: > 2 hours           Airway   Mallampati: II  TM distance: >3 FB  Neck ROM: full  No difficulty expected  Dental - normal exam     Pulmonary     breath sounds clear to auscultation  (+) a smoker Current, Abstained day of surgery, vape,  (-) sleep apnea  Cardiovascular - normal exam  Exercise tolerance: poor (<4 METS)    Patient on routine beta blocker and Beta blocker given within 24 hours of surgery  Rhythm: regular  Rate: normal    (+) hypertension (meds this am) well controlled 2 medications or greater      Neuro/Psych  (+) headaches (migraines), dizziness/light headedness, psychiatric history Depression and Anxiety  GI/Hepatic/Renal/Endo    (+) obesity, GERD (history of Barrettts,) well controlled, thyroid problem (hashimotos) hypothyroidismRenal disease: interstitial cystitis.    ROS Comment: IBS  Gastroparesis with gastric stimulator    Musculoskeletal     (+) back pain, chronic pain  Abdominal    Substance History Alcohol use: history of alcoholism, occ relapses.     OB/GYN          Other   autoimmune disease (connective tissue disorder) lupus and Sjogren syndrome,   Arthritis: shoulders, wrists, knees and ankles, hip Left.                Anesthesia Plan    ASA 3     general with block     (Procedures and risks associated with GA and nerve blocks discussed with patient (including inability to guarantee total pain control post op).  Plan GETA due to gastroparesis.)  intravenous induction     Anesthetic plan, risks, benefits, and alternatives have been provided, discussed and informed consent has been obtained with: patient and spouse/significant other.    Use of blood products discussed with patient and spouse/significant other  Consented to blood products.      CODE STATUS:

## 2024-05-28 NOTE — OP NOTE
Date of Surgery: 5/28/2024    PREOPERATIVE DIAGNOSES:  1. Left knee anterior cruciate ligament tear.    2. Left knee medial meniscal tear.     POSTOPERATIVE DIAGNOSES:  1. Left knee anterior cruciate ligament tear.    2. Left knee medial meniscal tear.     PROCEDURES PERFORMED:    1. Left knee anterior cruciate ligament reconstruction with soft tissue allograft   2. Left knee medial meniscus repair.     SURGEON: Joo Rivers MD     ASSISTANT: Assistant: Delon Partida CSA; Phylicia Jenkins CSA was responsible for performing the following activities: Retraction, Irrigation, Closing, and Placing Dressing and their skilled assistance was necessary for the success of this case.      ANESTHESIA: General endotracheal anesthesia with a regional block.     ESTIMATED BLOOD LOSS: <500ml    URINE OUTPUT: Not recorded.     FLUIDS: Per Anesthesia.     COMPLICATION: None.     SPECIMEN: None.     DRAIN: None.     Tourniquet Times:   Less than 2 hours at 250 mmHg    IMPLANT: Semitendinosis and anterior tibialis allograft, 10.5 mm in size on tibial and femoral sides, Smith and Nephew adjustable loop Endobutton, Smith [employer] Nephew 10 x 25 mm Biosure interference screw, Mitek 34 mm screw with 20 mm washer for secondary tibial fixation.  Smith [employer] Nephew FasT-Fix all inside meniscal repair device x 4    EXAMINATION UNDER ANESTHESIA: Passive range of motion of left knee is  0° to  130°, minimal effusion, stable to varus and valgus stress 0° and 30°. Positive pivot shift, grade 2B Lachman, 2+ anterior drawer. No endpoint, negative posterior drawer, negative posterolateral drawer, negative dial test. Midline patellar tracking and 2+ dorsalis pedis pulse left foot.     ARTHROSCOPIC FINDINGS:    1. Suprapatellar pouch: Free of loose bodies.    2. Medial and lateral gutters: Free of loose bodies.    3. Patellofemoral joint: No evidence of articular cartilage wear.    4. Medial compartment: Peripheral vertical tear posterior horn medial  meniscus, cartilage intact.    5. Lateral compartment: No evidence of meniscal tear, cartilage intact.    6. Notch: ACL complete rupture, PCL intact.     INDICATIONS: The patient is a pleasant 43 y.o. female with significant history of buckling episode to the left knee with associated pain and swelling. MRI indicated a complete rupture of anterior cruciate ligament. Given buckling episodes as well as associated pain and the patient's desire to continue to participate in cutting and pivoting activities, patient wished to proceed with above-mentioned surgery.     INFORMED CONSENT: Patient was explained details of the procedure, as well as risks, benefits, and alternatives as documented on the History and Physical, and had all questions answered prior to signing the operative consent form. No guarantees were given in regard to results of the surgery.     DESCRIPTION OF PROCEDURE: The patient was seen, evaluated, and cleared for surgery by Anesthesia. Met in the preoperative holding area and the operative site marked, consent was reviewed, History and Physical was completed, and preoperative labs were reviewed. A regional block was then placed per Anesthesia. The patient was then taken to the operating room and placed in the supine position on a regular OR table. After successful intubation per Anesthesia examination under anesthesia was carried out at this point in time. A nonsterile tourniquet was applied to the left lower extremity. The left leg was placed in a leg mcnamara and the contralateral leg was placed in a stirrup. The patient was secured to the table with a waist strap. All bony prominences were well-padded. The left leg was then sterilely prepped and draped in a standard fashion.     Formal timeout was completed, including confirmation of History and Physical, operative consent, surgical site, patient identification number, and preoperative antibiotic administration. The  left leg was then exsanguinated and  the tourniquet was inflated to 250 mmHg.      Attention was then turned to the knee were a standard anterolateral portal was made with a #11 blade followed by insertion of blunt trocar and a cannula. The scope was then inserted at this point in time. Standard anteromedial and far medial portal were then made with a spinal needle using outside-in technique. A probe was then inserted and diagnostic arthroscopic exam was carried out at this point in time with findings noted above.     Given confirmation of anterior cruciate ligament rupture, an  anterior tibialis allograft was opened on the back table and appropriately thawed in standard fashion.  It was whipstitched on both proximal and distal ends incised at this point time and noted to be 7.5 mm with loop diameter.  Thus a second graft, semitendinosis allograft, was opened on the back table, appropriately thawed in standard fashion, and whipstitched on both proximal and distal ends.  Both grafts were then used to create a 4 strand allograft which was looped over an Endobutton, whipstitched proximally, and then sized as 10.5 mm in diameter.  It was placed on tension 15 pounds on the back table with the graft board and covered with moist saline gauze.    Attention was then turned to medial meniscal repair given the peripheral vertical tear of the posterior horn. Tear site was identified in the posterior horn, ball tip rasp was used to debride the tear site. Fast fix meniscal repair device x 4 were passed in vertical mattress fashion, cinched down with pusher, and cut short. Following repair, probe was re-inserted and meniscal tear noted to be well reduced and secure at this point.      Attention was then turned to anterior cruciate ligament reconstruction. Debridement of the fat pad as well as a small notchplasty and debridement of the remainder of the ACL stump was completed at this point in time with the shaver as well as ablation wand. The femoral attachment site  on the lateral wall was identified at this time. The outside-in Thacker [employer] Nephew pinpoint guide was then placed. Graft had been sized as 10.5 mm and the 10 mm guide was used at this time. A 3 cm incision was made over the lateral femoral condyle proximal to the epicondyle at his time through skin only. Trocar guide was advanced to the lateral cortex. Guide pin was fired into the knee joint and noted to be in acceptable position.  4.5 mm reamer was then passed over the guidepin with a curette protecting the tip of the guidepin. A 10.5 mm TruNav reamer was then passed in line over the guide pin at this time, bony debris was removed with irrigation and suction and the TruNav reamer was flipped into its extended position and locked into place. Length of the tunnel was then measured with the TruNav device.  Retrograde reaming was then completed, leaving a 10 mm lateral cortical wall. TruNav reamer was once again advanced into the notch, flipped into its in line position, and central pin was removed. A shaver was then used to chamfer smooth the edges of the tunnel as well as remove bony debris.  Shuttling suture was then passed through the reamer and retrieved through the lateral portal.  Reamer was then removed from the femoral tunnel at this time.     Attention was then turned to the tibial tunnel with the tibial guide set at 55°. Tibial guide was inserted through the far medial portal. The tip of the guide was placed to the anterior horn of the lateral meniscus and just lateral to the medial tibial spine.  Skin incision was then made approximately 5 cm in length of the anteromedial face of the tibia centered over the proposed entry site for the tibial tunnel.  Soft tissue dissection was carried out bluntly as well as with Metzenbaum scissor with soft tissue elevator used to expose the anteromedial cortex of the tibia at this time. Guide pin was fired into the joint at this point in time through the prior hamstring  incision. A 6 mm reamer was used followed by 10.5 mm reamer in standard fashion creating the tibial tunnel. Edges of the tunnel were chamfered smooth with a shaver.      The passing suture was then retrieved through the distal end of the tibial tunnel at this time. Graft was then passed through mineral oil and brought to the operative site. Lead sutures from the adjustable loop endobutton were passed with a shuttling suture brought up through the lateral soft tissues.  Adjustable loop Endobutton was then pulled into the joint and out through the femoral tunnel until it was noted to be outside the lateral cortex and was successfully flipped at this time while staying below the IT band.  Using the adjustable loop of the ultra button, graft was then pulled into the notch and subsequently into the femoral tunnel until it was pulled up to the marked length of the femoral tunnel. Traction was pulled aggressively across the tibial side of the graft and there was noted to be appropriate fixation on the femoral side of the graft. The knee was then cycled from 0° to 130° 30 times to reduce creep in the graft.      Arthroscopic images were then taken with the knee in full extension with no evidence of anterior, medial, or lateral impingement noted.       Attention was then turned to tibial-sided fixation of the ACL graft with the knee brought to 30° of flexion with traction pulled across the tibial side of the graft as well as a gentle posterior drawer. A flexible guidepin was placed followed by insertion of 10 mm Biosure interference screw into the tibial tunnel while traction was maintained across the graft distally and gentle posterior drawer was placed on the proximal tibia. Arthroscope was re-inserted, graft examined and noted to have good tension with a probe as well as on Lachman exam with direct visualization.  Secondary tibial fixation was then obtained with Mitek screw being placed in bicortical fashion with  extraneous graft and sutures tensioned around the screw and washer which served as a post for secondary tibial fixation.  Once graft and sutures were secured in place, extraneous graft and sutures were sharply excised with a knife.     The knee was then thoroughly irrigated with normal saline through the scope and with an open cannula.  Additional traction was then pulled across the adjustable loop from the ultra button and final tensioning was achieved of the graft.  Fluid was then evacuated from the knee with suction. Open incisions were thoroughly irrigated at this time with Betadine lavage as well as normal saline. Attention was then turned to closure of the wounds with subcutaneous layer closed with 2-0 Vicryl, and skin closure with 3-0 nylon and portal sites and 3-0 strata fix as well as Prineo mesh and glue at medial tibial incision. The wounds were dressed with Telfa, 4 x 4's, ABD pad, Webril, ACE wrap, ice pack, and a knee brace locked in extension.      At the end of the procedure all lap, needle, and sponge counts were correct x2. The patient had brisk capillary refill to all digits of the left lower extremity. Compartments were soft and easily compressible at the end of the procedure.      DISPOSITION: The patient was extubated per Anesthesia and taken to the recovery room in stable condition. Patient will be discharged home in the care of family and follow up in 1 week for wound check. Will start physical therapy on postoperative day 3 or 4, touchdown weight-bear x 3 weeks followed by weightbearing as tolerated, and brace locked in extension when upright.  Aspirin 325 mg twice daily for DVT prophylaxis for 2 weeks.  I discussed results of the procedure as well as postoperative precautions with the family after the surgery and they had all questions answered at that time.      REHAB: ACL protocol with meniscal repair, touchdown weightbearing x 3 weeks then weight-bear as tolerated.  No restriction on  range of motion

## 2024-05-28 NOTE — ANESTHESIA PROCEDURE NOTES
Peripheral Block    Pre-sedation assessment completed: 5/28/2024 8:51 AM    Patient reassessed immediately prior to procedure    Patient location during procedure: pre-op  Start time: 5/28/2024 9:13 AM  Stop time: 5/28/2024 9:22 AM  Reason for block: at surgeon's request and post-op pain management  Performed by  Anesthesiologist: Hemalatha Stewart MD  Preanesthetic Checklist  Completed: patient identified, IV checked, site marked, risks and benefits discussed, surgical consent, monitors and equipment checked, pre-op evaluation and timeout performed  Prep:  Pt Position: supine (frog leg)  Sterile barriers:full drape, cap, gloves, mask and washed/disinfected hands  Prep: ChloraPrep  Patient monitoring: blood pressure monitoring, continuous pulse oximetry and EKG  Procedure    Sedation: yes  Performed under: local infiltration  Guidance:ultrasound guided    ULTRASOUND INTERPRETATION.  Using ultrasound guidance a 21 G gauge needle was placed in close proximity to the femoral nerve, at which point, under ultrasound guidance anesthetic was injected in the area of the nerve and spread of the anesthesia was seen on ultrasound in close proximity thereto.  There were no abnormalities seen on ultrasound; a digital image was taken; and the patient tolerated the procedure with no complications. Images:still images obtained, printed/placed on chart    Block Type:adductor canal block  Injection Technique:catheter  Needle Type:echogenic  Needle Gauge:20 G    Catheter Size:20 G    Medications Used: bupivacaine PF (MARCAINE) injection 0.25% - Injection   12 mL - 5/28/2024 9:22:00 AM      Medications  Comment:20 mcg dexmedetomidine    Post Assessment  Injection Assessment: negative aspiration for heme, no paresthesia on injection and incremental injection  Patient Tolerance:comfortable throughout block  Complications:no

## 2024-05-28 NOTE — H&P
Orthopedic H&P    Subjective:     Patient ID: Aide Henry is a 43 y.o. female.    Chief Complaint:  Left knee pain, ACL tear, medial meniscus tear    History of Present Illness  Aide Henry presents for surgical treatment of left knee pain and instability, ACL tear, and medial meniscus tear    She recounts an incident approximately 3-4 months ago where she twisted her knee, which subsequently buckled and gave way. She experienced a significant popping sensation, accompanied by swelling and stiffness. Subsequently, she has experienced episodes of instability in her knee, particularly on uneven surfaces, leading to frequent instability. She characterizes moderate pain in the medial joint line of her knee, but denies any gilda locking or catching. The pain is described as intermittent sharp and moderate aching, with mild radiation on the medial aspect of the leg, but not below the mid tibia. She denies any new numbness, tingling, hip, or groin pain. Despite minimal improvement with a home exercise program and intermittent use of a brace, she has noticed an improvement in swelling with intermittent anti-inflammatory medications and ice application. She has been diligently working on regaining her motion independently. She denies any prior injury or issues with her left knee.    No current facility-administered medications on file prior to encounter.     Current Outpatient Medications on File Prior to Encounter   Medication Sig Dispense Refill    amLODIPine (NORVASC) 5 MG tablet Take 1 tablet by mouth Daily.      Bacillus Coagulans-Inulin (Probiotic) 1-250 BILLION-MG capsule Take 1 capsule by mouth Daily.      DULoxetine (CYMBALTA) 60 MG capsule Take 1 capsule by mouth Daily.      gabapentin (NEURONTIN) 300 MG capsule Take 1 capsule by mouth 2 (Two) Times a Day. PER PATIENT NOT CURRENTLY TAKEN      Glycerin-Hypromellose- (ARTIFICIAL TEARS) 0.2-0.2-1 % solution ophthalmic solution Eyes Both, Q2H, 0  Refill(s)      hydroxychloroquine (PLAQUENIL) 200 MG tablet Take 1 tablet by mouth Every Night.      ibuprofen (ADVIL,MOTRIN) 200 MG tablet Take 4 tablets by mouth Every 6 (Six) Hours As Needed for Moderate Pain.      levothyroxine (SYNTHROID, LEVOTHROID) 50 MCG tablet Take 1 tablet by mouth Daily.      metoprolol succinate XL (TOPROL-XL) 25 MG 24 hr tablet 1 tablet Daily.      omeprazole (priLOSEC) 40 MG capsule Take 1 capsule by mouth 2 (Two) Times a Day.      Probiotic Product (Risaquad-2) capsule capsule Take 1 capsule by mouth Daily.      traZODone (DESYREL) 150 MG tablet Take 1 tablet by mouth Every Night.      vitamin D (ERGOCALCIFEROL) 1.25 MG (90878 UT) capsule capsule Take 1 capsule by mouth 1 (One) Time Per Week.      cevimeline (EVOXAC) 30 MG capsule Take 1 capsule by mouth 3 (Three) Times a Day. Normally takes 2 times a day      Chlorcyclizine-Pseudoephed 25-60 MG tablet 1 tablet by mouth every 6-8 hours, not to exceed 3 tablets in 24 hours (Patient not taking: Reported on 5/15/2024) 30 tablet 0    linaclotide (LINZESS) 72 MCG capsule capsule 1 capsule. (Patient not taking: Reported on 5/15/2024)      lubiprostone (AMITIZA) 24 MCG capsule Take 1 capsule by mouth. (Patient not taking: Reported on 5/15/2024)      Metoprolol Tartrate (LOPRESSOR PO) 10 mg.      Rimegepant Sulfate (NURTEC) 75 MG tablet dispersible tablet Take 1 tablet by mouth Daily As Needed.      Rimegepant Sulfate (NURTEC) 75 MG tablet dispersible tablet 1 tablet.      Salicylic Acid 6 % foam WASH ARMS AND ABDOMEN ONCE DAILY       Allergies   Allergen Reactions    Fluconazole Hives    Metoclopramide GI Intolerance     Other reaction(s): Other (see comments)    Constipation          Social History     Occupational History    Not on file   Tobacco Use    Smoking status: Every Day     Current packs/day: 0.25     Average packs/day: 0.3 packs/day for 20.0 years (5.0 ttl pk-yrs)     Types: Cigarettes     Passive exposure: Never    Smokeless  tobacco: Never    Tobacco comments:     Smokes every now and then    Vaping Use    Vaping status: Some Days    Substances: Nicotine    Devices: Disposable   Substance and Sexual Activity    Alcohol use: Not Currently     Comment: sometimes, sober since 2024    Drug use: Not Currently     Frequency: 1.0 times per week     Types: Cocaine(coke), Marijuana     Comment: rarely    Sexual activity: Defer      Past Medical History:   Diagnosis Date    Anal fissure     Anxiety     Bacterial vaginosis     [place]hton esophagus     Bulging lumbar disc     lower back per patient    Depression with anxiety     Fibromyalgia     Fracture, finger     right hand, 4th finger    Gastroenteritis 2017    Gastroesophageal reflux disease without esophagitis 02/10/2016    Gastroparesis     GERD (gastroesophageal reflux disease)     Hemorrhoids     had a fissure    History of anemia     Hypertension     Hypothyroid     hashimoto    IBS (irritable bowel syndrome)     IC (interstitial cystitis)     Lateral epicondylitis 2013    RHUEMATOLOGIST    Lupus     Migraine     MRSA (methicillin resistant staph aureus) culture positive  ESTIMATE    GROIN AREA     Overactive bladder     Sjogren's syndrome     Urinary tract infection      Past Surgical History:   Procedure Laterality Date    ADENOIDECTOMY      CARPAL TUNNEL RELEASE Bilateral      SECTION      COLONOSCOPY      D & C HYSTEROSCOPY N/A 2021    Procedure: DILATATION AND CURETTAGE HYSTEROSCOPY;  Surgeon: Rancho Mayberry MD;  Location: [facility] OR;  Service: Obstetrics/Gynecology;  Laterality: N/A;    D & C HYSTEROSCOPY ENDOMETRIAL ABLATION N/A 2021    Procedure: DILATATION AND CURETTAGE HYSTEROSCOPY,  NOVASURE ENDOMETRIAL ABLATION;  Surgeon: Rancho Mayberry MD;  Location: [facility] OR;  Service: Obstetrics/Gynecology;  Laterality: N/A;    ENDOSCOPY N/A 05/10/2019    Procedure: ESOPHAGOGASTRODUODENOSCOPY with biopsies;  Surgeon: George  MD Shanon;  Location: [facility] OR;  Service: Gastroenterology    ENDOSCOPY N/A 04/14/2021    Procedure: ESOPHAGOGASTRODUODENOSCOPY with biopsies;  Surgeon: Romain Rice MD;  Location: [facility] OR;  Service: Gastroenterology;  Laterality: N/A;  barretts  gastritis    FINGER GANGLION CYST EXCISION Right     middle finger    MOUTH SURGERY      3 teeth pulled due to sjorgens syndrome    OTHER SURGICAL HISTORY      reversal of tubal    RHINOPLASTY      SHOULDER ARTHROSCOPY Left 09/10/2018    Procedure: SHOULDER ARTHROSCOPY, rotator cuff repair; extensive debridement, open biceps tenodesis;  Surgeon: Joo Rivers MD;  Location: [facility] OR;  Service: Orthopedics    SHOULDER ARTHROSCOPY W/ ROTATOR CUFF REPAIR Left 05/27/2022    Procedure: SHOULDER ARTHROSCOPY WITH ROTATOR CUFF REPAIR COMPLEX, EXTENSIVE DEBRIDEMENT;  Surgeon: Joo Rivers MD;  Location: [facility] OR;  Service: Orthopedics;  Laterality: Left;    SHOULDER ARTHROSCOPY W/ ROTATOR CUFF REPAIR Left 06/23/2023    Procedure: SHOULDER ARTHROSCOPY WITH ROTATOR CUFF REPAIR, distal clavicle excision;  Surgeon: Joo Rivers MD;  Location: [facility] OR;  Service: Orthopedics;  Laterality: Left;    TONSILLECTOMY      TUBAL ABDOMINAL LIGATION      TYMPANOSTOMY TUBE PLACEMENT      WISDOM TOOTH EXTRACTION Bilateral        Family History   Problem Relation Age of Onset    Lung cancer Father     Heart disease Paternal Grandmother     Diabetes Maternal Aunt     Breast cancer Maternal Aunt     Diabetes Maternal Grandmother     COPD Mother     Colon cancer Neg Hx     Colon polyps Neg Hx     Malig Hyperthermia Neg Hx          Review of Systems        Objective:  Vitals:    05/28/24 0736 05/28/24 0747   BP:  113/73   BP Location:  Right arm   Patient Position:  Sitting   Pulse:  90   Resp:  14   Temp: 97.9 °F (36.6 °C)    TempSrc: Oral    SpO2:  100%   Weight: 88.9 kg (196 lb)          05/28/24  0736   Weight: 88.9 kg (196 lb)     Body mass index is 33.64  kg/m².  Physical Exam    Vital signs reviewed.   General: No acute distress, alert and oriented  Eyes: conjunctiva clear; pupils equally round and reactive  ENT: external ears and nose atraumatic; oropharynx clear  CV: no peripheral edema  Resp: normal respiratory effort  Skin: no rashes or wounds; normal turgor  Psych: mood and affect appropriate; recent and remote memory intact  Debilities: None      Ortho Exam       Left knee exam:  Active range of motion from 0 to 120 degrees, with 4+/5 strength on flexion and extension.   There is a grade 2B Lachman, 2+ anterior drawer with a soft endpoint, and a negative posterior drawer.   Dial test is negative.   There is maximal tenderness in the medial joint line, with positive Craig's and with pain and a mild click.   The active patellar compression test is moderately positive.   The knee is stable to varus and valgus stress at 0 and 30 degrees.   There is no hip pain on log roll examination.   Negative straight leg raise is noted in the left lower extremity.   Brisk cap refill is observed in all digits.   The dorsalis pedis pulse is 2+.   Sensation to light touch is intact in all distributions of the left foot, symmetric to the right.    Imaging:  MRI Knee Left Without Contrast    Result Date: 3/26/2024  Impression: 1.Complete disruption of the anterior cruciate ligament involving the proximal attachment at the inner margin of the lateral femoral condyle. 2.Associated pivot shift type osseous contusion pattern is observed. There is osteochondral impaction injury along the medial anterior margin of the weightbearing surface of the medial femoral condyle with associated osteochondral injury. There is mild depression measuring 3 mm. No displaced osteochondral fragment is seen. 3.A moderate joint effusion is noted. 4.Evidence for partial tear of the medial clear ligament indicating grade 1-2 partial injury. 5.Evidence for underlying partial meniscocapsular separation  along the peripheral margin of the body segment of the medial meniscus. There is also a complex medial meniscal tear involving the body segment and posterior horn. 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 This anatomic variant may predispose to patellofemoral instability. There is lateral tilt of the patella. There are no signs of patellofemoral dislocation at this time. 7.Mild tricompartmental osteoarthritis most pronounced in the patellofemoral compartment. No significant full-thickness articular cartilage loss is seen. Electronically Signed: Jose Terrell MD  3/26/2024 1:47 PM EDT  Workstation ID: [id number]    Review of outside MRI left knee including review of imaging as well as radiology report indicates ACL tear with pivot shift bone bruise phenomenon and osteochondral impaction injury of the medial femoral condyle, grade 1 injury to the MCL with complex medial meniscal tear.  Mild tricompartmental chondromalacia.    Assessment:        1. Tears of meniscus and anterior cruciate ligament of left knee, initial encounter    2. Rupture of anterior cruciate ligament of left knee, initial encounter           Plan:      1. Discussed treatment options with the patient. At this point in time, given her significant instability as well as medial meniscal tear and ACL tear are noted on her MRI with failure of conservative treatment including home exercises and stabilization with a brace, we discussed options, she would like to proceed with surgical treatment at this time given her desire to continue participating in cutting and pivoting activities.     2. Plan will be for left knee ACL reconstruction with allograft, meniscal cartilage surgery as indicated. I reviewed details of procedure including pertinent anatomy with the patient as well as risks benefits and alternatives, with the risks including not limited to neurovascular damage, bleeding, infection, re-tear of graft, failure of healing of graft, loss  of motion, weakness, stiffness, instability, chondrolysis, DVT, pulmonary embolus, death, stroke, complex regional pain syndrome, myocardial infarction, need for additional procedures. Patient understood all these had all questions answered before consenting to proceed with surgery.  No guarantees were given regarding results of procedure.    3. Patient denies history of DVT or pulmonary embolus, denies cardiac history. She is not diabetic. All questions answered.      Aide Henry was in agreement with plan and had all questions answered.

## 2024-05-28 NOTE — ANESTHESIA PROCEDURE NOTES
Peripheral Block    Pre-sedation assessment completed: 5/28/2024 8:51 AM    Patient reassessed immediately prior to procedure    Patient location during procedure: pre-op  Start time: 5/28/2024 9:27 AM  Stop time: 5/28/2024 9:29 AM  Reason for block: at surgeon's request and post-op pain management  Performed by  Anesthesiologist: Hemalatha Stewart MD  Preanesthetic Checklist  Completed: patient identified, IV checked, site marked, risks and benefits discussed, surgical consent, monitors and equipment checked, pre-op evaluation and timeout performed  Prep:  Pt Position: supine  Sterile barriers:cap, gloves, mask, partial drape and washed/disinfected hands  Prep: ChloraPrep  Patient monitoring: blood pressure monitoring, continuous pulse oximetry and EKG  Procedure    Sedation: yes  Performed under: local infiltration  Guidance:ultrasound guided    ULTRASOUND INTERPRETATION.  Using ultrasound guidance a 21 G gauge needle was placed in close proximity to the sciatic nerve, at which point, under ultrasound guidance anesthetic was injected in the area of the nerve and spread of the anesthesia was seen on ultrasound in close proximity thereto.  There were no abnormalities seen on ultrasound; a digital image was taken; and the patient tolerated the procedure with no complications. Images:still images obtained, printed/placed on chart    Block Type:iPack  Injection Technique:single-shot  Needle Type:echogenic  Needle Gauge:21 G      Medications Used: bupivacaine PF (MARCAINE) injection 0.25% - Injection   20 mL - 5/28/2024 9:29:00 AM      Post Assessment  Injection Assessment: negative aspiration for heme, no paresthesia on injection and incremental injection  Patient Tolerance:comfortable throughout block  Complications:no

## 2024-05-28 NOTE — ANESTHESIA POSTPROCEDURE EVALUATION
Patient: Aide Henry    Procedure Summary       Date: 05/28/24 Room / Location:  [facility] /  [facility]    Anesthesia Start: 0933 Anesthesia Stop: 1207    Procedure: KNEE ANTERIOR CRUCIATE LIGAMENT RECONSTRUCTION WITH ALLOGRAFT, medial meniscal repair (Left: Knee) Diagnosis:       Tears of meniscus and anterior cruciate ligament of left knee, initial encounter      Rupture of anterior cruciate ligament of left knee, initial encounter      (Tears of meniscus and anterior cruciate ligament of left knee, initial encounter [S83.207A, S83.512A])      (Rupture of anterior cruciate ligament of left knee, initial encounter [S83.512A])    Surgeons: Joo Rivers MD Provider: Hemalatha Stewart MD    Anesthesia Type: general with block ASA Status: 3            Anesthesia Type: general with block    Vitals  Vitals Value Taken Time   BP 99/69 05/28/24 1345   Temp 97.6 °F (36.4 °C) 05/28/24 1210   Pulse 96 05/28/24 1347   Resp 8 05/28/24 1345   SpO2 98 % 05/28/24 1347   Vitals shown include unfiled device data.        Post Anesthesia Care and Evaluation    Patient location during evaluation: PHASE II  Patient participation: complete - patient participated  Level of consciousness: awake  Pain management: adequate    Airway patency: patent  Anesthetic complications: No anesthetic complications  PONV Status: none  Cardiovascular status: acceptable  Respiratory status: acceptable  Hydration status: acceptable

## 2024-05-28 NOTE — CASE MANAGEMENT/SOCIAL WORK
Noted DWO for RW for outpatient procedure. Verified with Ilana/[facility], patient needs RW. RW delivered to bedside and [employer] paperwork completed. Clinicals faxed to [employer].

## 2024-05-30 ENCOUNTER — TELEPHONE (OUTPATIENT)
Dept: ORTHOPEDIC SURGERY | Facility: CLINIC | Age: 44
End: 2024-05-30
Payer: COMMERCIAL

## 2024-05-30 NOTE — TELEPHONE ENCOUNTER
Patient is s/p left ACL reconstruction on 5/28/24.  States that the oxycodone/acetaminophen 5/325 one every four hours is not helping her pain at all.  She is applying ice and elevating.  When asked about ibuprofen she says she taking 800 mg morning and at night.  I did advise she could take more frequently up to 3200 mg in 24 hours.  Denies any pain or tenderness in her calf.  Patient is taking the Lyrica 75 mg twice a day as well.

## 2024-05-30 NOTE — TELEPHONE ENCOUNTER
Patient is aware and I did inform her that insurance may not want to pay for an early refill if she has to up her dose to two at a time.  Just so she is aware.

## 2024-06-03 ENCOUNTER — HOSPITAL ENCOUNTER (OUTPATIENT)
Dept: PHYSICAL THERAPY | Facility: HOSPITAL | Age: 44
Setting detail: THERAPIES SERIES
Discharge: HOME | End: 2024-06-03
Payer: COMMERCIAL

## 2024-06-03 DIAGNOSIS — Z98.890 S/P ACL SURGERY: Primary | ICD-10-CM

## 2024-06-03 PROCEDURE — 97161 PT EVAL LOW COMPLEX 20 MIN: CPT | Performed by: PHYSICAL THERAPIST

## 2024-06-03 NOTE — THERAPY EVALUATION
Outpatient Physical Therapy Ortho Initial Evaluation  TENISHA Wing     Patient Name: Aide Henry  : 1980  MRN: [id number]  Today's Date: 6/3/2024      Visit Date: 2024    Patient Active Problem List   Diagnosis    Arthralgia of multiple joints    Chronic back pain    Chronic constipation    Disorder of connective tissue    Depression with anxiety    Fibromyalgia    Gastroesophageal reflux disease without esophagitis    Muscle pain    Palpitations    Seasonal allergic rhinitis    Eczema    Shoulder pain, left    Substance abuse    Lateral epicondylitis    Drug addiction syndrome    Gastroenteritis    Alcohol dependence in remission    History of placement of ear tubes    Status post arthroscopy of shoulder    Subacromial impingement of left shoulder    Biceps tendinitis of left upper extremity    Complete tear of left rotator cuff    Smoker    Sjogren's syndrome with keratoconjunctivitis sicca    Pelvic pain    Nondisplaced fracture of coronoid process of left ulna, initial encounter for closed fracture    Left breast lump    SARMAD (stress urinary incontinence, female)    Rectocele    Irritable bowel syndrome with both constipation and diarrhea    Ashton's esophagus without dysplasia    IC (interstitial cystitis)    Overactive bladder    Gastroparesis    Chondromalacia of patellofemoral joint, left    Lupus    Dysmenorrhea    History of bilateral tubal ligation    S/P endometrial ablation: 21    Blood blister: R labium    Carrier of ureaplasma urealyticum    Mechanical knee pain, left    AC joint arthropathy    Subacromial bursitis of right shoulder joint    Rotator cuff tendinitis, right    Vapes nicotine containing substance    Tears of meniscus and ACL of left knee    Left anterior cruciate ligament tear        Past Medical History:   Diagnosis Date    Anal fissure     Anxiety     Bacterial vaginosis     Ashton esophagus     Bulging lumbar disc     lower back per patient    Depression with  anxiety     Fibromyalgia     Fracture, finger     right hand, 4th finger    Gastroenteritis 2017    Gastroesophageal reflux disease without esophagitis 02/10/2016    Gastroparesis     GERD (gastroesophageal reflux disease)     Hemorrhoids     had a fissure    History of anemia     Hypertension     Hypothyroid     hashimoto    IBS (irritable bowel syndrome)     IC (interstitial cystitis)     Lateral epicondylitis 2013    RHUEMATOLOGIST    Lupus     Migraine     MRSA (methicillin resistant staph aureus) culture positive 2011 ESTIMATE    GROIN AREA     Overactive bladder     Sjogren's syndrome     Urinary tract infection         Past Surgical History:   Procedure Laterality Date    ADENOIDECTOMY      CARPAL TUNNEL RELEASE Bilateral      SECTION      COLONOSCOPY      D & C HYSTEROSCOPY N/A 2021    Procedure: DILATATION AND CURETTAGE HYSTEROSCOPY;  Surgeon: Rancho Mayberry MD;  Location: [facility] OR;  Service: Obstetrics/Gynecology;  Laterality: N/A;    D & C HYSTEROSCOPY ENDOMETRIAL ABLATION N/A 2021    Procedure: DILATATION AND CURETTAGE HYSTEROSCOPY,  NOVASURE ENDOMETRIAL ABLATION;  Surgeon: Rancho Mayberry MD;  Location: [facility] OR;  Service: Obstetrics/Gynecology;  Laterality: N/A;    ENDOSCOPY N/A 05/10/2019    Procedure: ESOPHAGOGASTRODUODENOSCOPY with biopsies;  Surgeon: Shanon Vazquez MD;  Location: [facility] OR;  Service: Gastroenterology    ENDOSCOPY N/A 2021    Procedure: ESOPHAGOGASTRODUODENOSCOPY with biopsies;  Surgeon: Romain Rice MD;  Location: [facility] OR;  Service: Gastroenterology;  Laterality: N/A;  barretts  gastritis    FINGER GANGLION CYST EXCISION Right     middle finger    GASTRIC STIMULATOR IMPLANT SURGERY      KNEE ACL RECONSTRUCTION Left 2024    Procedure: KNEE ANTERIOR CRUCIATE LIGAMENT RECONSTRUCTION WITH ALLOGRAFT, medial meniscal repair;  Surgeon: Joo Rivers MD;  Location: [facility] OR;  Service:  Orthopedics;  Laterality: Left;    MOUTH SURGERY      3 teeth pulled due to sjorgens syndrome    OTHER SURGICAL HISTORY      reversal of tubal    RHINOPLASTY      SHOULDER ARTHROSCOPY Left 09/10/2018    Procedure: SHOULDER ARTHROSCOPY, rotator cuff repair; extensive debridement, open biceps tenodesis;  Surgeon: Joo Rivers MD;  Location:  [facility] OR;  Service: Orthopedics    SHOULDER ARTHROSCOPY W/ ROTATOR CUFF REPAIR Left 05/27/2022    Procedure: SHOULDER ARTHROSCOPY WITH ROTATOR CUFF REPAIR COMPLEX, EXTENSIVE DEBRIDEMENT;  Surgeon: Joo Rivers MD;  Location:  [facility] OR;  Service: Orthopedics;  Laterality: Left;    SHOULDER ARTHROSCOPY W/ ROTATOR CUFF REPAIR Left 06/23/2023    Procedure: SHOULDER ARTHROSCOPY WITH ROTATOR CUFF REPAIR, distal clavicle excision;  Surgeon: Joo Rivers MD;  Location:  [facility] OR;  Service: Orthopedics;  Laterality: Left;    TONSILLECTOMY      TUBAL ABDOMINAL LIGATION      TYMPANOSTOMY TUBE PLACEMENT      WISDOM TOOTH EXTRACTION Bilateral        Visit Dx:     ICD-10-CM ICD-9-CM   1. S/P ACL surgery  Z98.890 V45.89          Patient History       Row Name 06/03/24 0700 06/03/24 0650          History    Chief Complaint Balance Problems;Difficulty Walking;Difficulty with daily activities;Falls/history of falls;Fatigue/poor endurance;Headache;Impaired sensation;Joint stiffness;Joint swelling;Muscle tenderness;Muscle weakness;Numbness;Pain;Swelling;Tinglings  - Balance Problems;Difficulty Walking;Difficulty with daily activities;Falls/history of falls;Fatigue/poor endurance;Headache;Impaired sensation;Joint stiffness;Joint swelling;Muscle tenderness;Muscle weakness;Numbness;Pain;Swelling;Tinglings  -GC (r) patient (t)     Type of Pain Knee pain  -GC Knee pain  -GC (r) patient (t)     Date Current Problem(s) Began 05/28/24  -GC [birth year]  -GC (r) patient (t)     Brief Description of Current Complaint Pt reports she injured her left knee several months ago when she fell on  some stairs after her knee gave out. She was seen by Dr. Rivers who did an MRI and found an ACL and MM tears. She underwent ACL reconstruction and MM repair surgery on 5/28. She is now referred for therapy.  -GC Jamilah had acl and meniscus repair  -GC (r) patient (t)     Patient/Caregiver Goals Relieve pain;Return to prior level of function;Return to work;Improve mobility;Improve strength;Know what to do to help the symptoms;Decrease swelling;Heal wound  -GC Relieve pain;Return to prior level of function;Return to work;Improve mobility;Improve strength;Know what to do to help the symptoms;Decrease swelling;Heal wound  -GC (r) patient (t)     Hand Dominance right-handed  -GC right-handed  -GC (r) patient (t)     Occupation/sports/leisure activities None at this time. I quit work for this operation.  -GC None at this time. I quit work for this operation.  -GC (r) patient (t)     Patient seeing anyone else for problem(s)? Orthopedic  -GC Orthopedic  -GC (r) patient (t)     What clinical tests have you had for this problem? X-ray;MRI  -GC X-ray;MRI  -GC (r) patient (t)     Results of Clinical Tests ACL and MM tears  -GC --     Are you or can you be pregnant No  -GC No  -GC (r) patient (t)        Pain     Pain Location Knee  left  -GC --     Pain at Present 7  -GC --     Pain at Worst 10  -GC --     Pain Frequency Constant/continuous  -GC --     Pain Description Aching;Discomfort;Tender;Sore;Sharp  -GC --     What Performance Factors Make the Current Problem(s) WORSE? Pt c/o pain with walking, bending/straightening her knee  -GC --     Difficulties at work? Pt is currently not working  -GC --     Difficulties with ADL's? Pt has difficulty with walking as well as LE dressing  -GC --        Fall Risk Assessment    Any falls in the past year: Yes  -GC Yes  -GC (r) patient (t)     Factors that contributed to the fall: Lost balance;Other (comment)  -GC Lost balance;Other (comment)  -GC (r) patient (t)     Other factors that  contributed to the fall: Knee gave out  -GC Knee gave out  -GC (r) patient (t)        Services    Prior Rehab/Home Health Experiences No  -GC No  -GC (r) patient (t)     Are you currently receiving Home Health services No  -GC No  -GC (r) patient (t)     Do you plan to receive Home Health services in the near future No  -GC No  -GC (r) patient (t)        Daily Activities    Primary Language English  -GC English  -GC (r) patient (t)     Are you able to read Yes  -GC Yes  -GC (r) patient (t)     Are you able to write Yes  -GC Yes  -GC (r) patient (t)     How does patient learn best? Listening;Reading;Demonstration  -GC Listening;Reading;Demonstration  -GC (r) patient (t)     Teaching needs identified Home Exercise Program;Management of Condition  -GC --     Patient is concerned about/has problems with Climbing Stairs;Difficulty with self care (i.e. bathing, dressing, toileting:;Flexibility;Performing home management (household chores, shopping, care of dependents);Performing job responsibilities/community activities (work, school,;Standing;Transfers (getting out of a chair, bed);Walking  -GC --     Does patient have problems with the following? Depression;Anxiety  -GC --     Barriers to learning None  -GC --     Functional Status bathing;dressing;grooming;mobility issues preventing performance of daily activities  -GC --     Pt Participated in POC and Goals Yes  -GC --        Safety    Are you being hurt, hit, or frightened by anyone at home or in your life? No  -GC No  -GC (r) patient (t)     Are you being neglected by a caregiver No  -GC No  -GC (r) patient (t)     Have you had any of the following issues with Depression;Anxiety;Panic Attacks;Neglect/Abuse  -GC Depression;Anxiety;Panic Attacks;Neglect/Abuse  -GC (r) patient (t)               User Key  (r) = Recorded By, (t) = Taken By, (c) = Cosigned By      Initials Name Provider Type    Ruperto Russo, PT Physical Therapist    patient Aide Henry --                      PT Ortho       Row Name 06/03/24 0700       Posture/Observations    Posture/Observations Comments Pt initially seen with LROM brace locked in extension, but it had slid down the leg. She has moderate edema left knee. The surgical sites are healing nicely. She does have some quad atrophy noted.  -GC       Knee Palpation    Medial Joint Line Left:;Tender  -GC    Lateral Joint Line Left:;Tender  -GC       Patellar Accessory Motions    Superior glide Left:;WNL  -GC    Inferior glide Left:;WNL  -GC    Medial glide Left:;WNL  -GC    Lateral glide Left:;WNL  -GC       Knee Special Tests    Ericka’s sign (DVT) Left:;Negative  -GC       Left Lower Ext    Lt Knee Extension/Flexion AROM 0-14-73 degrees  -GC       MMT Left Lower Ext    Lt Hip Flexion MMT, Gross Movement (3/5) fair  -GC    Lt Hip Extension MMT, Gross Movement (3/5) fair  -GC    Lt Hip ABduction MMT, Gross Movement (3+/5) fair plus  -GC    Lt Hip ADduction MMT, Gross Movement (3/5) fair  -GC    Lt Knee Extension MMT, Gross Movement (2+/5) poor plus  graded with QS  -GC    Lt Knee Flexion MMT, Gross Movement (3/5) fair  -GC    Lt Ankle Plantarflexion MMT, Gross Movement (4+/5) good plus  -GC    Lt Ankle Dorsiflexion MMT, Gross Movement (4+/5) good plus  -GC       Sensation    Light Touch No apparent deficits  -GC       Transfers    Comment, (Transfers) Pt uses her UEs to assist her left LE when going sit to/from supine  -       Gait/Stairs (Locomotion)    Comment, (Gait/Stairs) Pt is TTWBing left LE using a front wheeled walker  -              User Key  (r) = Recorded By, (t) = Taken By, (c) = Cosigned By      Initials Name Provider Type    GC Ruperto Kimball PT Physical Therapist                                Therapy Education  Given: HEP, Symptoms/condition management, Pain management  Program: New  How Provided: Verbal, Demonstration, Written  Provided to: Patient  Level of Understanding: Teach back education performed, Verbalized, Demonstrated       PT OP Goals       Row Name 06/03/24 0700          PT Short Term Goals    STG Date to Achieve 07/01/24  -     STG 1 Decrease left knee pain to 3-4/10 with activity.  -GC     STG 2 Increase left knee ROM to 0-5-110 degrees with testing.  -GC     STG 3 Increase left LE strength to at least 4/5 all planes with testing.  -GC     STG 4 Pt will be independent with all bed mobility and transfers.  -GC     STG 5 Pt will be independent with her HEP issued by this therapist.  -GC        Long Term Goals    LTG Date to Achieve 07/29/24  -GC     LTG 1 Decrease left knee pain to 0-1/10 with activity.  -GC     LTG 2 Increase left knee ROM to 0-130 degrees with testing.  -GC     LTG 3 Increase left LE strength to 5/5 all planes with testing.  -GC     LTG 4 Pt will ambulate normally on levels and stairs without brace or assistive device.  -GC     LTG 5 Pt will be independent wtih all ADLs and have a LEFS score > 60.  -        Time Calculation    PT Goal Re-Cert Due Date 07/01/24  -               User Key  (r) = Recorded By, (t) = Taken By, (c) = Cosigned By      Initials Name Provider Type     Ruperto Kimball, PT Physical Therapist                     PT Assessment/Plan       Row Name 06/03/24 0700          PT Assessment    Functional Limitations Impaired gait;Limitation in home management;Limitations in community activities;Limitations in functional capacity and performance;Performance in leisure activities;Performance in self-care ADL;Performance in work activities  -     Impairments Range of motion;Pain;Muscle strength;Impaired flexibility;Gait;Edema  -     Assessment Comments Pt presents approximately 5-6 days s/p left knee ACL reconstruction with medial meniscus repair. She is TTWBing for the first 3 weeks. She has pain that she rates up to 10/10 with activity. She has decreased knee ROM, decreased LE strength, decreased ambulatory status, and decreased function secondary to the above.  -     Rehab Potential  Good  -GC     Patient/caregiver participated in establishment of treatment plan and goals Yes  -GC     Patient would benefit from skilled therapy intervention Yes  -GC        PT Plan    PT Frequency 1x/week;2x/week  -GC     Predicted Duration of Therapy Intervention (PT) 8 weeks  -GC     Planned CPT's? PT EVAL LOW COMPLEXITY: 76217;PT THER PROC EA 15 MIN: 46427;PT HOT OR COLD PACK TREAT MCARE;PT ELECTRICAL STIM UNATTEND:   -     PT Plan Comments Pt is to continue her HEP 2x daily  -               User Key  (r) = Recorded By, (t) = Taken By, (c) = Cosigned By      Initials Name Provider Type     Ruperto Kimball, PT Physical Therapist                       OP Exercises       Row Name 06/03/24 0700             Exercise 1    Exercise Name 1 Heel slides  -GC      Time 1 8 min  -GC         Exercise 2    Exercise Name 2 wall slides  -GC         Exercise 3    Exercise Name 3 bike  -GC         Exercise 4    Exercise Name 4 Hamstring/gastroc stretch  -GC      Reps 4 15  -GC      Time 4 10 secs  -GC         Exercise 5    Exercise Name 5 supine knee EXT on roll  -GC      Time 5 3 min  -GC         Exercise 6    Exercise Name 6 QS with Russian Stim  -GC      Time 6 10 min 10/10  -GC         Exercise 7    Exercise Name 7 SLR  -GC      Reps 7 25  -GC         Exercise 8    Exercise Name 8 hip ABD  -GC      Reps 8 25  -GC         Exercise 9    Exercise Name 9 PF vs theraband  -GC      Reps 9 25  -GC      Time 9 silver  -GC                User Key  (r) = Recorded By, (t) = Taken By, (c) = Cosigned By      Initials Name Provider Type     Ruperto Kimball, PT Physical Therapist                                  Outcome Measure Options: Lower Extremity Functional Scale (LEFS)  Lower Extremity Functional Index  Any of your usual work, housework or school activities: Extreme difficulty or unable to perform activity  Your usual hobbies, recreational or sporting activities: Extreme difficulty or unable to perform activity  Getting  into or out of the bath: Extreme difficulty or unable to perform activity  Walking between rooms: Quite a bit of difficulty  Putting on your shoes or socks: Extreme difficulty or unable to perform activity  Squatting: Extreme difficulty or unable to perform activity  Lifting an object, like a bag of groceries from the floor: Quite a bit of difficulty  Performing light activities around your home: Extreme difficulty or unable to perform activity  Performing heavy activities around your home: Extreme difficulty or unable to perform activity  Getting into or out of a car: Quite a bit of difficulty  Walking 2 blocks: Extreme difficulty or unable to perform activity  Walking a mile: Extreme difficulty or unable to perform activity  Going up or down 10 stairs (about 1 flight of stairs): Quite a bit of difficulty  Standing for 1 hour: Extreme difficulty or unable to perform activity  Sitting for 1 hour: No difficulty  Running on even ground: Extreme difficulty or unable to perform activity  Running on uneven ground: Extreme difficulty or unable to perform activity  Making sharp turns while running fast: Extreme difficulty or unable to perform activity  Hopping: Extreme difficulty or unable to perform activity  Rolling over in bed: Quite a bit of difficulty  Total: 9  Lower Extremity Functional Index  Any of your usual work, housework or school activities: Extreme difficulty or unable to perform activity  Your usual hobbies, recreational or sporting activities: Extreme difficulty or unable to perform activity  Getting into or out of the bath: Extreme difficulty or unable to perform activity  Walking between rooms: Quite a bit of difficulty  Putting on your shoes or socks: Extreme difficulty or unable to perform activity  Squatting: Extreme difficulty or unable to perform activity  Lifting an object, like a bag of groceries from the floor: Quite a bit of difficulty  Performing light activities around your home: Extreme  difficulty or unable to perform activity  Performing heavy activities around your home: Extreme difficulty or unable to perform activity  Getting into or out of a car: Quite a bit of difficulty  Walking 2 blocks: Extreme difficulty or unable to perform activity  Walking a mile: Extreme difficulty or unable to perform activity  Going up or down 10 stairs (about 1 flight of stairs): Quite a bit of difficulty  Standing for 1 hour: Extreme difficulty or unable to perform activity  Sitting for 1 hour: No difficulty  Running on even ground: Extreme difficulty or unable to perform activity  Running on uneven ground: Extreme difficulty or unable to perform activity  Making sharp turns while running fast: Extreme difficulty or unable to perform activity  Hopping: Extreme difficulty or unable to perform activity  Rolling over in bed: Quite a bit of difficulty  Total: 9      Time Calculation:     Start Time: 0700  Stop Time: 0810  Time Calculation (min): 70 min     Therapy Charges for Today       Code Description Service Date Service Provider Modifiers Qty    [id number] HC PT EVAL LOW COMPLEXITY 3 6/3/2024 Ruperto Kimball, PT GP 1            PT G-Codes  Outcome Measure Options: Lower Extremity Functional Scale (LEFS)  Total: 9         Ruperto Kimball PT  6/3/2024

## 2024-06-05 ENCOUNTER — OFFICE VISIT (OUTPATIENT)
Dept: ORTHOPEDIC SURGERY | Facility: CLINIC | Age: 44
End: 2024-06-05
Payer: COMMERCIAL

## 2024-06-05 VITALS — HEIGHT: 64 IN | WEIGHT: 196 LBS | BODY MASS INDEX: 33.46 KG/M2

## 2024-06-05 DIAGNOSIS — Z98.890 STATUS POST REPAIR OF ANTERIOR CRUCIATE LIGAMENT: Primary | ICD-10-CM

## 2024-06-05 DIAGNOSIS — S83.512A RUPTURE OF ANTERIOR CRUCIATE LIGAMENT OF LEFT KNEE, INITIAL ENCOUNTER: ICD-10-CM

## 2024-06-05 DIAGNOSIS — Z98.890 S/P ACL RECONSTRUCTION: Primary | ICD-10-CM

## 2024-06-05 DIAGNOSIS — S83.512A TEARS OF MENISCUS AND ANTERIOR CRUCIATE LIGAMENT OF LEFT KNEE, INITIAL ENCOUNTER: ICD-10-CM

## 2024-06-05 DIAGNOSIS — S83.207A TEARS OF MENISCUS AND ANTERIOR CRUCIATE LIGAMENT OF LEFT KNEE, INITIAL ENCOUNTER: ICD-10-CM

## 2024-06-05 RX ORDER — OXYCODONE HYDROCHLORIDE AND ACETAMINOPHEN 5; 325 MG/1; MG/1
1 TABLET ORAL EVERY 4 HOURS PRN
Qty: 42 TABLET | Refills: 0 | Status: SHIPPED | OUTPATIENT
Start: 2024-06-05

## 2024-06-05 NOTE — TELEPHONE ENCOUNTER
Rx Refill Note  Requested Prescriptions     Pending Prescriptions Disp Refills    oxyCODONE-acetaminophen (PERCOCET) 5-325 MG per tablet 42 tablet 0     Sig: Take 1 tablet by mouth Every 4 (Four) Hours As Needed for Pain.      Last office visit with prescribing clinician: 4/11/2024      Next office visit with prescribing clinician: 6/26/2024   Last Filled: 5/28/2024    Encounter Diagnoses   Name Primary?    Tears of meniscus and anterior cruciate ligament of left knee, initial encounter     Rupture of anterior cruciate ligament of left knee, initial encounter     S/P ACL reconstruction Yes      Date of Surgery: 5/28/2024      Zaida Rose MA  06/05/24, 10:15 EDT    Previous RX pended for your approval, change or denial.     {TIP  Encounters:    {TIP  Please add Last Relevant Lab Date if appropriate:  {TIP  Is Refill Pharmacy correct?:

## 2024-06-06 ENCOUNTER — HOSPITAL ENCOUNTER (OUTPATIENT)
Dept: PHYSICAL THERAPY | Facility: HOSPITAL | Age: 44
Setting detail: THERAPIES SERIES
Discharge: HOME | End: 2024-06-06
Payer: COMMERCIAL

## 2024-06-06 DIAGNOSIS — Z98.890 S/P ACL SURGERY: Primary | ICD-10-CM

## 2024-06-06 PROCEDURE — 97110 THERAPEUTIC EXERCISES: CPT | Performed by: PHYSICAL THERAPIST

## 2024-06-06 NOTE — PROGRESS NOTES
Continue to monitor nail  Follow up with Podiatry as needed  Return to this clinic for any future concerns   Subjective:     Patient ID: Aide Henry is a 43 y.o. female.    Chief Complaint:  Follow-up left shoulder, status post revision rotator cuff repair, distal clavicle excision-6/23/2022    History of Present Illness  Aide Henry returns to clinic today for follow-up evaluation in regard to her left shoulder.    The patient states that she had been making progress, but she is concerned because she did have a fall within the first 5 to 7 days after her most recent shoulder surgery back in 06/2023. Since then, she has had some pain. She feels like she has made progress with her motion; however, she still has had limitations with her motion. Her main limitations have been especially with her strength, as well as associated pain localized primarily to the anterolateral aspect of her left shoulder. This has been fairly consistent since her fall. She denies any numbness or tingling. She has been going to physical therapy, most recent time was approximately a month ago, and they recommended that she return to us for further evaluation given concern for a possible recurrent tear. She denies any new numbness or tingling. She does have multiple joint aches. She does have underlying autoimmune issues that cause arthralgias. She is currently on Plaquenil. She denies any fevers, chills, or sweats and denies any issues with her incision.       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    Drug use: Not Currently     Types: Cocaine(coke)     Comment: sober since 4/2023    Sexual activity: Defer      Past Medical History:    Diagnosis Date    Anxiety     Bacterial vaginosis     Ashton esophagus     Bulging lumbar disc     lower back per patient    Depression with anxiety     Fibromyalgia     Fracture, finger     Gastroparesis     GERD (gastroesophageal reflux disease)     Hemorrhoids     History of anemia     Hypertension     Hypothyroid     IBS (irritable bowel syndrome)     IC (interstitial cystitis)     Lateral epicondylitis 2013    RHUEMATOLOGIST    Lupus     Migraine     MRSA (methicillin resistant staph aureus) culture positive  ESTIMATE    GROIN AREA     Overactive bladder     Sjogren's syndrome     Urinary tract infection      Past Surgical History:   Procedure Laterality Date    ADENOIDECTOMY      CARPAL TUNNEL RELEASE Bilateral      SECTION      COLONOSCOPY      D & C HYSTEROSCOPY N/A 2021    Procedure: DILATATION AND CURETTAGE HYSTEROSCOPY;  Surgeon: Rancho Mayberry MD;  Location: [facility] OR;  Service: Obstetrics/Gynecology;  Laterality: N/A;    D & C HYSTEROSCOPY ENDOMETRIAL ABLATION N/A 2021    Procedure: DILATATION AND CURETTAGE HYSTEROSCOPY,  NOVASURE ENDOMETRIAL ABLATION;  Surgeon: Rancho Mayberry MD;  Location: [facility] OR;  Service: Obstetrics/Gynecology;  Laterality: N/A;    ENDOSCOPY N/A 05/10/2019    Procedure: ESOPHAGOGASTRODUODENOSCOPY with biopsies;  Surgeon: Shanon Vazquez MD;  Location: [facility] OR;  Service: Gastroenterology    ENDOSCOPY N/A 2021    Procedure: ESOPHAGOGASTRODUODENOSCOPY with biopsies;  Surgeon: Romain Rice MD;  Location: [facility] OR;  Service: Gastroenterology;  Laterality: N/A;  barretts  gastritis    FINGER GANGLION CYST EXCISION Right     middle finger    MOUTH SURGERY      OTHER SURGICAL HISTORY      reversal of tubal    RHINOPLASTY      SHOULDER ARTHROSCOPY Left 09/10/2018    Procedure: SHOULDER ARTHROSCOPY, rotator cuff repair; extensive debridement, open biceps tenodesis;  Surgeon: Joo Rivers MD;   "Location:  [facility] OR;  Service: Orthopedics    SHOULDER ARTHROSCOPY W/ ROTATOR CUFF REPAIR Left 5/27/2022    Procedure: SHOULDER ARTHROSCOPY WITH ROTATOR CUFF REPAIR COMPLEX, EXTENSIVE DEBRIDEMENT;  Surgeon: Joo Rivers MD;  Location:  [facility] OR;  Service: Orthopedics;  Laterality: Left;    SHOULDER ARTHROSCOPY W/ ROTATOR CUFF REPAIR Left 6/23/2023    Procedure: SHOULDER ARTHROSCOPY WITH ROTATOR CUFF REPAIR, distal clavicle excision;  Surgeon: Joo Rivers MD;  Location:  [facility] OR;  Service: Orthopedics;  Laterality: Left;    TONSILLECTOMY      TUBAL ABDOMINAL LIGATION      TYMPANOSTOMY TUBE PLACEMENT      WISDOM TOOTH EXTRACTION Bilateral        Family History   Problem Relation Age of Onset    Lung cancer Father     Heart disease Paternal Grandmother     Diabetes Maternal Aunt     Breast cancer Maternal Aunt     Diabetes Maternal Grandmother     COPD Mother     Colon cancer Neg Hx     Colon polyps Neg Hx     Malig Hyperthermia Neg Hx          Review of Systems        Objective:  Vitals:    12/11/23 1146   Weight: 90.7 kg (200 lb)   Height: 162.6 cm (64\")         12/11/23  1146   Weight: 90.7 kg (200 lb)     Body mass index is 34.33 kg/m².  Physical Exam    Vital signs reviewed.   General: No acute distress, alert and oriented  Eyes: conjunctiva clear; pupils equally round and reactive  ENT: external ears and nose atraumatic; oropharynx clear  CV: no peripheral edema  Resp: normal respiratory effort  Skin: no rashes or wounds; normal turgor  Psych: mood and affect appropriate; recent and remote memory intact        Ortho Exam       Left shoulder:    Incision is well healed.  FF Active- is to 165 degrees.  Strength- 4/5.  ER Active- is to 45 degrees.  Strength- 4/5.  IR- T12.  Strength on Belly Press test- 4+/5.  Bear hug sign- Negative.  Empty can test- Positive.  Drop arm test- Negative.  Ext rotation lag sign- Negative.  Neer's sign test- Positive.  Hawkin's-Magdiel test- Positive.  Positive deltoid " firing in all three components.  Brisk cap refill to all digits.  2+ radial pulse, left wrist.    Imaging:  None today  Assessment:        1. Status post arthroscopy of shoulder    2. Shoulder weakness           Plan:          1. I discussed treatment options at length with the patient at today's visit.  2. Given her continued limitation in regards to her strength as well as her significant pain and her postop fall that has shown little signs of improvement recently, we discussed options and at this point, she would like to proceed with MR arthrogram of her left shoulder. I did have a long discussion with her about the fact that she has now had three surgeries on her rotator cuff and we may be dealing with a biologic problem that I really can not solve surgically even if she has a recurrent issue.  3. MR arthrogram left shoulder ordered to evaluate primarily for recurrent rotator cuff tear.  4. Follow up to review imaging and discuss further treatment options.  5. In the meantime, I have recommended she continue any stretching or range of motion exercises that she can do in order to try to maximize her function.        Aide Henry was in agreement with plan and had all questions answered.     Orders:  Orders Placed This Encounter   Procedures    FL Contrast Injection CT / MRI    MRI shoulder left arthrogram       Medications:  No orders of the defined types were placed in this encounter.      Followup:  Return for review of MRI results.    Diagnoses and all orders for this visit:    1. Status post arthroscopy of shoulder (Primary)  -     FL Contrast Injection CT / MRI; Future  -     MRI shoulder left arthrogram; Future    2. Shoulder weakness  -     FL Contrast Injection CT / MRI; Future  -     MRI shoulder left arthrogram; Future          Dictated utilizing Dragon dictation     Transcribed from ambient dictation for Joo Rivers MD by Danielle Shi.  12/11/23   15:26 EST    Patient or patient  representative verbalized consent to the visit recording.  I have personally performed the services described in this document as transcribed by the above individual, and it is both accurate and complete.

## 2024-06-06 NOTE — THERAPY TREATMENT NOTE
Outpatient Physical Therapy Ortho Treatment Note  [facility]     Patient Name: Aide Henry  : 1980  MRN: [id number]  Today's Date: 2024      Visit Date: 2024    Visit Dx:    ICD-10-CM ICD-9-CM   1. S/P ACL surgery  Z98.890 V45.89       Patient Active Problem List   Diagnosis    Arthralgia of multiple joints    Chronic back pain    Chronic constipation    Disorder of connective tissue    Depression with anxiety    Fibromyalgia    Gastroesophageal reflux disease without esophagitis    Muscle pain    Palpitations    Seasonal allergic rhinitis    Eczema    Shoulder pain, left    Substance abuse    Lateral epicondylitis    Drug addiction syndrome    Gastroenteritis    Alcohol dependence in remission    History of placement of ear tubes    Status post arthroscopy of shoulder    Subacromial impingement of left shoulder    Biceps tendinitis of left upper extremity    Complete tear of left rotator cuff    Smoker    Sjogren's syndrome with keratoconjunctivitis sicca    Pelvic pain    Nondisplaced fracture of coronoid process of left ulna, initial encounter for closed fracture    Left breast lump    SARMAD (stress urinary incontinence, female)    Rectocele    Irritable bowel syndrome with both constipation and diarrhea    Ashton's esophagus without dysplasia    IC (interstitial cystitis)    Overactive bladder    Gastroparesis    Chondromalacia of patellofemoral joint, left    Lupus    Dysmenorrhea    History of bilateral tubal ligation    S/P endometrial ablation: 21    Blood blister: R labium    Carrier of ureaplasma urealyticum    Mechanical knee pain, left    AC joint arthropathy    Subacromial bursitis of right shoulder joint    Rotator cuff tendinitis, right    Vapes nicotine containing substance    Tears of meniscus and ACL of left knee    Left anterior cruciate ligament tear        Past Medical History:   Diagnosis Date    Anal fissure     Anxiety     Bacterial vaginosis     Ashton  esophagus     Bulging lumbar disc     lower back per patient    Depression with anxiety     Fibromyalgia     Fracture, finger     right hand, 4th finger    Gastroenteritis 2017    Gastroesophageal reflux disease without esophagitis 02/10/2016    Gastroparesis     GERD (gastroesophageal reflux disease)     Hemorrhoids     had a fissure    History of anemia     Hypertension     Hypothyroid     hashimoto    IBS (irritable bowel syndrome)     IC (interstitial cystitis)     Lateral epicondylitis 2013    RHUEMATOLOGIST    Lupus     Migraine     MRSA (methicillin resistant staph aureus) culture positive 2011 ESTIMATE    GROIN AREA     Overactive bladder     Sjogren's syndrome     Urinary tract infection         Past Surgical History:   Procedure Laterality Date    ADENOIDECTOMY      CARPAL TUNNEL RELEASE Bilateral      SECTION      COLONOSCOPY      D & C HYSTEROSCOPY N/A 2021    Procedure: DILATATION AND CURETTAGE HYSTEROSCOPY;  Surgeon: Rancho Mayberry MD;  Location: [facility] OR;  Service: Obstetrics/Gynecology;  Laterality: N/A;    D & C HYSTEROSCOPY ENDOMETRIAL ABLATION N/A 2021    Procedure: DILATATION AND CURETTAGE HYSTEROSCOPY,  NOVASURE ENDOMETRIAL ABLATION;  Surgeon: Rancho Mayberry MD;  Location: [facility] OR;  Service: Obstetrics/Gynecology;  Laterality: N/A;    ENDOSCOPY N/A 05/10/2019    Procedure: ESOPHAGOGASTRODUODENOSCOPY with biopsies;  Surgeon: Shanon Vazquez MD;  Location: [facility] OR;  Service: Gastroenterology    ENDOSCOPY N/A 2021    Procedure: ESOPHAGOGASTRODUODENOSCOPY with biopsies;  Surgeon: Romain Rice MD;  Location: [facility] OR;  Service: Gastroenterology;  Laterality: N/A;  barretts  gastritis    FINGER GANGLION CYST EXCISION Right     middle finger    GASTRIC STIMULATOR IMPLANT SURGERY      KNEE ACL RECONSTRUCTION Left 2024    Procedure: KNEE ANTERIOR CRUCIATE LIGAMENT RECONSTRUCTION WITH ALLOGRAFT, medial meniscal  repair;  Surgeon: Joo Rivers MD;  Location:  [facility] OR;  Service: Orthopedics;  Laterality: Left;    MOUTH SURGERY      3 teeth pulled due to sjorgens syndrome    OTHER SURGICAL HISTORY      reversal of tubal    RHINOPLASTY      SHOULDER ARTHROSCOPY Left 09/10/2018    Procedure: SHOULDER ARTHROSCOPY, rotator cuff repair; extensive debridement, open biceps tenodesis;  Surgeon: Joo Rivers MD;  Location:  [facility] OR;  Service: Orthopedics    SHOULDER ARTHROSCOPY W/ ROTATOR CUFF REPAIR Left 05/27/2022    Procedure: SHOULDER ARTHROSCOPY WITH ROTATOR CUFF REPAIR COMPLEX, EXTENSIVE DEBRIDEMENT;  Surgeon: Joo Rivers MD;  Location:  [facility] OR;  Service: Orthopedics;  Laterality: Left;    SHOULDER ARTHROSCOPY W/ ROTATOR CUFF REPAIR Left 06/23/2023    Procedure: SHOULDER ARTHROSCOPY WITH ROTATOR CUFF REPAIR, distal clavicle excision;  Surgeon: Joo Rivers MD;  Location:  [facility] OR;  Service: Orthopedics;  Laterality: Left;    TONSILLECTOMY      TUBAL ABDOMINAL LIGATION      TYMPANOSTOMY TUBE PLACEMENT      WISDOM TOOTH EXTRACTION Bilateral         PT Ortho       Row Name 06/06/24 0630       Left Lower Ext    Lt Knee Extension/Flexion AROM 0-4-101 degrees prior to treatment and 0-1-117 degrees after stretching  -GC              User Key  (r) = Recorded By, (t) = Taken By, (c) = Cosigned By      Initials Name Provider Type    Ruperto Russo, PT Physical Therapist                                 PT Assessment/Plan       Row Name 06/06/24 0630          PT Assessment    Assessment Comments Pt is doing well with increased knee ROM and good tolerance to her exercise progression.  -GC        PT Plan    PT Plan Comments Pt is to continue her HEP 2x daily.  -GC               User Key  (r) = Recorded By, (t) = Taken By, (c) = Cosigned By      Initials Name Provider Type    Ruperto Russo, PT Physical Therapist                       OP Exercises       Row Name 06/06/24 0630             Subjective     Subjective Comments Pt states her knee is pretty sore.  -GC         Exercise 1    Exercise Name 1 Heel slides  -GC      Time 1 8 min  -GC         Exercise 2    Exercise Name 2 wall slides  -GC      Time 2 8 min  -GC         Exercise 3    Exercise Name 3 bike  -GC         Exercise 4    Exercise Name 4 Hamstring/gastroc stretch  -GC      Reps 4 15  -GC      Time 4 10 secs  -GC         Exercise 5    Exercise Name 5 supine knee EXT on roll  -GC      Time 5 3 min  -GC         Exercise 6    Exercise Name 6 QS with Russian Stim  -GC      Time 6 10 min 10/10  -GC         Exercise 7    Exercise Name 7 SLR  -GC      Reps 7 25  -GC         Exercise 8    Exercise Name 8 hip ABD  -GC      Reps 8 25  -GC         Exercise 9    Exercise Name 9 hip EXT  -GC      Reps 9 25  -GC      Time 9 --  -GC         Exercise 10    Exercise Name 10 LAQ  -GC      Reps 10 25  -GC         Exercise 11    Exercise Name 11 Heel raises  -GC      Reps 11 25  -GC                User Key  (r) = Recorded By, (t) = Taken By, (c) = Cosigned By      Initials Name Provider Type     Ruperto Kimball, PT Physical Therapist                                                    Time Calculation:   Start Time: 0630  Stop Time: 0726  Time Calculation (min): 56 min  Therapy Charges for Today       Code Description Service Date Service Provider Modifiers Qty    [id number] HC PT THER PROC EA 15 MIN 6/6/2024 Ruperto Kimball, PT GP 2                      Ruperto Kimball, PT  6/6/2024

## 2024-06-06 NOTE — PROGRESS NOTES
CC:Status post left knee ACL reconstruction with soft tissue allograft, medial meniscus repair, DOS 5/28/2024    Interval history: Patient returns to clinic today, 1 week from surgery, doing well with physical therapy in regards to strengthening, range of motion, and ambulation.  Denies any locking, catching, or giving way of left knee.  Has continued to wear the brace as instructed.  Touchdown weightbearing x 3 weeks and weightbearing as tolerated denies any numbness, tingling, or issues with incisions over the knee.    Physical exam:            Left knee:   Incisions- clean dry, and intact, healing well   Effusion mild   ROM- 0-4-117 degrees   Strength-  4/5   Positive sensation light touch    Brisk cap refill   No calf pain, negative Ericka's sign bilateral lower extremities    Impression: Status post left knee ACL reconstruction, medial meniscal repair    Plan:  1.  Continue with physical therapy 3 times per week for work on range of motion and strengthening.  2.  Keep hinge knee brace locked during any weightbearing activities, may unlock for PT and while seated or supine.  3.  Touchdown weightbearing x 3 weeks and weightbearing as tolerated  4.  Will follow-up in 3 weeks for reevaluation of motion and strength and x-rays.

## 2024-06-11 ENCOUNTER — HOSPITAL ENCOUNTER (OUTPATIENT)
Dept: PHYSICAL THERAPY | Facility: HOSPITAL | Age: 44
Setting detail: THERAPIES SERIES
Discharge: HOME | End: 2024-06-11
Payer: COMMERCIAL

## 2024-06-11 DIAGNOSIS — Z98.890 S/P ACL SURGERY: Primary | ICD-10-CM

## 2024-06-11 PROCEDURE — 97110 THERAPEUTIC EXERCISES: CPT | Performed by: PHYSICAL THERAPIST

## 2024-06-11 NOTE — THERAPY TREATMENT NOTE
Outpatient Physical Therapy Ortho Treatment Note  TENISHA Wing     Patient Name: Aide Henry  : 1980  MRN: [id number]  Today's Date: 2024      Visit Date: 2024    Visit Dx:    ICD-10-CM ICD-9-CM   1. S/P ACL surgery  Z98.890 V45.89       Patient Active Problem List   Diagnosis    Arthralgia of multiple joints    Chronic back pain    Chronic constipation    Disorder of connective tissue    Depression with anxiety    Fibromyalgia    Gastroesophageal reflux disease without esophagitis    Muscle pain    Palpitations    Seasonal allergic rhinitis    Eczema    Shoulder pain, left    Substance abuse    Lateral epicondylitis    Drug addiction syndrome    Gastroenteritis    Alcohol dependence in remission    History of placement of ear tubes    Status post arthroscopy of shoulder    Subacromial impingement of left shoulder    Biceps tendinitis of left upper extremity    Complete tear of left rotator cuff    Smoker    Sjogren's syndrome with keratoconjunctivitis sicca    Pelvic pain    Nondisplaced fracture of coronoid process of left ulna, initial encounter for closed fracture    Left breast lump    SARMAD (stress urinary incontinence, female)    Rectocele    Irritable bowel syndrome with both constipation and diarrhea    Ashton's esophagus without dysplasia    IC (interstitial cystitis)    Overactive bladder    Gastroparesis    Chondromalacia of patellofemoral joint, left    Lupus    Dysmenorrhea    History of bilateral tubal ligation    Status post repair of anterior cruciate ligament    Blood blister: R labium    Carrier of ureaplasma urealyticum    Mechanical knee pain, left    AC joint arthropathy    Subacromial bursitis of right shoulder joint    Rotator cuff tendinitis, right    Vapes nicotine containing substance    Tears of meniscus and ACL of left knee    Left anterior cruciate ligament tear        Past Medical History:   Diagnosis Date    Anal fissure     Anxiety     Bacterial vaginosis      Ashton esophagus     Bulging lumbar disc     lower back per patient    Depression with anxiety     Fibromyalgia     Fracture, finger     right hand, 4th finger    Gastroenteritis 2017    Gastroesophageal reflux disease without esophagitis 02/10/2016    Gastroparesis     GERD (gastroesophageal reflux disease)     Hemorrhoids     had a fissure    History of anemia     Hypertension     Hypothyroid     hashimoto    IBS (irritable bowel syndrome)     IC (interstitial cystitis)     Lateral epicondylitis 2013    RHUEMATOLOGIST    Lupus     Migraine     MRSA (methicillin resistant staph aureus) culture positive 2011 ESTIMATE    GROIN AREA     Overactive bladder     Sjogren's syndrome     Urinary tract infection         Past Surgical History:   Procedure Laterality Date    ADENOIDECTOMY      CARPAL TUNNEL RELEASE Bilateral      SECTION      COLONOSCOPY      D & C HYSTEROSCOPY N/A 2021    Procedure: DILATATION AND CURETTAGE HYSTEROSCOPY;  Surgeon: Rancho Mayberry MD;  Location: [facility] OR;  Service: Obstetrics/Gynecology;  Laterality: N/A;    D & C HYSTEROSCOPY ENDOMETRIAL ABLATION N/A 2021    Procedure: DILATATION AND CURETTAGE HYSTEROSCOPY,  NOVASURE ENDOMETRIAL ABLATION;  Surgeon: Rancho Mayberry MD;  Location: [facility] OR;  Service: Obstetrics/Gynecology;  Laterality: N/A;    ENDOSCOPY N/A 05/10/2019    Procedure: ESOPHAGOGASTRODUODENOSCOPY with biopsies;  Surgeon: Shanon Vazquez MD;  Location: [facility] OR;  Service: Gastroenterology    ENDOSCOPY N/A 2021    Procedure: ESOPHAGOGASTRODUODENOSCOPY with biopsies;  Surgeon: Romain Rice MD;  Location: [facility] OR;  Service: Gastroenterology;  Laterality: N/A;  barretts  gastritis    FINGER GANGLION CYST EXCISION Right     middle finger    GASTRIC STIMULATOR IMPLANT SURGERY      KNEE ACL RECONSTRUCTION Left 2024    Procedure: KNEE ANTERIOR CRUCIATE LIGAMENT RECONSTRUCTION WITH ALLOGRAFT, medial  meniscal repair;  Surgeon: Joo Rivers MD;  Location:  [facility] OR;  Service: Orthopedics;  Laterality: Left;    MOUTH SURGERY      3 teeth pulled due to sjorgens syndrome    OTHER SURGICAL HISTORY      reversal of tubal    RHINOPLASTY      SHOULDER ARTHROSCOPY Left 09/10/2018    Procedure: SHOULDER ARTHROSCOPY, rotator cuff repair; extensive debridement, open biceps tenodesis;  Surgeon: Joo Rivers MD;  Location:  [facility] OR;  Service: Orthopedics    SHOULDER ARTHROSCOPY W/ ROTATOR CUFF REPAIR Left 05/27/2022    Procedure: SHOULDER ARTHROSCOPY WITH ROTATOR CUFF REPAIR COMPLEX, EXTENSIVE DEBRIDEMENT;  Surgeon: Joo Rivers MD;  Location:  [facility] OR;  Service: Orthopedics;  Laterality: Left;    SHOULDER ARTHROSCOPY W/ ROTATOR CUFF REPAIR Left 06/23/2023    Procedure: SHOULDER ARTHROSCOPY WITH ROTATOR CUFF REPAIR, distal clavicle excision;  Surgeon: Joo Rivers MD;  Location:  [facility] OR;  Service: Orthopedics;  Laterality: Left;    TONSILLECTOMY      TUBAL ABDOMINAL LIGATION      TYMPANOSTOMY TUBE PLACEMENT      WISDOM TOOTH EXTRACTION Bilateral         PT Ortho       Row Name 06/11/24 0645       Left Lower Ext    Lt Knee Extension/Flexion AROM 0-125 degrees  -              User Key  (r) = Recorded By, (t) = Taken By, (c) = Cosigned By      Initials Name Provider Type    Ruperto Russo PT Physical Therapist                                 PT Assessment/Plan       Row Name 06/11/24 0645          PT Assessment    Assessment Comments Pt is doing well with increased knee ROM and good tolerance to her exercise progression.  -        PT Plan    PT Plan Comments Pt is to continue her HEP 2x daily.  -               User Key  (r) = Recorded By, (t) = Taken By, (c) = Cosigned By      Initials Name Provider Type    Ruperto Russo, PT Physical Therapist                       OP Exercises       Row Name 06/11/24 0645             Subjective    Subjective Comments Pt states her knee is feeling  okay.  -GC         Exercise 1    Exercise Name 1 Heel slides  -GC      Time 1 8 min  -GC         Exercise 2    Exercise Name 2 wall slides  -GC      Time 2 8 min  -GC         Exercise 3    Exercise Name 3 bike  -GC         Exercise 4    Exercise Name 4 Hamstring/gastroc stretch  -GC      Reps 4 15  -GC      Time 4 10 secs  -GC         Exercise 5    Exercise Name 5 supine knee EXT on roll  -GC      Time 5 3 min  -GC         Exercise 6    Exercise Name 6 QS with Russian Stim  -GC      Time 6 10 min 10/10  -GC         Exercise 7    Exercise Name 7 SLR  -GC      Reps 7 25  -GC      Time 7 1#  -GC         Exercise 8    Exercise Name 8 hip ABD  -GC      Reps 8 25  -GC      Time 8 1#  -GC         Exercise 9    Exercise Name 9 hip EXT  -GC      Reps 9 25  -GC      Time 9 1#  -GC         Exercise 10    Exercise Name 10 LAQ  -GC      Reps 10 25  -GC         Exercise 11    Exercise Name 11 Heel raises  -GC      Reps 11 25  -GC         Exercise 12    Exercise Name 12 hamstrings vs stool  -GC      Reps 12 25  -GC         Exercise 13    Exercise Name 13 TKE vs theraband  -GC      Reps 13 25  -GC      Time 13 Gold  -GC                User Key  (r) = Recorded By, (t) = Taken By, (c) = Cosigned By      Initials Name Provider Type    GC Ruperto Kimball, PT Physical Therapist                                                    Time Calculation:   Start Time: 0645  Stop Time: 0746  Time Calculation (min): 61 min  Therapy Charges for Today       Code Description Service Date Service Provider Modifiers Qty    53750581697 HC PT THER PROC EA 15 MIN 6/11/2024 Ruperto Kimball, PT GP 2                      Ruperto Kimball PT  6/11/2024

## 2024-06-14 ENCOUNTER — APPOINTMENT (OUTPATIENT)
Dept: PHYSICAL THERAPY | Facility: HOSPITAL | Age: 44
End: 2024-06-14
Payer: COMMERCIAL

## 2024-06-18 ENCOUNTER — HOSPITAL ENCOUNTER (OUTPATIENT)
Dept: PHYSICAL THERAPY | Facility: HOSPITAL | Age: 44
Setting detail: THERAPIES SERIES
Discharge: HOME | End: 2024-06-18
Payer: COMMERCIAL

## 2024-06-18 ENCOUNTER — TRANSCRIBE ORDERS (OUTPATIENT)
Dept: ADMINISTRATIVE | Facility: HOSPITAL | Age: 44
End: 2024-06-18
Payer: COMMERCIAL

## 2024-06-18 DIAGNOSIS — Z12.31 SCREENING MAMMOGRAM, ENCOUNTER FOR: Primary | ICD-10-CM

## 2024-06-18 DIAGNOSIS — Z98.890 S/P ACL SURGERY: Primary | ICD-10-CM

## 2024-06-18 PROCEDURE — 97110 THERAPEUTIC EXERCISES: CPT | Performed by: PHYSICAL THERAPIST

## 2024-06-18 NOTE — THERAPY TREATMENT NOTE
Outpatient Physical Therapy Ortho Treatment Note  TENISHA Wing     Patient Name: Aide Henry  : 1980  MRN: [id number]  Today's Date: 2024      Visit Date: 2024    Visit Dx:    ICD-10-CM ICD-9-CM   1. S/P ACL surgery  Z98.890 V45.89       Patient Active Problem List   Diagnosis    Arthralgia of multiple joints    Chronic back pain    Chronic constipation    Disorder of connective tissue    Depression with anxiety    Fibromyalgia    Gastroesophageal reflux disease without esophagitis    Muscle pain    Palpitations    Seasonal allergic rhinitis    Eczema    Shoulder pain, left    Substance abuse    Lateral epicondylitis    Drug addiction syndrome    Gastroenteritis    Alcohol dependence in remission    History of placement of ear tubes    Status post arthroscopy of shoulder    Subacromial impingement of left shoulder    Biceps tendinitis of left upper extremity    Complete tear of left rotator cuff    Smoker    Sjogren's syndrome with keratoconjunctivitis sicca    Pelvic pain    Nondisplaced fracture of coronoid process of left ulna, initial encounter for closed fracture    Left breast lump    SARMAD (stress urinary incontinence, female)    Rectocele    Irritable bowel syndrome with both constipation and diarrhea    Ashton's esophagus without dysplasia    IC (interstitial cystitis)    Overactive bladder    Gastroparesis    Chondromalacia of patellofemoral joint, left    Lupus    Dysmenorrhea    History of bilateral tubal ligation    Status post repair of anterior cruciate ligament    Blood blister: R labium    Carrier of ureaplasma urealyticum    Mechanical knee pain, left    AC joint arthropathy    Subacromial bursitis of right shoulder joint    Rotator cuff tendinitis, right    Vapes nicotine containing substance    Tears of meniscus and ACL of left knee    Left anterior cruciate ligament tear        Past Medical History:   Diagnosis Date    Anal fissure     Anxiety     Bacterial vaginosis      Ashton esophagus     Bulging lumbar disc     lower back per patient    Depression with anxiety     Fibromyalgia     Fracture, finger     right hand, 4th finger    Gastroenteritis 2017    Gastroesophageal reflux disease without esophagitis 02/10/2016    Gastroparesis     GERD (gastroesophageal reflux disease)     Hemorrhoids     had a fissure    History of anemia     Hypertension     Hypothyroid     hashimoto    IBS (irritable bowel syndrome)     IC (interstitial cystitis)     Lateral epicondylitis 2013    RHUEMATOLOGIST    Lupus     Migraine     MRSA (methicillin resistant staph aureus) culture positive 2011 ESTIMATE    GROIN AREA     Overactive bladder     Sjogren's syndrome     Urinary tract infection         Past Surgical History:   Procedure Laterality Date    ADENOIDECTOMY      CARPAL TUNNEL RELEASE Bilateral      SECTION      COLONOSCOPY      D & C HYSTEROSCOPY N/A 2021    Procedure: DILATATION AND CURETTAGE HYSTEROSCOPY;  Surgeon: Rancho Mayberry MD;  Location: [facility];  Service: Obstetrics/Gynecology;  Laterality: N/A;    D & C HYSTEROSCOPY ENDOMETRIAL ABLATION N/A 2021    Procedure: DILATATION AND CURETTAGE HYSTEROSCOPY,  NOVASURE ENDOMETRIAL ABLATION;  Surgeon: Rancho Mayberry MD;  Location: [facility] OR;  Service: Obstetrics/Gynecology;  Laterality: N/A;    ENDOSCOPY N/A 05/10/2019    Procedure: ESOPHAGOGASTRODUODENOSCOPY with biopsies;  Surgeon: Shanon Vazquez MD;  Location: [facility] OR;  Service: Gastroenterology    ENDOSCOPY N/A 2021    Procedure: ESOPHAGOGASTRODUODENOSCOPY with biopsies;  Surgeon: Romain Rice MD;  Location: [facility] OR;  Service: Gastroenterology;  Laterality: N/A;  barretts  gastritis    FINGER GANGLION CYST EXCISION Right     middle finger    GASTRIC STIMULATOR IMPLANT SURGERY      KNEE ACL RECONSTRUCTION Left 2024    Procedure: KNEE ANTERIOR CRUCIATE LIGAMENT RECONSTRUCTION WITH ALLOGRAFT, medial  meniscal repair;  Surgeon: Joo Rivers MD;  Location:  [facility] OR;  Service: Orthopedics;  Laterality: Left;    MOUTH SURGERY      3 teeth pulled due to sjorgens syndrome    OTHER SURGICAL HISTORY      reversal of tubal    RHINOPLASTY      SHOULDER ARTHROSCOPY Left 09/10/2018    Procedure: SHOULDER ARTHROSCOPY, rotator cuff repair; extensive debridement, open biceps tenodesis;  Surgeon: Joo Rivers MD;  Location:  [facility] OR;  Service: Orthopedics    SHOULDER ARTHROSCOPY W/ ROTATOR CUFF REPAIR Left 05/27/2022    Procedure: SHOULDER ARTHROSCOPY WITH ROTATOR CUFF REPAIR COMPLEX, EXTENSIVE DEBRIDEMENT;  Surgeon: Joo Rivers MD;  Location:  [facility] OR;  Service: Orthopedics;  Laterality: Left;    SHOULDER ARTHROSCOPY W/ ROTATOR CUFF REPAIR Left 06/23/2023    Procedure: SHOULDER ARTHROSCOPY WITH ROTATOR CUFF REPAIR, distal clavicle excision;  Surgeon: Joo Rivers MD;  Location:  [facility] OR;  Service: Orthopedics;  Laterality: Left;    TONSILLECTOMY      TUBAL ABDOMINAL LIGATION      TYMPANOSTOMY TUBE PLACEMENT      WISDOM TOOTH EXTRACTION Bilateral         PT Ortho       Row Name 06/18/24 0745       Subjective    Subjective Comments Pt states her knee is pretty stiff today.  -GC       Left Lower Ext    Lt Knee Extension/Flexion AROM 0-128 degrees after stretching  -GC              User Key  (r) = Recorded By, (t) = Taken By, (c) = Cosigned By      Initials Name Provider Type    Ruperto Russo, PT Physical Therapist                                 PT Assessment/Plan       Row Name 06/18/24 0745          PT Assessment    Assessment Comments Pt is doing well with increasing knee ROM and improving strength.  -GC        PT Plan    PT Plan Comments Pt is to continue her HEP 2x daily.  -               User Key  (r) = Recorded By, (t) = Taken By, (c) = Cosigned By      Initials Name Provider Type    Ruperto Russo, PT Physical Therapist                       OP Exercises       Row Name 06/18/24  0745             Subjective    Subjective Comments Pt states her knee is pretty stiff today.  -GC         Exercise 1    Exercise Name 1 Heel slides  -GC      Time 1 8 min  -GC         Exercise 2    Exercise Name 2 wall slides  -GC      Time 2 8 min  -GC         Exercise 3    Exercise Name 3 bike  -GC         Exercise 4    Exercise Name 4 Hamstring/gastroc stretch  -GC      Reps 4 15  -GC      Time 4 10 secs  -GC         Exercise 5    Exercise Name 5 prone leg hang  -GC      Time 5 5 min  -GC         Exercise 6    Exercise Name 6 QS with Russian Stim  -GC      Time 6 10 min 10/10  -GC         Exercise 7    Exercise Name 7 SLR  -GC      Reps 7 25  -GC      Time 7 2#  -GC         Exercise 8    Exercise Name 8 hip ABD  -GC      Reps 8 25  -GC      Time 8 2#  -GC         Exercise 9    Exercise Name 9 hip EXT  -GC      Reps 9 25  -GC      Time 9 2#  -GC         Exercise 10    Exercise Name 10 LAQ  -GC      Reps 10 25  -GC      Time 10 2#  -GC         Exercise 11    Exercise Name 11 Heel raises  -GC      Reps 11 25  -GC         Exercise 12    Exercise Name 12 hamstrings vs stool  -GC      Reps 12 25  -GC         Exercise 13    Exercise Name 13 TKE vs theraband  -GC      Reps 13 25  -GC      Time 13 Gold  -GC         Exercise 14    Exercise Name 14 Partial squats  -GC      Reps 14 25  -GC                User Key  (r) = Recorded By, (t) = Taken By, (c) = Cosigned By      Initials Name Provider Type    GC Ruperto Kimball, PT Physical Therapist                                                    Time Calculation:   Start Time: 0745  Stop Time: 0847  Time Calculation (min): 62 min  Therapy Charges for Today       Code Description Service Date Service Provider Modifiers Qty    [id number] HC PT THER PROC EA 15 MIN 6/18/2024 Ruperto Kimball, PT GP 2                      Ruperto Kimball, PT  6/18/2024

## 2024-06-24 ENCOUNTER — APPOINTMENT (OUTPATIENT)
Dept: PHYSICAL THERAPY | Facility: HOSPITAL | Age: 44
End: 2024-06-24
Payer: COMMERCIAL

## 2024-06-26 ENCOUNTER — OFFICE VISIT (OUTPATIENT)
Dept: ORTHOPEDIC SURGERY | Facility: CLINIC | Age: 44
End: 2024-06-26
Payer: COMMERCIAL

## 2024-06-26 VITALS — HEIGHT: 64 IN | BODY MASS INDEX: 33.46 KG/M2 | WEIGHT: 196 LBS

## 2024-06-26 DIAGNOSIS — Z98.890 STATUS POST REPAIR OF ANTERIOR CRUCIATE LIGAMENT: Primary | ICD-10-CM

## 2024-06-26 PROCEDURE — 99024 POSTOP FOLLOW-UP VISIT: CPT | Performed by: ORTHOPAEDIC SURGERY

## 2024-06-26 PROCEDURE — 1159F MED LIST DOCD IN RCRD: CPT | Performed by: ORTHOPAEDIC SURGERY

## 2024-06-26 PROCEDURE — 1160F RVW MEDS BY RX/DR IN RCRD: CPT | Performed by: ORTHOPAEDIC SURGERY

## 2024-06-26 PROCEDURE — 73562 X-RAY EXAM OF KNEE 3: CPT | Performed by: ORTHOPAEDIC SURGERY

## 2024-06-26 RX ORDER — PREDNISONE 10 MG/1
TABLET ORAL
Qty: 39 TABLET | Refills: 0 | Status: SHIPPED | OUTPATIENT
Start: 2024-06-26

## 2024-06-26 RX ORDER — HYDROCODONE BITARTRATE AND ACETAMINOPHEN 7.5; 325 MG/1; MG/1
1 TABLET ORAL EVERY 6 HOURS PRN
Qty: 50 TABLET | Refills: 0 | Status: SHIPPED | OUTPATIENT
Start: 2024-06-26

## 2024-06-26 NOTE — PROGRESS NOTES
CC:Status post left knee ACL reconstruction with soft tissue allograft, medial meniscal repair-5/28/2024    Interval history: Patient returns to clinic today, 4 weeks from surgery, she has been doing well with physical therapy but after her session on June 18 she states she had increased swelling as well as stiffness to her knee with increased pain.  She is now out of her narcotic pain medication and feels like this is contributing.  Denies any fevers chills or sweats.  No issues with her incision.  She has been using her brace per her report.  She is fully weightbearing at this time    Physical exam:              Left knee:   Incisions- clean dry, and intact, healing well   Effusion moderate     ROM-5-115 degrees   Strength- 5 degree quadriceps lag, 4-5 strength on extension, 4+ out of 5 strength on flexion   stable to testing and Lachman: stable   Positive sensation light touch    Brisk cap refill    Imaging: three-view x-rays of left knee from today's visit including AP, lateral, and notch views, ordered and reviewed by me, compared to preoperative x-rays.  Findings included stable position of femoral and tibial bone tunnels as well as ACL hardware, no evidence of intra-articular loose body, anatomic alignment left knee, no evidence of fracture or subluxation.    Impression: Status post left knee ACL reconstruction, medial meniscal repair    Plan:  1.  Continue with physical therapy 3 times per week for work on range of motion and strengthening.  2.  I sent a message to the physical therapy office-patient admits that she missed at least 1 if not multiple appointments.  I told her she needs to reengage her physical therapy or else she is going to have long-term consequences with stiffness from her knee.    3.  Will follow-up in 2 weeks for reevaluation of motion and strength.  I gave her a prednisone taper today as well as [place] to help with her pain.  I encouraged her to work on her motion as much as she can on  her own at home with work on icing.  We did shorten her brace  Can keep it locked until she has no extensor lag  4.  All questions answered today

## 2024-06-28 ENCOUNTER — DOCUMENTATION (OUTPATIENT)
Dept: PHYSICAL THERAPY | Facility: HOSPITAL | Age: 44
End: 2024-06-28
Payer: COMMERCIAL

## 2024-06-28 NOTE — SIGNIFICANT NOTE
Dr. Rivers contacted PT on Wednesday to see if we could get in her for an appointment this week.  called her on Wed. & she was still in MD office and requested to call her back. Pt did not ever call back.  called today to see if she wanted to come in this afternoon but pt reported she was not available to come in because she had other appointments today. She reported that she never called back because she had too many other appointments this week. Pt is scheduled on Monday, 7/1/24 for next appointment.

## 2024-07-01 ENCOUNTER — HOSPITAL ENCOUNTER (OUTPATIENT)
Dept: PHYSICAL THERAPY | Facility: HOSPITAL | Age: 44
Setting detail: THERAPIES SERIES
Discharge: HOME | End: 2024-07-01
Payer: COMMERCIAL

## 2024-07-01 DIAGNOSIS — Z98.890 S/P ACL SURGERY: Primary | ICD-10-CM

## 2024-07-01 PROCEDURE — 97110 THERAPEUTIC EXERCISES: CPT | Performed by: PHYSICAL THERAPIST

## 2024-07-01 NOTE — THERAPY TREATMENT NOTE
Outpatient Physical Therapy Ortho Treatment Note  TENISHA Wing     Patient Name: Aide Henry  : 1980  MRN: [id number]  Today's Date: 2024      Visit Date: 2024    Visit Dx:    ICD-10-CM ICD-9-CM   1. S/P ACL surgery  Z98.890 V45.89       Patient Active Problem List   Diagnosis    Arthralgia of multiple joints    Chronic back pain    Chronic constipation    Disorder of connective tissue    Depression with anxiety    Fibromyalgia    Gastroesophageal reflux disease without esophagitis    Muscle pain    Palpitations    Seasonal allergic rhinitis    Eczema    Shoulder pain, left    Substance abuse    Lateral epicondylitis    Drug addiction syndrome    Gastroenteritis    Alcohol dependence in remission    History of placement of ear tubes    Status post arthroscopy of shoulder    Subacromial impingement of left shoulder    Biceps tendinitis of left upper extremity    Complete tear of left rotator cuff    Smoker    Sjogren's syndrome with keratoconjunctivitis sicca    Pelvic pain    Nondisplaced fracture of coronoid process of left ulna, initial encounter for closed fracture    Left breast lump    SARMAD (stress urinary incontinence, female)    Rectocele    Irritable bowel syndrome with both constipation and diarrhea    Ashton's esophagus without dysplasia    IC (interstitial cystitis)    Overactive bladder    Gastroparesis    Chondromalacia of patellofemoral joint, left    Lupus    Dysmenorrhea    History of bilateral tubal ligation    Status post repair of anterior cruciate ligament    Blood blister: R labium    Carrier of ureaplasma urealyticum    Mechanical knee pain, left    AC joint arthropathy    Subacromial bursitis of right shoulder joint    Rotator cuff tendinitis, right    Vapes nicotine containing substance    Tears of meniscus and ACL of left knee    Left anterior cruciate ligament tear        Past Medical History:   Diagnosis Date    Anal fissure     Anxiety     Bacterial vaginosis      Ashton esophagus     Bulging lumbar disc     lower back per patient    Depression with anxiety     Fibromyalgia     Fracture, finger     right hand, 4th finger    Gastroenteritis 2017    Gastroesophageal reflux disease without esophagitis 02/10/2016    Gastroparesis     GERD (gastroesophageal reflux disease)     Hemorrhoids     had a fissure    History of anemia     Hypertension     Hypothyroid     hashimoto    IBS (irritable bowel syndrome)     IC (interstitial cystitis)     Lateral epicondylitis 2013    RHUEMATOLOGIST    Lupus     Migraine     MRSA (methicillin resistant staph aureus) culture positive 2011 ESTIMATE    GROIN AREA     Overactive bladder     Sjogren's syndrome     Urinary tract infection         Past Surgical History:   Procedure Laterality Date    ADENOIDECTOMY      CARPAL TUNNEL RELEASE Bilateral      SECTION      COLONOSCOPY      D & C HYSTEROSCOPY N/A 2021    Procedure: DILATATION AND CURETTAGE HYSTEROSCOPY;  Surgeon: Rancho Mayberry MD;  Location: [facility] OR;  Service: Obstetrics/Gynecology;  Laterality: N/A;    D & C HYSTEROSCOPY ENDOMETRIAL ABLATION N/A 2021    Procedure: DILATATION AND CURETTAGE HYSTEROSCOPY,  NOVASURE ENDOMETRIAL ABLATION;  Surgeon: Rancho Mayberry MD;  Location: [facility] OR;  Service: Obstetrics/Gynecology;  Laterality: N/A;    ENDOSCOPY N/A 05/10/2019    Procedure: ESOPHAGOGASTRODUODENOSCOPY with biopsies;  Surgeon: Shanon Vazquez MD;  Location: [facility] OR;  Service: Gastroenterology    ENDOSCOPY N/A 2021    Procedure: ESOPHAGOGASTRODUODENOSCOPY with biopsies;  Surgeon: Romain Rice MD;  Location: [facility] OR;  Service: Gastroenterology;  Laterality: N/A;  barretts  gastritis    FINGER GANGLION CYST EXCISION Right     middle finger    GASTRIC STIMULATOR IMPLANT SURGERY      KNEE ACL RECONSTRUCTION Left 2024    Procedure: KNEE ANTERIOR CRUCIATE LIGAMENT RECONSTRUCTION WITH ALLOGRAFT, medial  meniscal repair;  Surgeon: Joo Rivers MD;  Location:  [facility] OR;  Service: Orthopedics;  Laterality: Left;    MOUTH SURGERY      3 teeth pulled due to sjorgens syndrome    OTHER SURGICAL HISTORY      reversal of tubal    RHINOPLASTY      SHOULDER ARTHROSCOPY Left 09/10/2018    Procedure: SHOULDER ARTHROSCOPY, rotator cuff repair; extensive debridement, open biceps tenodesis;  Surgeon: Joo Rivers MD;  Location:  [facility] OR;  Service: Orthopedics    SHOULDER ARTHROSCOPY W/ ROTATOR CUFF REPAIR Left 05/27/2022    Procedure: SHOULDER ARTHROSCOPY WITH ROTATOR CUFF REPAIR COMPLEX, EXTENSIVE DEBRIDEMENT;  Surgeon: Joo Rivers MD;  Location:  [facility] OR;  Service: Orthopedics;  Laterality: Left;    SHOULDER ARTHROSCOPY W/ ROTATOR CUFF REPAIR Left 06/23/2023    Procedure: SHOULDER ARTHROSCOPY WITH ROTATOR CUFF REPAIR, distal clavicle excision;  Surgeon: Joo Rivers MD;  Location:  [facility] OR;  Service: Orthopedics;  Laterality: Left;    TONSILLECTOMY      TUBAL ABDOMINAL LIGATION      TYMPANOSTOMY TUBE PLACEMENT      WISDOM TOOTH EXTRACTION Bilateral                         PT Assessment/Plan       Row Name 07/01/24 0916          PT Assessment    Assessment Comments Pt is showing increased knee ROM and improving function.  -GC        PT Plan    PT Plan Comments Pt is to continue her HEP daily.  -GC               User Key  (r) = Recorded By, (t) = Taken By, (c) = Cosigned By      Initials Name Provider Type    Ruperto Russo, PT Physical Therapist                       OP Exercises       Row Name 07/01/24 7805             Subjective    Subjective Comments Pt states her knee is pretty stiff this morning. She says the doctor put her on steroids because he was not happy with her swelling.  -GC         Exercise 1    Exercise Name 1 Heel slides  -GC      Time 1 8 min  -GC         Exercise 2    Exercise Name 2 wall slides  -GC      Time 2 8 min  -GC         Exercise 3    Exercise Name 3 bike  -GC       "Time 3 5 min  -GC         Exercise 4    Exercise Name 4 Hamstring/gastroc stretch  -GC      Reps 4 15  -GC      Time 4 10 secs  -GC         Exercise 5    Exercise Name 5 prone leg hang  -GC      Time 5 5 min  -GC      Additional Comments 2#  -GC         Exercise 6    Exercise Name 6 QS with Russian Stim  -GC      Time 6 10 min 10/10  -GC         Exercise 7    Exercise Name 7 SLR  -GC      Reps 7 25  -GC      Time 7 3#  -GC         Exercise 8    Exercise Name 8 hip ABD  -GC      Reps 8 25  -GC      Time 8 3#  -GC         Exercise 9    Exercise Name 9 hip EXT  -GC      Reps 9 25  -GC      Time 9 3#  -GC         Exercise 10    Exercise Name 10 LAQ  -GC      Reps 10 25  -GC      Time 10 --  -GC         Exercise 11    Exercise Name 11 Heel raises  -GC      Reps 11 25  -GC         Exercise 12    Exercise Name 12 hamstrings vs stool  -GC      Reps 12 25  -GC         Exercise 13    Exercise Name 13 TKE vs theraband  -GC      Reps 13 25  -GC      Time 13 Gold  -GC         Exercise 14    Exercise Name 14 Partial squats  -GC      Reps 14 25  -GC         Exercise 15    Exercise Name 15 Forward step ups on 6\" step  -GC      Reps 15 10x ea  -GC         Exercise 16    Exercise Name 16 Lateral step overs on 6\" step  -GC      Reps 16 10x  -GC                User Key  (r) = Recorded By, (t) = Taken By, (c) = Cosigned By      Initials Name Provider Type     Ruperto Kimball, PT Physical Therapist                                                    Time Calculation:   Start Time: 0925  Stop Time: 1028  Time Calculation (min): 63 min  Therapy Charges for Today       Code Description Service Date Service Provider Modifiers Qty    [id number] HC PT THER PROC EA 15 MIN 7/1/2024 Ruperto Kimball, PT GP 2                      Ruperto Kimball, PT  7/1/2024     "

## 2024-07-15 ENCOUNTER — HOSPITAL ENCOUNTER (OUTPATIENT)
Dept: PHYSICAL THERAPY | Facility: HOSPITAL | Age: 44
Setting detail: THERAPIES SERIES
Discharge: HOME | End: 2024-07-15
Payer: COMMERCIAL

## 2024-07-15 DIAGNOSIS — Z98.890 S/P ACL SURGERY: Primary | ICD-10-CM

## 2024-07-15 PROCEDURE — 97110 THERAPEUTIC EXERCISES: CPT | Performed by: PHYSICAL THERAPIST

## 2024-07-15 NOTE — THERAPY TREATMENT NOTE
Outpatient Physical Therapy Ortho Treatment Note  TENISHA Wing     Patient Name: Aide Henry  : 1980  MRN: [id number]  Today's Date: 7/15/2024      Visit Date: 07/15/2024    Visit Dx:    ICD-10-CM ICD-9-CM   1. S/P ACL surgery  Z98.890 V45.89       Patient Active Problem List   Diagnosis    Arthralgia of multiple joints    Chronic back pain    Chronic constipation    Disorder of connective tissue    Depression with anxiety    Fibromyalgia    Gastroesophageal reflux disease without esophagitis    Muscle pain    Palpitations    Seasonal allergic rhinitis    Eczema    Shoulder pain, left    Substance abuse    Lateral epicondylitis    Drug addiction syndrome    Gastroenteritis    Alcohol dependence in remission    History of placement of ear tubes    Status post arthroscopy of shoulder    Subacromial impingement of left shoulder    Biceps tendinitis of left upper extremity    Complete tear of left rotator cuff    Smoker    Sjogren's syndrome with keratoconjunctivitis sicca    Pelvic pain    Nondisplaced fracture of coronoid process of left ulna, initial encounter for closed fracture    Left breast lump    SARMAD (stress urinary incontinence, female)    Rectocele    Irritable bowel syndrome with both constipation and diarrhea    Ashton's esophagus without dysplasia    IC (interstitial cystitis)    Overactive bladder    Gastroparesis    Chondromalacia of patellofemoral joint, left    Lupus    Dysmenorrhea    History of bilateral tubal ligation    Status post repair of anterior cruciate ligament    Blood blister: R labium    Carrier of ureaplasma urealyticum    Mechanical knee pain, left    AC joint arthropathy    Subacromial bursitis of right shoulder joint    Rotator cuff tendinitis, right    Vapes nicotine containing substance    Tears of meniscus and ACL of left knee    Left anterior cruciate ligament tear        Past Medical History:   Diagnosis Date    Anal fissure     Anxiety     Bacterial vaginosis      Ashton esophagus     Bulging lumbar disc     lower back per patient    Depression with anxiety     Fibromyalgia     Fracture, finger     right hand, 4th finger    Gastroenteritis 2017    Gastroesophageal reflux disease without esophagitis 02/10/2016    Gastroparesis     GERD (gastroesophageal reflux disease)     Hemorrhoids     had a fissure    History of anemia     Hypertension     Hypothyroid     hashimoto    IBS (irritable bowel syndrome)     IC (interstitial cystitis)     Lateral epicondylitis 2013    RHUEMATOLOGIST    Lupus     Migraine     MRSA (methicillin resistant staph aureus) culture positive 2011 ESTIMATE    GROIN AREA     Overactive bladder     Sjogren's syndrome     Urinary tract infection         Past Surgical History:   Procedure Laterality Date    ADENOIDECTOMY      CARPAL TUNNEL RELEASE Bilateral      SECTION      COLONOSCOPY      D & C HYSTEROSCOPY N/A 2021    Procedure: DILATATION AND CURETTAGE HYSTEROSCOPY;  Surgeon: Rancho Mayberry MD;  Location: [facility] OR;  Service: Obstetrics/Gynecology;  Laterality: N/A;    D & C HYSTEROSCOPY ENDOMETRIAL ABLATION N/A 2021    Procedure: DILATATION AND CURETTAGE HYSTEROSCOPY,  NOVASURE ENDOMETRIAL ABLATION;  Surgeon: Rancho Mayberry MD;  Location: [facility] OR;  Service: Obstetrics/Gynecology;  Laterality: N/A;    ENDOSCOPY N/A 05/10/2019    Procedure: ESOPHAGOGASTRODUODENOSCOPY with biopsies;  Surgeon: Shanon Vazquez MD;  Location: [facility] OR;  Service: Gastroenterology    ENDOSCOPY N/A 2021    Procedure: ESOPHAGOGASTRODUODENOSCOPY with biopsies;  Surgeon: Romain Rice MD;  Location: [facility] OR;  Service: Gastroenterology;  Laterality: N/A;  barretts  gastritis    FINGER GANGLION CYST EXCISION Right     middle finger    GASTRIC STIMULATOR IMPLANT SURGERY      KNEE ACL RECONSTRUCTION Left 2024    Procedure: KNEE ANTERIOR CRUCIATE LIGAMENT RECONSTRUCTION WITH ALLOGRAFT, medial  meniscal repair;  Surgeon: Joo Rivers MD;  Location:  [facility] OR;  Service: Orthopedics;  Laterality: Left;    MOUTH SURGERY      3 teeth pulled due to sjorgens syndrome    OTHER SURGICAL HISTORY      reversal of tubal    RHINOPLASTY      SHOULDER ARTHROSCOPY Left 09/10/2018    Procedure: SHOULDER ARTHROSCOPY, rotator cuff repair; extensive debridement, open biceps tenodesis;  Surgeon: Joo Rivers MD;  Location:  [facility] OR;  Service: Orthopedics    SHOULDER ARTHROSCOPY W/ ROTATOR CUFF REPAIR Left 05/27/2022    Procedure: SHOULDER ARTHROSCOPY WITH ROTATOR CUFF REPAIR COMPLEX, EXTENSIVE DEBRIDEMENT;  Surgeon: Joo Rivers MD;  Location:  [facility] OR;  Service: Orthopedics;  Laterality: Left;    SHOULDER ARTHROSCOPY W/ ROTATOR CUFF REPAIR Left 06/23/2023    Procedure: SHOULDER ARTHROSCOPY WITH ROTATOR CUFF REPAIR, distal clavicle excision;  Surgeon: Joo Rivers MD;  Location: [facility] OR;  Service: Orthopedics;  Laterality: Left;    TONSILLECTOMY      TUBAL ABDOMINAL LIGATION      TYMPANOSTOMY TUBE PLACEMENT      WISDOM TOOTH EXTRACTION Bilateral         PT Ortho       Row Name 07/15/24 0700       Left Lower Ext    Lt Knee Extension/Flexion AROM 0-136 degrees after stretching  -       Transfers    Comment, (Transfers) Pt is independent with all bed mobility and transfers  -       Gait/Stairs (Locomotion)    Comment, (Gait/Stairs) Pt is ambulating well on level surfaces without brace or assistive device.  -              User Key  (r) = Recorded By, (t) = Taken By, (c) = Cosigned By      Initials Name Provider Type     uRperto Kimball, PT Physical Therapist                                 PT Assessment/Plan       Row Name 07/15/24 0700          PT Assessment    Assessment Comments Pt is doing well with increased knee ROM, improved function, and she tolerated her exercise progression well.  -        PT Plan    PT Plan Comments Pt is to continue her HEP daily. Will re-ck again next week.   "-GC               User Key  (r) = Recorded By, (t) = Taken By, (c) = Cosigned By      Initials Name Provider Type    GC Ruperto Kimball, PT Physical Therapist                       OP Exercises       Row Name 07/15/24 0700             Subjective    Subjective Comments Pt states her knee is feeling much better, but it is still swollen.  -GC         Exercise 1    Exercise Name 1 Heel slides  -GC      Time 1 5 min  -GC         Exercise 2    Exercise Name 2 wall slides  -GC      Time 2 5 min  -GC         Exercise 3    Exercise Name 3 bike  -GC      Time 3 --  -GC         Exercise 4    Exercise Name 4 Hamstring stretch  -GC      Reps 4 15  -GC      Time 4 10 secs  -GC         Exercise 5    Exercise Name 5 prone leg hang  -GC      Time 5 5 min  -GC      Additional Comments 2#  -GC         Exercise 6    Exercise Name 6 QS with Russian Stim  -GC      Time 6 10 min 10/10  -GC         Exercise 7    Exercise Name 7 SLR  -GC      Reps 7 25  -GC      Time 7 4#  -GC         Exercise 8    Exercise Name 8 hip ABD  -GC      Reps 8 25  -GC      Time 8 4#  -GC         Exercise 9    Exercise Name 9 hip EXT  -GC      Reps 9 25  -GC      Time 9 4#  -GC         Exercise 10    Exercise Name 10 LAQ  -GC      Reps 10 25  -GC         Exercise 11    Exercise Name 11 Heel raises  -GC      Reps 11 25  -GC         Exercise 12    Exercise Name 12 hamstrings vs stool  -GC      Reps 12 25  -GC         Exercise 13    Exercise Name 13 TKE vs theraband  -GC      Reps 13 25  -GC      Time 13 Gold  -GC         Exercise 14    Exercise Name 14 Partial squats  -GC      Reps 14 25  -GC         Exercise 15    Exercise Name 15 Forward step ups on 6\" step  -GC      Reps 15 10x ea  -GC         Exercise 16    Exercise Name 16 Lateral step overs on 6\" step  -GC      Reps 16 10x  -GC                User Key  (r) = Recorded By, (t) = Taken By, (c) = Cosigned By      Initials Name Provider Type    GC Ruperto Kimball, PT Physical Therapist                           "                          Time Calculation:   Start Time: 0700  Stop Time: 0753  Time Calculation (min): 53 min  Therapy Charges for Today       Code Description Service Date Service Provider Modifiers Qty    [id number]  PT THER PROC EA 15 MIN 7/15/2024 Ruperto Kimball, PT GP 2                      Ruperto Kimball, PT  7/15/2024

## 2024-07-17 ENCOUNTER — OFFICE VISIT (OUTPATIENT)
Dept: ORTHOPEDIC SURGERY | Facility: CLINIC | Age: 44
End: 2024-07-17
Payer: COMMERCIAL

## 2024-07-17 VITALS — BODY MASS INDEX: 33.46 KG/M2 | WEIGHT: 196 LBS | HEIGHT: 64 IN

## 2024-07-17 DIAGNOSIS — Z98.890 STATUS POST REPAIR OF ANTERIOR CRUCIATE LIGAMENT: Primary | ICD-10-CM

## 2024-07-17 RX ORDER — TRIAMCINOLONE ACETONIDE 1 MG/G
CREAM TOPICAL
COMMUNITY
Start: 2024-06-27

## 2024-07-17 RX ORDER — CEVIMELINE HYDROCHLORIDE 30 MG/1
CAPSULE ORAL
COMMUNITY
Start: 2024-06-27

## 2024-07-17 RX ORDER — CELECOXIB 200 MG/1
200 CAPSULE ORAL 2 TIMES DAILY
Qty: 60 CAPSULE | Refills: 0 | Status: SHIPPED | OUTPATIENT
Start: 2024-07-17

## 2024-07-17 NOTE — PROGRESS NOTES
CC:Status post left knee ACL reconstruction with soft tissue allograft, medial meniscal repair-5/28/2024     Interval history: Patient returns to clinic today, 7 weeks from surgery, patient is doing much better this point in time with her flexion she still having some tightness on her extension as well as weakness, she has noted some crepitus of patellofemoral joint.  Denies any fever chills or sweats, no issues with her incision.  She is going to physical therapy but she is being back down to once a week given her good progress at this time.  She does get some moderate relief with use of ibuprofen      Physical exam:               Left knee:              Active range of motion 3 to 125 degrees, passive motion 1 to 130 degrees, 4 out of 5 strength on extension, 2 degree extensor lag, stable to varus and valgus 0 and 30 degrees.  Incisions well-healed              stable to testing and Lachman: stable              Positive sensation light touch               Brisk cap refill      Impression: Status post left knee ACL reconstruction, medial meniscal repair     Plan:  Patient is making good progress at this point in time particular with her flexion.  Recommended that she continue working particularly on prone hangs, hyperextension when she is walking, and straight leg raises with weights to try to improve her quad strength and resolve her extensor lag.  Recommended knee sleeve for support.  I did write prescription for Celebrex to try to help with inflammation and swelling.  Follow-up in 4 weeks repeat x-rays left knee ACL series or sooner if needed  Continue with physical therapy at least once a week to work on strengthening

## 2024-07-23 ENCOUNTER — HOSPITAL ENCOUNTER (OUTPATIENT)
Dept: PHYSICAL THERAPY | Facility: HOSPITAL | Age: 44
Setting detail: THERAPIES SERIES
Discharge: HOME | End: 2024-07-23
Payer: COMMERCIAL

## 2024-07-23 DIAGNOSIS — Z98.890 S/P ACL SURGERY: Primary | ICD-10-CM

## 2024-07-23 PROCEDURE — 97110 THERAPEUTIC EXERCISES: CPT | Performed by: PHYSICAL THERAPIST

## 2024-07-23 NOTE — THERAPY RE-EVALUATION
Outpatient Physical Therapy Ortho Re-Evaluation  TENISHA Wing     Patient Name: Aide Henry  : 1980  MRN: [id number]  Today's Date: 2024      Visit Date: 2024    Patient Active Problem List   Diagnosis    Arthralgia of multiple joints    Chronic back pain    Chronic constipation    Disorder of connective tissue    Depression with anxiety    Fibromyalgia    Gastroesophageal reflux disease without esophagitis    Muscle pain    Palpitations    Seasonal allergic rhinitis    Eczema    Shoulder pain, left    Substance abuse    Lateral epicondylitis    Drug addiction syndrome    Gastroenteritis    Alcohol dependence in remission    History of placement of ear tubes    Status post arthroscopy of shoulder    Subacromial impingement of left shoulder    Biceps tendinitis of left upper extremity    Complete tear of left rotator cuff    Smoker    Sjogren's syndrome with keratoconjunctivitis sicca    Pelvic pain    Nondisplaced fracture of coronoid process of left ulna, initial encounter for closed fracture    Left breast lump    SARMAD (stress urinary incontinence, female)    Rectocele    Irritable bowel syndrome with both constipation and diarrhea    Ashton's esophagus without dysplasia    IC (interstitial cystitis)    Overactive bladder    Gastroparesis    Chondromalacia of patellofemoral joint, left    Lupus    Dysmenorrhea    History of bilateral tubal ligation    Status post repair of anterior cruciate ligament    Blood blister: R labium    Carrier of ureaplasma urealyticum    Mechanical knee pain, left    AC joint arthropathy    Subacromial bursitis of right shoulder joint    Rotator cuff tendinitis, right    Vapes nicotine containing substance    Tears of meniscus and ACL of left knee    Left anterior cruciate ligament tear        Past Medical History:   Diagnosis Date    Anal fissure     Anxiety     Bacterial vaginosis     Ashton esophagus     Bulging lumbar disc     lower back per patient     Depression with anxiety     Fibromyalgia     Fracture, finger     right hand, 4th finger    Gastroenteritis 2017    Gastroesophageal reflux disease without esophagitis 02/10/2016    Gastroparesis     GERD (gastroesophageal reflux disease)     Hemorrhoids     had a fissure    History of anemia     Hypertension     Hypothyroid     hashimoto    IBS (irritable bowel syndrome)     IC (interstitial cystitis)     Lateral epicondylitis 2013    RHUEMATOLOGIST    Lupus     Migraine     MRSA (methicillin resistant staph aureus) culture positive  ESTIMATE    GROIN AREA     Overactive bladder     Sjogren's syndrome     Urinary tract infection         Past Surgical History:   Procedure Laterality Date    ADENOIDECTOMY      CARPAL TUNNEL RELEASE Bilateral      SECTION      COLONOSCOPY      D & C HYSTEROSCOPY N/A 2021    Procedure: DILATATION AND CURETTAGE HYSTEROSCOPY;  Surgeon: Rancho Mayberry MD;  Location: [facility];  Service: Obstetrics/Gynecology;  Laterality: N/A;    D & C HYSTEROSCOPY ENDOMETRIAL ABLATION N/A 2021    Procedure: DILATATION AND CURETTAGE HYSTEROSCOPY,  NOVASURE ENDOMETRIAL ABLATION;  Surgeon: Rancho Mayberry MD;  Location: [facility];  Service: Obstetrics/Gynecology;  Laterality: N/A;    ENDOSCOPY N/A 05/10/2019    Procedure: ESOPHAGOGASTRODUODENOSCOPY with biopsies;  Surgeon: Shanon Vazquez MD;  Location: [facility];  Service: Gastroenterology    ENDOSCOPY N/A 2021    Procedure: ESOPHAGOGASTRODUODENOSCOPY with biopsies;  Surgeon: Romain Rice MD;  Location: [facility];  Service: Gastroenterology;  Laterality: N/A;  barretts  gastritis    FINGER GANGLION CYST EXCISION Right     middle finger    GASTRIC STIMULATOR IMPLANT SURGERY      KNEE ACL RECONSTRUCTION Left 2024    Procedure: KNEE ANTERIOR CRUCIATE LIGAMENT RECONSTRUCTION WITH ALLOGRAFT, medial meniscal repair;  Surgeon: Joo Rivers MD;  Location: [facility];   Service: Orthopedics;  Laterality: Left;    MOUTH SURGERY      3 teeth pulled due to sjorgens syndrome    OTHER SURGICAL HISTORY      reversal of tubal    RHINOPLASTY      SHOULDER ARTHROSCOPY Left 09/10/2018    Procedure: SHOULDER ARTHROSCOPY, rotator cuff repair; extensive debridement, open biceps tenodesis;  Surgeon: Joo Rivers MD;  Location:  [facility];  Service: Orthopedics    SHOULDER ARTHROSCOPY W/ ROTATOR CUFF REPAIR Left 05/27/2022    Procedure: SHOULDER ARTHROSCOPY WITH ROTATOR CUFF REPAIR COMPLEX, EXTENSIVE DEBRIDEMENT;  Surgeon: Joo Rivers MD;  Location:  [facility];  Service: Orthopedics;  Laterality: Left;    SHOULDER ARTHROSCOPY W/ ROTATOR CUFF REPAIR Left 06/23/2023    Procedure: SHOULDER ARTHROSCOPY WITH ROTATOR CUFF REPAIR, distal clavicle excision;  Surgeon: Joo Rivers MD;  Location:  [facility];  Service: Orthopedics;  Laterality: Left;    TONSILLECTOMY      TUBAL ABDOMINAL LIGATION      TYMPANOSTOMY TUBE PLACEMENT      WISDOM TOOTH EXTRACTION Bilateral        Visit Dx:     ICD-10-CM ICD-9-CM   1. S/P ACL surgery  Z98.890 V45.89              PT Ortho       Row Name 07/23/24 0700       Left Lower Ext    Lt Knee Extension/Flexion AROM 0-138 degrees after stretching  -GC       MMT Left Lower Ext    Lt Hip Flexion MMT, Gross Movement (4+/5) good plus  -GC    Lt Hip Extension MMT, Gross Movement (4+/5) good plus  -GC    Lt Hip ABduction MMT, Gross Movement (5/5) normal  -GC    Lt Hip ADduction MMT, Gross Movement (4/5) good  -GC    Lt Knee Extension MMT, Gross Movement (4/5) good  -GC    Lt Knee Flexion MMT, Gross Movement (4/5) good  -GC    Lt Ankle Plantarflexion MMT, Gross Movement (5/5) normal  -GC    Lt Ankle Dorsiflexion MMT, Gross Movement (5/5) normal  -GC              User Key  (r) = Recorded By, (t) = Taken By, (c) = Cosigned By      Initials Name Provider Type    Ruperto Russo, PT Physical Therapist                                       PT OP Goals       Row Name  07/23/24 0700          PT Short Term Goals    STG Date to Achieve 07/01/24  -GC     STG 1 Decrease left knee pain to 3-4/10 with activity.  -GC     STG 1 Progress Met  -GC     STG 2 Increase left knee ROM to 0-5-110 degrees with testing.  -GC     STG 2 Progress Met  -GC     STG 3 Increase left LE strength to at least 4/5 all planes with testing.  -GC     STG 3 Progress Met  -GC     STG 4 Pt will be independent with all bed mobility and transfers.  -GC     STG 4 Progress Met  -GC     STG 5 Pt will be independent with her HEP issued by this therapist.  -GC     STG 5 Progress Met  -GC        Long Term Goals    LTG Date to Achieve 07/29/24  -GC     LTG 1 Decrease left knee pain to 0-1/10 with activity.  -GC     LTG 1 Progress Partially Met  -GC     LTG 2 Increase left knee ROM to 0-130 degrees with testing.  -GC     LTG 2 Progress Met  -GC     LTG 3 Increase left LE strength to 5/5 all planes with testing.  -GC     LTG 3 Progress Partially Met  -GC     LTG 4 Pt will ambulate normally on levels and stairs without brace or assistive device.  -GC     LTG 4 Progress Partially Met  -GC     LTG 5 Pt will be independent wtih all ADLs and have a LEFS score > 60.  -     LTG 5 Progress Partially Met  -GC               User Key  (r) = Recorded By, (t) = Taken By, (c) = Cosigned By      Initials Name Provider Type    Ruperto Russo, PT Physical Therapist                     PT Assessment/Plan       Row Name 07/23/24 0700          PT Assessment    Assessment Comments Pt is doing well with good knee ROm, improved strength, and improved function.  -        PT Plan    PT Plan Comments Pt is doing is to continue her HEP daily.  -               User Key  (r) = Recorded By, (t) = Taken By, (c) = Cosigned By      Initials Name Provider Type    Ruperto Russo PT Physical Therapist                       OP Exercises       Row Name 07/23/24 0700             Subjective    Subjective Comments Pt states her knee feels a little stiff  "this morning.  -GC         Exercise 1    Exercise Name 1 Heel slides  -GC      Time 1 5 min  -GC         Exercise 2    Exercise Name 2 wall slides  -GC      Time 2 5 min  -GC         Exercise 3    Exercise Name 3 bike  -GC         Exercise 4    Exercise Name 4 Hamstring stretch  -GC      Reps 4 15  -GC      Time 4 10 secs  -GC         Exercise 5    Exercise Name 5 prone leg hang  -GC      Time 5 5 min  -GC         Exercise 6    Exercise Name 6 QS with Russian Stim  -GC      Time 6 10 min 10/10  -GC         Exercise 7    Exercise Name 7 SLR  -GC      Reps 7 25  -GC      Time 7 4#  -GC         Exercise 8    Exercise Name 8 hip ABD  -GC      Reps 8 25  -GC      Time 8 4#  -GC         Exercise 9    Exercise Name 9 hip EXT  -GC      Reps 9 25  -GC      Time 9 4#  -GC         Exercise 10    Exercise Name 10 LAQ  -GC      Reps 10 25  -GC         Exercise 11    Exercise Name 11 Heel raises  -GC      Reps 11 25  -GC         Exercise 12    Exercise Name 12 hamstrings vs stool  -GC      Reps 12 25  -GC         Exercise 13    Exercise Name 13 TKE vs theraband  -GC      Reps 13 25  -GC      Time 13 Gold  -GC         Exercise 14    Exercise Name 14 Partial squats  -GC      Reps 14 25  -GC         Exercise 15    Exercise Name 15 Forward step ups on 6\" step  -GC      Reps 15 10x ea  -GC         Exercise 16    Exercise Name 16 Lateral step overs on 6\" step  -GC      Reps 16 10x  -GC                User Key  (r) = Recorded By, (t) = Taken By, (c) = Cosigned By      Initials Name Provider Type    GC Ruperto Kimball, PT Physical Therapist                                            Time Calculation:     Start Time: 0700  Stop Time: 0754  Time Calculation (min): 54 min     Therapy Charges for Today       Code Description Service Date Service Provider Modifiers Qty    [id number]  PT THER PROC EA 15 MIN 7/23/2024 Ruperto Kimball, PT GP 2                      Ruperto Kimball PT  7/23/2024        "

## 2024-07-30 ENCOUNTER — HOSPITAL ENCOUNTER (OUTPATIENT)
Dept: PHYSICAL THERAPY | Facility: HOSPITAL | Age: 44
Setting detail: THERAPIES SERIES
Discharge: HOME | End: 2024-07-30
Payer: COMMERCIAL

## 2024-07-30 DIAGNOSIS — Z98.890 S/P ACL SURGERY: Primary | ICD-10-CM

## 2024-07-30 PROCEDURE — 97110 THERAPEUTIC EXERCISES: CPT | Performed by: PHYSICAL THERAPIST

## 2024-07-30 NOTE — THERAPY TREATMENT NOTE
Outpatient Physical Therapy Ortho Treatment Note  TENISHA Wing     Patient Name: Aide Henry  : 1980  MRN: [id number]  Today's Date: 2024      Visit Date: 2024    Visit Dx:    ICD-10-CM ICD-9-CM   1. S/P ACL surgery  Z98.890 V45.89       Patient Active Problem List   Diagnosis    Arthralgia of multiple joints    Chronic back pain    Chronic constipation    Disorder of connective tissue    Depression with anxiety    Fibromyalgia    Gastroesophageal reflux disease without esophagitis    Muscle pain    Palpitations    Seasonal allergic rhinitis    Eczema    Shoulder pain, left    Substance abuse    Lateral epicondylitis    Drug addiction syndrome    Gastroenteritis    Alcohol dependence in remission    History of placement of ear tubes    Status post arthroscopy of shoulder    Subacromial impingement of left shoulder    Biceps tendinitis of left upper extremity    Complete tear of left rotator cuff    Smoker    Sjogren's syndrome with keratoconjunctivitis sicca    Pelvic pain    Nondisplaced fracture of coronoid process of left ulna, initial encounter for closed fracture    Left breast lump    SARMAD (stress urinary incontinence, female)    Rectocele    Irritable bowel syndrome with both constipation and diarrhea    Ashton's esophagus without dysplasia    IC (interstitial cystitis)    Overactive bladder    Gastroparesis    Chondromalacia of patellofemoral joint, left    Lupus    Dysmenorrhea    History of bilateral tubal ligation    Status post repair of anterior cruciate ligament    Blood blister: R labium    Carrier of ureaplasma urealyticum    Mechanical knee pain, left    AC joint arthropathy    Subacromial bursitis of right shoulder joint    Rotator cuff tendinitis, right    Vapes nicotine containing substance    Tears of meniscus and ACL of left knee    Left anterior cruciate ligament tear        Past Medical History:   Diagnosis Date    Anal fissure     Anxiety     Bacterial vaginosis      Ashton esophagus     Bulging lumbar disc     lower back per patient    Depression with anxiety     Fibromyalgia     Fracture, finger     right hand, 4th finger    Gastroenteritis 2017    Gastroesophageal reflux disease without esophagitis 02/10/2016    Gastroparesis     GERD (gastroesophageal reflux disease)     Hemorrhoids     had a fissure    History of anemia     Hypertension     Hypothyroid     hashimoto    IBS (irritable bowel syndrome)     IC (interstitial cystitis)     Lateral epicondylitis 2013    RHUEMATOLOGIST    Lupus     Migraine     MRSA (methicillin resistant staph aureus) culture positive 2011 ESTIMATE    GROIN AREA     Overactive bladder     Sjogren's syndrome     Urinary tract infection         Past Surgical History:   Procedure Laterality Date    ADENOIDECTOMY      CARPAL TUNNEL RELEASE Bilateral      SECTION      COLONOSCOPY      D & C HYSTEROSCOPY N/A 2021    Procedure: DILATATION AND CURETTAGE HYSTEROSCOPY;  Surgeon: Rancho Mayberry MD;  Location: [facility];  Service: Obstetrics/Gynecology;  Laterality: N/A;    D & C HYSTEROSCOPY ENDOMETRIAL ABLATION N/A 2021    Procedure: DILATATION AND CURETTAGE HYSTEROSCOPY,  NOVASURE ENDOMETRIAL ABLATION;  Surgeon: Rancho Mayberry MD;  Location: [facility];  Service: Obstetrics/Gynecology;  Laterality: N/A;    ENDOSCOPY N/A 05/10/2019    Procedure: ESOPHAGOGASTRODUODENOSCOPY with biopsies;  Surgeon: Shanon Vazquez MD;  Location: [facility];  Service: Gastroenterology    ENDOSCOPY N/A 2021    Procedure: ESOPHAGOGASTRODUODENOSCOPY with biopsies;  Surgeon: Romain Rice MD;  Location: [facility];  Service: Gastroenterology;  Laterality: N/A;  barretts  gastritis    FINGER GANGLION CYST EXCISION Right     middle finger    GASTRIC STIMULATOR IMPLANT SURGERY      KNEE ACL RECONSTRUCTION Left 2024    Procedure: KNEE ANTERIOR CRUCIATE LIGAMENT RECONSTRUCTION WITH ALLOGRAFT, medial  meniscal repair;  Surgeon: Joo Rivers MD;  Location:  [facility];  Service: Orthopedics;  Laterality: Left;    MOUTH SURGERY      3 teeth pulled due to sjorgens syndrome    OTHER SURGICAL HISTORY      reversal of tubal    RHINOPLASTY      SHOULDER ARTHROSCOPY Left 09/10/2018    Procedure: SHOULDER ARTHROSCOPY, rotator cuff repair; extensive debridement, open biceps tenodesis;  Surgeon: Joo Rivers MD;  Location:  [facility];  Service: Orthopedics    SHOULDER ARTHROSCOPY W/ ROTATOR CUFF REPAIR Left 05/27/2022    Procedure: SHOULDER ARTHROSCOPY WITH ROTATOR CUFF REPAIR COMPLEX, EXTENSIVE DEBRIDEMENT;  Surgeon: Joo Rivers MD;  Location:  [facility];  Service: Orthopedics;  Laterality: Left;    SHOULDER ARTHROSCOPY W/ ROTATOR CUFF REPAIR Left 06/23/2023    Procedure: SHOULDER ARTHROSCOPY WITH ROTATOR CUFF REPAIR, distal clavicle excision;  Surgeon: Joo Rivers MD;  Location:  [facility];  Service: Orthopedics;  Laterality: Left;    TONSILLECTOMY      TUBAL ABDOMINAL LIGATION      TYMPANOSTOMY TUBE PLACEMENT      WISDOM TOOTH EXTRACTION Bilateral         PT Ortho       Row Name 07/30/24 0700       Subjective    Subjective Comments Pt states her knee is still sore.  -GC       Left Lower Ext    Lt Knee Extension/Flexion AROM 0-2-140 degrees prior to treatment  -GC       Gait/Stairs (Locomotion)    Comment, (Gait/Stairs) Pt ambulates with mild antalgic gait left LE  -GC              User Key  (r) = Recorded By, (t) = Taken By, (c) = Cosigned By      Initials Name Provider Type    Ruperto Russo, PT Physical Therapist                                 PT Assessment/Plan       Row Name 07/30/24 0700          PT Assessment    Assessment Comments Pt is doing well with improving function and strength.  -GC        PT Plan    PT Plan Comments Pt is to continue her HEP daily.  -GC               User Key  (r) = Recorded By, (t) = Taken By, (c) = Cosigned By      Initials Name Provider Type    PETE Kimball  "KEYSHA Hickey Physical Therapist                       OP Exercises       Row Name 07/30/24 0700             Subjective    Subjective Comments Pt states her knee is still sore.  -GC         Exercise 1    Exercise Name 1 Heel slides  -GC         Exercise 2    Exercise Name 2 wall slides  -GC         Exercise 3    Exercise Name 3 bike  -GC         Exercise 4    Exercise Name 4 Hamstring stretch  -GC      Reps 4 15  -GC      Time 4 10 secs  -GC         Exercise 5    Exercise Name 5 prone leg hang  -GC      Time 5 5 min  -GC         Exercise 6    Exercise Name 6 QS with Russian Stim  -GC      Time 6 10 min 10/10  -GC         Exercise 7    Exercise Name 7 SLR  -GC      Reps 7 25  -GC      Time 7 4#  -GC         Exercise 8    Exercise Name 8 hip ABD  -GC      Reps 8 25  -GC      Time 8 4#  -GC         Exercise 9    Exercise Name 9 hip EXT  -GC      Reps 9 25  -GC      Time 9 4#  -GC         Exercise 10    Exercise Name 10 LAQ  -GC      Reps 10 25  -GC         Exercise 11    Exercise Name 11 Heel raises  -GC      Reps 11 25  -GC         Exercise 12    Exercise Name 12 hamstrings vs stool  -GC      Reps 12 25  -GC         Exercise 13    Exercise Name 13 TKE vs theraband  -GC      Reps 13 25  -GC      Time 13 Gold  -GC         Exercise 14    Exercise Name 14 Partial squats  -GC      Reps 14 25  -GC         Exercise 15    Exercise Name 15 Forward step ups on 6\" step  -GC      Reps 15 15x ea  -GC         Exercise 16    Exercise Name 16 Lateral step overs on 6\" step  -GC      Reps 16 15x  -GC         Exercise 17    Exercise Name 17 lateral dips on 4\" step  -GC      Reps 17 25  -GC                User Key  (r) = Recorded By, (t) = Taken By, (c) = Cosigned By      Initials Name Provider Type    Ruperto Russo PT Physical Therapist                                                    Time Calculation:   Start Time: 0700  Stop Time: 0747  Time Calculation (min): 47 min  Therapy Charges for Today       Code Description Service Date " Service Provider Modifiers Qty    [id number] HC PT THER PROC EA 15 MIN 7/30/2024 Ruperto Kimball, PT GP 2                      Ruperto Kimball, PT  7/30/2024

## 2024-08-06 ENCOUNTER — HOSPITAL ENCOUNTER (OUTPATIENT)
Dept: PHYSICAL THERAPY | Facility: HOSPITAL | Age: 44
Setting detail: THERAPIES SERIES
Discharge: HOME | End: 2024-08-06
Payer: COMMERCIAL

## 2024-08-06 DIAGNOSIS — Z98.890 S/P ACL SURGERY: Primary | ICD-10-CM

## 2024-08-06 PROCEDURE — 97110 THERAPEUTIC EXERCISES: CPT | Performed by: PHYSICAL THERAPIST

## 2024-08-06 NOTE — THERAPY TREATMENT NOTE
Outpatient Physical Therapy Ortho Treatment Note  TENISHA Wing     Patient Name: Aide Henry  : 1980  MRN: [id number]  Today's Date: 2024      Visit Date: 2024    Visit Dx:    ICD-10-CM ICD-9-CM   1. S/P ACL surgery  Z98.890 V45.89       Patient Active Problem List   Diagnosis    Arthralgia of multiple joints    Chronic back pain    Chronic constipation    Disorder of connective tissue    Depression with anxiety    Fibromyalgia    Gastroesophageal reflux disease without esophagitis    Muscle pain    Palpitations    Seasonal allergic rhinitis    Eczema    Shoulder pain, left    Substance abuse    Lateral epicondylitis    Drug addiction syndrome    Gastroenteritis    Alcohol dependence in remission    History of placement of ear tubes    Status post arthroscopy of shoulder    Subacromial impingement of left shoulder    Biceps tendinitis of left upper extremity    Complete tear of left rotator cuff    Smoker    Sjogren's syndrome with keratoconjunctivitis sicca    Pelvic pain    Nondisplaced fracture of coronoid process of left ulna, initial encounter for closed fracture    Left breast lump    SARMAD (stress urinary incontinence, female)    Rectocele    Irritable bowel syndrome with both constipation and diarrhea    Ashton's esophagus without dysplasia    IC (interstitial cystitis)    Overactive bladder    Gastroparesis    Chondromalacia of patellofemoral joint, left    Lupus    Dysmenorrhea    History of bilateral tubal ligation    Status post repair of anterior cruciate ligament    Blood blister: R labium    Carrier of ureaplasma urealyticum    Mechanical knee pain, left    AC joint arthropathy    Subacromial bursitis of right shoulder joint    Rotator cuff tendinitis, right    Vapes nicotine containing substance    Tears of meniscus and ACL of left knee    Left anterior cruciate ligament tear        Past Medical History:   Diagnosis Date    Anal fissure     Anxiety     Bacterial vaginosis      Ashton esophagus     Bulging lumbar disc     lower back per patient    Depression with anxiety     Fibromyalgia     Fracture, finger     right hand, 4th finger    Gastroenteritis 2017    Gastroesophageal reflux disease without esophagitis 02/10/2016    Gastroparesis     GERD (gastroesophageal reflux disease)     Hemorrhoids     had a fissure    History of anemia     Hypertension     Hypothyroid     hashimoto    IBS (irritable bowel syndrome)     IC (interstitial cystitis)     Lateral epicondylitis 2013    RHUEMATOLOGIST    Lupus     Migraine     MRSA (methicillin resistant staph aureus) culture positive 2011 ESTIMATE    GROIN AREA     Overactive bladder     Sjogren's syndrome     Urinary tract infection         Past Surgical History:   Procedure Laterality Date    ADENOIDECTOMY      CARPAL TUNNEL RELEASE Bilateral      SECTION      COLONOSCOPY      D & C HYSTEROSCOPY N/A 2021    Procedure: DILATATION AND CURETTAGE HYSTEROSCOPY;  Surgeon: Rancho Mayberry MD;  Location: [facility];  Service: Obstetrics/Gynecology;  Laterality: N/A;    D & C HYSTEROSCOPY ENDOMETRIAL ABLATION N/A 2021    Procedure: DILATATION AND CURETTAGE HYSTEROSCOPY,  NOVASURE ENDOMETRIAL ABLATION;  Surgeon: Rancho Mayberry MD;  Location: [facility] OR;  Service: Obstetrics/Gynecology;  Laterality: N/A;    ENDOSCOPY N/A 05/10/2019    Procedure: ESOPHAGOGASTRODUODENOSCOPY with biopsies;  Surgeon: Shanon Vazquez MD;  Location: [facility] OR;  Service: Gastroenterology    ENDOSCOPY N/A 2021    Procedure: ESOPHAGOGASTRODUODENOSCOPY with biopsies;  Surgeon: Romain Rice MD;  Location: [facility] OR;  Service: Gastroenterology;  Laterality: N/A;  barretts  gastritis    FINGER GANGLION CYST EXCISION Right     middle finger    GASTRIC STIMULATOR IMPLANT SURGERY      KNEE ACL RECONSTRUCTION Left 2024    Procedure: KNEE ANTERIOR CRUCIATE LIGAMENT RECONSTRUCTION WITH ALLOGRAFT, medial  meniscal repair;  Surgeon: Joo Rivers MD;  Location:  [facility] OR;  Service: Orthopedics;  Laterality: Left;    MOUTH SURGERY      3 teeth pulled due to sjorgens syndrome    OTHER SURGICAL HISTORY      reversal of tubal    RHINOPLASTY      SHOULDER ARTHROSCOPY Left 09/10/2018    Procedure: SHOULDER ARTHROSCOPY, rotator cuff repair; extensive debridement, open biceps tenodesis;  Surgeon: Joo Rivers MD;  Location:  [facility] OR;  Service: Orthopedics    SHOULDER ARTHROSCOPY W/ ROTATOR CUFF REPAIR Left 05/27/2022    Procedure: SHOULDER ARTHROSCOPY WITH ROTATOR CUFF REPAIR COMPLEX, EXTENSIVE DEBRIDEMENT;  Surgeon: Joo Rivers MD;  Location:  [facility] OR;  Service: Orthopedics;  Laterality: Left;    SHOULDER ARTHROSCOPY W/ ROTATOR CUFF REPAIR Left 06/23/2023    Procedure: SHOULDER ARTHROSCOPY WITH ROTATOR CUFF REPAIR, distal clavicle excision;  Surgeon: Joo Rivers MD;  [facility];  Service: Orthopedics;  Laterality: Left;    TONSILLECTOMY      TUBAL ABDOMINAL LIGATION      TYMPANOSTOMY TUBE PLACEMENT      WISDOM TOOTH EXTRACTION Bilateral                         PT Assessment/Plan       Row Name 08/06/24 0700          PT Assessment    Assessment Comments Pt is doing well with improving function.  -        PT Plan    PT Plan Comments Pt is to continue her HEP daily.  -               User Key  (r) = Recorded By, (t) = Taken By, (c) = Cosigned By      Initials Name Provider Type    GC Ruperto Kimball, PT Physical Therapist                       OP Exercises       Row Name 08/06/24 0700             Subjective    Subjective Comments Pt states her knee still hurts everyday, but she is getting around pretty well.  -         Exercise 1    Exercise Name 1 Heel slides  -GC         Exercise 2    Exercise Name 2 wall slides  -GC         Exercise 3    Exercise Name 3 bike  -         Exercise 4    Exercise Name 4 Hamstring stretch  -      Reps 4 15  -GC      Time 4 10 secs  -          "Exercise 5    Exercise Name 5 prone leg hang  -GC      Time 5 5 min  -GC         Exercise 6    Exercise Name 6 QS with Russian Stim  -GC      Time 6 10 min 10/10  -GC         Exercise 7    Exercise Name 7 SLR  -GC      Reps 7 25  -GC      Time 7 4#  -GC         Exercise 8    Exercise Name 8 hip ABD  -GC      Reps 8 25  -GC      Time 8 4#  -GC         Exercise 9    Exercise Name 9 hip EXT  -GC      Reps 9 25  -GC      Time 9 4#  -GC         Exercise 10    Exercise Name 10 LAQ  -GC      Reps 10 25  -GC         Exercise 11    Exercise Name 11 Heel raises  -GC      Reps 11 25  -GC         Exercise 12    Exercise Name 12 hamstrings vs stool  -GC      Reps 12 25  -GC         Exercise 13    Exercise Name 13 TKE vs theraband  -GC      Reps 13 25  -GC      Time 13 Gold  -GC         Exercise 14    Exercise Name 14 Partial squats  -GC      Reps 14 25  -GC         Exercise 15    Exercise Name 15 Forward step ups on 6\" step  -GC      Reps 15 15x ea  -GC         Exercise 16    Exercise Name 16 Lateral step overs on 6\" step  -GC      Reps 16 15x  -GC         Exercise 17    Exercise Name 17 lateral dips on 4\" step  -GC      Reps 17 25  -GC                User Key  (r) = Recorded By, (t) = Taken By, (c) = Cosigned By      Initials Name Provider Type     Ruperto Kimball, PT Physical Therapist                                                    Time Calculation:   Start Time: 0700  Stop Time: 0751  Time Calculation (min): 51 min  Therapy Charges for Today       Code Description Service Date Service Provider Modifiers Qty    [id number] HC PT THER PROC EA 15 MIN 8/6/2024 Ruperto Kimball, PT GP 2                      Ruperto Kimball, PT  8/6/2024     "

## 2024-08-14 ENCOUNTER — OFFICE VISIT (OUTPATIENT)
Dept: ORTHOPEDIC SURGERY | Facility: CLINIC | Age: 44
End: 2024-08-14
Payer: COMMERCIAL

## 2024-08-14 VITALS — WEIGHT: 196 LBS | HEIGHT: 64 IN | BODY MASS INDEX: 33.46 KG/M2

## 2024-08-14 DIAGNOSIS — Z98.890 STATUS POST REPAIR OF ANTERIOR CRUCIATE LIGAMENT: Primary | ICD-10-CM

## 2024-08-14 NOTE — PROGRESS NOTES
CC: Follow-up status post left knee ACL reconstruction with soft tissue allograft, medial meniscal repair-5/28/2024    Interval history: Patient returns clinic today stating doing better at this point in time, she still is having some swelling and stiffness particular in early mornings but once she is able to get her knee moving it feels much better.  She denies any buckling catching locking or giving way episodes.  No issues with her incisions.  She is progressing well with physical therapy.      Exam:  Left knee-active range of motion 0 to 130 degrees, 4 out of 5 strength on extension, 4+ out of 5 strength on flexion, no residual extensor lag.  Grade 1A Lachman, negative anterior posterior drawer.  Minimal effusion.  Incisions well-healed.  Positive sensation to light touch all distributions left foot symmetric to right brisk cap refill all digits 2+pulse.  No calf pain, negative Homans' sign.    Imaging:  Left Knee X-Ray  Indication: Status post anterior cruciate ligament reconstruction    AP, Lateral, and notch views    Findings:  Tibial and femoral tunnels in stable position and with no significant tunnel lysis  Implants evident with no evidence of loosening or disruption  Acceptable overall alignment  No evidence of intra-articular loose body    Compared to prior office x-rays      Impression: Status post left knee ACL reconstruction with soft tissue allograft, medial meniscal repair    Plan:  Patient is improving in regards to functional activity as well as recovery.  Recommended continue work on home exercises, continue work with physical therapy.  Recommended compression sleeve as she returns to work in order to try to help improve any of her residual swelling.  Follow-up in 3 months for repeat evaluation, no x-rays needed.  No cutting or pivoting activities

## 2024-11-10 NOTE — PROGRESS NOTES
GYN Annual Exam     CC- Here for annual exam   Chief Complaint   Patient presents with    Gynecologic Exam     Mmg   Colon             Aide Henry is a 44 y.o.  female who presents for annual well woman exam. No LMP recorded. Patient has had an ablation.    Problems in addition to need for annual: pt needs referral to gyn onc for SARMAD (she never went) and would like a skin tag removal of L perineal/thigh area    HPI: History of Present Illness    PMHX:  Patient Active Problem List   Diagnosis    Arthralgia of multiple joints    Chronic back pain    Chronic constipation    Disorder of connective tissue    Depression with anxiety    Fibromyalgia    Gastroesophageal reflux disease without esophagitis    Muscle pain    Palpitations    Seasonal allergic rhinitis    Eczema    Shoulder pain, left    Substance abuse    Lateral epicondylitis    Drug addiction syndrome    Gastroenteritis    Alcohol dependence in remission    History of placement of ear tubes    Status post arthroscopy of shoulder    Subacromial impingement of left shoulder    Biceps tendinitis of left upper extremity    Complete tear of left rotator cuff    Smoker    Sjogren's syndrome with keratoconjunctivitis sicca    Pelvic pain    Nondisplaced fracture of coronoid process of left ulna, initial encounter for closed fracture    Left breast lump    SARMAD (stress urinary incontinence, female)    Rectocele    Irritable bowel syndrome with both constipation and diarrhea    Ashton's esophagus without dysplasia    IC (interstitial cystitis)    Overactive bladder    Gastroparesis    Chondromalacia of patellofemoral joint, left    Lupus    Dysmenorrhea    History of bilateral tubal ligation    Status post repair of anterior cruciate ligament    Blood blister: R labium    Carrier of ureaplasma urealyticum    Mechanical knee pain, left    AC joint arthropathy    Subacromial bursitis of right shoulder joint    Rotator cuff tendinitis, right    Vapes  nicotine containing substance    Tears of meniscus and ACL of left knee    Left anterior cruciate ligament tear    Skin tag of vulva   ; otherwise none    OB History          4    Para   4    Term   4       0    AB   0    Living   4         SAB        IAB        Ectopic        Molar        Multiple        Live Births                    Past Medical History:   Diagnosis Date    Anal fissure     Anxiety     Bacterial vaginosis     Ashton esophagus     Bulging lumbar disc     lower back per patient    Depression with anxiety     Fibromyalgia     Fracture, finger     right hand, 4th finger    Gastroenteritis 2017    Gastroesophageal reflux disease without esophagitis 02/10/2016    Gastroparesis     GERD (gastroesophageal reflux disease)     Hemorrhoids     had a fissure    History of anemia     Hypertension     Hypothyroid     hashimoto    IBS (irritable bowel syndrome)     IC (interstitial cystitis)     Lateral epicondylitis 2013    RHUEMATOLOGIST    Lupus     Migraine     MRSA (methicillin resistant staph aureus) culture positive  ESTIMATE    GROIN AREA     Overactive bladder     Sjogren's syndrome     Urinary tract infection        Past Surgical History:   Procedure Laterality Date    ADENOIDECTOMY      CARPAL TUNNEL RELEASE Bilateral      SECTION      COLONOSCOPY      D & C HYSTEROSCOPY N/A 2021    Procedure: DILATATION AND CURETTAGE HYSTEROSCOPY;  Surgeon: Rancho Mayberry MD;  Location: [facility];  Service: Obstetrics/Gynecology;  Laterality: N/A;    D & C HYSTEROSCOPY ENDOMETRIAL ABLATION N/A 2021    Procedure: DILATATION AND CURETTAGE HYSTEROSCOPY,  NOVASURE ENDOMETRIAL ABLATION;  Surgeon: Rancho Mayberry MD;  Location: [facility];  Service: Obstetrics/Gynecology;  Laterality: N/A;    ENDOSCOPY N/A 05/10/2019    Procedure: ESOPHAGOGASTRODUODENOSCOPY with biopsies;  Surgeon: Shanon Vazquez MD;  Location: [facility];  Service:  Gastroenterology    ENDOSCOPY N/A 04/14/2021    Procedure: ESOPHAGOGASTRODUODENOSCOPY with biopsies;  Surgeon: Romain Rice MD;  Location: [facility];  Service: Gastroenterology;  Laterality: N/A;  barretts  gastritis    FINGER GANGLION CYST EXCISION Right     middle finger    GASTRIC STIMULATOR IMPLANT SURGERY      KNEE ACL RECONSTRUCTION Left 5/28/2024    Procedure: KNEE ANTERIOR CRUCIATE LIGAMENT RECONSTRUCTION WITH ALLOGRAFT, medial meniscal repair;  Surgeon: Joo Rivers MD;  Location:  [facility];  Service: Orthopedics;  Laterality: Left;    MOUTH SURGERY      3 teeth pulled due to sjorgens syndrome    OTHER SURGICAL HISTORY      reversal of tubal    RHINOPLASTY      SHOULDER ARTHROSCOPY Left 09/10/2018    Procedure: SHOULDER ARTHROSCOPY, rotator cuff repair; extensive debridement, open biceps tenodesis;  Surgeon: Joo Rivers MD;  Location:  [facility];  Service: Orthopedics    SHOULDER ARTHROSCOPY W/ ROTATOR CUFF REPAIR Left 05/27/2022    Procedure: SHOULDER ARTHROSCOPY WITH ROTATOR CUFF REPAIR COMPLEX, EXTENSIVE DEBRIDEMENT;  Surgeon: Joo Rivers MD;  Location:  [facility];  Service: Orthopedics;  Laterality: Left;    SHOULDER ARTHROSCOPY W/ ROTATOR CUFF REPAIR Left 06/23/2023    Procedure: SHOULDER ARTHROSCOPY WITH ROTATOR CUFF REPAIR, distal clavicle excision;  Surgeon: Joo Rivers MD;  Location: [facility];  Service: Orthopedics;  Laterality: Left;    TONSILLECTOMY      TUBAL ABDOMINAL LIGATION      TYMPANOSTOMY TUBE PLACEMENT      WISDOM TOOTH EXTRACTION Bilateral          Current Outpatient Medications:     amLODIPine (NORVASC) 5 MG tablet, Take 1 tablet by mouth Daily., Disp: , Rfl:     Bacillus Coagulans-Inulin (Probiotic) 1-250 BILLION-MG capsule, Take 1 capsule by mouth Daily., Disp: , Rfl:     celecoxib (CeleBREX) 200 MG capsule, Take 1 capsule by mouth 2 (Two) Times a Day., Disp: 60 capsule, Rfl: 0    cevimeline (EVOXAC) 30 MG capsule, TAKE 1 CAPSULE(30 MG) BY  MOUTH THREE TIMES DAILY, Disp: , Rfl:     Chlorcyclizine-Pseudoephed 25-60 MG tablet, 1 tablet by mouth every 6-8 hours, not to exceed 3 tablets in 24 hours, Disp: 30 tablet, Rfl: 0    DULoxetine (CYMBALTA) 60 MG capsule, Take 1 capsule by mouth Daily., Disp: , Rfl:     gabapentin (NEURONTIN) 300 MG capsule, Take 1 capsule by mouth 2 (Two) Times a Day. PER PATIENT NOT CURRENTLY TAKEN, Disp: , Rfl:     Glycerin-Hypromellose- (ARTIFICIAL TEARS) 0.2-0.2-1 % solution ophthalmic solution, Eyes Both, Q2H, 0 Refill(s), Disp: , Rfl:     HYDROcodone-acetaminophen (Norco) 7.5-325 MG per tablet, Take 1 tablet by mouth Every 6 (Six) Hours As Needed for Moderate Pain., Disp: 50 tablet, Rfl: 0    hydroxychloroquine (PLAQUENIL) 200 MG tablet, Take 1 tablet by mouth Every Night., Disp: , Rfl:     ibuprofen (ADVIL,MOTRIN) 200 MG tablet, Take 4 tablets by mouth Every 6 (Six) Hours As Needed for Moderate Pain., Disp: , Rfl:     levothyroxine (SYNTHROID, LEVOTHROID) 50 MCG tablet, Take 1 tablet by mouth Daily., Disp: , Rfl:     linaclotide (LINZESS) 72 MCG capsule capsule, 1 capsule., Disp: , Rfl:     metoprolol succinate XL (TOPROL-XL) 25 MG 24 hr tablet, 1 tablet Daily., Disp: , Rfl:     omeprazole (priLOSEC) 40 MG capsule, Take 1 capsule by mouth 2 (Two) Times a Day., Disp: , Rfl:     ondansetron (Zofran) 4 MG tablet, Take 1 tablet by mouth Every 8 (Eight) Hours As Needed for Nausea or Vomiting., Disp: 30 tablet, Rfl: 0    oxyCODONE-acetaminophen (PERCOCET) 5-325 MG per tablet, Take 1 tablet by mouth Every 4 (Four) Hours As Needed for Pain., Disp: 42 tablet, Rfl: 0    predniSONE (DELTASONE) 10 MG tablet, 60 mg po daily x 3 days, then 40 mg po daily x 3 days, then 20 mg po daily x 3 days, then 10 mg po daily x 3 days, Disp: 39 tablet, Rfl: 0    pregabalin (Lyrica) 75 MG capsule, Take 1 capsule by mouth 2 (Two) Times a Day., Disp: 60 capsule, Rfl: 0    Probiotic Product (Risaquad-2) capsule capsule, Take 1 capsule by mouth  Daily., Disp: , Rfl:     Rimegepant Sulfate (NURTEC) 75 MG tablet dispersible tablet, Take 1 tablet by mouth Daily As Needed., Disp: , Rfl:     Rimegepant Sulfate (NURTEC) 75 MG tablet dispersible tablet, 1 tablet., Disp: , Rfl:     Salicylic Acid 6 % foam, WASH ARMS AND ABDOMEN ONCE DAILY, Disp: , Rfl:     senna (SENOKOT) 8.6 MG tablet, Take 1 tablet by mouth Every Night., Disp: 20 tablet, Rfl: 0    traZODone (DESYREL) 150 MG tablet, Take 1 tablet by mouth Every Night., Disp: , Rfl:     triamcinolone (KENALOG) 0.1 % cream, APPLY TO THE AFFECTED AREA ONCE DAILY UNTIL CLEAR., Disp: , Rfl:     vitamin D (ERGOCALCIFEROL) 1.25 MG (93317 UT) capsule capsule, Take 1 capsule by mouth 1 (One) Time Per Week., Disp: , Rfl:     Allergies   Allergen Reactions    Fluconazole Hives    Metoclopramide GI Intolerance     Other reaction(s): Other (see comments)    Constipation       Social History     Tobacco Use    Smoking status: Former     Current packs/day: 0.25     Average packs/day: 0.3 packs/day for 20.0 years (5.0 ttl pk-yrs)     Types: Cigarettes     Passive exposure: Never    Smokeless tobacco: Never    Tobacco comments:     Smokes every now and then    Vaping Use    Vaping status: Some Days    Substances: Nicotine    Devices: Disposable   Substance Use Topics    Alcohol use: Not Currently     Comment: sometimes, sober since 5/2024- states occasional relapse 2-3 times a year    Drug use: Not Currently     Frequency: 1.0 times per week     Types: Cocaine(coke), Marijuana     Comment: rarely     Pt smokes    Family History   Problem Relation Age of Onset    Lung cancer Father     Heart disease Paternal Grandmother     Diabetes Maternal Aunt     Breast cancer Maternal Aunt     Diabetes Maternal Grandmother     COPD Mother     Colon cancer Neg Hx     Colon polyps Neg Hx     Malig Hyperthermia Neg Hx        Review of Systems    Patient reports that she is currently experiencing any symptoms of urinary incontinence.      noTESTED  "FOR CHLAMYDIA?    Gardisil indicated? (9-46yo) 0,2,6 months: no    EXAM:  /60   Ht 162.6 cm (64\")   Wt 88.9 kg (196 lb)   Breastfeeding No   BMI 33.64 kg/m²     Physical Exam  Vitals and nursing note reviewed. Exam conducted with a chaperone present.   Constitutional:       General: She is not in acute distress.     Appearance: She is well-developed. She is not diaphoretic.   HENT:      Head: Normocephalic and atraumatic.      Nose: Nose normal.   Eyes:      Extraocular Movements: Extraocular movements intact.   Cardiovascular:      Rate and Rhythm: Normal rate.   Pulmonary:      Effort: Pulmonary effort is normal.   Chest:   Breasts:     Breasts are symmetrical.      Right: Normal. No mass, nipple discharge, skin change or tenderness.      Left: Normal. No mass, nipple discharge, skin change or tenderness.   Abdominal:      General: There is no distension.      Palpations: Abdomen is soft. There is no mass.      Tenderness: There is no abdominal tenderness. There is no guarding.   Genitourinary:     General: Normal vulva.      Pubic Area: No rash.       Vagina: Normal. No vaginal discharge.      Cervix: Normal.      Uterus: Normal.       Adnexa: Right adnexa normal and left adnexa normal.   Musculoskeletal:         General: No tenderness or deformity. Normal range of motion.      Cervical back: Normal range of motion.   Lymphadenopathy:      Upper Body:      Right upper body: No axillary adenopathy.      Left upper body: No axillary adenopathy.   Skin:     General: Skin is warm and dry.      Coloration: Skin is not pale.      Findings: No erythema or rash.   Neurological:      Mental Status: She is alert and oriented to person, place, and time.   Psychiatric:         Behavior: Behavior normal.         Thought Content: Thought content normal.         Judgment: Judgment normal.         Labs:   Lab Results (last 24 hours)       Procedure Component Value Units Date/Time    POC Urinalysis Dipstick [[id number]] " Collected: 11/12/24 1349    Specimen: Urine Updated: 11/12/24 1350     Color Yellow     Clarity, UA Clear     Glucose, UA Negative mg/dL      Bilirubin Negative     Ketones, UA Negative     Specific Gravity  1.005     Blood, UA Negative     pH, Urine 5.0     Protein, POC Negative mg/dL      Urobilinogen, UA Normal     Leukocytes Negative     Nitrite, UA Negative                As part of wellness and prevention, the following topics were discussed with the patient where appropriate: healthy weight, substance abuse/misuse, mental health, encouraging self breast exam, and other counseling and guidance done:  Nutrition, physical activity, healthy weight, injury prevention, misuse of tobacco, alcohol and drugs, sexual behavior and STDs, contraception, dental health, mental health, immunizations breast cancer screening and exams.      Mammogram:   Last Completed Mammogram            Ordered - MAMMOGRAM (Every 2 Years) Ordered on 11/10/2024      08/28/2024  Mammo Screening Digital Tomosynthesis Bilateral With CAD    06/15/2023  Mammo Screening Digital Tomosynthesis Bilateral With CAD    06/14/2022  Mammo Screening Digital Tomosynthesis Bilateral With CAD    12/09/2020  Mammo Diagnostic Digital Tomosynthesis Bilateral With CAD                  MAMMOGRAM UP TO DATE IF AGE APPROPRIATE?  Yes  (if no, pt encouraged to schedule; risks of undiagnosed breast cancer reviewed with pt if she does not have a mammogram)        Assessment/Plan:     1) GYN annual well woman exam.   2) PAP done today?   3) problems addressed:     * No active hospital problems. *            Follow up prn or one year.    Pt instructed to call for results of any testing done today and that failure to call if she has not heard from us could result in inadequate treatment.  Pt verbalized her understanding.   Diagnoses and all orders for this visit:    1. Cervical smear, as part of routine gynecological examination (Primary)  -     POC Urinalysis Dipstick  -      IGP, Apt HPV,rfx 16 / 18,45    2. SARMAD (stress urinary incontinence, female)  -     Ambulatory Referral to Gynecologic Urology    3. Gastroparesis    4. Rectocele    5. Sjogren's syndrome with keratoconjunctivitis sicca    6. Screening mammogram for breast cancer  -     Mammo Screening Digital Tomosynthesis Bilateral With CAD; Future    7. Routine gynecological examination  -     POC Urinalysis Dipstick  -     IGP, Apt HPV,rfx 16 / 18,45    8. Special screening examination for human papillomavirus (HPV)  -     POC Urinalysis Dipstick  -     IGP, Apt HPV,rfx 16 / 18,45    9. Smoker    10. Skin tag of vulva  -     Case Request; Standing  -     sodium chloride 0.9 % flush 3 mL  -     sodium chloride 0.9 % flush 10 mL  -     sodium chloride 0.9 % infusion 40 mL  -     acetaminophen (TYLENOL) tablet 1,000 mg  -     Case Request    Other orders  -     Code Status and Medical Interventions: CPR (Attempt to Resuscitate); Full Support; Standing  -     Follow Anesthesia Guidelines / Protocol; Future  -     Follow Anesthesia Guidelines / Protocol; Standing  -     Chlorhexidine Skin Prep; Future  -     Verify / Perform Chlorhexidine Skin Prep; Standing  -     Insert Peripheral IV; Standing  -     Saline Lock & Maintain IV Access; Standing  -     Place Sequential Compression Device; Standing  -     Maintain Sequential Compression Device; Standing           RTO No follow-ups on file.      Rancho Mayberry MD  11/12/24  14:21 EST

## 2024-11-12 ENCOUNTER — OFFICE VISIT (OUTPATIENT)
Dept: OBSTETRICS AND GYNECOLOGY | Facility: CLINIC | Age: 44
End: 2024-11-12
Payer: COMMERCIAL

## 2024-11-12 VITALS
WEIGHT: 196 LBS | BODY MASS INDEX: 33.46 KG/M2 | SYSTOLIC BLOOD PRESSURE: 104 MMHG | HEIGHT: 64 IN | DIASTOLIC BLOOD PRESSURE: 60 MMHG

## 2024-11-12 DIAGNOSIS — Z12.31 SCREENING MAMMOGRAM FOR BREAST CANCER: ICD-10-CM

## 2024-11-12 DIAGNOSIS — M35.01 SJOGREN'S SYNDROME WITH KERATOCONJUNCTIVITIS SICCA: ICD-10-CM

## 2024-11-12 DIAGNOSIS — Z11.51 SPECIAL SCREENING EXAMINATION FOR HUMAN PAPILLOMAVIRUS (HPV): ICD-10-CM

## 2024-11-12 DIAGNOSIS — K31.84 GASTROPARESIS: ICD-10-CM

## 2024-11-12 DIAGNOSIS — N90.89 SKIN TAG OF VULVA: ICD-10-CM

## 2024-11-12 DIAGNOSIS — Z01.419 ROUTINE GYNECOLOGICAL EXAMINATION: ICD-10-CM

## 2024-11-12 DIAGNOSIS — N39.3 SUI (STRESS URINARY INCONTINENCE, FEMALE): ICD-10-CM

## 2024-11-12 DIAGNOSIS — N81.6 RECTOCELE: ICD-10-CM

## 2024-11-12 DIAGNOSIS — Z01.419 CERVICAL SMEAR, AS PART OF ROUTINE GYNECOLOGICAL EXAMINATION: Primary | ICD-10-CM

## 2024-11-12 DIAGNOSIS — F17.200 SMOKER: ICD-10-CM

## 2024-11-12 RX ORDER — SODIUM CHLORIDE 0.9 % (FLUSH) 0.9 %
3 SYRINGE (ML) INJECTION EVERY 12 HOURS SCHEDULED
OUTPATIENT
Start: 2024-11-12

## 2024-11-12 RX ORDER — ACETAMINOPHEN 500 MG
1000 TABLET ORAL ONCE
OUTPATIENT
Start: 2024-11-12 | End: 2024-11-12

## 2024-11-12 RX ORDER — SODIUM CHLORIDE 0.9 % (FLUSH) 0.9 %
10 SYRINGE (ML) INJECTION AS NEEDED
OUTPATIENT
Start: 2024-11-12

## 2024-11-12 RX ORDER — SODIUM CHLORIDE 9 MG/ML
40 INJECTION, SOLUTION INTRAVENOUS AS NEEDED
OUTPATIENT
Start: 2024-11-12

## 2024-11-13 ENCOUNTER — OFFICE VISIT (OUTPATIENT)
Dept: ORTHOPEDIC SURGERY | Facility: CLINIC | Age: 44
End: 2024-11-13
Payer: COMMERCIAL

## 2024-11-13 VITALS — BODY MASS INDEX: 33.46 KG/M2 | HEIGHT: 64 IN | WEIGHT: 196 LBS

## 2024-11-13 DIAGNOSIS — Z98.890 S/P ACL RECONSTRUCTION: ICD-10-CM

## 2024-11-13 DIAGNOSIS — M25.462 EFFUSION OF LEFT KNEE: ICD-10-CM

## 2024-11-13 DIAGNOSIS — Z98.890 STATUS POST REPAIR OF ANTERIOR CRUCIATE LIGAMENT: Primary | ICD-10-CM

## 2024-11-13 RX ADMIN — LIDOCAINE HYDROCHLORIDE 8 ML: 10 INJECTION, SOLUTION EPIDURAL; INFILTRATION; INTRACAUDAL; PERINEURAL at 08:58

## 2024-11-13 RX ADMIN — TRIAMCINOLONE ACETONIDE 80 MG: 40 INJECTION, SUSPENSION INTRA-ARTICULAR; INTRAMUSCULAR at 08:58

## 2024-11-13 NOTE — PROGRESS NOTES
Subjective:     Patient ID: Aide Henry is a 44 y.o. female.    Chief Complaint:  Follow-up status post left knee ACL reconstruction with soft tissue allograft, medial meniscal repair-5/28/2024   History of Present Illness  History of Present Illness  The patient returns to the clinic today for a follow-up evaluation regarding her left knee.    She reports that overall, her knee has been functioning reasonably well, but she is experiencing some pain, particularly on the front and inner side of her knee. She describes this pain as aching and rates it as 5 to 6 out of 10. Despite taking anti-inflammatory medications, she reports minimal improvement in her symptoms.    She has not experienced any episodes of her knee locking or catching, nor any instances of her knee giving way. Additionally, she reports no fevers, chills, or sweats, and no issues with her surgical incision.     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 2-3 times a year    Drug use: Not Currently     Frequency: 1.0 times per week     Types: Cocaine(coke), Marijuana     Comment: rarely    Sexual activity: Defer      Past Medical History:   Diagnosis Date    Anal fissure     Anxiety     Bacterial vaginosis     [place] esophagus     Bulging lumbar disc     lower back per patient    Depression with anxiety     Fibromyalgia     Fracture, finger     right hand, 4th finger    Gastroenteritis 12/12/2017    Gastroesophageal reflux disease without esophagitis  02/10/2016    Gastroparesis     GERD (gastroesophageal reflux disease)     Hemorrhoids     had a fissure    History of anemia     Hypertension     Hypothyroid     hashimoto    IBS (irritable bowel syndrome)     IC (interstitial cystitis)     Lateral epicondylitis 2013    RHUEMATOLOGIST    Lupus     Migraine     MRSA (methicillin resistant staph aureus) culture positive  ESTIMATE    GROIN AREA     Overactive bladder     Sjogren's syndrome     Urinary tract infection      Past Surgical History:   Procedure Laterality Date    ADENOIDECTOMY      CARPAL TUNNEL RELEASE Bilateral      SECTION      COLONOSCOPY      D & C HYSTEROSCOPY N/A 2021    Procedure: DILATATION AND CURETTAGE HYSTEROSCOPY;  Surgeon: Rancho Mayberry MD;  Location: [facility];  Service: Obstetrics/Gynecology;  Laterality: N/A;    D & C HYSTEROSCOPY ENDOMETRIAL ABLATION N/A 2021    Procedure: DILATATION AND CURETTAGE HYSTEROSCOPY,  NOVASURE ENDOMETRIAL ABLATION;  Surgeon: Rancho Mayberry MD;  Location: [facility];  Service: Obstetrics/Gynecology;  Laterality: N/A;    ENDOSCOPY N/A 05/10/2019    Procedure: ESOPHAGOGASTRODUODENOSCOPY with biopsies;  Surgeon: Shanon Vazquez MD;  Location: [facility];  Service: Gastroenterology    ENDOSCOPY N/A 2021    Procedure: ESOPHAGOGASTRODUODENOSCOPY with biopsies;  Surgeon: Romain Rice MD;  Location: [facility];  Service: Gastroenterology;  Laterality: N/A;  barretts  gastritis    FINGER GANGLION CYST EXCISION Right     middle finger    GASTRIC STIMULATOR IMPLANT SURGERY      KNEE ACL RECONSTRUCTION Left 2024    Procedure: KNEE ANTERIOR CRUCIATE LIGAMENT RECONSTRUCTION WITH ALLOGRAFT, medial meniscal repair;  Surgeon: Joo Rivers MD;  Location: [facility];  Service: Orthopedics;  Laterality: Left;    MOUTH SURGERY      3 teeth pulled due to sjorgens syndrome    OTHER SURGICAL HISTORY      reversal of tubal    RHINOPLASTY      SHOULDER  "ARTHROSCOPY Left 09/10/2018    Procedure: SHOULDER ARTHROSCOPY, rotator cuff repair; extensive debridement, open biceps tenodesis;  Surgeon: Joo Rivers MD;  Location:  [facility];  Service: Orthopedics    SHOULDER ARTHROSCOPY W/ ROTATOR CUFF REPAIR Left 05/27/2022    Procedure: SHOULDER ARTHROSCOPY WITH ROTATOR CUFF REPAIR COMPLEX, EXTENSIVE DEBRIDEMENT;  Surgeon: Joo Rivers MD;  Location:  [facility];  Service: Orthopedics;  Laterality: Left;    SHOULDER ARTHROSCOPY W/ ROTATOR CUFF REPAIR Left 06/23/2023    Procedure: SHOULDER ARTHROSCOPY WITH ROTATOR CUFF REPAIR, distal clavicle excision;  Surgeon: Joo Rivers MD;  Location:  [facility] OR;  Service: Orthopedics;  Laterality: Left;    TONSILLECTOMY      TUBAL ABDOMINAL LIGATION      TYMPANOSTOMY TUBE PLACEMENT      WISDOM TOOTH EXTRACTION Bilateral        Family History   Problem Relation Age of Onset    Lung cancer Father     Heart disease Paternal Grandmother     Diabetes Maternal Aunt     Breast cancer Maternal Aunt     Diabetes Maternal Grandmother     COPD Mother     Colon cancer Neg Hx     Colon polyps Neg Hx     Malig Hyperthermia Neg Hx          Review of Systems        Objective:  Vitals:    11/13/24 0820   Weight: 88.9 kg (196 lb)   Height: 162.6 cm (64\")         11/13/24  0820   Weight: 88.9 kg (196 lb)     Body mass index is 33.64 kg/m².  General: No acute distress.  Resp: normal respiratory effort  Skin: no rashes or wounds; normal turgor  Psych: mood and affect appropriate; recent and remote memory intact          Physical Exam  Left knee demonstrates an active range of motion from 0 to 135 degrees, with 4+ out of 5 strength on flexion and extension. There is a grade 1A Lachman, negative anterior and posterior drawer, mild medial compartment pain with mild joint line tenderness. Craig exam is negative. The knee is stable to varus and valgus stress at 0 and 30 degrees, with mild effusion and a moderately positive active patellar " compression test.         Imaging:  None today  Assessment:        1. Status post repair of anterior cruciate ligament    2. S/P ACL reconstruction           Plan:      Large Joint Arthrocentesis: L knee  Date/Time: 11/13/2024 8:58 AM  Consent given by: patient  Site marked: site marked  Timeout: Immediately prior to procedure a time out was called to verify the correct patient, procedure, equipment, support staff and site/side marked as required   Supporting Documentation  Indications: pain   Procedure Details  Location: knee - L knee  Preparation: Patient was prepped and draped in the usual sterile fashion  Needle size: 22 G  Approach: anterolateral  Medications administered: 8 mL lidocaine PF 1% 1 %; 80 mg triamcinolone acetonide 40 MG/ML  Patient tolerance: patient tolerated the procedure well with no immediate complications        Assessment & Plan  1. Left knee pain.  She reports pain particularly over the anterior medial aspect of her knee, rated 5-6 out of 10 and aching in nature. There is minimal improvement with anti-inflammatory medications. On exam, left knee active range of motion is 0 to 135 degrees, 4+ out of 5 strength on flexion and extension, grade 1 a Lachman, negative anterior and posterior drawer, mild medial compartment pain with mild joint line tenderness, negative Craig exam, stable to varus and valgus stress, mild effusion, and moderately positive active patellar compression test.     Discussed options at length. She will be sent back to physical therapy for additional strengthening and soft tissue massage modalities. A left knee intra-articular injection will be administered today. Follow-up in 6 weeks for reassessment with x-rays of the left knee. If there is no improvement and no other obvious clinical issues, a referral to pain management may be considered due to her history of battling alcoholism secondary to her pain. She was in agreement and had all questions answered.    Patient  would like to proceed with cortisone injection today to the left knee. Recommended limited use of affected extremity for the next 24 hours to only essential activites other than work on general active and passive motion. Recommended supplementing with ice and soft tissue massage. Discussed with patient that they should see results in 5-7 days, if no improvement in 5-6 weeks I have asked them to call the office to review other options. Patient should call office immediately if they notice redness, warmth, fevers, chills, or residual numbness or tingling for greater than 6 hours after injection.       Follow-up  Return in 6 weeks for reassessment.    Aide LLANOS Henry was in agreement with plan and had all questions answered.     Orders:  No orders of the defined types were placed in this encounter.      Medications:  No orders of the defined types were placed in this encounter.      Followup:  No follow-ups on file.    Diagnoses and all orders for this visit:    1. Status post repair of anterior cruciate ligament (Primary)    2. S/P ACL reconstruction                  Dictated utilizing Dragon dictation     Patient or patient representative verbalized consent for the use of Ambient Listening during the visit with  Joo Rivers MD for chart documentation. 11/13/2024  08:27 EST

## 2024-11-15 RX ORDER — LIDOCAINE HYDROCHLORIDE 10 MG/ML
8 INJECTION, SOLUTION EPIDURAL; INFILTRATION; INTRACAUDAL; PERINEURAL
Status: COMPLETED | OUTPATIENT
Start: 2024-11-13 | End: 2024-11-13

## 2024-11-15 RX ORDER — TRIAMCINOLONE ACETONIDE 40 MG/ML
80 INJECTION, SUSPENSION INTRA-ARTICULAR; INTRAMUSCULAR
Status: COMPLETED | OUTPATIENT
Start: 2024-11-13 | End: 2024-11-13

## 2024-11-20 LAB
CYTOLOGIST CVX/VAG CYTO: NORMAL
CYTOLOGY CVX/VAG DOC CYTO: NORMAL
CYTOLOGY CVX/VAG DOC THIN PREP: NORMAL
DX ICD CODE: NORMAL
HPV I/H RISK 4 DNA CVX QL PROBE+SIG AMP: NEGATIVE
Lab: NORMAL
OTHER STN SPEC: NORMAL
STAT OF ADQ CVX/VAG CYTO-IMP: NORMAL

## 2024-12-03 ENCOUNTER — TELEPHONE (OUTPATIENT)
Dept: OBSTETRICS AND GYNECOLOGY | Facility: CLINIC | Age: 44
End: 2024-12-03
Payer: COMMERCIAL

## 2024-12-03 NOTE — TELEPHONE ENCOUNTER
We are seeing Aide Henry ([id number]) for PAT on 12-6. The consent reads R perineal lesion/ cyst excision-left. Can you please clarify for the consent? The note in the chart says excision of skin tag left perineal thigh

## 2024-12-06 ENCOUNTER — HOSPITAL ENCOUNTER (OUTPATIENT)
Dept: PHYSICAL THERAPY | Facility: HOSPITAL | Age: 44
Discharge: HOME | End: 2024-12-06
Payer: COMMERCIAL

## 2024-12-06 ENCOUNTER — PRE-ADMISSION TESTING (OUTPATIENT)
Dept: PREADMISSION TESTING | Facility: HOSPITAL | Age: 44
End: 2024-12-06
Payer: COMMERCIAL

## 2024-12-06 VITALS
OXYGEN SATURATION: 98 % | RESPIRATION RATE: 16 BRPM | WEIGHT: 195.4 LBS | HEIGHT: 64 IN | DIASTOLIC BLOOD PRESSURE: 80 MMHG | BODY MASS INDEX: 33.36 KG/M2 | SYSTOLIC BLOOD PRESSURE: 132 MMHG | HEART RATE: 90 BPM

## 2024-12-06 DIAGNOSIS — Z98.890 S/P ACL RECONSTRUCTION: ICD-10-CM

## 2024-12-06 DIAGNOSIS — Z98.890 STATUS POST REPAIR OF ANTERIOR CRUCIATE LIGAMENT: Primary | ICD-10-CM

## 2024-12-06 LAB
ANION GAP SERPL CALCULATED.3IONS-SCNC: 12.1 MMOL/L (ref 5–15)
BUN SERPL-MCNC: 8 MG/DL (ref 6–20)
BUN/CREAT SERPL: 12.5 (ref 7–25)
CALCIUM SPEC-SCNC: 8.7 MG/DL (ref 8.6–10.5)
CHLORIDE SERPL-SCNC: 104 MMOL/L (ref 98–107)
CO2 SERPL-SCNC: 21.9 MMOL/L (ref 22–29)
CREAT SERPL-MCNC: 0.64 MG/DL (ref 0.57–1)
DEPRECATED RDW RBC AUTO: 51.3 FL (ref 37–54)
EGFRCR SERPLBLD CKD-EPI 2021: 113.3 ML/MIN/1.73
ERYTHROCYTE [DISTWIDTH] IN BLOOD BY AUTOMATED COUNT: 14.6 % (ref 12.3–15.4)
GLUCOSE SERPL-MCNC: 100 MG/DL (ref 65–99)
HCG SERPL QL: NEGATIVE
HCT VFR BLD AUTO: 41.2 % (ref 34–46.6)
HGB BLD-MCNC: 13.4 G/DL (ref 12–15.9)
MCH RBC QN AUTO: 30.6 PG (ref 26.6–33)
MCHC RBC AUTO-ENTMCNC: 32.5 G/DL (ref 31.5–35.7)
MCV RBC AUTO: 94.1 FL (ref 79–97)
PLATELET # BLD AUTO: 215 10*3/MM3 (ref 140–450)
PMV BLD AUTO: 8.9 FL (ref 6–12)
POTASSIUM SERPL-SCNC: 3.8 MMOL/L (ref 3.5–5.2)
QT INTERVAL: 387 MS
QTC INTERVAL: 478 MS
RBC # BLD AUTO: 4.38 10*6/MM3 (ref 3.77–5.28)
SODIUM SERPL-SCNC: 138 MMOL/L (ref 136–145)
WBC NRBC COR # BLD AUTO: 3.33 10*3/MM3 (ref 3.4–10.8)

## 2024-12-06 PROCEDURE — 85027 COMPLETE CBC AUTOMATED: CPT | Performed by: OBSTETRICS & GYNECOLOGY

## 2024-12-06 PROCEDURE — 93005 ELECTROCARDIOGRAM TRACING: CPT

## 2024-12-06 PROCEDURE — 84703 CHORIONIC GONADOTROPIN ASSAY: CPT | Performed by: OBSTETRICS & GYNECOLOGY

## 2024-12-06 PROCEDURE — 36415 COLL VENOUS BLD VENIPUNCTURE: CPT

## 2024-12-06 PROCEDURE — 80048 BASIC METABOLIC PNL TOTAL CA: CPT | Performed by: OBSTETRICS & GYNECOLOGY

## 2024-12-06 PROCEDURE — 93010 ELECTROCARDIOGRAM REPORT: CPT | Performed by: INTERNAL MEDICINE

## 2024-12-06 PROCEDURE — 97161 PT EVAL LOW COMPLEX 20 MIN: CPT | Performed by: PHYSICAL THERAPIST

## 2024-12-06 NOTE — PAT
Pt here for PAT visit.  Pre-op tests completed, chg soap given, and instructions reviewed.  Instructed clears until 2 hrs prior to arrival time and no vaping after MN nighr prior, voiced understanding.

## 2024-12-06 NOTE — DISCHARGE INSTRUCTIONS
PRE-ADMISSION TESTING INSTRUCTIONS FOR ADULTS    Take these medications the morning of surgery with a small sip of water:  levothyroxine, amlodipine, duloxetine, gabapentin, metoprolol, and omeprazole      Do not take any insulin or diabetes medications the morning of surgery.      No aspirin, advil, aleve, ibuprofen, naproxen, diet pills, decongestants, or herbal/vitamins for a week prior to surgery.   Hold vitamin D     Tylenol/Acetaminophen is okay to take if needed.    General Instructions:    DO NOT EAT SOLID FOOD AFTER MIDNIGHT THE NIGHT BEFORE SURGERY. No gum, mints, or hard candy after midnight the night before surgery.  You may drink clear liquids the day of surgery up until 2 hours before your arrival time.  (5:00 am)  Clear liquids are liquids you can see through. Nothing RED in color.    Plain water    Sports drinks      Gelatin (Jell-O)  Fruit juices without pulp such as white grape juice and apple juice  Popsicles that contain no fruit or yogurt  Tea or coffee (no cream or milk added)    It is beneficial for you to have a clear drink that contains carbohydrates 2 hours before your arrival time.  We suggest a 20 ounce bottle of Gatorade or Powerade for non-diabetic patients or a 20 ounce bottle of Gatorade Zero or Powerade Zero for diabetic patients.     Patients who avoid smoking, chewing tobacco and alcohol for 4 weeks prior to surgery have a reduced risk of post-operative complications.  If at all possible, quit smoking as many days before surgery as you can.    Do not smoke, use chewing tobacco or drink alcohol the day of surgery    Bring your C-PAP/ BI-PAP machine if you use one.  Wear clean comfortable clothes.  Do not wear contact lenses, lotion, deodorant, or make-up.  Bring a case for your glasses if applicable. You may brush your teeth the morning of surgery.  You may wear dentures/partials, do not put adhesive/glue on them.  Leave all other jewelry and valuables at home.      Preventing a  Surgical Site Infection:    Shower the night before and on the morning of surgery using the chlorhexidine soap you were given.  Use a clean washcloth with the soap.  Place clean sheets on your bed after showering the night before surgery. Do not use the CHG soap on your hair, face, or private areas. Wash your body gently for five (5) minutes. Do not scrub your skin.  Dry with a clean towel and dress in clean clothing.  Do not shave the surgical area for 10 days-2 weeks prior to surgery  because the razor can irritate skin and make it easier to develop an infection.  Make sure you, your family, and all healthcare providers clean their hands with soap and water or an alcohol based hand  before caring for you or your wound.      Day of surgery:    Your surgeon’s office will advise you of your arrival time for the day of surgery.    Upon arrival, a Pre-op nurse and Anesthesia provider will review your health history, obtain vital signs, and answer questions you may have. The anesthesia provider will also discuss the type of anesthesia that will be needed for your procedure, which may include general anesthesia. The only belongings needed at this time will be your home medications and if applicable your C-PAP/BI-PAP machine.  If you are staying overnight your family can leave the rest of your belongings in the car and bring them to your room later.  A Pre-op nurse will start an IV and you may receive medication in preparation for surgery, including something to help you relax.  Your family will be able to see you in the Pre-op area.  While you are in surgery your family should notify the waiting room  if they leave the waiting room area and provide a contact phone number.    IF you have any questions, you can call the Pre-Admission Department at [phone] or your surgeon's office.  Notify your surgeon if  you become sick, have a fever, productive cough, or cannot be here the day of  surgery    Please be aware that surgery does come with discomfort.  We want to make every effort to control your discomfort so please discuss any uncontrolled symptoms with your nurse.   Your doctor will most likely have prescribed pain medications.      If you are going home after surgery, you will receive individualized written care instructions before being discharged.  A responsible adult (over the age of 18) must drive you to and from the hospital on the day of your surgery and stay with you for 24 hours after anesthesia.    If you are staying overnight following surgery, you will be transported to your hospital room following the recovery period.  [facility] has all private rooms.    You may receive a survey regarding the care you received. Your feedback is very important and will be used to collect the necessary data to help us to continue to provide excellent care.     Deductibles and co-payments are collected on the day of service. Please be prepared to pay the required co-pay, deductible or deposit on the day of service as defined by your plan.

## 2024-12-06 NOTE — THERAPY EVALUATION
Outpatient Physical Therapy Ortho Initial Evaluation  TENISHA Wing     Patient Name: Aide Henry  : 1980  MRN: [id number]  Today's Date: 2024      Visit Date: 2024    Patient Active Problem List   Diagnosis    Arthralgia of multiple joints    Chronic back pain    Chronic constipation    Disorder of connective tissue    Depression with anxiety    Fibromyalgia    Gastroesophageal reflux disease without esophagitis    Muscle pain    Palpitations    Seasonal allergic rhinitis    Eczema    Shoulder pain, left    Substance abuse    Lateral epicondylitis    Drug addiction syndrome    Gastroenteritis    Alcohol dependence in remission    History of placement of ear tubes    Status post arthroscopy of shoulder    Subacromial impingement of left shoulder    Biceps tendinitis of left upper extremity    Complete tear of left rotator cuff    Smoker    Sjogren's syndrome with keratoconjunctivitis sicca    Pelvic pain    Nondisplaced fracture of coronoid process of left ulna, initial encounter for closed fracture    Left breast lump    SARMAD (stress urinary incontinence, female)    Rectocele    Irritable bowel syndrome with both constipation and diarrhea    Ashton's esophagus without dysplasia    IC (interstitial cystitis)    Overactive bladder    Gastroparesis    Chondromalacia of patellofemoral joint, left    Lupus    Dysmenorrhea    History of bilateral tubal ligation    Status post repair of anterior cruciate ligament    Blood blister: R labium    Carrier of ureaplasma urealyticum    Mechanical knee pain, left    AC joint arthropathy    Subacromial bursitis of right shoulder joint    Rotator cuff tendinitis, right    Vapes nicotine containing substance    Tears of meniscus and ACL of left knee    Left anterior cruciate ligament tear    Skin tag of vulva        Past Medical History:   Diagnosis Date    Anal fissure     Anxiety     Bacterial vaginosis     Ashton esophagus     Bulging lumbar disc     lower  back per patient    Depression with anxiety     Fibromyalgia     Fracture, finger     right hand, 4th finger    Gastroenteritis 2017    Gastroesophageal reflux disease without esophagitis 02/10/2016    Gastroparesis     Hemorrhoids     had a fissure    History of anemia     Hypertension     Hypothyroid     hashimoto    IBS (irritable bowel syndrome)     IC (interstitial cystitis)     Lateral epicondylitis 2013    RHUEMATOLOGIST    Lupus     Migraine     MRSA (methicillin resistant staph aureus) culture positive  ESTIMATE    GROIN AREA     Overactive bladder     Sjogren's syndrome     Urinary tract infection         Past Surgical History:   Procedure Laterality Date    ADENOIDECTOMY      CARPAL TUNNEL RELEASE Bilateral      SECTION      COLONOSCOPY      D & C HYSTEROSCOPY N/A 2021    Procedure: DILATATION AND CURETTAGE HYSTEROSCOPY;  Surgeon: Rancho Mayberry MD;  Location: [facility] OR;  Service: Obstetrics/Gynecology;  Laterality: N/A;    D & C HYSTEROSCOPY ENDOMETRIAL ABLATION N/A 2021    Procedure: DILATATION AND CURETTAGE HYSTEROSCOPY,  NOVASURE ENDOMETRIAL ABLATION;  Surgeon: Rancoh Mayberry MD;  Location: [facility] OR;  Service: Obstetrics/Gynecology;  Laterality: N/A;    ENDOSCOPY N/A 05/10/2019    Procedure: ESOPHAGOGASTRODUODENOSCOPY with biopsies;  Surgeon: Shanon Vazquez MD;  Location: [facility] OR;  Service: Gastroenterology    ENDOSCOPY N/A 2021    Procedure: ESOPHAGOGASTRODUODENOSCOPY with biopsies;  Surgeon: Romain Rice MD;  Location: [facility] OR;  Service: Gastroenterology;  Laterality: N/A;  barretts  gastritis    FINGER GANGLION CYST EXCISION Right     middle finger    GASTRIC STIMULATOR IMPLANT SURGERY      KNEE ACL RECONSTRUCTION Left 2024    Procedure: KNEE ANTERIOR CRUCIATE LIGAMENT RECONSTRUCTION WITH ALLOGRAFT, medial meniscal repair;  Surgeon: Joo Rivers MD;  Location: [facility] OR;  Service: Orthopedics;   Laterality: Left;    MOUTH SURGERY      3 teeth pulled due to sjorgens syndrome    OTHER SURGICAL HISTORY      reversal of tubal    RHINOPLASTY      SHOULDER ARTHROSCOPY Left 09/10/2018    Procedure: SHOULDER ARTHROSCOPY, rotator cuff repair; extensive debridement, open biceps tenodesis;  Surgeon: Joo Rivers MD;  Location:  [facility] OR;  Service: Orthopedics    SHOULDER ARTHROSCOPY W/ ROTATOR CUFF REPAIR Left 05/27/2022    Procedure: SHOULDER ARTHROSCOPY WITH ROTATOR CUFF REPAIR COMPLEX, EXTENSIVE DEBRIDEMENT;  Surgeon: Joo Rivers MD;  Location:  [facility] OR;  Service: Orthopedics;  Laterality: Left;    SHOULDER ARTHROSCOPY W/ ROTATOR CUFF REPAIR Left 06/23/2023    Procedure: SHOULDER ARTHROSCOPY WITH ROTATOR CUFF REPAIR, distal clavicle excision;  Surgeon: Joo Rivers MD;  Location:  [facility] OR;  Service: Orthopedics;  Laterality: Left;    TONSILLECTOMY      TUBAL ABDOMINAL LIGATION      TYMPANOSTOMY TUBE PLACEMENT      WISDOM TOOTH EXTRACTION Bilateral        Visit Dx:     ICD-10-CM ICD-9-CM   1. Status post repair of anterior cruciate ligament  Z98.890 V45.89   2. S/P ACL reconstruction  Z98.890 V45.89          Patient History       Row Name 12/06/24 0900             History    Chief Complaint Difficulty with daily activities;Pain  -GC      Type of Pain Knee pain  left  -GC      Brief Description of Current Complaint Pt c/o left knee pain when she twists on her left leg. She was seen by Dr. Rivers who injected her knee and the pt states she felt relief for some time after, but the pain has returned again. She is now referred for therapy.  -GC      Patient/Caregiver Goals Relieve pain;Relief from dizziness  -GC      Hand Dominance right-handed  -GC      Occupation/sports/leisure activities   -GC         Pain     Pain Location Knee  left  -GC      Pain at Worst 8;9  -GC      Pain Description Sharp;Sore;Tender  -GC      What Performance Factors Make the Current Problem(s) WORSE? Pt  c/o pain when she twists or moves wrong  -GC         Daily Activities    Primary Language English  -GC      Are you able to read Yes  -GC      Are you able to write Yes  -GC      How does patient learn best? Listening;Reading;Demonstration  -GC      Teaching needs identified Home Exercise Program;Management of Condition  -GC      Patient is concerned about/has problems with Climbing Stairs;Difficulty with self care (i.e. bathing, dressing, toileting:;Flexibility;Performing home management (household chores, shopping, care of dependents);Performing job responsibilities/community activities (work, school,;Standing;Transfers (getting out of a chair, bed);Walking  -GC      Does patient have problems with the following? Depression;Anxiety  -GC      Barriers to learning None  -GC      Functional Status bathing;dressing;grooming;mobility issues preventing performance of daily activities  -GC      Pt Participated in POC and Goals Yes  -GC         Safety    Are you being hurt, hit, or frightened by anyone at home or in your life? No  -GC      Are you being neglected by a caregiver No  -GC                User Key  (r) = Recorded By, (t) = Taken By, (c) = Cosigned By      Initials Name Provider Type    GC Ruperto Kimball, KEYSHA Physical Therapist                     PT Ortho       Row Name 12/06/24 0900       Posture/Observations    Posture/Observations Comments Pt has mild quad atrophy noted  -GC       Knee Palpation    Patella Tendon Left:;Tender  -GC       Patellar Accessory Motions    Superior glide Left:;WNL  -GC    Inferior glide Left:;WNL  -GC    Medial glide Left:;WNL  -GC    Lateral glide Left:;WNL  -GC       Knee Special Tests    Anterior drawer (ACL lesion) Left:;Negative  -GC    Posterior drawer (PCL lesion) Left:;Negative  -GC    Valgus stress (MCL lesion) Left:;Negative  -GC    Varus stress (LCL lesion) Left:;Negative  -GC    Craig’s test (meniscal lesion) Left:;Positive  -GC    Bounce home test (meniscal lesion)  Left:;Negative  -GC       Left Lower Ext    Lt Knee Extension/Flexion AROM 0-140 degrees  -GC       MMT Left Lower Ext    Lt Hip Flexion MMT, Gross Movement (5/5) normal  -GC    Lt Hip Extension MMT, Gross Movement (5/5) normal  -GC    Lt Hip ABduction MMT, Gross Movement (5/5) normal  -GC    Lt Hip ADduction MMT, Gross Movement (5/5) normal  -GC    Lt Knee Extension MMT, Gross Movement (4+/5) good plus  -GC    Lt Knee Flexion MMT, Gross Movement (4+/5) good plus  -GC    Lt Ankle Plantarflexion MMT, Gross Movement (5/5) normal  -GC    Lt Ankle Dorsiflexion MMT, Gross Movement (5/5) normal  -GC       Sensation    Light Touch No apparent deficits  -       Lower Extremity Flexibility    Hamstrings Right:;Mildly limited  -GC    Hip Flexors Right:;WNL  -GC    Quadriceps Right:;WNL  -GC    ITB Right:;WNL  -GC       Transfers    Comment, (Transfers) Pt is independent with all bed mobility and transfers  -       Gait/Stairs (Locomotion)    Comment, (Gait/Stairs) Pt ambulates normally on level surfaces  -              User Key  (r) = Recorded By, (t) = Taken By, (c) = Cosigned By      Initials Name Provider Type    GC Ruperto Kimball, PT Physical Therapist                                Therapy Education  Given: HEP, Symptoms/condition management, Pain management  Program: New  How Provided: Verbal, Demonstration, Written  Provided to: Patient  Level of Understanding: Verbalized, Demonstrated      PT OP Goals       Row Name 12/06/24 0900          PT Short Term Goals    STG Date to Achieve 12/20/24  -     STG 1 Decrease left knee pain to 3-4/10 with activity.  -     STG 2 Increase hamstring flexibility to WFL with testing.  -     STG 3 Pt will be independent with her HEP issued by this therapist.  -        Long Term Goals    LTG Date to Achieve 01/03/25  -     LTG 1 Decrease left knee pain to 0-1/10 with activity.  -     LTG 2 Increase left quad strength to 5/5 with testing.  -     LTG 3 Pt will be  independent with all ADLs without pain.  -        Time Calculation    PT Goal Re-Cert Due Date 01/03/25  -               User Key  (r) = Recorded By, (t) = Taken By, (c) = Cosigned By      Initials Name Provider Type    GC Ruperto Kimball, PT Physical Therapist                     PT Assessment/Plan       Row Name 12/06/24 0900          PT Assessment    Functional Limitations Limitations in community activities;Limitations in functional capacity and performance  -     Impairments Pain;Muscle strength;Impaired flexibility  -     Assessment Comments Pt presents with a several week history of left knee pain when she twists on it. She rates this pain up to an 8-9/10. She has decreased hamstring flexibility, decreased quad strength, and decreased function secondary to the above.  -GC     Rehab Potential Good  -     Patient/caregiver participated in establishment of treatment plan and goals Yes  -     Patient would benefit from skilled therapy intervention Yes  -GC        PT Plan    PT Frequency 1x/week;2x/week  -     Predicted Duration of Therapy Intervention (PT) 4 weeks  -     Planned CPT's? PT EVAL LOW COMPLEXITY: 12552;PT THER PROC EA 15 MIN: 88434  -     PT Plan Comments Pt is to continue her HEP daily  -               User Key  (r) = Recorded By, (t) = Taken By, (c) = Cosigned By      Initials Name Provider Type    Ruperto Russo, PT Physical Therapist                       OP Exercises       Row Name 12/06/24 0900             Exercise 1    Exercise Name 1 Hamstring stretch  -      Reps 1 10  -GC      Time 1 10 secs  -GC         Exercise 2    Exercise Name 2 SAQ  -GC      Reps 2 50  -GC         Exercise 3    Exercise Name 3 LAQ  -GC      Reps 3 50  -GC         Exercise 4    Exercise Name 4 TKE vs theraband  -GC      Reps 4 50  -GC      Time 4 silver  -GC         Exercise 5    Exercise Name 5 standing hamstring curls  -GC      Reps 5 50  -GC                User Key  (r) = Recorded By, (t) =  Taken By, (c) = Cosigned By      Initials Name Provider Type    Ruperto Russo PT Physical Therapist                                  Outcome Measure Options: Lower Extremity Functional Scale (LEFS)  Lower Extremity Functional Index  Any of your usual work, housework or school activities: A little bit of difficulty  Your usual hobbies, recreational or sporting activities: A little bit of difficulty  Getting into or out of the bath: A little bit of difficulty  Walking between rooms: A little bit of difficulty  Putting on your shoes or socks: A little bit of difficulty  Squatting: A little bit of difficulty  Lifting an object, like a bag of groceries from the floor: A little bit of difficulty  Performing light activities around your home: A little bit of difficulty  Performing heavy activities around your home: A little bit of difficulty  Getting into or out of a car: A little bit of difficulty  Walking 2 blocks: A little bit of difficulty  Walking a mile: A little bit of difficulty  Going up or down 10 stairs (about 1 flight of stairs): A little bit of difficulty  Standing for 1 hour: A little bit of difficulty  Sitting for 1 hour: A little bit of difficulty  Running on even ground: A little bit of difficulty  Running on uneven ground: A little bit of difficulty  Making sharp turns while running fast: A little bit of difficulty  Hopping: A little bit of difficulty  Lower Extremity Functional Index  Any of your usual work, housework or school activities: A little bit of difficulty  Your usual hobbies, recreational or sporting activities: A little bit of difficulty  Getting into or out of the bath: A little bit of difficulty  Walking between rooms: A little bit of difficulty  Putting on your shoes or socks: A little bit of difficulty  Squatting: A little bit of difficulty  Lifting an object, like a bag of groceries from the floor: A little bit of difficulty  Performing light activities around your home: A little  bit of difficulty  Performing heavy activities around your home: A little bit of difficulty  Getting into or out of a car: A little bit of difficulty  Walking 2 blocks: A little bit of difficulty  Walking a mile: A little bit of difficulty  Going up or down 10 stairs (about 1 flight of stairs): A little bit of difficulty  Standing for 1 hour: A little bit of difficulty  Sitting for 1 hour: A little bit of difficulty  Running on even ground: A little bit of difficulty  Running on uneven ground: A little bit of difficulty  Making sharp turns while running fast: A little bit of difficulty  Hopping: A little bit of difficulty      Time Calculation:     Start Time: 0900  Stop Time: 0950  Time Calculation (min): 50 min     Therapy Charges for Today       Code Description Service Date Service Provider Modifiers Qty    87511107120 HC PT EVAL LOW COMPLEXITY 3 12/6/2024 Ruperto Kimball, PT GP 1            PT G-Codes  Outcome Measure Options: Lower Extremity Functional Scale (LEFS)         Ruperto Kimball PT  12/6/2024

## 2024-12-11 ENCOUNTER — ANESTHESIA EVENT (OUTPATIENT)
Dept: PERIOP | Facility: HOSPITAL | Age: 44
End: 2024-12-11
Payer: COMMERCIAL

## 2024-12-12 ENCOUNTER — ANESTHESIA (OUTPATIENT)
Dept: PERIOP | Facility: HOSPITAL | Age: 44
End: 2024-12-12
Payer: COMMERCIAL

## 2024-12-12 ENCOUNTER — HOSPITAL ENCOUNTER (OUTPATIENT)
Facility: HOSPITAL | Age: 44
Setting detail: HOSPITAL OUTPATIENT SURGERY
Discharge: HOME | End: 2024-12-12
Attending: OBSTETRICS & GYNECOLOGY | Admitting: OBSTETRICS & GYNECOLOGY
Payer: COMMERCIAL

## 2024-12-12 VITALS
TEMPERATURE: 98 F | HEART RATE: 97 BPM | WEIGHT: 203 LBS | SYSTOLIC BLOOD PRESSURE: 117 MMHG | RESPIRATION RATE: 12 BRPM | BODY MASS INDEX: 34.84 KG/M2 | DIASTOLIC BLOOD PRESSURE: 72 MMHG | OXYGEN SATURATION: 96 %

## 2024-12-12 DIAGNOSIS — N90.89 SKIN TAG OF VULVA: ICD-10-CM

## 2024-12-12 PROCEDURE — 25010000002 MIDAZOLAM PER 1MG

## 2024-12-12 PROCEDURE — 25810000003 LACTATED RINGERS PER 1000 ML

## 2024-12-12 PROCEDURE — 25010000002 PROPOFOL 200 MG/20ML EMULSION: Performed by: NURSE ANESTHETIST, CERTIFIED REGISTERED

## 2024-12-12 PROCEDURE — 25010000002 ONDANSETRON PER 1 MG

## 2024-12-12 PROCEDURE — 88304 TISSUE EXAM BY PATHOLOGIST: CPT | Performed by: OBSTETRICS & GYNECOLOGY

## 2024-12-12 PROCEDURE — 25010000002 FENTANYL CITRATE (PF) 50 MCG/ML SOLUTION: Performed by: NURSE ANESTHETIST, CERTIFIED REGISTERED

## 2024-12-12 PROCEDURE — 25010000002 DEXAMETHASONE PER 1 MG

## 2024-12-12 RX ORDER — LIDOCAINE HYDROCHLORIDE 10 MG/ML
0.5 INJECTION, SOLUTION EPIDURAL; INFILTRATION; INTRACAUDAL; PERINEURAL ONCE AS NEEDED
Status: DISCONTINUED | OUTPATIENT
Start: 2024-12-12 | End: 2024-12-12 | Stop reason: HOSPADM

## 2024-12-12 RX ORDER — SODIUM CHLORIDE 9 MG/ML
40 INJECTION, SOLUTION INTRAVENOUS AS NEEDED
Status: DISCONTINUED | OUTPATIENT
Start: 2024-12-12 | End: 2024-12-12 | Stop reason: HOSPADM

## 2024-12-12 RX ORDER — FENTANYL CITRATE 50 UG/ML
50 INJECTION, SOLUTION INTRAMUSCULAR; INTRAVENOUS
Status: DISCONTINUED | OUTPATIENT
Start: 2024-12-12 | End: 2024-12-12 | Stop reason: HOSPADM

## 2024-12-12 RX ORDER — ONDANSETRON 2 MG/ML
4 INJECTION INTRAMUSCULAR; INTRAVENOUS ONCE AS NEEDED
Status: COMPLETED | OUTPATIENT
Start: 2024-12-12 | End: 2024-12-12

## 2024-12-12 RX ORDER — SODIUM CHLORIDE, SODIUM LACTATE, POTASSIUM CHLORIDE, CALCIUM CHLORIDE 600; 310; 30; 20 MG/100ML; MG/100ML; MG/100ML; MG/100ML
100 INJECTION, SOLUTION INTRAVENOUS ONCE
Status: DISCONTINUED | OUTPATIENT
Start: 2024-12-12 | End: 2024-12-12 | Stop reason: HOSPADM

## 2024-12-12 RX ORDER — SODIUM CHLORIDE 0.9 % (FLUSH) 0.9 %
3 SYRINGE (ML) INJECTION EVERY 12 HOURS SCHEDULED
Status: DISCONTINUED | OUTPATIENT
Start: 2024-12-12 | End: 2024-12-12 | Stop reason: HOSPADM

## 2024-12-12 RX ORDER — SODIUM CHLORIDE 0.9 % (FLUSH) 0.9 %
10 SYRINGE (ML) INJECTION AS NEEDED
Status: DISCONTINUED | OUTPATIENT
Start: 2024-12-12 | End: 2024-12-12 | Stop reason: HOSPADM

## 2024-12-12 RX ORDER — PROPOFOL 10 MG/ML
INJECTION, EMULSION INTRAVENOUS AS NEEDED
Status: DISCONTINUED | OUTPATIENT
Start: 2024-12-12 | End: 2024-12-12 | Stop reason: SURG

## 2024-12-12 RX ORDER — FENTANYL CITRATE 50 UG/ML
INJECTION, SOLUTION INTRAMUSCULAR; INTRAVENOUS AS NEEDED
Status: DISCONTINUED | OUTPATIENT
Start: 2024-12-12 | End: 2024-12-12 | Stop reason: SURG

## 2024-12-12 RX ORDER — MIDAZOLAM HYDROCHLORIDE 2 MG/2ML
1 INJECTION, SOLUTION INTRAMUSCULAR; INTRAVENOUS
Status: DISCONTINUED | OUTPATIENT
Start: 2024-12-12 | End: 2024-12-12 | Stop reason: HOSPADM

## 2024-12-12 RX ORDER — ONDANSETRON 2 MG/ML
4 INJECTION INTRAMUSCULAR; INTRAVENOUS ONCE AS NEEDED
Status: DISCONTINUED | OUTPATIENT
Start: 2024-12-12 | End: 2024-12-12 | Stop reason: HOSPADM

## 2024-12-12 RX ORDER — DEXMEDETOMIDINE HYDROCHLORIDE 100 UG/ML
INJECTION, SOLUTION INTRAVENOUS AS NEEDED
Status: DISCONTINUED | OUTPATIENT
Start: 2024-12-12 | End: 2024-12-12 | Stop reason: SURG

## 2024-12-12 RX ORDER — HYDROCODONE BITARTRATE AND ACETAMINOPHEN 7.5; 325 MG/1; MG/1
1 TABLET ORAL ONCE AS NEEDED
Status: DISCONTINUED | OUTPATIENT
Start: 2024-12-12 | End: 2024-12-12 | Stop reason: HOSPADM

## 2024-12-12 RX ORDER — SODIUM CHLORIDE, SODIUM LACTATE, POTASSIUM CHLORIDE, CALCIUM CHLORIDE 600; 310; 30; 20 MG/100ML; MG/100ML; MG/100ML; MG/100ML
30 INJECTION, SOLUTION INTRAVENOUS CONTINUOUS
Status: DISCONTINUED | OUTPATIENT
Start: 2024-12-12 | End: 2024-12-12 | Stop reason: HOSPADM

## 2024-12-12 RX ORDER — ACETAMINOPHEN 500 MG
1000 TABLET ORAL ONCE
Status: COMPLETED | OUTPATIENT
Start: 2024-12-12 | End: 2024-12-12

## 2024-12-12 RX ORDER — DEXAMETHASONE SODIUM PHOSPHATE 10 MG/ML
8 INJECTION INTRAMUSCULAR; INTRAVENOUS ONCE AS NEEDED
Status: COMPLETED | OUTPATIENT
Start: 2024-12-12 | End: 2024-12-12

## 2024-12-12 RX ORDER — FAMOTIDINE 10 MG/ML
20 INJECTION, SOLUTION INTRAVENOUS
Status: COMPLETED | OUTPATIENT
Start: 2024-12-12 | End: 2024-12-12

## 2024-12-12 RX ADMIN — SODIUM CHLORIDE, SODIUM LACTATE, POTASSIUM CHLORIDE, CALCIUM CHLORIDE 30 ML/HR: 20; 30; 600; 310 INJECTION, SOLUTION INTRAVENOUS at 08:09

## 2024-12-12 RX ADMIN — PROPOFOL 50 MG: 10 INJECTION, EMULSION INTRAVENOUS at 09:52

## 2024-12-12 RX ADMIN — MIDAZOLAM HYDROCHLORIDE 1 MG: 1 INJECTION, SOLUTION INTRAMUSCULAR; INTRAVENOUS at 09:38

## 2024-12-12 RX ADMIN — FENTANYL CITRATE 25 MCG: 50 INJECTION, SOLUTION INTRAMUSCULAR; INTRAVENOUS at 09:59

## 2024-12-12 RX ADMIN — PROPOFOL 100 MCG/KG/MIN: 10 INJECTION, EMULSION INTRAVENOUS at 09:56

## 2024-12-12 RX ADMIN — FENTANYL CITRATE 25 MCG: 50 INJECTION, SOLUTION INTRAMUSCULAR; INTRAVENOUS at 09:53

## 2024-12-12 RX ADMIN — DEXMEDETOMIDINE 5 MCG: 100 INJECTION, SOLUTION INTRAVENOUS at 09:52

## 2024-12-12 RX ADMIN — ACETAMINOPHEN 1000 MG: 500 TABLET ORAL at 08:08

## 2024-12-12 RX ADMIN — DEXMEDETOMIDINE 5 MCG: 100 INJECTION, SOLUTION INTRAVENOUS at 09:58

## 2024-12-12 RX ADMIN — ONDANSETRON 4 MG: 2 INJECTION INTRAMUSCULAR; INTRAVENOUS at 08:08

## 2024-12-12 RX ADMIN — PROPOFOL 30 MG: 10 INJECTION, EMULSION INTRAVENOUS at 10:02

## 2024-12-12 RX ADMIN — FAMOTIDINE 20 MG: 10 INJECTION, SOLUTION INTRAVENOUS at 08:08

## 2024-12-12 RX ADMIN — DEXAMETHASONE SODIUM PHOSPHATE 8 MG: 10 INJECTION INTRAMUSCULAR; INTRAVENOUS at 08:08

## 2024-12-12 NOTE — ANESTHESIA POSTPROCEDURE EVALUATION
Patient: Aide Henry    Procedure Summary       Date: 12/12/24 Room / Location: [facility] OR 1 /  [facility] OR    Anesthesia Start: 0947 Anesthesia Stop: 1017    Procedure: R PERINEAL LESION/CYST EXCISION (Right: Vulva) Diagnosis:       Skin tag of vulva      (Skin tag of vulva [N90.89])    Surgeons: Rancho Mayberry MD Provider: Elizabeth Bernal CRNA    Anesthesia Type: MAC ASA Status: 3            Anesthesia Type: MAC    Vitals  Vitals Value Taken Time   BP 61/26 12/12/24 1040   Temp 98 °F (36.7 °C) 12/12/24 1019   Pulse 87 12/12/24 1045   Resp 12 12/12/24 1040   SpO2 100 % 12/12/24 1045   Vitals shown include unfiled device data.        Post Anesthesia Care and Evaluation    Patient location during evaluation: PHASE II  Patient participation: complete - patient participated  Level of consciousness: awake and alert  Pain score: 0  Pain management: adequate    Airway patency: patent  Anesthetic complications: No anesthetic complications  PONV Status: none  Cardiovascular status: acceptable  Respiratory status: acceptable  Hydration status: acceptable

## 2024-12-12 NOTE — ANESTHESIA PREPROCEDURE EVALUATION
Anesthesia Evaluation     Patient summary reviewed and Nursing notes reviewed   no history of anesthetic complications:   NPO Solid Status: > 8 hours  NPO Liquid Status: > 2 hours           Airway   Mallampati: III  TM distance: >3 FB  Neck ROM: full  Possible difficult intubation and Small opening  Dental      Comment: Scattered fillings      Pulmonary - normal exam    breath sounds clear to auscultation  (+) a smoker (nicotine) Current, Abstained day of surgery, vape,  Cardiovascular     Rhythm: regular  Rate: normal    (+) hypertension (metoprolol, amlodipine) well controlled 2 medications or greater, valvular problems/murmurs MR, dysrhythmias (SVT), angina (received echo in 2021 and was told she had a mild leaky mitral valve)      Neuro/Psych  (+) tremors, weakness, numbness, psychiatric history Depression and Anxiety    ROS Comment: B hands (weakness, numbness, and tremors). Reports former carpal tunnel release on both sides  GI/Hepatic/Renal/Endo    (+) obesity, GERD (prilosec) well controlled, thyroid problem (synthroid (hashimoto's)) hypothyroidism    ROS Comment: Gastroparesis with gastric stimulator present    IBS      Musculoskeletal     (+) myalgias (fibromyalgia)      ROS comment: Bulging disc in lumbar spine    Abdominal    Substance History   (+) alcohol use (weekly (vodka 0.5 pint/week)), drug use (former MJ use. None current)     OB/GYN          Other   arthritis, autoimmune disease lupus,     ROS/Med Hx Other: Endometrial ablation                 Anesthesia Plan    ASA 3     MAC     (General as backup)    Anesthetic plan, risks, benefits, and alternatives have been provided, discussed and informed consent has been obtained with: patient.  Pre-procedure education provided  Use of blood products discussed with patient  Consented to blood products.    Plan discussed with CRNA.    CODE STATUS:

## 2024-12-12 NOTE — OP NOTE
DATE OF PROCEDURE:  12/12/24    PROCEDURE:  excision R groin mole    SURGEON:  Rancho Mayberry MD    ASSISTANT:  none    PREOP DX:  R groin mole    POSTOP DX:  same    ANESTHESIA:  MAC    EBL:  1CC    IVFS:  100CC    FINDINGS:  R groin mole    COMPLICATIONS:  none    SPECIMENS:  R groin mole    ANTIBIOTICS:  none          DESCRIPTION OF THE PROCEDURE:    After informed consent was obtained, the pt was taken to the OR and MAC was induced without difficulty.  Pt was then placed in the dorsolithotomy position.    Pt was then prepped and draped in the usual fashion for the procedure.    The R groin mole was excised and the skin oversewn with 3-0 chromic in a simple interrupted stitch.  Antibiotic cream applied.    The lesion was 1/2 cm x 1/2 cm    All sponge, instrument and needle counts were correct x 3 according to the OR personnel.    Pt tolerated procedure well and went to the recovery room in satisfactory condition.    Rancho Mayberry MD  10:16 EST  12/12/24

## 2024-12-12 NOTE — H&P
PREOPERATIVE HISTORY AND PHYSICAL      Patient Care Team:  Lou Willard APRN as PCP - General (Family Medicine)  Rancho Mayberry MD as Consulting Physician (Obstetrics and Gynecology)    Chief complaint: Skin tag of vulva, desires removal under anesthesia    Pt is a 44 y.o.   No LMP recorded. Patient has had an ablation.     HPI:History of Present Illness    PMHx:   Past Medical History:   Diagnosis Date    Anal fissure     Anxiety     Bacterial vaginosis     Ashton esophagus     Bulging lumbar disc     lower back per patient    Depression with anxiety     Fibromyalgia     Fracture, finger     right hand, 4th finger    Gastroenteritis 2017    Gastroesophageal reflux disease without esophagitis 02/10/2016    Gastroparesis     Hemorrhoids     had a fissure    History of anemia     Hypertension     Hypothyroid     hashimoto    IBS (irritable bowel syndrome)     IC (interstitial cystitis)     Lateral epicondylitis 2013    RHUEMATOLOGIST    Lupus     Migraine     MRSA (methicillin resistant staph aureus) culture positive  ESTIMATE    GROIN AREA     Overactive bladder     Sjogren's syndrome     Urinary tract infection        Current problem list:    Skin tag of vulva       PSHx:   Past Surgical History:   Procedure Laterality Date    ADENOIDECTOMY      CARPAL TUNNEL RELEASE Bilateral      SECTION      COLONOSCOPY      D & C HYSTEROSCOPY N/A 2021    Procedure: DILATATION AND CURETTAGE HYSTEROSCOPY;  Surgeon: Rancho Mayberry MD;  Location: [facility] OR;  Service: Obstetrics/Gynecology;  Laterality: N/A;    D & C HYSTEROSCOPY ENDOMETRIAL ABLATION N/A 2021    Procedure: DILATATION AND CURETTAGE HYSTEROSCOPY,  NOVASURE ENDOMETRIAL ABLATION;  Surgeon: Rancho Mayberry MD;  Location: [facility] OR;  Service: Obstetrics/Gynecology;  Laterality: N/A;    ENDOSCOPY N/A 05/10/2019    Procedure: ESOPHAGOGASTRODUODENOSCOPY with biopsies;  Surgeon: George  MD Shanon;  Location: [facility] OR;  Service: Gastroenterology    ENDOSCOPY N/A 04/14/2021    Procedure: ESOPHAGOGASTRODUODENOSCOPY with biopsies;  Surgeon: Romain Rice MD;  Location: [facility] OR;  Service: Gastroenterology;  Laterality: N/A;  barretts  gastritis    FINGER GANGLION CYST EXCISION Right     middle finger    GASTRIC STIMULATOR IMPLANT SURGERY      KNEE ACL RECONSTRUCTION Left 05/28/2024    Procedure: KNEE ANTERIOR CRUCIATE LIGAMENT RECONSTRUCTION WITH ALLOGRAFT, medial meniscal repair;  Surgeon: Joo Rivers MD;  Location: [facility] OR;  Service: Orthopedics;  Laterality: Left;    MOUTH SURGERY      3 teeth pulled due to sjorgens syndrome    OTHER SURGICAL HISTORY      reversal of tubal    RHINOPLASTY      SHOULDER ARTHROSCOPY Left 09/10/2018    Procedure: SHOULDER ARTHROSCOPY, rotator cuff repair; extensive debridement, open biceps tenodesis;  Surgeon: Joo Rivers MD;  Location: [facility] OR;  Service: Orthopedics    SHOULDER ARTHROSCOPY W/ ROTATOR CUFF REPAIR Left 05/27/2022    Procedure: SHOULDER ARTHROSCOPY WITH ROTATOR CUFF REPAIR COMPLEX, EXTENSIVE DEBRIDEMENT;  Surgeon: Joo Rivers MD;  Location: [facility] OR;  Service: Orthopedics;  Laterality: Left;    SHOULDER ARTHROSCOPY W/ ROTATOR CUFF REPAIR Left 06/23/2023    Procedure: SHOULDER ARTHROSCOPY WITH ROTATOR CUFF REPAIR, distal clavicle excision;  Surgeon: Joo Rivers MD;  Location: [facility] OR;  Service: Orthopedics;  Laterality: Left;    TONSILLECTOMY      TUBAL ABDOMINAL LIGATION      TYMPANOSTOMY TUBE PLACEMENT      WISDOM TOOTH EXTRACTION Bilateral        Social Hx:   Social History     Socioeconomic History    Marital status:    Tobacco Use    Smoking status: Former     Current packs/day: 0.25     Average packs/day: 0.3 packs/day for 20.0 years (5.0 ttl pk-yrs)     Types: Cigarettes     Passive exposure: Never    Smokeless tobacco: Never    Tobacco comments:     Smokes every now and then    Vaping  Use    Vaping status: Some Days    Substances: Nicotine    Devices: Disposable   Substance and Sexual Activity    Alcohol use: Not Currently     Comment: sometimes, sober since 2024- states occasional relapse 2-3 times a year    Drug use: Not Currently     Frequency: 1.0 times per week     Types: Cocaine(coke), Marijuana     Comment: rarely    Sexual activity: Defer       FHx:   Family History   Problem Relation Age of Onset    Lung cancer Father     Heart disease Paternal Grandmother     Diabetes Maternal Aunt     Breast cancer Maternal Aunt     Diabetes Maternal Grandmother     COPD Mother     Colon cancer Neg Hx     Colon polyps Neg Hx     Malig Hyperthermia Neg Hx        Debilities/Disabilities Identified: None    Emotional Behavior: Appropriate    PGyn Hx:  otherwise noncontributory    POBHx:   OB History    Para Term  AB Living   4 4 4 0 0 4   SAB IAB Ectopic Molar Multiple Live Births   0 0 0 0 0 0      # Outcome Date GA Lbr Nguyễn/2nd Weight Sex Type Anes PTL Lv   4 Term      CS-LTranv      3 Term            2 Term            1 Term                Allergies: Fluconazole and Metoclopramide    Medications:   Medications Prior to Admission   Medication Sig Dispense Refill Last Dose/Taking    amLODIPine (NORVASC) 5 MG tablet Take 1 tablet by mouth Daily. Take dos       Bacillus Coagulans-Inulin (Probiotic) 1-250 BILLION-MG capsule Take 1 capsule by mouth Daily.       cevimeline (EVOXAC) 30 MG capsule Take 1 capsule by mouth 3 (Three) Times a Day.       DULoxetine (CYMBALTA) 60 MG capsule Take 1 capsule by mouth Daily.       gabapentin (NEURONTIN) 300 MG capsule Take 1 capsule by mouth 2 (Two) Times a Day. Take dos       Glycerin-Hypromellose- (ARTIFICIAL TEARS) 0.2-0.2-1 % solution ophthalmic solution Administer 2 drops to both eyes As Needed for Dry Eyes.       hydroxychloroquine (PLAQUENIL) 200 MG tablet Take 1 tablet by mouth Every Night.       ibuprofen (ADVIL,MOTRIN) 200 MG tablet Take 4  tablets by mouth Every 6 (Six) Hours As Needed for Moderate Pain.       levothyroxine (SYNTHROID, LEVOTHROID) 50 MCG tablet Take 1 tablet by mouth Daily. Take dos       linaclotide (LINZESS) 72 MCG capsule capsule Take 1 capsule by mouth Daily As Needed (constipation).       metoprolol succinate XL (TOPROL-XL) 25 MG 24 hr tablet Take 1 tablet by mouth Daily. Take dos       omeprazole (priLOSEC) 40 MG capsule Take 1 capsule by mouth 2 (Two) Times a Day. Take dos       ondansetron (Zofran) 4 MG tablet Take 1 tablet by mouth Every 8 (Eight) Hours As Needed for Nausea or Vomiting. 30 tablet 0     Rimegepant Sulfate (NURTEC) 75 MG tablet dispersible tablet Take 1 tablet by mouth Daily As Needed (migraines).       Salicylic Acid 6 % foam Apply 1 Application topically to the appropriate area as directed As Needed.       traZODone (DESYREL) 150 MG tablet Take 1 tablet by mouth Every Night.       triamcinolone (KENALOG) 0.1 % cream Apply 1 Application topically to the appropriate area as directed As Needed for Irritation.       vitamin D (ERGOCALCIFEROL) 1.25 MG (74986 UT) capsule capsule Take 1 capsule by mouth 1 (One) Time Per Week.                               No current facility-administered medications for this encounter.        Review of Systems   Constitutional: Negative.    HENT: Negative.     Eyes: Negative.    Respiratory: Negative.     Cardiovascular: Negative.    Gastrointestinal: Negative.    Endocrine: Negative.    Musculoskeletal: Negative.    Skin: Negative.    Allergic/Immunologic: Negative.    Neurological: Negative.    Hematological: Negative.    Psychiatric/Behavioral: Negative.         Vital Signs  There were no vitals taken for this visit.    Physical Exam  Vitals and nursing note reviewed.   Constitutional:       Appearance: She is well-developed.   HENT:      Head: Normocephalic and atraumatic.   Cardiovascular:      Rate and Rhythm: Normal rate.   Pulmonary:      Effort: Pulmonary effort is normal.    Abdominal:      General: There is no distension.      Palpations: Abdomen is soft. There is no mass.      Tenderness: There is no abdominal tenderness. There is no guarding.   Genitourinary:     Vagina: No vaginal discharge.   Musculoskeletal:         General: No tenderness or deformity. Normal range of motion.      Cervical back: Normal range of motion.   Skin:     General: Skin is warm and dry.      Coloration: Skin is not pale.      Findings: No erythema or rash.   Neurological:      Mental Status: She is alert and oriented to person, place, and time.   Psychiatric:         Behavior: Behavior normal.         Thought Content: Thought content normal.         Judgment: Judgment normal.             IMPRESSION:    Skin tag of vulva                                    PLAN:    Procedure(s):  R PERINEAL LESION/CYST EXCISION    RISKS, ALTERNATIVES, COMPLICATIONS OF THE PROCEDURE INCLUDING BUT NOT LIMITED TO:    INTRAOPERATIVE RISKS: INJURY TO INTERNAL AND ADJACENT ORGANS AND STRUCTURES (BOWEL, BLADDER, URETER,BLOOD VESSELS) OR HEMORRHAGE REQUIRING FURTHER SURGERY (LAPAROTOMY),  POSSIBLE NON-DIAGNOSTIC FINDINGS, DISCOVERY OF POSSIBLE MALIGNANCY, INFECTION, AND DEATH;   POSTOP COMPLICATIONS: BLEEDING, INFECTION (REQUIRING POSSIBLE REOPERATION), FAILURE OF GOAL OF SURGERY AND RECURRENCE OF ORIGINAL SYMPTOMS, PNEUMONIA, PULMONARY EMBOLISM, AND DEATH;  WERE EXPLAINED TO THE PT WHO VERBALIZED HER UNDERSTANDING.             I discussed the patients findings and my recommendations with patient.     Rancho Mayberry MD  12/12/24  07:40 EST

## 2024-12-20 ENCOUNTER — HOSPITAL ENCOUNTER (OUTPATIENT)
Dept: PHYSICAL THERAPY | Facility: HOSPITAL | Age: 44
Setting detail: THERAPIES SERIES
Discharge: HOME | End: 2024-12-20
Payer: COMMERCIAL

## 2024-12-20 DIAGNOSIS — Z98.890 STATUS POST REPAIR OF ANTERIOR CRUCIATE LIGAMENT: Primary | ICD-10-CM

## 2024-12-20 PROCEDURE — 97110 THERAPEUTIC EXERCISES: CPT | Performed by: PHYSICAL THERAPIST

## 2024-12-20 NOTE — THERAPY TREATMENT NOTE
Outpatient Physical Therapy Ortho Treatment Note  TENISHA Wu     Patient Name: Aide Henry  : 1980  MRN: [id number]  Today's Date: 2024      Visit Date: 2024    Visit Dx:    ICD-10-CM ICD-9-CM   1. Status post repair of anterior cruciate ligament  Z98.890 V45.89       Patient Active Problem List   Diagnosis    Arthralgia of multiple joints    Chronic back pain    Chronic constipation    Disorder of connective tissue    Depression with anxiety    Fibromyalgia    Gastroesophageal reflux disease without esophagitis    Muscle pain    Palpitations    Seasonal allergic rhinitis    Eczema    Shoulder pain, left    Substance abuse    Lateral epicondylitis    Drug addiction syndrome    Gastroenteritis    Alcohol dependence in remission    History of placement of ear tubes    Status post arthroscopy of shoulder    Subacromial impingement of left shoulder    Biceps tendinitis of left upper extremity    Complete tear of left rotator cuff    Smoker    Sjogren's syndrome with keratoconjunctivitis sicca    Pelvic pain    Nondisplaced fracture of coronoid process of left ulna, initial encounter for closed fracture    Left breast lump    SARMAD (stress urinary incontinence, female)    Rectocele    Irritable bowel syndrome with both constipation and diarrhea    Ashton's esophagus without dysplasia    IC (interstitial cystitis)    Overactive bladder    Gastroparesis    Chondromalacia of patellofemoral joint, left    Lupus    Dysmenorrhea    History of bilateral tubal ligation    Status post repair of anterior cruciate ligament    Blood blister: R labium    Carrier of ureaplasma urealyticum    Mechanical knee pain, left    AC joint arthropathy    Subacromial bursitis of right shoulder joint    Rotator cuff tendinitis, right    Vapes nicotine containing substance    Tears of meniscus and ACL of left knee    Left anterior cruciate ligament tear    Skin tag of vulva: removal 24        Past Medical History:    Diagnosis Date    Anal fissure     Anxiety     Bacterial vaginosis     Ashton esophagus     Bulging lumbar disc     lower back per patient    Depression with anxiety     Fibromyalgia     Fracture, finger     right hand, 4th finger    Gastroenteritis 2017    Gastroesophageal reflux disease without esophagitis 02/10/2016    Gastroparesis     Hemorrhoids     had a fissure    History of anemia     Hypertension     Hypothyroid     hashimoto    IBS (irritable bowel syndrome)     IC (interstitial cystitis)     Lateral epicondylitis 2013    RHUEMATOLOGIST    Lupus     Migraine     MRSA (methicillin resistant staph aureus) culture positive 2011 ESTIMATE    GROIN AREA     Overactive bladder     Sjogren's syndrome     Urinary tract infection         Past Surgical History:   Procedure Laterality Date    ADENOIDECTOMY      CARPAL TUNNEL RELEASE Bilateral      SECTION      COLONOSCOPY      D & C HYSTEROSCOPY N/A 2021    Procedure: DILATATION AND CURETTAGE HYSTEROSCOPY;  Surgeon: Rancho Mayberry MD;  Location: [facility];  Service: Obstetrics/Gynecology;  Laterality: N/A;    D & C HYSTEROSCOPY ENDOMETRIAL ABLATION N/A 2021    Procedure: DILATATION AND CURETTAGE HYSTEROSCOPY,  NOVASURE ENDOMETRIAL ABLATION;  Surgeon: Rancho Mayberry MD;  Location: [facility] OR;  Service: Obstetrics/Gynecology;  Laterality: N/A;    ENDOSCOPY N/A 05/10/2019    Procedure: ESOPHAGOGASTRODUODENOSCOPY with biopsies;  Surgeon: Shanon Vazquez MD;  Location: [facility] OR;  Service: Gastroenterology    ENDOSCOPY N/A 2021    Procedure: ESOPHAGOGASTRODUODENOSCOPY with biopsies;  Surgeon: Romain Rice MD;  Location: [facility] OR;  Service: Gastroenterology;  Laterality: N/A;  barretts  gastritis    FINGER GANGLION CYST EXCISION Right     middle finger    GASTRIC STIMULATOR IMPLANT SURGERY      KNEE ACL RECONSTRUCTION Left 2024    Procedure: KNEE ANTERIOR CRUCIATE LIGAMENT  RECONSTRUCTION WITH ALLOGRAFT, medial meniscal repair;  Surgeon: Joo Rivers MD;  Location:  [facility] OR;  Service: Orthopedics;  Laterality: Left;    MOUTH SURGERY      3 teeth pulled due to sjorgens syndrome    OTHER SURGICAL HISTORY      reversal of tubal    RHINOPLASTY      SHOULDER ARTHROSCOPY Left 09/10/2018    Procedure: SHOULDER ARTHROSCOPY, rotator cuff repair; extensive debridement, open biceps tenodesis;  Surgeon: Joo Rivers MD;  Location:  [facility] OR;  Service: Orthopedics    SHOULDER ARTHROSCOPY W/ ROTATOR CUFF REPAIR Left 05/27/2022    Procedure: SHOULDER ARTHROSCOPY WITH ROTATOR CUFF REPAIR COMPLEX, EXTENSIVE DEBRIDEMENT;  Surgeon: Joo Rivers MD;  Location:  [facility] OR;  Service: Orthopedics;  Laterality: Left;    SHOULDER ARTHROSCOPY W/ ROTATOR CUFF REPAIR Left 06/23/2023    Procedure: SHOULDER ARTHROSCOPY WITH ROTATOR CUFF REPAIR, distal clavicle excision;  Surgeon: Joo Rivers MD;  Location: [facility] OR;  Service: Orthopedics;  Laterality: Left;    TONSILLECTOMY      TUBAL ABDOMINAL LIGATION      TYMPANOSTOMY TUBE PLACEMENT      VULVA BIOPSY Right 12/12/2024    Procedure: PERINEAL LESION/CYST EXCISION;  Surgeon: Rancho Mayberry MD;  Location: [facility] OR;  Service: Obstetrics/Gynecology;  Laterality: Right;    WISDOM TOOTH EXTRACTION Bilateral                         PT Assessment/Plan       Row Name 12/20/24 0900          PT Assessment    Assessment Comments Pt tolerated her exercise progression well. Feel she still needs additional strength for functional improvement.  -        PT Plan    PT Plan Comments Pt is to continue her HEP daily. Will re-ck again in 2 weeks.  -               User Key  (r) = Recorded By, (t) = Taken By, (c) = Cosigned By      Initials Name Provider Type    Ruperto Russo, PT Physical Therapist                       OP Exercises       Row Name 12/20/24 0900             Subjective    Subjective Comments Pt states her knee is better,  "but it is not where she wants it to be.  -GC         Exercise 1    Exercise Name 1 Hamstring stretch  -GC      Reps 1 10  -GC      Time 1 10 secs  -GC         Exercise 2    Exercise Name 2 SAQ  -GC      Reps 2 50  -GC      Time 2 3#  -GC         Exercise 3    Exercise Name 3 LAQ  -GC      Reps 3 50  -GC      Time 3 3#  -GC         Exercise 4    Exercise Name 4 TKE vs theraband  -GC      Reps 4 50  -GC      Time 4 gold  -GC         Exercise 5    Exercise Name 5 standing hamstring curls  -GC      Reps 5 50  -GC         Exercise 6    Exercise Name 6 Partial squat/ball squeeze  -GC      Reps 6 25  -GC         Exercise 7    Exercise Name 7 Lateral dips on 4\" step  -GC      Reps 7 25  -GC                User Key  (r) = Recorded By, (t) = Taken By, (c) = Cosigned By      Initials Name Provider Type     Ruperto Kimball, PT Physical Therapist                                                    Time Calculation:   Start Time: 0900  Stop Time: 0931  Time Calculation (min): 31 min  Therapy Charges for Today       Code Description Service Date Service Provider Modifiers Qty    60849305766 HC PT THER PROC EA 15 MIN 12/20/2024 Ruperto Kimball, PT GP 2                      Ruperto Kimball, PT  12/20/2024     "

## 2024-12-26 ENCOUNTER — OFFICE VISIT (OUTPATIENT)
Dept: OBSTETRICS AND GYNECOLOGY | Facility: CLINIC | Age: 44
End: 2024-12-26
Payer: COMMERCIAL

## 2024-12-26 VITALS
DIASTOLIC BLOOD PRESSURE: 72 MMHG | BODY MASS INDEX: 34.66 KG/M2 | SYSTOLIC BLOOD PRESSURE: 128 MMHG | HEIGHT: 64 IN | WEIGHT: 203 LBS

## 2024-12-26 DIAGNOSIS — Z09 POSTOPERATIVE FOLLOW-UP: Primary | ICD-10-CM

## 2024-12-26 RX ORDER — HYDROXYCHLOROQUINE SULFATE 200 MG/1
400 TABLET, FILM COATED ORAL DAILY
COMMUNITY
Start: 2024-12-16

## 2024-12-26 RX ORDER — UREA 40 %
CREAM (GRAM) TOPICAL
COMMUNITY
Start: 2024-11-19

## 2024-12-26 RX ORDER — IBUPROFEN 200 MG
200 TABLET ORAL
COMMUNITY
Start: 2024-11-19

## 2024-12-26 RX ORDER — LEVOTHYROXINE SODIUM 50 UG/1
25 TABLET ORAL
COMMUNITY
Start: 2024-11-19

## 2024-12-26 RX ORDER — MIRABEGRON 50 MG/1
50 TABLET, FILM COATED, EXTENDED RELEASE ORAL DAILY
COMMUNITY
Start: 2024-12-19

## 2024-12-26 RX ORDER — TRAZODONE HYDROCHLORIDE 100 MG/1
200 TABLET ORAL
COMMUNITY

## 2024-12-26 NOTE — PROGRESS NOTES
"Subjective     Chief Complaint   Patient presents with    Post-op       Aide Henry is a 44 y.o.  whose LMP is No LMP recorded. Patient has had an ablation.. This patient is new to me- yes, but is an established patient of this practice.  She presents with post-op check    HPI    She had a skin tag removed under anesthesia on 24; performed by Dr. Mayberry.  She denies problems/concerns today.  Stitches were dissolvable.          The following portions of the patient's history were reviewed and updated as appropriate:vital signs, allergies, current medications, past medical history, past social history, past surgical history, and problem list      Review of Systems     Review of Systems   Skin:  Positive for skin lesions.       Objective      /72   Ht 162.6 cm (64.02\")   Wt 92.1 kg (203 lb)   BMI 34.83 kg/m²     Physical Exam    Physical Exam  Vitals reviewed.   Constitutional:       General: She is awake. She is not in acute distress.     Appearance: She is not ill-appearing.   Eyes:      Conjunctiva/sclera: Conjunctivae normal.   Pulmonary:      Effort: Pulmonary effort is normal. No respiratory distress.   Musculoskeletal:      Cervical back: Neck supple. No rigidity.   Skin:     General: Skin is warm and dry.      Capillary Refill: Capillary refill takes less than 2 seconds.   Neurological:      Mental Status: She is alert and oriented to person, place, and time.   Psychiatric:         Mood and Affect: Mood and affect normal.         Behavior: Behavior normal.           Lab Review   Labs: pathology report     Imaging: No data reviewed      Assessment/Plan  Diagnoses and all orders for this visit:    1. Postoperative follow-up (Primary)  -     POC Urinalysis Dipstick      She is doing well with no concerns. Pathology report showed it was a polyp.      Return as needed.    Candi Rose, APRN  2024  13:43 EST  "

## 2024-12-31 ENCOUNTER — OFFICE VISIT (OUTPATIENT)
Dept: OBSTETRICS AND GYNECOLOGY | Facility: CLINIC | Age: 44
End: 2024-12-31
Payer: COMMERCIAL

## 2024-12-31 VITALS
HEIGHT: 64 IN | BODY MASS INDEX: 33.97 KG/M2 | SYSTOLIC BLOOD PRESSURE: 128 MMHG | WEIGHT: 199 LBS | DIASTOLIC BLOOD PRESSURE: 70 MMHG

## 2024-12-31 DIAGNOSIS — N63.20 MASS OF LEFT BREAST, UNSPECIFIED QUADRANT: Primary | ICD-10-CM

## 2024-12-31 DIAGNOSIS — N64.4 MASTODYNIA: ICD-10-CM

## 2024-12-31 NOTE — PROGRESS NOTES
"Subjective     Chief Complaint   Patient presents with    Breast Problem     lt       Aide Henry is a 44 y.o.  whose LMP is No LMP recorded (lmp unknown). Patient has had an ablation.. This patient is new to me- no.  She presents with lump/pain left breast    HPI    Felt lump in left breast yesterday that is painful.  H/o cysts in breasts.  Recent MMG 2024 showed fibroglandular densities.  Denies hx of abn MMG.  She is a smoker; denies extreme caffeine use.  Family history + for maternal aunt with breast cancer in her 60's.          The following portions of the patient's history were reviewed and updated as appropriate:vital signs, allergies, current medications, past family history, past medical history, past social history, past surgical history, and problem list      Review of Systems     Review of Systems   Genitourinary:  Positive for breast lump and breast pain. Negative for breast discharge.   Skin: Negative.        Objective      /70   Ht 162.6 cm (64\")   Wt 90.3 kg (199 lb)   LMP  (LMP Unknown)   Breastfeeding No   BMI 34.16 kg/m²     Physical Exam    Physical Exam  Vitals reviewed.   Constitutional:       General: She is awake. She is not in acute distress.     Appearance: She is not ill-appearing.   Eyes:      Conjunctiva/sclera: Conjunctivae normal.   Pulmonary:      Effort: Pulmonary effort is normal. No respiratory distress.   Chest:   Breasts:     Right: Normal.      Left: Mass and tenderness present. No swelling, bleeding, inverted nipple, nipple discharge or skin change.      Comments: Mass felt when patient is sitting up; outer quadrant in center  Musculoskeletal:      Cervical back: Neck supple. No rigidity.   Lymphadenopathy:      Upper Body:      Right upper body: No axillary adenopathy.      Left upper body: No axillary adenopathy.   Skin:     General: Skin is warm and dry.      Capillary Refill: Capillary refill takes less than 2 seconds.   Neurological:      Mental " Status: She is alert and oriented to person, place, and time.   Psychiatric:         Mood and Affect: Mood and affect normal.         Behavior: Behavior normal.           Lab Review   Labs: No data reviewed     Imaging: No data reviewed      Assessment/Plan  Diagnoses and all orders for this visit:    1. Mass of left breast, unspecified quadrant (Primary)  -     US Breast Left Complete; Future  -     Mammo Diagnostic Digital Tomosynthesis Left With CAD; Future    2. Mastodynia  -     US Breast Left Complete; Future  -     Mammo Diagnostic Digital Tomosynthesis Left With CAD; Future    Diag MMG with US ordered.  Discussed OTC treatment for pain and smoking cessation.       Return as needed.      Candi Rose, APRN  12/31/2024  11:09 EST

## 2025-01-02 ENCOUNTER — HOSPITAL ENCOUNTER (OUTPATIENT)
Dept: PHYSICAL THERAPY | Facility: HOSPITAL | Age: 45
Setting detail: THERAPIES SERIES
Discharge: HOME | End: 2025-01-02
Payer: COMMERCIAL

## 2025-01-02 DIAGNOSIS — Z98.890 STATUS POST REPAIR OF ANTERIOR CRUCIATE LIGAMENT: Primary | ICD-10-CM

## 2025-01-02 PROCEDURE — 97110 THERAPEUTIC EXERCISES: CPT | Performed by: PHYSICAL THERAPIST

## 2025-01-02 NOTE — THERAPY TREATMENT NOTE
Outpatient Physical Therapy Ortho Treatment Note  TENISHA Wu     Patient Name: Aide Henry  : 1980  MRN: [id number]  Today's Date: 2025      Visit Date: 2025    Visit Dx:    ICD-10-CM ICD-9-CM   1. Status post repair of anterior cruciate ligament  Z98.890 V45.89       Patient Active Problem List   Diagnosis    Arthralgia of multiple joints    Chronic back pain    Chronic constipation    Disorder of connective tissue    Depression with anxiety    Fibromyalgia    Gastroesophageal reflux disease without esophagitis    Muscle pain    Palpitations    Seasonal allergic rhinitis    Eczema    Shoulder pain, left    Substance abuse    Lateral epicondylitis    Drug addiction syndrome    Gastroenteritis    Alcohol dependence in remission    History of placement of ear tubes    Status post arthroscopy of shoulder    Subacromial impingement of left shoulder    Biceps tendinitis of left upper extremity    Complete tear of left rotator cuff    Smoker    Sjogren's syndrome with keratoconjunctivitis sicca    Pelvic pain    Nondisplaced fracture of coronoid process of left ulna, initial encounter for closed fracture    Left breast lump    SARMAD (stress urinary incontinence, female)    Rectocele    Irritable bowel syndrome with both constipation and diarrhea    Ashton's esophagus without dysplasia    IC (interstitial cystitis)    Overactive bladder    Gastroparesis    Chondromalacia of patellofemoral joint, left    Lupus    Dysmenorrhea    History of bilateral tubal ligation    Status post repair of anterior cruciate ligament    Blood blister: R labium    Carrier of ureaplasma urealyticum    Mechanical knee pain, left    AC joint arthropathy    Subacromial bursitis of right shoulder joint    Rotator cuff tendinitis, right    Vapes nicotine containing substance    Tears of meniscus and ACL of left knee    Left anterior cruciate ligament tear    Skin tag of vulva: removal 24        Past Medical History:    Diagnosis Date    Anal fissure     Anxiety     Bacterial vaginosis     Ashton esophagus     Bulging lumbar disc     lower back per patient    Depression with anxiety     Fibromyalgia     Fracture, finger     right hand, 4th finger    Gastroenteritis 2017    Gastroesophageal reflux disease without esophagitis 02/10/2016    Gastroparesis     Hemorrhoids     had a fissure    History of anemia     Hypertension     Hypothyroid     hashimoto    IBS (irritable bowel syndrome)     IC (interstitial cystitis)     Lateral epicondylitis 2013    RHUEMATOLOGIST    Lupus     Migraine     MRSA (methicillin resistant staph aureus) culture positive 2011 ESTIMATE    GROIN AREA     Overactive bladder     Sjogren's syndrome     Urinary tract infection         Past Surgical History:   Procedure Laterality Date    ADENOIDECTOMY      CARPAL TUNNEL RELEASE Bilateral      SECTION      COLONOSCOPY      D & C HYSTEROSCOPY N/A 2021    Procedure: DILATATION AND CURETTAGE HYSTEROSCOPY;  Surgeon: Rancho Mayberry MD;  Location: [facility];  Service: Obstetrics/Gynecology;  Laterality: N/A;    D & C HYSTEROSCOPY ENDOMETRIAL ABLATION N/A 2021    Procedure: DILATATION AND CURETTAGE HYSTEROSCOPY,  NOVASURE ENDOMETRIAL ABLATION;  Surgeon: Rancho Mayberry MD;  [facility];  Service: Obstetrics/Gynecology;  Laterality: N/A;    ENDOSCOPY N/A 05/10/2019    Procedure: ESOPHAGOGASTRODUODENOSCOPY with biopsies;  Surgeon: Shanon Vazquez MD;  Location: [facility];  Service: Gastroenterology    ENDOSCOPY N/A 2021    Procedure: ESOPHAGOGASTRODUODENOSCOPY with biopsies;  Surgeon: Roamin Rice MD;  Location: [facility];  Service: Gastroenterology;  Laterality: N/A;  barretts  gastritis    FINGER GANGLION CYST EXCISION Right     middle finger    GASTRIC STIMULATOR IMPLANT SURGERY      KNEE ACL RECONSTRUCTION Left 2024    Procedure: KNEE ANTERIOR CRUCIATE LIGAMENT  RECONSTRUCTION WITH ALLOGRAFT, medial meniscal repair;  Surgeon: Joo Rivers MD;  Location:  [facility] OR;  Service: Orthopedics;  Laterality: Left;    MOUTH SURGERY      3 teeth pulled due to sjorgens syndrome    OTHER SURGICAL HISTORY      reversal of tubal    RHINOPLASTY      SHOULDER ARTHROSCOPY Left 09/10/2018    Procedure: SHOULDER ARTHROSCOPY, rotator cuff repair; extensive debridement, open biceps tenodesis;  Surgeon: Joo Rivers MD;  Location:  [facility] OR;  Service: Orthopedics    SHOULDER ARTHROSCOPY W/ ROTATOR CUFF REPAIR Left 05/27/2022    Procedure: SHOULDER ARTHROSCOPY WITH ROTATOR CUFF REPAIR COMPLEX, EXTENSIVE DEBRIDEMENT;  Surgeon: Joo Rivers MD;  Location:  [facility] OR;  Service: Orthopedics;  Laterality: Left;    SHOULDER ARTHROSCOPY W/ ROTATOR CUFF REPAIR Left 06/23/2023    Procedure: SHOULDER ARTHROSCOPY WITH ROTATOR CUFF REPAIR, distal clavicle excision;  Surgeon: Joo Rivers MD;  Location: [facility] OR;  Service: Orthopedics;  Laterality: Left;    TONSILLECTOMY      TUBAL ABDOMINAL LIGATION      TYMPANOSTOMY TUBE PLACEMENT      VULVA BIOPSY Right 12/12/2024    Procedure: PERINEAL LESION/CYST EXCISION;  Surgeon: Rancho Mayberry MD;  Location: [facility] OR;  Service: Obstetrics/Gynecology;  Laterality: Right;    WISDOM TOOTH EXTRACTION Bilateral                         PT Assessment/Plan       Row Name 01/02/25 0910          PT Assessment    Assessment Comments Pt tolerated her exercise progression well.  -        PT Plan    PT Plan Comments Pt is to continue her HEP 2x daily. Will re-ck again in 2 weeks.  -               User Key  (r) = Recorded By, (t) = Taken By, (c) = Cosigned By      Initials Name Provider Type    GC Ruperto Kimball, PT Physical Therapist                       OP Exercises       Row Name 01/02/25 0906             Subjective    Subjective Comments Pt states her knee still hurts, just not as much.  -         Exercise 1    Exercise Name 1  "Hamstring stretch  -GC      Reps 1 10  -GC      Time 1 10 secs  -GC         Exercise 2    Exercise Name 2 SAQ  -GC      Reps 2 50  -GC      Time 2 3#  -GC         Exercise 3    Exercise Name 3 LAQ  -GC      Reps 3 50  -GC      Time 3 3#  -GC         Exercise 4    Exercise Name 4 TKE vs theraband  -GC      Reps 4 50  -GC      Time 4 gold  -GC         Exercise 5    Exercise Name 5 standing hamstring curls  -GC      Reps 5 50  -GC      Time 5 2#  -GC         Exercise 6    Exercise Name 6 Partial squat/ball squeeze  -GC      Reps 6 25  -GC         Exercise 7    Exercise Name 7 Lateral dips on 6\" step  -GC      Reps 7 25  -GC         Exercise 8    Exercise Name 8 SLR  -GC      Reps 8 25  -GC      Time 8 2#  -GC         Exercise 9    Exercise Name 9 Hip ADD  -GC      Reps 9 25  -GC      Time 9 2#  -GC                User Key  (r) = Recorded By, (t) = Taken By, (c) = Cosigned By      Initials Name Provider Type     Ruperto Kimball, PT Physical Therapist                                                    Time Calculation:   Start Time: 0910  Stop Time: 0943  Time Calculation (min): 33 min  Therapy Charges for Today       Code Description Service Date Service Provider Modifiers Qty    02907609011 HC PT THER PROC EA 15 MIN 1/2/2025 Ruperto Kimball, PT GP 2                      Ruperto Kimball, PT  1/2/2025     "

## 2025-01-16 ENCOUNTER — HOSPITAL ENCOUNTER (OUTPATIENT)
Dept: PHYSICAL THERAPY | Facility: HOSPITAL | Age: 45
Setting detail: THERAPIES SERIES
Discharge: HOME | End: 2025-01-16
Payer: COMMERCIAL

## 2025-01-16 DIAGNOSIS — Z98.890 STATUS POST REPAIR OF ANTERIOR CRUCIATE LIGAMENT: Primary | ICD-10-CM

## 2025-01-16 PROCEDURE — 97110 THERAPEUTIC EXERCISES: CPT | Performed by: PHYSICAL THERAPIST

## 2025-01-16 NOTE — THERAPY TREATMENT NOTE
Outpatient Physical Therapy Ortho Treatment Note  TENISHA Wu     Patient Name: Aide Henry  : 1980  MRN: [id number]  Today's Date: 2025      Visit Date: 2025    Visit Dx:    ICD-10-CM ICD-9-CM   1. Status post repair of anterior cruciate ligament  Z98.890 V45.89       Patient Active Problem List   Diagnosis    Arthralgia of multiple joints    Chronic back pain    Chronic constipation    Disorder of connective tissue    Depression with anxiety    Fibromyalgia    Gastroesophageal reflux disease without esophagitis    Muscle pain    Palpitations    Seasonal allergic rhinitis    Eczema    Shoulder pain, left    Substance abuse    Lateral epicondylitis    Drug addiction syndrome    Gastroenteritis    Alcohol dependence in remission    History of placement of ear tubes    Status post arthroscopy of shoulder    Subacromial impingement of left shoulder    Biceps tendinitis of left upper extremity    Complete tear of left rotator cuff    Smoker    Sjogren's syndrome with keratoconjunctivitis sicca    Pelvic pain    Nondisplaced fracture of coronoid process of left ulna, initial encounter for closed fracture    Left breast lump    SARMAD (stress urinary incontinence, female)    Rectocele    Irritable bowel syndrome with both constipation and diarrhea    Ashton's esophagus without dysplasia    IC (interstitial cystitis)    Overactive bladder    Gastroparesis    Chondromalacia of patellofemoral joint, left    Lupus    Dysmenorrhea    History of bilateral tubal ligation    Status post repair of anterior cruciate ligament    Blood blister: R labium    Carrier of ureaplasma urealyticum    Mechanical knee pain, left    AC joint arthropathy    Subacromial bursitis of right shoulder joint    Rotator cuff tendinitis, right    Vapes nicotine containing substance    Tears of meniscus and ACL of left knee    Left anterior cruciate ligament tear    Skin tag of vulva: removal 24        Past Medical History:    Diagnosis Date    Anal fissure     Anxiety     Bacterial vaginosis     Ashton esophagus     Bulging lumbar disc     lower back per patient    Depression with anxiety     Fibromyalgia     Fracture, finger     right hand, 4th finger    Gastroenteritis 2017    Gastroesophageal reflux disease without esophagitis 02/10/2016    Gastroparesis     Hemorrhoids     had a fissure    History of anemia     Hypertension     Hypothyroid     hashimoto    IBS (irritable bowel syndrome)     IC (interstitial cystitis)     Lateral epicondylitis 2013    RHUEMATOLOGIST    Lupus     Migraine     MRSA (methicillin resistant staph aureus) culture positive 2011 ESTIMATE    GROIN AREA     Overactive bladder     Sjogren's syndrome     Urinary tract infection         Past Surgical History:   Procedure Laterality Date    ADENOIDECTOMY      CARPAL TUNNEL RELEASE Bilateral      SECTION      COLONOSCOPY      D & C HYSTEROSCOPY N/A 2021    Procedure: DILATATION AND CURETTAGE HYSTEROSCOPY;  Surgeon: Rancho Mayberry MD;  Location: [facility];  Service: Obstetrics/Gynecology;  Laterality: N/A;    D & C HYSTEROSCOPY ENDOMETRIAL ABLATION N/A 2021    Procedure: DILATATION AND CURETTAGE HYSTEROSCOPY,  NOVASURE ENDOMETRIAL ABLATION;  Surgeon: Rancho Mayberry MD;  Location: [facility] OR;  Service: Obstetrics/Gynecology;  Laterality: N/A;    ENDOSCOPY N/A 05/10/2019    Procedure: ESOPHAGOGASTRODUODENOSCOPY with biopsies;  Surgeon: Shanon Vazquez MD;  Location: [facility] OR;  Service: Gastroenterology    ENDOSCOPY N/A 2021    Procedure: ESOPHAGOGASTRODUODENOSCOPY with biopsies;  Surgeon: Romain Rice MD;  Location: [facility] OR;  Service: Gastroenterology;  Laterality: N/A;  barretts  gastritis    FINGER GANGLION CYST EXCISION Right     middle finger    GASTRIC STIMULATOR IMPLANT SURGERY      KNEE ACL RECONSTRUCTION Left 2024    Procedure: KNEE ANTERIOR CRUCIATE LIGAMENT  RECONSTRUCTION WITH ALLOGRAFT, medial meniscal repair;  Surgeon: Joo Rivers MD;  Location:  [facility] OR;  Service: Orthopedics;  Laterality: Left;    MOUTH SURGERY      3 teeth pulled due to sjorgens syndrome    OTHER SURGICAL HISTORY      reversal of tubal    RHINOPLASTY      SHOULDER ARTHROSCOPY Left 09/10/2018    Procedure: SHOULDER ARTHROSCOPY, rotator cuff repair; extensive debridement, open biceps tenodesis;  Surgeon: Joo Rivers MD;  Location:  [facility] OR;  Service: Orthopedics    SHOULDER ARTHROSCOPY W/ ROTATOR CUFF REPAIR Left 05/27/2022    Procedure: SHOULDER ARTHROSCOPY WITH ROTATOR CUFF REPAIR COMPLEX, EXTENSIVE DEBRIDEMENT;  Surgeon: Joo Rivres MD;  Location:  [facility] OR;  Service: Orthopedics;  Laterality: Left;    SHOULDER ARTHROSCOPY W/ ROTATOR CUFF REPAIR Left 06/23/2023    Procedure: SHOULDER ARTHROSCOPY WITH ROTATOR CUFF REPAIR, distal clavicle excision;  Surgeon: Joo Rivers MD;  Location: [facility] OR;  Service: Orthopedics;  Laterality: Left;    TONSILLECTOMY      TUBAL ABDOMINAL LIGATION      TYMPANOSTOMY TUBE PLACEMENT      VULVA BIOPSY Right 12/12/2024    Procedure: PERINEAL LESION/CYST EXCISION;  Surgeon: Rancho Mayberry MD;  Location: [facility] OR;  Service: Obstetrics/Gynecology;  Laterality: Right;    WISDOM TOOTH EXTRACTION Bilateral                         PT Assessment/Plan       Row Name 01/16/25 1000          PT Assessment    Assessment Comments Pt is doing well with decreased pain and good tolerance to her exercise progression.  -        PT Plan    PT Plan Comments Pt is to continue her HEP daily.  -               User Key  (r) = Recorded By, (t) = Taken By, (c) = Cosigned By      Initials Name Provider Type     Ruperto Kimball, PT Physical Therapist                       OP Exercises       Row Name 01/16/25 1000             Subjective    Subjective Comments Pt states her knee has been feeling pretty good.  -         Exercise 1    Exercise  "Name 1 Hamstring stretch  -GC      Reps 1 10  -GC      Time 1 10 secs  -GC         Exercise 2    Exercise Name 2 SAQ  -GC      Reps 2 50  -GC      Time 2 5#  -GC         Exercise 3    Exercise Name 3 LAQ  -GC      Reps 3 50  -GC      Time 3 5#  -GC         Exercise 4    Exercise Name 4 TKE vs theraband  -GC      Reps 4 50  -GC      Time 4 gold  -GC         Exercise 5    Exercise Name 5 standing hamstring curls  -GC      Reps 5 25  -GC      Time 5 3#  -GC         Exercise 6    Exercise Name 6 Partial squat/ball squeeze  -GC      Reps 6 30  -GC         Exercise 7    Exercise Name 7 Lateral dips on 6\" step  -GC      Reps 7 25  -GC         Exercise 8    Exercise Name 8 SLR  -GC      Reps 8 25  -GC      Time 8 3#  -GC         Exercise 9    Exercise Name 9 Hip ADD  -GC      Reps 9 25  -GC      Time 9 3#  -GC                User Key  (r) = Recorded By, (t) = Taken By, (c) = Cosigned By      Initials Name Provider Type     Ruperto Kimball, PT Physical Therapist                                                    Time Calculation:   Start Time: 1000  Stop Time: 1031  Time Calculation (min): 31 min  Therapy Charges for Today       Code Description Service Date Service Provider Modifiers Qty    66492101420 HC PT THER PROC EA 15 MIN 1/16/2025 Ruperto Kimball, PT GP 2                      Ruperto Kimball, PT  1/16/2025     "

## 2025-01-20 ENCOUNTER — HOSPITAL ENCOUNTER (OUTPATIENT)
Dept: ULTRASOUND IMAGING | Facility: HOSPITAL | Age: 45
Discharge: HOME | End: 2025-01-20
Payer: COMMERCIAL

## 2025-01-20 ENCOUNTER — HOSPITAL ENCOUNTER (OUTPATIENT)
Dept: MAMMOGRAPHY | Facility: HOSPITAL | Age: 45
Discharge: HOME | End: 2025-01-20
Payer: COMMERCIAL

## 2025-01-20 DIAGNOSIS — N63.20 MASS OF LEFT BREAST, UNSPECIFIED QUADRANT: ICD-10-CM

## 2025-01-20 DIAGNOSIS — N64.4 MASTODYNIA: ICD-10-CM

## 2025-01-20 PROCEDURE — 77065 DX MAMMO INCL CAD UNI: CPT

## 2025-01-20 PROCEDURE — 76642 ULTRASOUND BREAST LIMITED: CPT

## 2025-01-20 PROCEDURE — G0279 TOMOSYNTHESIS, MAMMO: HCPCS

## 2025-01-23 ENCOUNTER — APPOINTMENT (OUTPATIENT)
Dept: PHYSICAL THERAPY | Facility: HOSPITAL | Age: 45
End: 2025-01-23
Payer: COMMERCIAL

## 2025-01-30 ENCOUNTER — APPOINTMENT (OUTPATIENT)
Dept: PHYSICAL THERAPY | Facility: HOSPITAL | Age: 45
End: 2025-01-30
Payer: COMMERCIAL

## 2025-02-10 ENCOUNTER — OFFICE VISIT (OUTPATIENT)
Dept: ORTHOPEDIC SURGERY | Facility: CLINIC | Age: 45
End: 2025-02-10
Payer: COMMERCIAL

## 2025-02-10 VITALS — BODY MASS INDEX: 35.65 KG/M2 | WEIGHT: 208.8 LBS | HEIGHT: 64 IN

## 2025-02-10 DIAGNOSIS — Z98.890 S/P ACL RECONSTRUCTION: ICD-10-CM

## 2025-02-10 DIAGNOSIS — Z98.890 STATUS POST REPAIR OF ANTERIOR CRUCIATE LIGAMENT: Primary | ICD-10-CM

## 2025-02-10 RX ORDER — MIRTAZAPINE 7.5 MG/1
TABLET, FILM COATED ORAL
COMMUNITY
Start: 2025-01-23

## 2025-02-10 NOTE — PROGRESS NOTES
Subjective:     Patient ID: Aide Henry is a 44 y.o. female.    Chief Complaint:  Follow-up status post left knee ACL reconstruction with soft tissue allograft, medial meniscal repair-5/28/2024     History of Present Illness  History of Present Illness  The patient returns to the clinic today for follow-up on her left knee.    She reports significant improvement in her left knee following the last injection, with minimal irritation in the anterior aspect of the knee. She feels stable and has no episodes of locking, catching, buckling, or giving way.     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 2-3 times a year    Drug use: Not Currently     Frequency: 1.0 times per week     Types: Cocaine(coke), Marijuana     Comment: rarely    Sexual activity: Defer      Past Medical History:   Diagnosis Date    Anal fissure     Anxiety     Bacterial vaginosis     Ashton esophagus     Bulging lumbar disc     lower back per patient    Depression with anxiety     Fibromyalgia     Fracture, finger     right hand, 4th finger    Gastroenteritis 12/12/2017    Gastroesophageal reflux disease without esophagitis 02/10/2016    Gastroparesis     Hemorrhoids     had a fissure    History of anemia     Hypertension     Hypothyroid     hashimoto    IBS (irritable bowel syndrome)     IC (interstitial cystitis)     Lateral epicondylitis 09/16/2013    RHUEMATOLOGIST    Lupus     Migraine     MRSA (methicillin resistant staph aureus) culture positive  2011 ESTIMATE    GROIN AREA     Overactive bladder     Sjogren's syndrome     Urinary tract infection      Past Surgical History:   Procedure Laterality Date    ADENOIDECTOMY      CARPAL TUNNEL RELEASE Bilateral      SECTION      COLONOSCOPY      D & C HYSTEROSCOPY N/A 2021    Procedure: DILATATION AND CURETTAGE HYSTEROSCOPY;  Surgeon: Rancho Mayberry MD;  Location: [facility] OR;  Service: Obstetrics/Gynecology;  Laterality: N/A;    D & C HYSTEROSCOPY ENDOMETRIAL ABLATION N/A 2021    Procedure: DILATATION AND CURETTAGE HYSTEROSCOPY,  NOVASURE ENDOMETRIAL ABLATION;  Surgeon: Rancho Mayberry MD;  Location: [facility] OR;  Service: Obstetrics/Gynecology;  Laterality: N/A;    ENDOSCOPY N/A 05/10/2019    Procedure: ESOPHAGOGASTRODUODENOSCOPY with biopsies;  Surgeon: Shanon Vazquez MD;  Location: [facility] OR;  Service: Gastroenterology    ENDOSCOPY N/A 2021    Procedure: ESOPHAGOGASTRODUODENOSCOPY with biopsies;  Surgeon: Romain Rice MD;  Location: [facility] OR;  Service: Gastroenterology;  Laterality: N/A;  barretts  gastritis    FINGER GANGLION CYST EXCISION Right     middle finger    GASTRIC STIMULATOR IMPLANT SURGERY      KNEE ACL RECONSTRUCTION Left 2024    Procedure: KNEE ANTERIOR CRUCIATE LIGAMENT RECONSTRUCTION WITH ALLOGRAFT, medial meniscal repair;  Surgeon: Joo Rivers MD;  Location: [facility] OR;  Service: Orthopedics;  Laterality: Left;    MOUTH SURGERY      3 teeth pulled due to sjorgens syndrome    OTHER SURGICAL HISTORY      reversal of tubal    RHINOPLASTY      SHOULDER ARTHROSCOPY Left 09/10/2018    Procedure: SHOULDER ARTHROSCOPY, rotator cuff repair; extensive debridement, open biceps tenodesis;  Surgeon: Joo Rivers MD;  Location: [facility] OR;  Service: Orthopedics    SHOULDER ARTHROSCOPY W/ ROTATOR CUFF REPAIR Left 2022    Procedure: SHOULDER ARTHROSCOPY WITH ROTATOR CUFF REPAIR COMPLEX, EXTENSIVE DEBRIDEMENT;  Surgeon:  "Joo Rivers MD;  Location: [facility] OR;  Service: Orthopedics;  Laterality: Left;    SHOULDER ARTHROSCOPY W/ ROTATOR CUFF REPAIR Left 06/23/2023    Procedure: SHOULDER ARTHROSCOPY WITH ROTATOR CUFF REPAIR, distal clavicle excision;  Surgeon: Joo Rivers MD;  Location:  [facility] OR;  Service: Orthopedics;  Laterality: Left;    TONSILLECTOMY      TUBAL ABDOMINAL LIGATION      TYMPANOSTOMY TUBE PLACEMENT      VULVA BIOPSY Right 12/12/2024    Procedure: PERINEAL LESION/CYST EXCISION;  Surgeon: Rancho Mayberry MD;  Location: [facility] OR;  Service: Obstetrics/Gynecology;  Laterality: Right;    WISDOM TOOTH EXTRACTION Bilateral        Family History   Problem Relation Age of Onset    Lung cancer Father     Heart disease Paternal Grandmother     Diabetes Maternal Aunt     Breast cancer Maternal Aunt     Diabetes Maternal Grandmother     COPD Mother     Colon cancer Neg Hx     Colon polyps Neg Hx     Malig Hyperthermia Neg Hx          Review of Systems        Objective:  Vitals:    02/10/25 1120   Weight: 94.7 kg (208 lb 12.8 oz)   Height: 162.6 cm (64\")         02/10/25  1120   Weight: 94.7 kg (208 lb 12.8 oz)     Body mass index is 35.84 kg/m².  General: No acute distress.  Resp: normal respiratory effort  Skin: no rashes or wounds; normal turgor  Psych: mood and affect appropriate; recent and remote memory intact          Physical Exam  The left knee has an active range of motion from 0 to 140 degrees, with 4+ out of 5 strength on flexion and extension. No effusion is present. Grade 1A Lachman test is observed. Anterior and posterior drawer tests are negative. No joint line pain is detected. Craig exam is negative. Active patellar compression test is minimally positive.         Imaging:  Left Knee X-Ray  Indication: Status post anterior cruciate ligament reconstruction    AP, Lateral, and notch views    Findings:  Tibial and femoral tunnels in stable position and with no significant tunnel " lysis  Implants evident with no evidence of loosening or disruption  Acceptable overall alignment  No evidence of intra-articular loose body    Compared to prior office x-rays    Assessment:        1. Status post repair of anterior cruciate ligament           Plan:          Assessment & Plan  1. Left knee pain.  She reports significant improvement in her left knee following the last intra-articular injection, with minimal irritation in the anterior aspect of the knee. She has had good improvement with physical therapy and rehabilitation focused on strengthening. On examination, left knee active range of motion is 0 to 140 degrees, with 4+ out of 5 strength on flexion and extension. There is no effusion, grade 1A Lachman, negative anterior and posterior drawer tests, no joint line pain, negative Carig exam, and a minimally positive active patellar compression test. She is advised to use a knee sleeve for compression and support. If she experiences a recurrence of pain, other treatment options, including another intra-articular injection, will be considered.    Follow-up  The patient will follow up as needed.    PROCEDURE  The patient reports significant improvement in her left knee following the last intra-articular injection.    Aide Henry was in agreement with plan and had all questions answered.     Orders:  Orders Placed This Encounter   Procedures    XR Knee 3 View Left       Medications:  No orders of the defined types were placed in this encounter.      Followup:  No follow-ups on file.    Diagnoses and all orders for this visit:    1. Status post repair of anterior cruciate ligament (Primary)  -     XR Knee 3 View Left                  Dictated utilizing Dragon dictation     Patient or patient representative verbalized consent for the use of Ambient Listening during the visit with  Joo Rivers MD for chart documentation. 2/10/2025  11:40 EST

## 2025-03-20 ENCOUNTER — TELEPHONE (OUTPATIENT)
Dept: ORTHOPEDIC SURGERY | Facility: CLINIC | Age: 45
End: 2025-03-20

## 2025-03-20 NOTE — TELEPHONE ENCOUNTER
Provider: DR BURTON     Caller: Aide Henry    Relationship to Patient: Self    Phone Number: [phone]    Reason for Call: PATIENT WOULD LIKE DR BURTON TO REFER HER TO SOMEONE FOR HER LOWER BACK PAIN. PREVIOUSLY REFERRED TO DR BUDDY COOLEY BUT WAS NOT SEEN. PLEASE ADVISE

## 2025-04-07 ENCOUNTER — OFFICE VISIT (OUTPATIENT)
Dept: ORTHOPEDIC SURGERY | Facility: CLINIC | Age: 45
End: 2025-04-07
Payer: COMMERCIAL

## 2025-04-07 VITALS — WEIGHT: 208 LBS | BODY MASS INDEX: 35.51 KG/M2 | HEIGHT: 64 IN

## 2025-04-07 DIAGNOSIS — M25.462 EFFUSION OF LEFT KNEE: ICD-10-CM

## 2025-04-07 DIAGNOSIS — M54.16 LUMBAR RADICULOPATHY: ICD-10-CM

## 2025-04-07 DIAGNOSIS — M94.262 CHONDROMALACIA OF KNEE, LEFT: ICD-10-CM

## 2025-04-07 DIAGNOSIS — Z98.890 S/P ACL RECONSTRUCTION: Primary | ICD-10-CM

## 2025-04-07 PROCEDURE — 99213 OFFICE O/P EST LOW 20 MIN: CPT | Performed by: ORTHOPAEDIC SURGERY

## 2025-04-07 PROCEDURE — 1159F MED LIST DOCD IN RCRD: CPT | Performed by: ORTHOPAEDIC SURGERY

## 2025-04-07 PROCEDURE — 20610 DRAIN/INJ JOINT/BURSA W/O US: CPT | Performed by: ORTHOPAEDIC SURGERY

## 2025-04-07 PROCEDURE — 1160F RVW MEDS BY RX/DR IN RCRD: CPT | Performed by: ORTHOPAEDIC SURGERY

## 2025-04-07 RX ORDER — CYCLOBENZAPRINE HCL 10 MG
10 TABLET ORAL
COMMUNITY
Start: 2025-03-10

## 2025-04-07 RX ORDER — TRIAMCINOLONE ACETONIDE 40 MG/ML
80 INJECTION, SUSPENSION INTRA-ARTICULAR; INTRAMUSCULAR
Status: COMPLETED | OUTPATIENT
Start: 2025-04-07 | End: 2025-04-07

## 2025-04-07 RX ORDER — LIDOCAINE HYDROCHLORIDE 10 MG/ML
8 INJECTION, SOLUTION EPIDURAL; INFILTRATION; INTRACAUDAL; PERINEURAL
Status: COMPLETED | OUTPATIENT
Start: 2025-04-07 | End: 2025-04-07

## 2025-04-07 RX ADMIN — LIDOCAINE HYDROCHLORIDE 8 ML: 10 INJECTION, SOLUTION EPIDURAL; INFILTRATION; INTRACAUDAL; PERINEURAL at 13:42

## 2025-04-07 RX ADMIN — TRIAMCINOLONE ACETONIDE 80 MG: 40 INJECTION, SUSPENSION INTRA-ARTICULAR; INTRAMUSCULAR at 13:42

## 2025-04-07 NOTE — PROGRESS NOTES
Subjective:     Patient ID: Aide Henry is a 44 y.o. female.    Chief Complaint:  Follow-up status post left knee ACL reconstruction with soft tissue allograft, medial meniscal repair-5/28/2024   History of Present Illness  History of Present Illness  The patient returns to the clinic today for a follow-up evaluation regarding her left knee.    She had been doing fairly well, but over the last 3 to 4 weeks, she has noted increasing pain, particularly over the anterolateral aspect of her left knee. The pain is of moderate intensity, aching in nature, and accompanied by mild swelling. The pain does wax and wane. She reports no episodes of gilda buckling, catching, or locking. Additionally, she has noticed some diffuse pain in all of her extremities at this point in time. She is on Plaquenil and currently on a prednisone taper. She reports no systemic symptoms such as fevers, chills, or sweats. There are no issues with her incisions.    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    Drug use: Not Currently     Frequency: 1.0 times per week     Types: Cocaine(coke), Marijuana     Comment: rarely    Sexual activity: Defer      Past Medical History:   Diagnosis Date    Anal fissure     Anxiety     Bacterial vaginosis     Ashton esophagus     Bulging lumbar disc     lower back per patient    Depression with anxiety     Fibromyalgia     Fracture, finger     right hand, 4th  finger    Gastroenteritis 2017    Gastroesophageal reflux disease without esophagitis 02/10/2016    Gastroparesis     Hemorrhoids     had a fissure    History of anemia     Hypertension     Hypothyroid     hashimoto    IBS (irritable bowel syndrome)     IC (interstitial cystitis)     Lateral epicondylitis 2013    RHUEMATOLOGIST    Lupus     Migraine     MRSA (methicillin resistant staph aureus) culture positive  ESTIMATE    GROIN AREA     Overactive bladder     Sjogren's syndrome     Urinary tract infection      Past Surgical History:   Procedure Laterality Date    ADENOIDECTOMY      CARPAL TUNNEL RELEASE Bilateral      SECTION      COLONOSCOPY      D & C HYSTEROSCOPY N/A 2021    Procedure: DILATATION AND CURETTAGE HYSTEROSCOPY;  Surgeon: Rancho Mayberry MD;  Location: [facility] OR;  Service: Obstetrics/Gynecology;  Laterality: N/A;    D & C HYSTEROSCOPY ENDOMETRIAL ABLATION N/A 2021    Procedure: DILATATION AND CURETTAGE HYSTEROSCOPY,  NOVASURE ENDOMETRIAL ABLATION;  Surgeon: Rancho Mayberry MD;  Location: [facility] OR;  Service: Obstetrics/Gynecology;  Laterality: N/A;    ENDOSCOPY N/A 05/10/2019    Procedure: ESOPHAGOGASTRODUODENOSCOPY with biopsies;  Surgeon: Shanon Vazquez MD;  Location: [facility] OR;  Service: Gastroenterology    ENDOSCOPY N/A 2021    Procedure: ESOPHAGOGASTRODUODENOSCOPY with biopsies;  Surgeon: Romain Rice MD;  Location: [facility] OR;  Service: Gastroenterology;  Laterality: N/A;  barretts  gastritis    FINGER GANGLION CYST EXCISION Right     middle finger    GASTRIC STIMULATOR IMPLANT SURGERY      KNEE ACL RECONSTRUCTION Left 2024    Procedure: KNEE ANTERIOR CRUCIATE LIGAMENT RECONSTRUCTION WITH ALLOGRAFT, medial meniscal repair;  Surgeon: Joo Rivers MD;  Location: [facility] OR;  Service: Orthopedics;  Laterality: Left;    MOUTH SURGERY      3 teeth pulled due to sjorgens syndrome    OTHER SURGICAL HISTORY    "   reversal of tubal    RHINOPLASTY      SHOULDER ARTHROSCOPY Left 09/10/2018    Procedure: SHOULDER ARTHROSCOPY, rotator cuff repair; extensive debridement, open biceps tenodesis;  Surgeon: Joo Rivers MD;  Location:  [facility] OR;  Service: Orthopedics    SHOULDER ARTHROSCOPY W/ ROTATOR CUFF REPAIR Left 05/27/2022    Procedure: SHOULDER ARTHROSCOPY WITH ROTATOR CUFF REPAIR COMPLEX, EXTENSIVE DEBRIDEMENT;  Surgeon: Joo Rivers MD;  Location:  [facility] OR;  Service: Orthopedics;  Laterality: Left;    SHOULDER ARTHROSCOPY W/ ROTATOR CUFF REPAIR Left 06/23/2023    Procedure: SHOULDER ARTHROSCOPY WITH ROTATOR CUFF REPAIR, distal clavicle excision;  Surgeon: Joo Rivers MD;  Location:  [facility] OR;  Service: Orthopedics;  Laterality: Left;    TONSILLECTOMY      TUBAL ABDOMINAL LIGATION      TYMPANOSTOMY TUBE PLACEMENT      VULVA BIOPSY Right 12/12/2024    Procedure: PERINEAL LESION/CYST EXCISION;  Surgeon: Rancho Mayberry MD;  Location:  [facility] OR;  Service: Obstetrics/Gynecology;  Laterality: Right;    WISDOM TOOTH EXTRACTION Bilateral        Family History   Problem Relation Age of Onset    Lung cancer Father     Heart disease Paternal Grandmother     Diabetes Maternal Aunt     Breast cancer Maternal Aunt     Diabetes Maternal Grandmother     COPD Mother     Colon cancer Neg Hx     Colon polyps Neg Hx     Malig Hyperthermia Neg Hx          Review of Systems        Objective:  Vitals:    04/07/25 1319   Weight: 94.3 kg (208 lb)   Height: 162.6 cm (64.02\")         04/07/25  1319   Weight: 94.3 kg (208 lb)     Body mass index is 35.69 kg/m².  Physical Exam    Vital signs reviewed.   General: No acute distress, alert and oriented  Eyes: conjunctiva clear; pupils equally round and reactive  ENT: external ears and nose atraumatic; oropharynx clear  CV: no peripheral edema  Resp: normal respiratory effort  Skin: no rashes or wounds; normal turgor  Psych: mood and affect appropriate; recent and remote " memory intact          Physical Exam  The left knee has an active range of motion from 0 to 140 degrees, with strength in flexion and extension rated at 4+ out of 5. There is mild effusion, maximal tenderness to palpation on the medial and lateral patellar facet with a positive active patellar compression test, and mild tenderness along the medial joint line. A Craig exam is negative, Lachman is grade 1, and anterior and posterior drawer tests are negative. The incisions on the knee are well healed.         Imaging:  None today  Assessment:        1. S/P ACL reconstruction    2. Effusion of left knee    3. Chondromalacia of knee, left    4. Lumbar radiculopathy           Plan:    - Large Joint Arthrocentesis: L knee on 4/7/2025 1:42 PM  Indications: pain  Details: 22 G needle, anterolateral approach  Medications: 80 mg triamcinolone acetonide 40 MG/ML; 8 mL lidocaine PF 1% 1 %  Outcome: tolerated well, no immediate complications  Procedure, treatment alternatives, risks and benefits explained, specific risks discussed. Consent was given by the patient. Immediately prior to procedure a time out was called to verify the correct patient, procedure, equipment, support staff and site/side marked as required. Patient was prepped and draped in the usual sterile fashion.                 Assessment & Plan  1. Left knee pain.  She reports increasing pain over the anterolateral aspect of her left knee, moderate in intensity, with mild swelling and aching in nature. There is no evidence of mechanical symptoms or instability based on her exam or complaints. Given her underlying chondromalacia and swelling, treatment options were discussed at length. She opted for a repeat intra-articular injection today. All questions were answered. If this fails to improve her symptoms significantly, repeat advanced imaging may be considered.    Patient would like to proceed with cortisone injection today to the left knee. Recommended  limited use of affected extremity for the next 24 hours to only essential activites other than work on general active and passive motion. Recommended supplementing with ice and soft tissue massage. Discussed with patient that they should see results in 5-7 days, if no improvement in 5-6 weeks I have asked them to call the office to review other options. Patient should call office immediately if they notice redness, warmth, fevers, chills, or residual numbness or tingling for greater than 6 hours after injection.       Follow-up  The patient will follow up as needed.    Aide Henry was in agreement with plan and had all questions answered.     Orders:  Orders Placed This Encounter   Procedures    - Large Joint Arthrocentesis: L knee    Ambulatory Referral to Neurosurgery       Medications:  No orders of the defined types were placed in this encounter.      Followup:  Return if symptoms worsen or fail to improve.    Diagnoses and all orders for this visit:    1. S/P ACL reconstruction (Primary)    2. Effusion of left knee    3. Chondromalacia of knee, left    4. Lumbar radiculopathy  -     Ambulatory Referral to Neurosurgery    Other orders  -     - Large Joint Arthrocentesis: L knee                  Dictated utilizing Dragon dictation     Patient or patient representative verbalized consent for the use of Ambient Listening during the visit with  Joo Rivers MD for chart documentation. 4/7/2025  13:26 EDT

## 2025-05-02 ENCOUNTER — TELEPHONE (OUTPATIENT)
Dept: ORTHOPEDIC SURGERY | Facility: CLINIC | Age: 45
End: 2025-05-02
Payer: COMMERCIAL

## 2025-05-02 NOTE — TELEPHONE ENCOUNTER
Caller: Aide Henry    Relationship to patient: Self    Best call back number:     Chief complaint: LEFT KNEE    Type of visit: FUP     Requested date: ASAP     Additional notes:PATIENT SAID AFTER INJECTION IT WAS FEELING BETTER BUT TURNED FOR WORSE REALLY SUDDEN.  SHE STATED IF SHE CAN'T GET IN SOON SHE IS GOING TO ER TONIGHT

## 2025-05-02 NOTE — TELEPHONE ENCOUNTER
PATIENT CALLED BACK STATING SHE DID NOT MEAN TO HANG UP IT WAS SOMETHING HER HEARING AIDS CAUSED. SHE IS COMING IN TO SEE WOLF NEXT WEEK

## 2025-05-02 NOTE — TELEPHONE ENCOUNTER
"Called patient regarding her knee pain.  She said it was doing fine since the shot on 4/7/25 until the last 3 days and she has just had \"terrible pain\" . I let her know that in Dr Rivers's last note he stated if the knee does not get better then repeat advanced imaging maybe considered.   I suggested she take Tylenol and ice. She stated that she has had \"ice on it 24 hours, just hurts bad enough I am going to the ER\" I told her that she would not be getting a narcotic. I questioned if her pain was in the leg area and she said \"yes it is going down the back of my leg\" she said it felt hard.  She hung up the telephone.  "

## 2025-05-06 ENCOUNTER — OFFICE VISIT (OUTPATIENT)
Dept: ORTHOPEDIC SURGERY | Facility: CLINIC | Age: 45
End: 2025-05-06
Payer: COMMERCIAL

## 2025-05-06 VITALS — HEIGHT: 64 IN | WEIGHT: 208 LBS | BODY MASS INDEX: 35.51 KG/M2

## 2025-05-06 DIAGNOSIS — M25.462 EFFUSION OF LEFT KNEE: ICD-10-CM

## 2025-05-06 DIAGNOSIS — Z98.890 S/P ACL RECONSTRUCTION: ICD-10-CM

## 2025-05-06 DIAGNOSIS — M25.562 MECHANICAL KNEE PAIN, LEFT: Primary | ICD-10-CM

## 2025-05-06 PROCEDURE — 99213 OFFICE O/P EST LOW 20 MIN: CPT | Performed by: NURSE PRACTITIONER

## 2025-05-06 NOTE — PROGRESS NOTES
Subjective:     Patient ID: Aide Henry is a 44 y.o. female.    Chief Complaint:  Follow-up status post left knee ACL reconstruction with soft tissue allograft, medial meniscal repair-5/28/2024   Corticosteroid injection left knee 4/7/2025  New issue to examiner  History of Present Illness  History of Present Illness  The patient is a 44-year-old female who presents to the clinic today with worsening pain in her left knee.    She underwent knee arthroscopy on 05/24/2024, which initially improved her symptoms. However, for the past few months, she has experienced discomfort, now rating it as 9 to 10 out of 10. She reports instability and significant pain along the medial compartment and anterior aspect of her knee, describing it as a constant ache that progresses to throbbing with increased activity. She is ambulating with a significant limp and feels as if her knee is going to give way medially. She does not report any other concerns. She is currently taking Celebrex and has attempted physical therapy twice, which only provided temporary relief. She has tried ice for several hours at a time, which seems to provide her symptom improvement. She does not report any known injury. A corticosteroid injection administered approximately 4 weeks ago provided initial relief, but she recently experienced an acute onset of severe pain while standing and cooking. Despite using a knee sleeve, bracing, and continued strengthening exercises, her symptoms have not significantly improved.  X-ray imaging completed in clinic 2/10/2025    PAST SURGICAL HISTORY:  Knee arthroscopy on 05/24/2024.  Meniscal repair.  ACL reconstruction.    SOCIAL HISTORY  Exercise: Home strengthening exercises       Social History     Occupational History    Not on file   Tobacco Use    Smoking status: Former     Current packs/day: 0.25     Average packs/day: 0.3 packs/day for 20.0 years (5.0 ttl pk-yrs)     Types: Cigarettes     Passive exposure: Never     Smokeless tobacco: Never    Tobacco comments:     Smokes every now and then    Vaping Use    Vaping status: Some Days    Substances: Nicotine    Devices: Disposable   Substance and Sexual Activity    Alcohol use: Not Currently     Comment: sometimes, sober since 2024- states occasional relapse 2-3 times a year    Drug use: Not Currently     Frequency: 1.0 times per week     Types: Cocaine(coke), Marijuana     Comment: rarely    Sexual activity: Defer      Past Medical History:   Diagnosis Date    Anal fissure     Anxiety     Bacterial vaginosis     Ashton esophagus     Bulging lumbar disc     lower back per patient    Depression with anxiety     Fibromyalgia     Fracture, finger     right hand, 4th finger    Gastroenteritis 2017    Gastroesophageal reflux disease without esophagitis 02/10/2016    Gastroparesis     Hemorrhoids     had a fissure    History of anemia     Hypertension     Hypothyroid     hashimoto    IBS (irritable bowel syndrome)     IC (interstitial cystitis)     Lateral epicondylitis 2013    RHUEMATOLOGIST    Lupus     Migraine     MRSA (methicillin resistant staph aureus) culture positive  ESTIMATE    GROIN AREA     Overactive bladder     Sjogren's syndrome     Urinary tract infection      Past Surgical History:   Procedure Laterality Date    ADENOIDECTOMY      CARPAL TUNNEL RELEASE Bilateral      SECTION      COLONOSCOPY      D & C HYSTEROSCOPY N/A 2021    Procedure: DILATATION AND CURETTAGE HYSTEROSCOPY;  Surgeon: Rancho Mayberry MD;  Location: [facility] OR;  Service: Obstetrics/Gynecology;  Laterality: N/A;    D & C HYSTEROSCOPY ENDOMETRIAL ABLATION N/A 2021    Procedure: DILATATION AND CURETTAGE HYSTEROSCOPY,  NOVASURE ENDOMETRIAL ABLATION;  Surgeon: Rancho Mayberry MD;  Location: [facility] OR;  Service: Obstetrics/Gynecology;  Laterality: N/A;    ENDOSCOPY N/A 05/10/2019    Procedure: ESOPHAGOGASTRODUODENOSCOPY with biopsies;  Surgeon:  Shanon Vazquez MD;  Location: [facility] OR;  Service: Gastroenterology    ENDOSCOPY N/A 04/14/2021    Procedure: ESOPHAGOGASTRODUODENOSCOPY with biopsies;  Surgeon: Romain Rice MD;  Location: [facility] OR;  Service: Gastroenterology;  Laterality: N/A;  barretts  gastritis    FINGER GANGLION CYST EXCISION Right     middle finger    GASTRIC STIMULATOR IMPLANT SURGERY      KNEE ACL RECONSTRUCTION Left 05/28/2024    Procedure: KNEE ANTERIOR CRUCIATE LIGAMENT RECONSTRUCTION WITH ALLOGRAFT, medial meniscal repair;  Surgeon: Joo Rivers MD;  Location: [facility] OR;  Service: Orthopedics;  Laterality: Left;    MOUTH SURGERY      3 teeth pulled due to sjorgens syndrome    OTHER SURGICAL HISTORY      reversal of tubal    RHINOPLASTY      SHOULDER ARTHROSCOPY Left 09/10/2018    Procedure: SHOULDER ARTHROSCOPY, rotator cuff repair; extensive debridement, open biceps tenodesis;  Surgeon: Joo Rivers MD;  Location: [facility] OR;  Service: Orthopedics    SHOULDER ARTHROSCOPY W/ ROTATOR CUFF REPAIR Left 05/27/2022    Procedure: SHOULDER ARTHROSCOPY WITH ROTATOR CUFF REPAIR COMPLEX, EXTENSIVE DEBRIDEMENT;  Surgeon: Joo Rivers MD;  Location: [facility] OR;  Service: Orthopedics;  Laterality: Left;    SHOULDER ARTHROSCOPY W/ ROTATOR CUFF REPAIR Left 06/23/2023    Procedure: SHOULDER ARTHROSCOPY WITH ROTATOR CUFF REPAIR, distal clavicle excision;  Surgeon: Joo Rivers MD;  Location: [facility] OR;  Service: Orthopedics;  Laterality: Left;    TONSILLECTOMY      TUBAL ABDOMINAL LIGATION      TYMPANOSTOMY TUBE PLACEMENT      VULVA BIOPSY Right 12/12/2024    Procedure: PERINEAL LESION/CYST EXCISION;  Surgeon: Rancho Mayberry MD;  Location: [facility] OR;  Service: Obstetrics/Gynecology;  Laterality: Right;    WISDOM TOOTH EXTRACTION Bilateral        Family History   Problem Relation Age of Onset    Lung cancer Father     Heart disease Paternal Grandmother     Diabetes Maternal Aunt     Breast cancer  "Maternal Aunt     Diabetes Maternal Grandmother     COPD Mother     Colon cancer Neg Hx     Colon polyps Neg Hx     Malig Hyperthermia Neg Hx                Objective:  Physical Exam    Vital signs reviewed.   General: No acute distress.  Eyes: conjunctiva clear; pupils equally round and reactive  ENT: external ears and nose atraumatic; oropharynx clear  CV: no peripheral edema  Resp: normal respiratory effort  Skin: no rashes or wounds; normal turgor  Psych: mood and affect appropriate; recent and remote memory intact    Vitals:    05/06/25 0855   Weight: 94.3 kg (208 lb)   Height: 162.6 cm (64.02\")         05/06/25  0855   Weight: 94.3 kg (208 lb)     Body mass index is 35.69 kg/m².      Ortho Exam     Physical Exam  General: Patient appears in significant distress due to pain, ambulating with a notable limp.    Musculoskeletal:  Gait: Ambulating with a significant limp.  Left Knee: Significant pain along the medial compartment and anterior aspect. Reports instability and achiness throughout the day with increased activity progressing to throbbing pain.    Left knee exam  Knee range of motion 0 to 140 degrees  Moderate effusion and swelling greater along the medial aspect  Maximal Tenderness present along the medial joint line, MCL, medial and lateral patellar facets  Positive active patellar compression test  Positive medial Craig's exam, negative lateral Craig's exam, 1+ anterior Lachman exam  Incisions clean dry and intact well-healed from prior knee arthroscopy  Mildly Positive valgus stress test    Assessment:        1. S/P ACL reconstruction    2. Effusion of left knee    3. Mechanical knee pain, left         Assessment & Plan  1. Left knee pain:    An MR arthrogram of the knee is recommended due to prior meniscal repair and ACL reconstruction. A hinged brace will be provided for stability. Continuation of home strengthening exercises is advised. The option of adding prednisone was discussed, but she " prefers to hold off for now. All questions answered.    Follow-up  Follow up after the completion of the test to discuss the results and further plan of care.    Orders:  No orders of the defined types were placed in this encounter.    No orders of the defined types were placed in this encounter.          Dragon dictation utilized          Patient or patient representative verbalized consent for the use of Ambient Listening during the visit with  TORSTEN Meek for chart documentation. 5/6/2025  09:16 EDT

## 2025-07-01 ENCOUNTER — HOSPITAL ENCOUNTER (OUTPATIENT)
Dept: GENERAL RADIOLOGY | Facility: HOSPITAL | Age: 45
Discharge: HOME | End: 2025-07-01
Payer: COMMERCIAL

## 2025-07-01 ENCOUNTER — HOSPITAL ENCOUNTER (OUTPATIENT)
Dept: MRI IMAGING | Facility: HOSPITAL | Age: 45
Discharge: HOME | End: 2025-07-01
Payer: COMMERCIAL

## 2025-07-01 DIAGNOSIS — Z98.890 S/P ACL RECONSTRUCTION: ICD-10-CM

## 2025-07-01 DIAGNOSIS — M25.562 MECHANICAL KNEE PAIN, LEFT: ICD-10-CM

## 2025-07-01 PROCEDURE — 76015 MR SFTY MPLT&/FB ASMT STF EA: CPT

## 2025-07-01 PROCEDURE — 25010000002 BUPIVACAINE (PF) 0.25 % SOLUTION: Performed by: NURSE PRACTITIONER

## 2025-07-01 PROCEDURE — 25510000001 IOPAMIDOL 61 % SOLUTION: Performed by: NURSE PRACTITIONER

## 2025-07-01 PROCEDURE — 73722 MRI JOINT OF LWR EXTR W/DYE: CPT

## 2025-07-01 PROCEDURE — A9577 INJ MULTIHANCE: HCPCS | Performed by: NURSE PRACTITIONER

## 2025-07-01 PROCEDURE — 76014 MR SFTY IMPLT&/FB ASMT STF 1: CPT

## 2025-07-01 PROCEDURE — 25010000002 LIDOCAINE 1 % SOLUTION: Performed by: NURSE PRACTITIONER

## 2025-07-01 PROCEDURE — 25510000002 GADOBENATE DIMEGLUMINE 529 MG/ML SOLUTION: Performed by: NURSE PRACTITIONER

## 2025-07-01 RX ORDER — IOPAMIDOL 612 MG/ML
30 INJECTION, SOLUTION INTRAVASCULAR
Status: COMPLETED | OUTPATIENT
Start: 2025-07-01 | End: 2025-07-01

## 2025-07-01 RX ORDER — BUPIVACAINE HYDROCHLORIDE 2.5 MG/ML
10 INJECTION, SOLUTION EPIDURAL; INFILTRATION; INTRACAUDAL; PERINEURAL ONCE
Status: COMPLETED | OUTPATIENT
Start: 2025-07-01 | End: 2025-07-01

## 2025-07-01 RX ORDER — LIDOCAINE HYDROCHLORIDE 10 MG/ML
10 INJECTION, SOLUTION INFILTRATION; PERINEURAL ONCE
Status: COMPLETED | OUTPATIENT
Start: 2025-07-01 | End: 2025-07-01

## 2025-07-01 RX ADMIN — GADOBENATE DIMEGLUMINE 0.1 ML: 529 INJECTION, SOLUTION INTRAVENOUS at 14:12

## 2025-07-01 RX ADMIN — BUPIVACAINE HYDROCHLORIDE 3 ML: 2.5 INJECTION, SOLUTION EPIDURAL; INFILTRATION; INTRACAUDAL; PERINEURAL at 14:12

## 2025-07-01 RX ADMIN — LIDOCAINE HYDROCHLORIDE 1 ML: 10 INJECTION, SOLUTION INFILTRATION; PERINEURAL at 14:12

## 2025-07-01 RX ADMIN — IOPAMIDOL 7 ML: 612 INJECTION, SOLUTION INTRAVENOUS at 14:12

## 2025-07-07 ENCOUNTER — OFFICE VISIT (OUTPATIENT)
Dept: ORTHOPEDIC SURGERY | Facility: CLINIC | Age: 45
End: 2025-07-07
Payer: COMMERCIAL

## 2025-07-07 DIAGNOSIS — Z98.890 S/P ACL RECONSTRUCTION: Primary | ICD-10-CM

## 2025-07-07 DIAGNOSIS — M17.12 PRIMARY OSTEOARTHRITIS OF LEFT KNEE: ICD-10-CM

## 2025-07-07 DIAGNOSIS — M25.562 MECHANICAL KNEE PAIN, LEFT: ICD-10-CM

## 2025-07-07 DIAGNOSIS — M25.462 EFFUSION OF LEFT KNEE: ICD-10-CM

## 2025-07-07 RX ORDER — NEMOLIZUMAB-ILTO 30 MG/100MG
INJECTION, POWDER, LYOPHILIZED, FOR SOLUTION SUBCUTANEOUS
COMMUNITY
Start: 2025-05-31

## 2025-07-07 RX ORDER — CELECOXIB 200 MG/1
200 CAPSULE ORAL DAILY
COMMUNITY
Start: 2025-03-10

## 2025-07-07 RX ORDER — DIAZEPAM 10 MG/1
10 TABLET ORAL
COMMUNITY
Start: 2025-05-12

## 2025-07-07 NOTE — PROGRESS NOTES
Subjective:     Patient ID: Aide Henry is a 45 y.o. female.    Chief Complaint:  Follow-up left knee, osteoarthritis left knee,   status post ACL reconstruction with soft tissue allograft, medial meniscal repair DOS 5/28/2024  History of Present Illness  History of Present Illness    Aide Henry presented today for follow-up of her left knee.  She presents to clinic to discuss results and further plan of care after completion of the MRI.  She is continued with brace unfortunately unable to wear the brace when she is outdoors due to exacerbation of her lupus but she is able to wear the brace indoors.  She is very cautiously applying pressure to the right lower extremity.  Does feel as if the knee is getting give way pain with extension and flexion of the knee.  Rates discomfort 8-10 depending on the activity with weightbearing it does increase her pain.  Denies other concerns present.       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 2-3 times a year    Drug use: Not Currently     Frequency: 1.0 times per week     Types: Cocaine(coke), Marijuana     Comment: rarely    Sexual activity: Defer      Past Medical History:   Diagnosis Date    Anal fissure     Anxiety     Bacterial vaginosis     Ashton esophagus     Bulging lumbar disc     lower back per patient    Depression with anxiety     Fibromyalgia     Fracture, finger     right hand, 4th finger    Gastroenteritis 12/12/2017    Gastroesophageal reflux disease without  esophagitis 02/10/2016    Gastroparesis     Hemorrhoids     had a fissure    History of anemia     Hypertension     Hypothyroid     hashimoto    IBS (irritable bowel syndrome)     IC (interstitial cystitis)     Lateral epicondylitis 2013    RHUEMATOLOGIST    Lupus     Migraine     MRSA (methicillin resistant staph aureus) culture positive  ESTIMATE    GROIN AREA     Overactive bladder     Sjogren's syndrome     Urinary tract infection      Past Surgical History:   Procedure Laterality Date    ADENOIDECTOMY      CARPAL TUNNEL RELEASE Bilateral      SECTION      COLONOSCOPY      D & C HYSTEROSCOPY N/A 2021    Procedure: DILATATION AND CURETTAGE HYSTEROSCOPY;  Surgeon: Rancho Mayberry MD;  Location: [facility] OR;  Service: Obstetrics/Gynecology;  Laterality: N/A;    D & C HYSTEROSCOPY ENDOMETRIAL ABLATION N/A 2021    Procedure: DILATATION AND CURETTAGE HYSTEROSCOPY,  NOVASURE ENDOMETRIAL ABLATION;  Surgeon: Rancho Mayberry MD;  Location: [facility] OR;  Service: Obstetrics/Gynecology;  Laterality: N/A;    ENDOSCOPY N/A 05/10/2019    Procedure: ESOPHAGOGASTRODUODENOSCOPY with biopsies;  Surgeon: Shanon Vazquez MD;  Location: [facility] OR;  Service: Gastroenterology    ENDOSCOPY N/A 2021    Procedure: ESOPHAGOGASTRODUODENOSCOPY with biopsies;  Surgeon: Romain Rice MD;  Location: [facility] OR;  Service: Gastroenterology;  Laterality: N/A;  barretts  gastritis    FINGER GANGLION CYST EXCISION Right     middle finger    GASTRIC STIMULATOR IMPLANT SURGERY      KNEE ACL RECONSTRUCTION Left 2024    Procedure: KNEE ANTERIOR CRUCIATE LIGAMENT RECONSTRUCTION WITH ALLOGRAFT, medial meniscal repair;  Surgeon: Joo Rivers MD;  Location: [facility] OR;  Service: Orthopedics;  Laterality: Left;    MOUTH SURGERY      3 teeth pulled due to sjorgens syndrome    OTHER SURGICAL HISTORY      reversal of tubal    RHINOPLASTY      SHOULDER ARTHROSCOPY Left 09/10/2018     Procedure: SHOULDER ARTHROSCOPY, rotator cuff repair; extensive debridement, open biceps tenodesis;  Surgeon: Joo Rivers MD;  Location:  [facility] OR;  Service: Orthopedics    SHOULDER ARTHROSCOPY W/ ROTATOR CUFF REPAIR Left 05/27/2022    Procedure: SHOULDER ARTHROSCOPY WITH ROTATOR CUFF REPAIR COMPLEX, EXTENSIVE DEBRIDEMENT;  Surgeon: Joo Rivers MD;  Location:  [facility] OR;  Service: Orthopedics;  Laterality: Left;    SHOULDER ARTHROSCOPY W/ ROTATOR CUFF REPAIR Left 06/23/2023    Procedure: SHOULDER ARTHROSCOPY WITH ROTATOR CUFF REPAIR, distal clavicle excision;  Surgeon: Joo Rivers MD;  Location:  [facility] OR;  Service: Orthopedics;  Laterality: Left;    TONSILLECTOMY      TUBAL ABDOMINAL LIGATION      TYMPANOSTOMY TUBE PLACEMENT      VULVA BIOPSY Right 12/12/2024    Procedure: PERINEAL LESION/CYST EXCISION;  Surgeon: Rancho Mayberry MD;  Location:  [facility] OR;  Service: Obstetrics/Gynecology;  Laterality: Right;    WISDOM TOOTH EXTRACTION Bilateral        Family History   Problem Relation Age of Onset    Lung cancer Father     Heart disease Paternal Grandmother     Diabetes Maternal Aunt     Breast cancer Maternal Aunt     Diabetes Maternal Grandmother     COPD Mother     Colon cancer Neg Hx     Colon polyps Neg Hx     Malig Hyperthermia Neg Hx                Objective:  Physical Exam    General: No acute distress.  Eyes: conjunctiva clear; pupils equally round and reactive  ENT: external ears and nose atraumatic; oropharynx clear  CV: no peripheral edema  Resp: normal respiratory effort  Skin: no rashes or wounds; normal turgor  Psych: mood and affect appropriate; recent and remote memory intact    There were no vitals filed for this visit.  There were no vitals filed for this visit.  There is no height or weight on file to calculate BMI.      Ortho Exam     Physical Exam  Left knee examined  Knee range of motion 0 to 1  Moderate effusion   maximal tenderness along the medial  compartment   stable to varus and valgus stress at 0 degrees and 30 [id number] degrees  Positive active patellar compression test  Positive medial Craig's exam, negative lateral Craig's exam  1+ anterior right knee exam    Imaging:  [unfilled]MRI Knee Left Arthrogram  Result Date: 7/2/2025   1. Postoperative changes from ACL reconstruction with thin intermediate signal graft material suggesting partial graft tear. Cystic changes at the femoral tunnel 2. Diffuse synovial thickening and/or intra-articular debris, nonspecific thickening of the medial plica, and a large linear 2.3 cm intermediate signal filling defect in the anterior intercondylar knee joint space that could represent torn ACL graft material or a large chondral body. Intermediate signal filling defects in the suprapatellar joint space could represent nodular synovial thickening or intra-articular bodies. 3. Postoperative changes at the medial meniscus with recurrent tear of the medial meniscus body, and anterior meniscal body flap flipped into the medial femoral gutter, and a posterior meniscal body flap flipped into the medial tibial gutter. 4. New severe chondromalacia in the medial compartment with complete or near complete chondral loss at the weightbearing portions of the medial femoral condyle and medial tibial plateau. 5. Acute or subacute nondisplaced chondral insufficiency type fractures at the medial weightbearing portions of the medial femoral condyle and medial tibial plateau with a large region of abnormal signal abnormality in the medial proximal tibia that likely represents osteonecrosis. 6. Multifocal partial-thickness chondral fissuring at the patella and multifocal full-thickness or near full-thickness chondral fissuring at the central and medial trochlea.    This report was finalized on 7/2/2025 10:15 AM by Kirk Tran MD on Workstation: [id number]      Independently reviewed MRI left knee concerns for complete rupture of the  ACL graft, significant edema along the medial tibial plateau, full-thickness cartilage loss at the medial compartment insufficiency fractures along the medial compartment     Assessment:        1. S/P ACL reconstruction    2. Effusion of left knee    3. Mechanical knee pain, left    4. Primary osteoarthritis of left knee         Assessment & Plan      Plan:  Discussed with patient regarding treatment options.  We did discuss options at this time I would recommend she follow-up with Dr. Rivers to discuss further treatment options.  She has tried bracing again she is able to wear the brace while she is indoors unable to wear the brace when she is outdoors because of the heat due to her lupus.  Will have her follow-up with him for further discussion of further treatment options.  All question answered.  Orders:  No orders of the defined types were placed in this encounter.    No orders of the defined types were placed in this encounter.      Dragon dictation utilized          Patient or patient representative verbalized consent for the use of Ambient Listening during the visit with  TORSTEN Meek for chart documentation. 7/14/2025  18:01 EDT

## 2025-07-14 ENCOUNTER — TRANSCRIBE ORDERS (OUTPATIENT)
Dept: ADMINISTRATIVE | Facility: HOSPITAL | Age: 45
End: 2025-07-14
Payer: COMMERCIAL

## 2025-07-14 ENCOUNTER — LAB (OUTPATIENT)
Dept: LAB | Facility: HOSPITAL | Age: 45
End: 2025-07-14
Payer: COMMERCIAL

## 2025-07-14 ENCOUNTER — OFFICE VISIT (OUTPATIENT)
Dept: ORTHOPEDIC SURGERY | Facility: CLINIC | Age: 45
End: 2025-07-14
Payer: COMMERCIAL

## 2025-07-14 VITALS
SYSTOLIC BLOOD PRESSURE: 104 MMHG | DIASTOLIC BLOOD PRESSURE: 74 MMHG | BODY MASS INDEX: 37.01 KG/M2 | HEIGHT: 64 IN | WEIGHT: 216.8 LBS | HEART RATE: 94 BPM

## 2025-07-14 DIAGNOSIS — Z79.899 POLYPHARMACY: ICD-10-CM

## 2025-07-14 DIAGNOSIS — Z98.890 S/P ACL RECONSTRUCTION: Primary | ICD-10-CM

## 2025-07-14 DIAGNOSIS — F10.90 DRINKING PROBLEM: ICD-10-CM

## 2025-07-14 DIAGNOSIS — Z00.00 ROUTINE GENERAL MEDICAL EXAMINATION AT A HEALTH CARE FACILITY: ICD-10-CM

## 2025-07-14 DIAGNOSIS — M35.00 SICCA SYNDROME: ICD-10-CM

## 2025-07-14 DIAGNOSIS — L93.0 DISCOID LUPUS: Primary | ICD-10-CM

## 2025-07-14 DIAGNOSIS — M25.462 EFFUSION OF LEFT KNEE: ICD-10-CM

## 2025-07-14 DIAGNOSIS — R74.8 ACID PHOSPHATASE ELEVATED: ICD-10-CM

## 2025-07-14 DIAGNOSIS — M25.50 PAIN IN JOINT, MULTIPLE SITES: ICD-10-CM

## 2025-07-14 DIAGNOSIS — Z98.890 S/P ACL RECONSTRUCTION: ICD-10-CM

## 2025-07-14 DIAGNOSIS — H16.009 CORNEAL ULCER, UNSPECIFIED LATERALITY: ICD-10-CM

## 2025-07-14 DIAGNOSIS — L93.0 DISCOID LUPUS: ICD-10-CM

## 2025-07-14 LAB
ALT SERPL W P-5'-P-CCNC: 64 U/L (ref 1–33)
AST SERPL-CCNC: 49 U/L (ref 1–32)
BASOPHILS # BLD AUTO: 0.03 10*3/MM3 (ref 0–0.2)
BASOPHILS NFR BLD AUTO: 0.7 % (ref 0–1.5)
BILIRUB UR QL STRIP: NEGATIVE
C3 SERPL-MCNC: 121 MG/DL (ref 82–167)
C4 SERPL-MCNC: 10 MG/DL (ref 14–44)
CLARITY UR: CLEAR
COLOR UR: YELLOW
CREAT UR-MCNC: 145.9 MG/DL
CRP SERPL-MCNC: <0.3 MG/DL (ref 0–0.5)
DEPRECATED RDW RBC AUTO: 44.2 FL (ref 37–54)
EOSINOPHIL # BLD AUTO: 0.08 10*3/MM3 (ref 0–0.4)
EOSINOPHIL NFR BLD AUTO: 1.9 % (ref 0.3–6.2)
ERYTHROCYTE [DISTWIDTH] IN BLOOD BY AUTOMATED COUNT: 14 % (ref 12.3–15.4)
ERYTHROCYTE [SEDIMENTATION RATE] IN BLOOD: 6 MM/HR (ref 0–20)
GLUCOSE UR STRIP-MCNC: NEGATIVE MG/DL
HBV SURFACE AB SER RIA-ACNC: REACTIVE
HBV SURFACE AG SERPL QL IA: NORMAL
HCT VFR BLD AUTO: 38 % (ref 34–46.6)
HCV AB SER QL: NORMAL
HGB BLD-MCNC: 12.8 G/DL (ref 12–15.9)
HGB UR QL STRIP.AUTO: NEGATIVE
IMM GRANULOCYTES # BLD AUTO: 0.04 10*3/MM3 (ref 0–0.05)
IMM GRANULOCYTES NFR BLD AUTO: 0.9 % (ref 0–0.5)
KETONES UR QL STRIP: NEGATIVE
LEUKOCYTE ESTERASE UR QL STRIP.AUTO: NEGATIVE
LYMPHOCYTES # BLD AUTO: 1.22 10*3/MM3 (ref 0.7–3.1)
LYMPHOCYTES NFR BLD AUTO: 28.7 % (ref 19.6–45.3)
MCH RBC QN AUTO: 30.2 PG (ref 26.6–33)
MCHC RBC AUTO-ENTMCNC: 33.7 G/DL (ref 31.5–35.7)
MCV RBC AUTO: 89.6 FL (ref 79–97)
MONOCYTES # BLD AUTO: 0.56 10*3/MM3 (ref 0.1–0.9)
MONOCYTES NFR BLD AUTO: 13.2 % (ref 5–12)
NEUTROPHILS NFR BLD AUTO: 2.32 10*3/MM3 (ref 1.7–7)
NEUTROPHILS NFR BLD AUTO: 54.6 % (ref 42.7–76)
NITRITE UR QL STRIP: NEGATIVE
NRBC BLD AUTO-RTO: 0 /100 WBC (ref 0–0.2)
PH UR STRIP.AUTO: 8.5 [PH] (ref 5–8)
PLATELET # BLD AUTO: 218 10*3/MM3 (ref 140–450)
PMV BLD AUTO: 9.6 FL (ref 6–12)
PROT ?TM UR-MCNC: 8.6 MG/DL
PROT UR QL STRIP: ABNORMAL
PROT/CREAT UR: 58.9 MG/G CREA (ref 0–200)
RBC # BLD AUTO: 4.24 10*6/MM3 (ref 3.77–5.28)
SP GR UR STRIP: 1.02 (ref 1–1.03)
UROBILINOGEN UR QL STRIP: ABNORMAL
WBC NRBC COR # BLD AUTO: 4.25 10*3/MM3 (ref 3.4–10.8)

## 2025-07-14 PROCEDURE — 86706 HEP B SURFACE ANTIBODY: CPT

## 2025-07-14 PROCEDURE — 36415 COLL VENOUS BLD VENIPUNCTURE: CPT

## 2025-07-14 PROCEDURE — 87340 HEPATITIS B SURFACE AG IA: CPT

## 2025-07-14 PROCEDURE — 99214 OFFICE O/P EST MOD 30 MIN: CPT | Performed by: ORTHOPAEDIC SURGERY

## 2025-07-14 PROCEDURE — 1159F MED LIST DOCD IN RCRD: CPT | Performed by: ORTHOPAEDIC SURGERY

## 2025-07-14 PROCEDURE — 81003 URINALYSIS AUTO W/O SCOPE: CPT

## 2025-07-14 PROCEDURE — 84156 ASSAY OF PROTEIN URINE: CPT

## 2025-07-14 PROCEDURE — 84450 TRANSFERASE (AST) (SGOT): CPT

## 2025-07-14 PROCEDURE — 86803 HEPATITIS C AB TEST: CPT

## 2025-07-14 PROCEDURE — 84460 ALANINE AMINO (ALT) (SGPT): CPT

## 2025-07-14 PROCEDURE — 86225 DNA ANTIBODY NATIVE: CPT

## 2025-07-14 PROCEDURE — 85025 COMPLETE CBC W/AUTO DIFF WBC: CPT

## 2025-07-14 PROCEDURE — 85652 RBC SED RATE AUTOMATED: CPT

## 2025-07-14 PROCEDURE — 86480 TB TEST CELL IMMUN MEASURE: CPT

## 2025-07-14 PROCEDURE — 82570 ASSAY OF URINE CREATININE: CPT

## 2025-07-14 PROCEDURE — 86160 COMPLEMENT ANTIGEN: CPT

## 2025-07-14 PROCEDURE — 86140 C-REACTIVE PROTEIN: CPT

## 2025-07-14 PROCEDURE — 1160F RVW MEDS BY RX/DR IN RCRD: CPT | Performed by: ORTHOPAEDIC SURGERY

## 2025-07-14 RX ORDER — MELOXICAM 15 MG/1
15 TABLET ORAL DAILY
Qty: 30 TABLET | Refills: 0 | Status: SHIPPED | OUTPATIENT
Start: 2025-07-14

## 2025-07-14 NOTE — PROGRESS NOTES
15-Dec-2016 12:58 Subjective:     Patient ID: Aide Henry is a 45 y.o. female.    Chief Complaint:  Left knee pain, new exacerbation  Prior post left knee ACL reconstruction with soft tissue allograft, medial meniscal repair-5/28/2024   History of Present Illness  Aide presents clinic today for evaluation of new exacerbation of pain to her left knee, states that started back in May 2025, she is unsure of the exact episode of her pain states that she may have tripped and fallen into an air compressor and feels like her pain got extended worse at that point in time.  Rates her pain as severe in intensity 9 out of 10 crushing throbbing stabbing shooting grinding aching dull and burning in nature with associated swelling stiffness giving way clicking and popping to her knee worse with standing sitting working driving walking running climbing stairs leisure activities.  Limited improvement with Celebrex over-the-counter as well as over-the-counter anti-inflammatory medications including Advil and Aleve.  Localizes pain to the medial and anterior aspect of her left knee worse with prolonged walking weightbearing and deep flexion activities.  She does have some occasional catching as well as buckling episodes per her report.  Denies any fevers chills or sweats.  Denies issues with previous incisions.  Denies hip or groin pain.    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 2-3 times a year    Drug use: Not Currently     Frequency: 1.0 times per  week     Types: Cocaine(coke), Marijuana     Comment: rarely    Sexual activity: Defer      Past Medical History:   Diagnosis Date    Anal fissure     Anxiety     Bacterial vaginosis     Ashton esophagus     Bulging lumbar disc     lower back per patient    Depression with anxiety     Fibromyalgia     Fracture, finger     right hand, 4th finger    Gastroenteritis 2017    Gastroesophageal reflux disease without esophagitis 02/10/2016    Gastroparesis     Hemorrhoids     had a fissure    History of anemia     Hypertension     Hypothyroid     hashimoto    IBS (irritable bowel syndrome)     IC (interstitial cystitis)     Lateral epicondylitis 2013    RHUEMATOLOGIST    Lupus     Migraine     MRSA (methicillin resistant staph aureus) culture positive  ESTIMATE    GROIN AREA     Overactive bladder     Sjogren's syndrome     Urinary tract infection      Past Surgical History:   Procedure Laterality Date    ADENOIDECTOMY      CARPAL TUNNEL RELEASE Bilateral      SECTION      COLONOSCOPY      D & C HYSTEROSCOPY N/A 2021    Procedure: DILATATION AND CURETTAGE HYSTEROSCOPY;  Surgeon: Rancho Mayberry MD;  Location: [facility] OR;  Service: Obstetrics/Gynecology;  Laterality: N/A;    D & C HYSTEROSCOPY ENDOMETRIAL ABLATION N/A 2021    Procedure: DILATATION AND CURETTAGE HYSTEROSCOPY,  NOVASURE ENDOMETRIAL ABLATION;  Surgeon: Rancho Mayberry MD;  Location: [facility] OR;  Service: Obstetrics/Gynecology;  Laterality: N/A;    ENDOSCOPY N/A 05/10/2019    Procedure: ESOPHAGOGASTRODUODENOSCOPY with biopsies;  Surgeon: Shanon Vazquez MD;  Location: [facility] OR;  Service: Gastroenterology    ENDOSCOPY N/A 2021    Procedure: ESOPHAGOGASTRODUODENOSCOPY with biopsies;  Surgeon: Romain Rice MD;  Location: [facility] OR;  Service: Gastroenterology;  Laterality: N/A;  barretts  gastritis    FINGER GANGLION CYST EXCISION Right     middle finger    GASTRIC STIMULATOR IMPLANT  "SURGERY      KNEE ACL RECONSTRUCTION Left 05/28/2024    Procedure: KNEE ANTERIOR CRUCIATE LIGAMENT RECONSTRUCTION WITH ALLOGRAFT, medial meniscal repair;  Surgeon: Joo Rivers MD;  Location:  [facility] OR;  Service: Orthopedics;  Laterality: Left;    MOUTH SURGERY      3 teeth pulled due to sjorgens syndrome    OTHER SURGICAL HISTORY      reversal of tubal    RHINOPLASTY      SHOULDER ARTHROSCOPY Left 09/10/2018    Procedure: SHOULDER ARTHROSCOPY, rotator cuff repair; extensive debridement, open biceps tenodesis;  Surgeon: Joo Rivers MD;  Location:  [facility] OR;  Service: Orthopedics    SHOULDER ARTHROSCOPY W/ ROTATOR CUFF REPAIR Left 05/27/2022    Procedure: SHOULDER ARTHROSCOPY WITH ROTATOR CUFF REPAIR COMPLEX, EXTENSIVE DEBRIDEMENT;  Surgeon: Joo Rivers MD;  Location:  [facility] OR;  Service: Orthopedics;  Laterality: Left;    SHOULDER ARTHROSCOPY W/ ROTATOR CUFF REPAIR Left 06/23/2023    Procedure: SHOULDER ARTHROSCOPY WITH ROTATOR CUFF REPAIR, distal clavicle excision;  Surgeon: Joo Rivers MD;  Location:  [facility] OR;  Service: Orthopedics;  Laterality: Left;    TONSILLECTOMY      TUBAL ABDOMINAL LIGATION      TYMPANOSTOMY TUBE PLACEMENT      VULVA BIOPSY Right 12/12/2024    Procedure: PERINEAL LESION/CYST EXCISION;  Surgeon: Rancho Mayberry MD;  Location: [facility] OR;  Service: Obstetrics/Gynecology;  Laterality: Right;    WISDOM TOOTH EXTRACTION Bilateral        Family History   Problem Relation Age of Onset    Lung cancer Father     Heart disease Paternal Grandmother     Diabetes Maternal Aunt     Breast cancer Maternal Aunt     Diabetes Maternal Grandmother     COPD Mother     Colon cancer Neg Hx     Colon polyps Neg Hx     Malig Hyperthermia Neg Hx          Review of Systems        Objective:  Vitals:    07/14/25 1423   BP: 104/74   Pulse: 94   Weight: 98.3 kg (216 lb 12.8 oz)   Height: 162.6 cm (64.02\")         07/14/25  1423   Weight: 98.3 kg (216 lb 12.8 oz)     Body mass " index is 37.2 kg/m².    Physical Exam    Vital signs reviewed.   General: No acute distress, alert and oriented  Eyes: conjunctiva clear; pupils equally round and reactive  ENT: external ears and nose atraumatic; oropharynx clear  CV: no peripheral edema  Resp: normal respiratory effort  Skin: no rashes or wounds; normal turgor  Psych: mood and affect appropriate; recent and remote memory intact       Physical Exam  Left knee-active range of motion 2 to 125 degrees, 4 out of 5 strength of flexion extension, moderate effusion.  Maximal tenderness palpation medial joint line medial proximal tibia medial femoral condyle, mild tenderness lateral joint line.  Positive Alejandra exam with pain along medial joint line, no click.  Grade 2A Lachman, 1+ anterior drawer with moderate endpoint, negative posterior drawer, negative posterior lateral drawer, negative dial test.  Stable to varus and valgus stress at 0 and 30 degrees.  Significantly positive active patella compression test.  No hip pain on logroll or Stinchfield exam.  Previous incisions well-healed.        Imaging:    MRI Knee Left Arthrogram  Result Date: 7/2/2025   1. Postoperative changes from ACL reconstruction with thin intermediate signal graft material suggesting partial graft tear. Cystic changes at the femoral tunnel 2. Diffuse synovial thickening and/or intra-articular debris, nonspecific thickening of the medial plica, and a large linear 2.3 cm intermediate signal filling defect in the anterior intercondylar knee joint space that could represent torn ACL graft material or a large chondral body. Intermediate signal filling defects in the suprapatellar joint space could represent nodular synovial thickening or intra-articular bodies. 3. Postoperative changes at the medial meniscus with recurrent tear of the medial meniscus body, and anterior meniscal body flap flipped into the medial femoral gutter, and a posterior meniscal body flap flipped into the medial  tibial gutter. 4. New severe chondromalacia in the medial compartment with complete or near complete chondral loss at the weightbearing portions of the medial femoral condyle and medial tibial plateau. 5. Acute or subacute nondisplaced chondral insufficiency type fractures at the medial weightbearing portions of the medial femoral condyle and medial tibial plateau with a large region of abnormal signal abnormality in the medial proximal tibia that likely represents osteonecrosis. 6. Multifocal partial-thickness chondral fissuring at the patella and multifocal full-thickness or near full-thickness chondral fissuring at the central and medial trochlea.    This report was finalized on 7/2/2025 10:15 AM by Kirk Tran MD on Workstation: [id number]        Independent review of outside MRI left knee indicates advanced chondromalacia in the medial femoral condyle medial tibial plateau with insufficiency fractures of the medial femoral condyle and medial tibial plateau and a very large region concerning for possible area of osteonecrosis with full-thickness chondral fissuring noted of the patellofemoral joint as well.  ACL graft appears to be torn at this point in time.    Assessment:        1. S/P ACL reconstruction    2. Effusion of left knee             Assessment & Plan  Discussed with patient that I am concerned about the fact that she has significant bony edema particular the medial tibial plateau as well as medial femoral condyle.  She certainly has advanced degenerative change to her knee at this point in time and I am concerned about the integrity of her ACL graft though she does still have some stability based on her clinical exam.  At this point I recommended transitioning to meloxicam to see if this gives her better relief than her Celebrex as well as obtaining baseline labs including CBC, CRP, and sed rate.  I want to see her back in 4 weeks for reassessment and may consider aspiration of her knee at that  point in time if further workup appears to be warranted for need for additional procedures or for any underlying infectious etiology though it does not seem to be consistent with her clinical exam today.    Aide Henry was in agreement with plan and had all questions answered.     Orders:  Orders Placed This Encounter   Procedures    C-reactive Protein    Sedimentation Rate    CBC & Differential       Medications:  New Medications Ordered This Visit   Medications    meloxicam (MOBIC) 15 MG tablet     Sig: Take 1 tablet by mouth Daily.     Dispense:  30 tablet     Refill:  0       Followup:  Return in about 4 weeks (around 8/11/2025).    Diagnoses and all orders for this visit:    1. S/P ACL reconstruction (Primary)  -     CBC & Differential; Future  -     C-reactive Protein; Future  -     Sedimentation Rate; Future    2. Effusion of left knee  -     CBC & Differential; Future  -     C-reactive Protein; Future  -     Sedimentation Rate; Future    Other orders  -     meloxicam (MOBIC) 15 MG tablet; Take 1 tablet by mouth Daily.  Dispense: 30 tablet; Refill: 0                   Dictated utilizing Dragon dictation     Patient or patient representative verbalized consent for the use of Ambient Listening during the visit with  Joo Rivers MD for chart documentation. 7/14/2025  14:34 EDT

## 2025-07-15 LAB — DSDNA AB SER-ACNC: 2 IU/ML (ref 0–9)

## 2025-07-16 LAB
GAMMA INTERFERON BACKGROUND BLD IA-ACNC: 0.14 IU/ML
M TB IFN-G BLD-IMP: NEGATIVE
M TB IFN-G CD4+ BCKGRND COR BLD-ACNC: 0.12 IU/ML
M TB IFN-G CD4+CD8+ BCKGRND COR BLD-ACNC: 0.12 IU/ML
MITOGEN IGNF BCKGRD COR BLD-ACNC: >10 IU/ML
QUANTIFERON INCUBATION: NORMAL
SERVICE CMNT-IMP: NORMAL

## 2025-07-29 ENCOUNTER — TELEPHONE (OUTPATIENT)
Dept: ORTHOPEDIC SURGERY | Facility: CLINIC | Age: 45
End: 2025-07-29
Payer: COMMERCIAL

## 2025-07-29 NOTE — TELEPHONE ENCOUNTER
Patient states the meloxicam has not helped with her swelling and her pain has gotten significantly worse since visit on 7/14.  She has a follow up on 8/20 and is asking if something else can be done or if she needs to be seen sooner.  She is very concerned that her knee is getting worse.

## 2025-07-30 RX ORDER — PREDNISONE 10 MG/1
TABLET ORAL
Qty: 39 TABLET | Refills: 0 | Status: SHIPPED | OUTPATIENT
Start: 2025-07-30

## 2025-08-05 ENCOUNTER — TELEPHONE (OUTPATIENT)
Dept: ORTHOPEDIC SURGERY | Facility: CLINIC | Age: 45
End: 2025-08-05
Payer: COMMERCIAL

## 2025-08-13 ENCOUNTER — LAB (OUTPATIENT)
Dept: LAB | Facility: HOSPITAL | Age: 45
End: 2025-08-13
Payer: COMMERCIAL

## 2025-08-13 DIAGNOSIS — M25.462 EFFUSION OF LEFT KNEE: ICD-10-CM

## 2025-08-13 DIAGNOSIS — M17.12 PRIMARY OSTEOARTHRITIS OF LEFT KNEE: ICD-10-CM

## 2025-08-13 DIAGNOSIS — Z98.890 S/P ACL RECONSTRUCTION: ICD-10-CM

## 2025-08-13 LAB
APPEARANCE FLD: ABNORMAL
BASOPHILS NFR FLD: 0 %
BLASTS NFR FLD: 0 %
COLOR FLD: ABNORMAL
CRYSTALS FLD MICRO: NORMAL
EOSINOPHIL NFR FLD MANUAL: 0 %
LYMPHOCYTES NFR FLD MANUAL: 97 %
MACROPHAGE FLUID %: 0 %
MESOTHL CELL NFR FLD MANUAL: 0 %
MONOCYTES NFR FLD: 0 %
MONOS+MACROS NFR FLD: 0 %
NEUTROPHILS NFR FLD MANUAL: 3 %
NRBC FLD-RTO: 0 /100 WBCS
OTHER CELLS FLUID PER 100/WBCS: 0 /100 WBCS
PLASMA CELLS NFR FLD: 0 %
RBC # FLD AUTO: 3000 /MM3
UNCLASSIFIED CELLS, FLUID %: 0 %
WBC # FLD AUTO: 562 /MM3

## 2025-08-13 PROCEDURE — 87070 CULTURE OTHR SPECIMN AEROBIC: CPT

## 2025-08-13 PROCEDURE — 89060 EXAM SYNOVIAL FLUID CRYSTALS: CPT

## 2025-08-13 PROCEDURE — 87205 SMEAR GRAM STAIN: CPT

## 2025-08-13 PROCEDURE — 89051 BODY FLUID CELL COUNT: CPT

## 2025-08-18 LAB
BACTERIA FLD CULT: NORMAL
GRAM STN SPEC: NORMAL
GRAM STN SPEC: NORMAL

## 2025-08-22 RX ORDER — MELOXICAM 15 MG/1
15 TABLET ORAL DAILY
Qty: 30 TABLET | Refills: 0 | Status: SHIPPED | OUTPATIENT
Start: 2025-08-22

## (undated) DEVICE — Device: Brand: DEFENDO AIR/WATER/SUCTION AND BIOPSY VALVE

## (undated) DEVICE — BW-412T DISP COMBO CLEANING BRUSH: Brand: SINGLE USE COMBINATION CLEANING BRUSH

## (undated) DEVICE — TUBING, SUCTION, 1/4" X 12', STRAIGHT: Brand: MEDLINE

## (undated) DEVICE — SUCTION CANISTER, 1000CC,SAFELINER: Brand: DEROYAL

## (undated) DEVICE — SOL ISO/ALC RUB 70PCT 4OZ

## (undated) DEVICE — SUT ETHLN 3/0 PS2 18 IN 1669H

## (undated) DEVICE — SPNG GZ WOVN 4X4IN 12PLY 10/BX STRL

## (undated) DEVICE — GLV SURG NEOLON 2G PF LF 7.5 STRL

## (undated) DEVICE — ANTIBACTERIAL UNDYED BRAIDED (POLYGLACTIN 910), SYNTHETIC ABSORBABLE SUTURE: Brand: COATED VICRYL

## (undated) DEVICE — WEREWOLF FLOW 50 COBLATION WAND: Brand: COBLATION

## (undated) DEVICE — Device: Brand: FOG GUARD ANTI-FOG SOLUTION WITH SPONGE

## (undated) DEVICE — GLV SURG SENSICARE PI PF LF 7 GRN STRL

## (undated) DEVICE — ST TB GOFLO STRL

## (undated) DEVICE — 1000ML,PRESSURE INFUSER W/STOPCOCK: Brand: MEDLINE

## (undated) DEVICE — SYR LL 3CC

## (undated) DEVICE — WRAP KNEE COLD THERAPY

## (undated) DEVICE — 3M™ STERI-STRIP™ REINFORCED ADHESIVE SKIN CLOSURES, R1547, 1/2 IN X 4 IN (12 MM X 100 MM), 6 STRIPS/ENVELOPE: Brand: 3M™ STERI-STRIP™

## (undated) DEVICE — NDL HYPO SFTY GLD 22G 1 1/2IN

## (undated) DEVICE — SUT PDS 0 CT1 36IN Z346H

## (undated) DEVICE — SYR LL TP 10ML STRL

## (undated) DEVICE — GLV SURG SENSICARE MICRO PF LF 6 STRL

## (undated) DEVICE — DRSNG SURESITE WNDW 4X4.5

## (undated) DEVICE — PREP SOL POVIDONE/IODINE BT 4OZ

## (undated) DEVICE — CANNULA THREADED FLEX 8.0 X 72MM: Brand: CLEAR-TRAC

## (undated) DEVICE — TOWEL,OR,DSP,ST,BLUE,STD,4/PK,20PK/CS: Brand: MEDLINE

## (undated) DEVICE — GLV SURG SENSICARE W/ALOE PF LF 7.5 STRL

## (undated) DEVICE — CURETTE ENDOMTRL SXN

## (undated) DEVICE — CLEAR-TRAC DISPOSABLE STOPCOCK: Brand: CLEAR-TRAC

## (undated) DEVICE — FRCP BX RADJAW4 NDL 2.8 240CM LG OG BX40

## (undated) DEVICE — 1.2 RAP-PAC B LATEX FREE: Brand: RAP-PAC

## (undated) DEVICE — SYR LUERLOK 50ML

## (undated) DEVICE — SEAL HYSTERSCOPE/OUTFLOW CHANNEL MYOSURE

## (undated) DEVICE — SUT MNCRYL 3/0 PS2 18IN Y497G

## (undated) DEVICE — LAG PERI GYN: Brand: MEDLINE INDUSTRIES, INC.

## (undated) DEVICE — GLV SURG SENSICARE ORTHO PF LF 7 STRL

## (undated) DEVICE — 3M™ IOBAN™ 2 ANTIMICROBIAL INCISE DRAPE 6650EZ: Brand: IOBAN™ 2

## (undated) DEVICE — DRSNG GZ PETROLTM XEROFORM CURAD 1X8IN STRL

## (undated) DEVICE — 3M™ STERI-DRAPE™ U-DRAPE 1015: Brand: STERI-DRAPE™

## (undated) DEVICE — PK BASIC ORTHO 90

## (undated) DEVICE — PILOT DRILL FOR USE WITH MITEK CORTICAL & CANCELLOUS SCREWS 3.2MM

## (undated) DEVICE — Device

## (undated) DEVICE — GLV SURG SENSICARE PI LF PF 7.0

## (undated) DEVICE — SUT VIC 0 UR6 27IN VCP603H

## (undated) DEVICE — DRSNG PAD ABD 8X10IN STRL

## (undated) DEVICE — CYSTO/BLADDER IRRIGATION SET, REGULATING CLAMP

## (undated) DEVICE — DECANT BG O JET

## (undated) DEVICE — GLV SURG SENSICARE PI MIC PF SZ7.5 LF STRL

## (undated) DEVICE — WRAP SHOULDER COLD THERAPY

## (undated) DEVICE — COVER,MAYO STAND,STERILE: Brand: MEDLINE

## (undated) DEVICE — NDL FLTR BLNT 18G 1 1/2IN

## (undated) DEVICE — TB SXN FRAZIER 10F STRL

## (undated) DEVICE — GLV SURG SENSICARE PF POLYISPRN SZ8 LF

## (undated) DEVICE — MASK,FACE,FLUID RES,SHLD,FOGFREE,TIES: Brand: MEDLINE

## (undated) DEVICE — SPIKED WASHER 17MM
Type: IMPLANTABLE DEVICE | Site: KNEE | Status: NON-FUNCTIONAL
Removed: 2024-05-28

## (undated) DEVICE — SOL IRR H2O BO 1000ML STRL

## (undated) DEVICE — FIRSTPASS ST SUTURE PASSER, SELF CAPTURE: Brand: FIRSTPASS

## (undated) DEVICE — ENDOSCOPIC CANNULATED DRILL BIT,                                    4.5 MM, STERILE

## (undated) DEVICE — PIPET CURET 3MM

## (undated) DEVICE — TBG PENCL TELESCP MEGADYNE SMOKE EVAC 10FT

## (undated) DEVICE — SYR LUERLOK 20CC

## (undated) DEVICE — JACKT LAB F/R KNIT CUFF/COLR XLG BLU

## (undated) DEVICE — TRAP FLD MINIVAC MEGADYNE 100ML

## (undated) DEVICE — INTENT TO BE USED WITH SUTURE MATERIAL FOR TISSUE CLOSURE: Brand: RICHARD-ALLAN® NEEDLE 1/2 CIRCLE TAPER

## (undated) DEVICE — SUT ABS VICRLY/PLS COAT CR 2/0 CP/2/REV/CUT/NDL 8X18IN UD

## (undated) DEVICE — T-MAX DISPOSABLE FACE MASK 8 PER BOX

## (undated) DEVICE — VIAL FORMALIN CAP 10P 40ML

## (undated) DEVICE — ACCU-PASS SUTURE SHUTTLE 45                                    DEGREE, UPBEND, STERILE: Brand: ACCU-PASS

## (undated) DEVICE — DISPOSABLE TOURNIQUET CUFF 34"X4", 1-LINE, BLUE, STERILE, 1EA/PK, 10PK/CS: Brand: ASP MEDICAL

## (undated) DEVICE — ADAPT DB SPIKE 2PCT FOR AR6400 TBG

## (undated) DEVICE — CANNULA THREADED FLEX 5.5 X 72MM: Brand: CLEAR-TRAC

## (undated) DEVICE — GAUZE,SPONGE,4"X4",16PLY,STRL,LF,10/TRAY: Brand: MEDLINE

## (undated) DEVICE — SHOULDER STABILIZATION KIT,                                    DISPOSABLE 12 PER BOX

## (undated) DEVICE — DRESSING,GAUZE,XEROFORM,CURAD,1"X8",ST: Brand: CURAD

## (undated) DEVICE — JACKT LAB KNIT COLR LG BLU

## (undated) DEVICE — PENCL SMOKE/EVAC MEGADYNE TELESCP 10FT

## (undated) DEVICE — APPL CHLORAPREP HI/LITE 26ML ORNG

## (undated) DEVICE — INTENDED FOR TISSUE SEPARATION, AND OTHER PROCEDURES THAT REQUIRE A SHARP SURGICAL BLADE TO PUNCTURE OR CUT.: Brand: BARD-PARKER ® STAINLESS STEEL BLADES

## (undated) DEVICE — PADDING,UNDERCAST,COTTON, 4"X4YD STERILE: Brand: MEDLINE

## (undated) DEVICE — TUBING, SUCTION, 1/4" X 20', STRAIGHT: Brand: MEDLINE INDUSTRIES, INC.

## (undated) DEVICE — GOWN,PREVENTION PLUS,XLNG/XXLARGE,STRL: Brand: MEDLINE

## (undated) DEVICE — SYS SKIN CLS DERMABOND PRINEO W/22CM MESH TP

## (undated) DEVICE — CONN TBG Y 5 IN 1 LF STRL

## (undated) DEVICE — KT ORCA ORCAPOD DISP STRL

## (undated) DEVICE — SUCTION CANISTER, 3000CC,SAFELINER: Brand: DEROYAL

## (undated) DEVICE — GLV SURG NEOPRN SENSICARE SZ8

## (undated) DEVICE — GOWN,PREVENTION PLUS,XXLARGE,STERILE: Brand: MEDLINE

## (undated) DEVICE — PAD,NON-ADHERENT,3X8,STERILE,LF,1/PK: Brand: MEDLINE

## (undated) DEVICE — APPL CHLORAPREP W/TINT 26ML ORNG

## (undated) DEVICE — DYONICS 5.5 FULL RADIUS BONECUTTER                                    BLADES, ORANGE, 8000 MAXIMUM RPM,                                    PACKAGED 6 PER BOX, STERILE

## (undated) DEVICE — WEREWOLF FLOW 90 COBLATION WAND: Brand: COBLATION

## (undated) DEVICE — 1010 S-DRAPE TOWEL DRAPE 10/BX: Brand: STERI-DRAPE™

## (undated) DEVICE — LAG ARTHROSCOPY: Brand: MEDLINE INDUSTRIES, INC.

## (undated) DEVICE — FAST-FIX 360 STRAIGHT KNOT                                    PUSHER/CUTTER AND SLOTTED CANNULA SETS: Brand: FAST-FIX

## (undated) DEVICE — PK SHLDR ARTHSCP 90

## (undated) DEVICE — DECANTER: Brand: UNBRANDED

## (undated) DEVICE — THIN OFFSET (9.0 X 0.38 X 25.0MM)

## (undated) DEVICE — ACCU-PASS SUTURE SHUTTLE 45                                    DEGREE, RIGHT, STERILE: Brand: ACCU-PASS

## (undated) DEVICE — THE BITE BLOCK MAXI, LATEX FREE STRAP IS USED TO PROTECT THE ENDOSCOPE INSERTION TUBE FROM BEING BITTEN BY THE PATIENT.

## (undated) DEVICE — MAT FLR ABSORBENT LG 4FT 10 2.5FT

## (undated) DEVICE — Q-FIX REUSABLE 2.8MM PATHFINDER OBTURATOR: Brand: Q-FIX

## (undated) DEVICE — SKIN PREP TRAY W/CHG: Brand: MEDLINE INDUSTRIES, INC.

## (undated) DEVICE — NDL HYPO PRECISIONGLIDE/REG 18G 1IN PNK

## (undated) DEVICE — SYR LUERLOK 20CC BX/50

## (undated) DEVICE — 3M™ MEDIPORE™ H SOFT CLOTH SURGICAL TAPE 2864, 4 INCH X 10 YARD (10CM X 9,14M), 12 ROLLS/CASE: Brand: 3M™ MEDIPORE™

## (undated) DEVICE — ACUFEX TRUNAV RETROGRADE DRILL                                    10.5 MM: Brand: ACUFEX

## (undated) DEVICE — 4.5 FULL RADIUS BONECUTTER BLADES,                                    YELLOW, 8000 MAXIMUM RPM, PACKAGED 6                                    PER BOX, STERILE

## (undated) DEVICE — SYS CLS SKIN PREMIERPRO EXOFINFUSION 22CM

## (undated) DEVICE — SUPER TURBOVAC 90 IFS: Brand: COBLATION

## (undated) DEVICE — YANKAUER,BULB TIP,W/O VENT,RIGID,STERILE: Brand: MEDLINE

## (undated) DEVICE — ACCU-PASS SUTURE SHUTTLE,                                    MONOFILAMENT SIZE NO.1, SINGLE PACK, STERILE: Brand: ACCU-PASS

## (undated) DEVICE — PROB ABL ENDOMTRL NOVASURE/G4 IMPEDENCE 1P/U

## (undated) DEVICE — DRSNG TELFA PAD NONADH STR 1S 3X8IN

## (undated) DEVICE — SOL IRR H2O BTL 1000ML STRL

## (undated) DEVICE — OCCLUSIVE GAUZE STRIP,3% BISMUTH TRIBROMOPHENATE IN PETROLATUM BLEND: Brand: XEROFORM

## (undated) DEVICE — BRACE P/OP KN XROM TELESCP ADJ UNIV

## (undated) DEVICE — BNDG ELAS ELITE V/CLOSE 4IN 5YD LF

## (undated) DEVICE — GOWN ISOL W/THUMB UNIV BLU BX/15

## (undated) DEVICE — VAGINAL PACKING: Brand: DEROYAL